# Patient Record
Sex: FEMALE | Race: WHITE | NOT HISPANIC OR LATINO | Employment: PART TIME | URBAN - METROPOLITAN AREA
[De-identification: names, ages, dates, MRNs, and addresses within clinical notes are randomized per-mention and may not be internally consistent; named-entity substitution may affect disease eponyms.]

---

## 2018-01-16 ENCOUNTER — HOSPITAL ENCOUNTER (EMERGENCY)
Facility: CLINIC | Age: 33
Discharge: HOME | End: 2018-01-16
Attending: EMERGENCY MEDICINE | Admitting: EMERGENCY MEDICINE
Payer: MEDICAID

## 2018-01-16 VITALS
TEMPERATURE: 100.2 F | OXYGEN SATURATION: 100 % | DIASTOLIC BLOOD PRESSURE: 59 MMHG | SYSTOLIC BLOOD PRESSURE: 122 MMHG | HEART RATE: 104 BPM | RESPIRATION RATE: 28 BRPM

## 2018-01-16 DIAGNOSIS — E86.0 DEHYDRATION: ICD-10-CM

## 2018-01-16 DIAGNOSIS — R11.15 NON-INTRACTABLE CYCLICAL VOMITING WITH NAUSEA: ICD-10-CM

## 2018-01-16 DIAGNOSIS — R11.2 NON-INTRACTABLE VOMITING WITH NAUSEA, UNSPECIFIED VOMITING TYPE: ICD-10-CM

## 2018-01-16 LAB
ALBUMIN SERPL-MCNC: 4.1 G/DL (ref 3.4–5)
ALP SERPL-CCNC: 59 U/L (ref 40–150)
ALT SERPL W P-5'-P-CCNC: 17 U/L (ref 0–50)
ANION GAP SERPL CALCULATED.3IONS-SCNC: 11 MMOL/L (ref 3–14)
AST SERPL W P-5'-P-CCNC: 15 U/L (ref 0–45)
BASOPHILS # BLD AUTO: 0 10E9/L (ref 0–0.2)
BASOPHILS NFR BLD AUTO: 0.4 %
BILIRUB SERPL-MCNC: 1.4 MG/DL (ref 0.2–1.3)
BUN SERPL-MCNC: 12 MG/DL (ref 7–30)
CALCIUM SERPL-MCNC: 8.7 MG/DL (ref 8.5–10.1)
CHLORIDE SERPL-SCNC: 107 MMOL/L (ref 94–109)
CO2 SERPL-SCNC: 20 MMOL/L (ref 20–32)
CREAT SERPL-MCNC: 0.71 MG/DL (ref 0.52–1.04)
DIFFERENTIAL METHOD BLD: ABNORMAL
EOSINOPHIL # BLD AUTO: 0 10E9/L (ref 0–0.7)
EOSINOPHIL NFR BLD AUTO: 0.2 %
ERYTHROCYTE [DISTWIDTH] IN BLOOD BY AUTOMATED COUNT: 13.5 % (ref 10–15)
GFR SERPL CREATININE-BSD FRML MDRD: >90 ML/MIN/1.7M2
GLUCOSE SERPL-MCNC: 110 MG/DL (ref 70–99)
HCT VFR BLD AUTO: 39.6 % (ref 35–47)
HGB BLD-MCNC: 13.6 G/DL (ref 11.7–15.7)
IMM GRANULOCYTES # BLD: 0 10E9/L (ref 0–0.4)
IMM GRANULOCYTES NFR BLD: 0.2 %
LYMPHOCYTES # BLD AUTO: 0.3 10E9/L (ref 0.8–5.3)
LYMPHOCYTES NFR BLD AUTO: 5.5 %
MCH RBC QN AUTO: 29.8 PG (ref 26.5–33)
MCHC RBC AUTO-ENTMCNC: 34.3 G/DL (ref 31.5–36.5)
MCV RBC AUTO: 87 FL (ref 78–100)
MONOCYTES # BLD AUTO: 0.8 10E9/L (ref 0–1.3)
MONOCYTES NFR BLD AUTO: 15.1 %
NEUTROPHILS # BLD AUTO: 4.1 10E9/L (ref 1.6–8.3)
NEUTROPHILS NFR BLD AUTO: 78.6 %
NRBC # BLD AUTO: 0 10*3/UL
NRBC BLD AUTO-RTO: 0 /100
PLATELET # BLD AUTO: 190 10E9/L (ref 150–450)
POTASSIUM SERPL-SCNC: 3.2 MMOL/L (ref 3.4–5.3)
PROT SERPL-MCNC: 8.4 G/DL (ref 6.8–8.8)
RBC # BLD AUTO: 4.57 10E12/L (ref 3.8–5.2)
SODIUM SERPL-SCNC: 138 MMOL/L (ref 133–144)
WBC # BLD AUTO: 5.2 10E9/L (ref 4–11)

## 2018-01-16 PROCEDURE — 96361 HYDRATE IV INFUSION ADD-ON: CPT

## 2018-01-16 PROCEDURE — 96375 TX/PRO/DX INJ NEW DRUG ADDON: CPT

## 2018-01-16 PROCEDURE — 80053 COMPREHEN METABOLIC PANEL: CPT | Performed by: EMERGENCY MEDICINE

## 2018-01-16 PROCEDURE — 96376 TX/PRO/DX INJ SAME DRUG ADON: CPT

## 2018-01-16 PROCEDURE — 99284 EMERGENCY DEPT VISIT MOD MDM: CPT | Mod: 25

## 2018-01-16 PROCEDURE — 96374 THER/PROPH/DIAG INJ IV PUSH: CPT

## 2018-01-16 PROCEDURE — 25000132 ZZH RX MED GY IP 250 OP 250 PS 637: Performed by: EMERGENCY MEDICINE

## 2018-01-16 PROCEDURE — 93005 ELECTROCARDIOGRAM TRACING: CPT

## 2018-01-16 PROCEDURE — 25000128 H RX IP 250 OP 636: Performed by: EMERGENCY MEDICINE

## 2018-01-16 PROCEDURE — 85025 COMPLETE CBC W/AUTO DIFF WBC: CPT | Performed by: EMERGENCY MEDICINE

## 2018-01-16 RX ORDER — ONDANSETRON 2 MG/ML
4 INJECTION INTRAMUSCULAR; INTRAVENOUS EVERY 30 MIN PRN
Status: COMPLETED | OUTPATIENT
Start: 2018-01-16 | End: 2018-01-16

## 2018-01-16 RX ORDER — PROMETHAZINE HYDROCHLORIDE 25 MG/ML
12.5 INJECTION, SOLUTION INTRAMUSCULAR; INTRAVENOUS ONCE
Status: COMPLETED | OUTPATIENT
Start: 2018-01-16 | End: 2018-01-16

## 2018-01-16 RX ORDER — PROMETHAZINE HYDROCHLORIDE 25 MG/1
25 TABLET ORAL EVERY 6 HOURS PRN
Qty: 20 TABLET | Refills: 0 | Status: ON HOLD | OUTPATIENT
Start: 2018-01-16 | End: 2018-01-21

## 2018-01-16 RX ORDER — POTASSIUM CHLORIDE 1500 MG/1
20 TABLET, EXTENDED RELEASE ORAL ONCE
Status: COMPLETED | OUTPATIENT
Start: 2018-01-16 | End: 2018-01-16

## 2018-01-16 RX ADMIN — PROMETHAZINE HYDROCHLORIDE 12.5 MG: 25 INJECTION INTRAMUSCULAR; INTRAVENOUS at 22:14

## 2018-01-16 RX ADMIN — SODIUM CHLORIDE, POTASSIUM CHLORIDE, SODIUM LACTATE AND CALCIUM CHLORIDE 1000 ML: 600; 310; 30; 20 INJECTION, SOLUTION INTRAVENOUS at 22:12

## 2018-01-16 RX ADMIN — ONDANSETRON 4 MG: 2 INJECTION INTRAMUSCULAR; INTRAVENOUS at 19:52

## 2018-01-16 RX ADMIN — ONDANSETRON 4 MG: 2 INJECTION INTRAMUSCULAR; INTRAVENOUS at 20:22

## 2018-01-16 RX ADMIN — POTASSIUM CHLORIDE 20 MEQ: 1500 TABLET, EXTENDED RELEASE ORAL at 23:11

## 2018-01-16 RX ADMIN — ONDANSETRON 4 MG: 2 INJECTION INTRAMUSCULAR; INTRAVENOUS at 21:35

## 2018-01-16 RX ADMIN — SODIUM CHLORIDE, POTASSIUM CHLORIDE, SODIUM LACTATE AND CALCIUM CHLORIDE 1000 ML: 600; 310; 30; 20 INJECTION, SOLUTION INTRAVENOUS at 19:50

## 2018-01-16 ASSESSMENT — ENCOUNTER SYMPTOMS
VOMITING: 1
DIARRHEA: 0
NAUSEA: 1
COUGH: 1
FEVER: 1
CHILLS: 1

## 2018-01-16 NOTE — ED AVS SNAPSHOT
[facility] Emergency Department    [address] [place] [address]    Phone:  [phone]    Fax:  [phone]                                       Ashlie Matthews   MRN: [id number]    Department:  [facility] Emergency Department   Date of Visit:  1/16/2018           Patient Information     Date Of Birth          1985        Your diagnoses for this visit were:     Non-intractable cyclical vomiting with nausea     Dehydration     Non-intractable vomiting with nausea, unspecified vomiting type        You were seen by Carlos A Galeas MD.      Follow-up Information     Follow up with Clinic, [facility] In 1 day.    Why:  As needed    Contact information:    [id number]  [facility] [id number] [phone]          Discharge Instructions         The Flu (Influenza)     The virus that causes the flu spreads through the air in droplets when someone who has the flu coughs, sneezes, laughs, or talks.   The flu (influenza) is an infection that affects your respiratory tract. This tract is made up of your mouth, nose, and lungs, and the passages between them. Unlike a cold, the flu can make you very ill. And it can lead to pneumonia, a serious lung infection. The flu can have serious complications and even cause death.  Who is at risk for the flu?  Anyone can get the flu. But you are more likely to become infected if you:    Have a weakened immune system    Work in a healthcare setting where you may be exposed to flu germs    Live or work with someone who has the flu    Haven t had an annual flu shot  How does the flu spread?  The flu is caused by a virus. The virus spreads through the air in droplets when someone who has the flu coughs, sneezes, laughs, or talks. You can become infected when you inhale these viruses directly. You can also become infected when you touch a surface on which the droplets have landed and then transfer the germs to your  eyes, nose, or mouth. Touching used tissues, or sharing utensils, drinking glasses, or a toothbrush from an infected person can expose you to flu viruses, too.  What are the symptoms of the flu?  Flu symptoms tend to come on quickly and may last a few days to a few weeks. They include:    Fever usually higher than 100.4 F  (38 C) and chills    Sore throat and headache    Dry cough    Runny nose    Tiredness and weakness    Muscle aches  Who is at risk for flu complications?  For some people, the flu can be very serious. The risk for complications is greater for:    Children younger than age 5    Adults ages 65 and older    People with a chronic illness such as diabetes or heart, kidney, or lung disease    People who live in a nursing home or long-term care facility   How is the flu treated?  The flu usually gets better after 7 days or so. In some cases, your healthcare provider may prescribe an antiviral medicine. This may help you get well a little sooner. For the medicine to help, you need to take it as soon as possible (ideally within 48 hours) after your symptoms start. If you develop pneumonia or other serious illness, you may need to stay in the hospital.  Easing flu symptoms    Drink lots of fluids such as water, juice, and warm soup. A good rule is to drink enough so that you urinate your normal amount.    Get plenty of rest.    Ask your healthcare provider what to take for fever and pain.    Call your provider if your fever is 100.4 F (38 C) or higher, or you become dizzy, lightheaded, or short of breath.  Taking steps to protect others    Wash your hands often, especially after coughing or sneezing. Or clean your hands with an alcohol-based hand  containing at least 60% alcohol.    Cough or sneeze into a tissue. Then throw the tissue away and wash your hands. If you don t have a tissue, cough and sneeze into your elbow.    Stay home until at least 24 hours after you no longer have a fever or chills.  Be sure the fever isn t being hidden by fever-reducing medicine.    Don t share food, utensils, drinking glasses, or a toothbrush with others.    Ask your healthcare provider if others in your household should get antiviral medicine to help them avoid infection.  How can the flu be prevented?    One of the best ways to avoid the flu is to get a flu vaccine each year. The virus that causes the flu changes from year to year. For that reason, healthcare providers recommend getting the flu vaccine each year, as soon as it's available in your area. The vaccine is given as a shot. Your healthcare provider can tell you which vaccine is right for you. A nasal spray is also available but is not recommended for the 1790-5826 flu season. The CDC says this is because the nasal spray did not seem to protect against the flu over the last several flu seasons. In the past, it was meant for people ages 2 to 49.    Wash your hands often. Frequent handwashing is a proven way to help prevent infection.    Carry an alcohol-based hand gel containing at least 60% alcohol. Use it when you can't use soap and water. Then wash your hands as soon as you can.    Avoid touching your eyes, nose, and mouth.    At home and work, clean phones, computer keyboards, and toys often with disinfectant wipes.    If possible, avoid close contact with others who have the flu or symptoms of the flu.  Handwashing tips  Handwashing is one of the best ways to prevent many common infections. If you are caring for or visiting someone with the flu, wash your hands each time you enter and leave the room. Follow these steps:    Use warm water and plenty of soap. Rub your hands together well.    Clean the whole hand, including under your nails, between your fingers, and up the wrists.    Wash for at least 15 seconds.    Rinse, letting the water run down your fingers, not up your wrists.    Dry your hands well. Use a paper towel to turn off the faucet and open the  door.  Using alcohol-based hand   Alcohol-based hand  are also a good choice. Use them when you can't use soap and water. Follow these steps:    Squeeze about a tablespoon of gel into the palm of one hand.    Rub your hands together briskly, cleaning the backs of your hands, the palms, between your fingers, and up the wrists.    Rub until the gel is gone and your hands are completely dry.  Preventing the flu in healthcare settings  The flu is a special concern for people in hospitals and long-term care facilities. To help prevent the spread of flu, many hospitals and nursing homes take these steps:    Healthcare providers wash their hands or use an alcohol-based hand  before and after treating each patient.    People with the flu have private rooms and bathrooms or share a room with someone with the same infection.    People who are at high risk for the flu but don't have it are encouraged to get the flu and pneumonia vaccines.    All healthcare workers are encouraged or required to get flu shots.   Date Last Reviewed: 12/1/2016 2000-2017 [employer]. [address] [place] [address]. All rights reserved. This information is not intended as a substitute for professional medical care. Always follow your healthcare professional's instructions.        Discharge Instructions  Vomiting    You have been seen today for vomiting. This is usually caused by a virus, but some bacteria, parasites, medicines or other medical conditions can cause similar symptoms. At this time your doctor does not find that your vomiting is a sign of anything dangerous or life-threatening. However, sometimes the signs of serious illness do not show up right away. If you have new or worse symptoms, you may need to be seen again in the emergency department or by your primary doctor. Remember that serious problems like appendicitis can start as vomiting.     Return to the Emergency Department  if:    You keep throwing up and you are not able to keep liquids down.     You feel you are getting dehydrated, such as being very thirsty, not urinating at least every 8-12 hours, or feeling faint or lightheaded.     You develop a new fever, or your fever continues for more than 2 days.     You have belly pain that seems worse than cramps, is in one spot, or is getting worse over time.     You have blood in your vomit or stools.     You feel very weak.    You are not starting to improve within 24 hours of your visit here.     What can I do to help myself?    The most important thing to do is to drink clear liquids. If you have been vomiting a lot, it is best to have only small, frequent sips of liquids. Drinking too much at once may cause more vomiting. If you are vomiting often, you must replace minerals, sodium and potassium lost with your illness. Pedialyte  and sports drinks can help you replace these minerals. You can also drink clear liquids such as water, weak tea, apple juice, and 7-Up . Avoid acid liquids (orange), caffeine (coffee) or alcohol. Do not drink milk until you no longer have diarrhea.     After liquids are staying down, you may start eating mild foods. Soda crackers, toast, plain noodles, gelatin, applesauce and bananas are good first choices. Avoid foods that have acid, are spicy, fatty or have a lot of fiber (such as meats, coarse grains, vegetables). You may start eating these foods again in about 3 days when you are better.     Sometimes treatment includes prescription medicine to prevent nausea and vomiting. If your doctor prescribes these for you, take them as directed.     Don t take ibuprofen, or other nonsteroidal anti-inflammatory medicines without checking with your healthcare provider.   If you were given a prescription for medicine here today, be sure to read all of the information (including the package insert) that comes with your prescription.  This will include important  information about the medicine, its side effects, and any warnings that you need to know about.  The pharmacist who fills the prescription can provide more information and answer questions you may have about the medicine.  If you have questions or concerns that the pharmacist cannot address, please call or return to the Emergency Department.       Opioid Medication Information    Pain medications are among the most commonly prescribed medicines, so we are including this information for all our patients. If you did not receive pain medication or get a prescription for pain medicine, you can ignore it.     You may have been given a prescription for an opioid (narcotic) pain medicine and/or have received a pain medicine while here in the Emergency Department. These medicines can make you drowsy or impaired. You must not drive, operate dangerous equipment, or engage in any other dangerous activities while taking these medications. If you drive while taking these medications, you could be arrested for DUI, or driving under the influence. Do not drink any alcohol while you are taking these medications.     Opioid pain medications can cause addiction. If you have a history of chemical dependency of any type, you are at a higher risk of becoming addicted to pain medications.  Only take these prescribed medications to treat your pain when all other options have been tried. Take it for as short a time and as few doses as possible. Store your pain pills in a secure place, as they are frequently stolen and provide a dangerous opportunity for children or visitors in your house to start abusing these powerful medications. We will not replace any lost or stolen medicine.  As soon as your pain is better, you should flush all your remaining medication.     Many prescription pain medications contain Tylenol  (acetaminophen), including Vicodin , Tylenol #3 , Norco , Lortab , and Percocet .  You should not take any extra pills of  Tylenol  if you are using these prescription medications or you can get very sick.  Do not ever take more than 3000 mg of acetaminophen in any 24 hour period.    All opioids tend to cause constipation. Drink plenty of water and eat foods that have a lot of fiber, such as fruits, vegetables, prune juice, apple juice and high fiber cereal.  Take a laxative if you don t move your bowels at least every other day. Miralax , Milk of Magnesia, Colace , or Senna  can be used to keep you regular.      Remember that you can always come back to the Emergency Department if you are not able to see your regular doctor in the amount of time listed above, if you get any new symptoms, or if there is anything that worries you.          24 Hour Appointment Hotline       To make an appointment at any [facility], call [phone] ([phone]). If you don't have a family doctor or clinic, we will help you find one. [facility] are conveniently located to serve the needs of you and your family.             Review of your medicines      START taking        Dose / Directions Last dose taken    promethazine 25 MG tablet   Commonly known as:  PHENERGAN   Dose:  25 mg   Quantity:  20 tablet        Take 1 tablet (25 mg) by mouth every 6 hours as needed for nausea   Refills:  0          Our records show that you are taking the medicines listed below. If these are incorrect, please call your family doctor or clinic.        Dose / Directions Last dose taken    * albuterol 108 (90 BASE) MCG/ACT Inhaler   Commonly known as:  PROAIR HFA/PROVENTIL HFA/VENTOLIN HFA   Dose:  2 puff   Quantity:  1 Inhaler        Inhale 2 puffs into the lungs every 6 hours as needed for shortness of breath / dyspnea or wheezing   Refills:  11        * albuterol (2.5 MG/3ML) 0.083% neb solution   Dose:  1 vial   Quantity:  30 vial        Take 1 vial (2.5 mg) by nebulization every 6 hours as needed for shortness of breath / dyspnea or wheezing   Refills:  3         fluticasone 50 MCG/ACT spray   Commonly known as:  FLONASE   Dose:  1-2 spray   Quantity:  17 g        Spray 1-2 sprays into both nostrils daily   Refills:  1        metoclopramide 5 MG tablet   Commonly known as:  REGLAN   Dose:  5-10 mg   Quantity:  20 tablet        Take 1-2 tablets (5-10 mg) by mouth every 6 hours as needed   Refills:  0        mometasone 50 MCG/ACT spray   Commonly known as:  NASONEX   Dose:  2 spray   Quantity:  3 Box        Spray 2 sprays into both nostrils daily   Refills:  3        prenatal multivitamin plus iron 27-0.8 MG Tabs per tablet   Dose:  1 tablet        Take 1 tablet by mouth daily   Refills:  0        ZYRTEC ALLERGY 10 MG tablet   Dose:  10 mg   Generic drug:  cetirizine        Take 10 mg by mouth every morning   Refills:  0        * Notice:  This list has 2 medication(s) that are the same as other medications prescribed for you. Read the directions carefully, and ask your doctor or other care provider to review them with you.            Prescriptions were sent or printed at these locations (1 Prescription)                   Other Prescriptions                Printed at Department/Unit printer (1 of 1)         promethazine (PHENERGAN) 25 MG tablet                Procedures and tests performed during your visit     CBC with platelets differential    Comprehensive metabolic panel    EKG 12-lead, tracing only    Peripheral IV catheter      Orders Needing Specimen Collection     None      Pending Results     Date and Time Order Name Status Description    1/16/2018 1929 EKG 12-lead, tracing only Preliminary             Pending Culture Results     No orders found from 1/14/2018 to 1/17/2018.            Pending Results Instructions     If you had any lab results that were not finalized at the time of your Discharge, you can call the ED Lab Result RN at [phone]. You will be contacted by this team for any positive Lab results or changes in treatment. The nurses are available 7  days a week from 10A to 6:30P.  You can leave a message 24 hours per day and they will return your call.        Test Results From Your Hospital Stay        1/16/2018  7:44 PM      Component Results     Component Value Ref Range & Units Status    WBC 5.2 4.0 - 11.0 10e9/L Final    RBC Count 4.57 3.8 - 5.2 10e12/L Final    Hemoglobin 13.6 11.7 - 15.7 g/dL Final    Hematocrit 39.6 35.0 - 47.0 % Final    MCV 87 78 - 100 fl Final    MCH 29.8 26.5 - 33.0 pg Final    MCHC 34.3 31.5 - 36.5 g/dL Final    RDW 13.5 10.0 - 15.0 % Final    Platelet Count 190 150 - 450 10e9/L Final    Diff Method Automated Method  Final    % Neutrophils 78.6 % Final    % Lymphocytes 5.5 % Final    % Monocytes 15.1 % Final    % Eosinophils 0.2 % Final    % Basophils 0.4 % Final    % Immature Granulocytes 0.2 % Final    Nucleated RBCs 0 0 /100 Final    Absolute Neutrophil 4.1 1.6 - 8.3 10e9/L Final    Absolute Lymphocytes 0.3 (L) 0.8 - 5.3 10e9/L Final    Absolute Monocytes 0.8 0.0 - 1.3 10e9/L Final    Absolute Eosinophils 0.0 0.0 - 0.7 10e9/L Final    Absolute Basophils 0.0 0.0 - 0.2 10e9/L Final    Abs Immature Granulocytes 0.0 0 - 0.4 10e9/L Final    Absolute Nucleated RBC 0.0  Final         1/16/2018  8:00 PM      Component Results     Component Value Ref Range & Units Status    Sodium 138 133 - 144 mmol/L Final    Potassium 3.2 (L) 3.4 - 5.3 mmol/L Final    Chloride 107 94 - 109 mmol/L Final    Carbon Dioxide 20 20 - 32 mmol/L Final    Anion Gap 11 3 - 14 mmol/L Final    Glucose 110 (H) 70 - 99 mg/dL Final    Urea Nitrogen 12 7 - 30 mg/dL Final    Creatinine 0.71 0.52 - 1.04 mg/dL Final    GFR Estimate >90 >60 mL/min/1.7m2 Final    Non  GFR Calc    GFR Estimate If Black >90 >60 mL/min/1.7m2 Final    African American GFR Calc    Calcium 8.7 8.5 - 10.1 mg/dL Final    Bilirubin Total 1.4 (H) 0.2 - 1.3 mg/dL Final    Albumin 4.1 3.4 - 5.0 g/dL Final    Protein Total 8.4 6.8 - 8.8 g/dL Final    Alkaline Phosphatase 59 40 - 150 U/L  Final    ALT 17 0 - 50 U/L Final    AST 15 0 - 45 U/L Final                Clinical Quality Measure: Blood Pressure Screening     Your blood pressure was checked while you were in the emergency department today. The last reading we obtained was  BP: 122/59 . Please read the guidelines below about what these numbers mean and what you should do about them.  If your systolic blood pressure (the top number) is less than 120 and your diastolic blood pressure (the bottom number) is less than 80, then your blood pressure is normal. There is nothing more that you need to do about it.  If your systolic blood pressure (the top number) is 120-139 or your diastolic blood pressure (the bottom number) is 80-89, your blood pressure may be higher than it should be. You should have your blood pressure rechecked within a year by a primary care provider.  If your systolic blood pressure (the top number) is 140 or greater or your diastolic blood pressure (the bottom number) is 90 or greater, you may have high blood pressure. High blood pressure is treatable, but if left untreated over time it can put you at risk for heart attack, stroke, or kidney failure. You should have your blood pressure rechecked by a primary care provider within the next 4 weeks.  If your provider in the emergency department today gave you specific instructions to follow-up with your doctor or provider even sooner than that, you should follow that instruction and not wait for up to 4 weeks for your follow-up visit.        Thank you for choosing [facility]       Thank you for choosing [facility] for your care. Our goal is always to provide you with excellent care. Hearing back from our patients is one way we can continue to improve our services. Please take a few minutes to complete the written survey that you may receive in the mail after you visit with us. Thank you!        [employer] Information     [employer] gives you secure access to your electronic health record. If  you see a primary care provider, you can also send messages to your care team and make appointments. If you have questions, please call your primary care clinic.  If you do not have a primary care provider, please call [phone] and they will assist you.        Care EveryWhere ID     This is your Care EveryWhere ID. This could be used by other organizations to access your [facility] medical records  [id number]        Equal Access to Services     ERICKA ALMAGUER : Hadii harpreet Aguilar, wabethany harris, vanda kaalmacheri ruggiero, mary reza. So [facility] [phone].    ATENCIÓN: Si habla eliseo, tiene a calvillo disposición servicios gratuitos de asistencia lingüística. Llame al [phone].    We comply with applicable federal civil rights laws and [place] laws. We do not discriminate on the basis of race, color, national origin, age, disability, sex, sexual orientation, or gender identity.            After Visit Summary       This is your record. Keep this with you and show to your community pharmacist(s) and doctor(s) at your next visit.

## 2018-01-16 NOTE — ED AVS SNAPSHOT
[facility] Emergency Department    [address]    [facility] [place] [address]    Phone:  [phone]    Fax:  [phone]                                       Ashlie Matthews   MRN: [id number]    Department:  [facility] Emergency Department   Date of Visit:  1/16/2018           After Visit Summary Signature Page     I have received my discharge instructions, and my questions have been answered. I have discussed any challenges I see with this plan with the nurse or doctor.    ..........................................................................................................................................  Patient/Patient Representative Signature      ..........................................................................................................................................  Patient Representative Print Name and Relationship to Patient    ..................................................               ................................................  Date                                            Time    ..........................................................................................................................................  Reviewed by Signature/Title    ...................................................              ..............................................  Date                                                            Time

## 2018-01-17 LAB — INTERPRETATION ECG - MUSE: NORMAL

## 2018-01-17 NOTE — DISCHARGE INSTRUCTIONS
The Flu (Influenza)     The virus that causes the flu spreads through the air in droplets when someone who has the flu coughs, sneezes, laughs, or talks.   The flu (influenza) is an infection that affects your respiratory tract. This tract is made up of your mouth, nose, and lungs, and the passages between them. Unlike a cold, the flu can make you very ill. And it can lead to pneumonia, a serious lung infection. The flu can have serious complications and even cause death.  Who is at risk for the flu?  Anyone can get the flu. But you are more likely to become infected if you:    Have a weakened immune system    Work in a healthcare setting where you may be exposed to flu germs    Live or work with someone who has the flu    Haven t had an annual flu shot  How does the flu spread?  The flu is caused by a virus. The virus spreads through the air in droplets when someone who has the flu coughs, sneezes, laughs, or talks. You can become infected when you inhale these viruses directly. You can also become infected when you touch a surface on which the droplets have landed and then transfer the germs to your eyes, nose, or mouth. Touching used tissues, or sharing utensils, drinking glasses, or a toothbrush from an infected person can expose you to flu viruses, too.  What are the symptoms of the flu?  Flu symptoms tend to come on quickly and may last a few days to a few weeks. They include:    Fever usually higher than 100.4 F  (38 C) and chills    Sore throat and headache    Dry cough    Runny nose    Tiredness and weakness    Muscle aches  Who is at risk for flu complications?  For some people, the flu can be very serious. The risk for complications is greater for:    Children younger than age 5    Adults ages 65 and older    People with a chronic illness such as diabetes or heart, kidney, or lung disease    People who live in a nursing home or long-term care facility   How is the flu treated?  The flu usually gets  better after 7 days or so. In some cases, your healthcare provider may prescribe an antiviral medicine. This may help you get well a little sooner. For the medicine to help, you need to take it as soon as possible (ideally within 48 hours) after your symptoms start. If you develop pneumonia or other serious illness, you may need to stay in the hospital.  Easing flu symptoms    Drink lots of fluids such as water, juice, and warm soup. A good rule is to drink enough so that you urinate your normal amount.    Get plenty of rest.    Ask your healthcare provider what to take for fever and pain.    Call your provider if your fever is 100.4 F (38 C) or higher, or you become dizzy, lightheaded, or short of breath.  Taking steps to protect others    Wash your hands often, especially after coughing or sneezing. Or clean your hands with an alcohol-based hand  containing at least 60% alcohol.    Cough or sneeze into a tissue. Then throw the tissue away and wash your hands. If you don t have a tissue, cough and sneeze into your elbow.    Stay home until at least 24 hours after you no longer have a fever or chills. Be sure the fever isn t being hidden by fever-reducing medicine.    Don t share food, utensils, drinking glasses, or a toothbrush with others.    Ask your healthcare provider if others in your household should get antiviral medicine to help them avoid infection.  How can the flu be prevented?    One of the best ways to avoid the flu is to get a flu vaccine each year. The virus that causes the flu changes from year to year. For that reason, healthcare providers recommend getting the flu vaccine each year, as soon as it's available in your area. The vaccine is given as a shot. Your healthcare provider can tell you which vaccine is right for you. A nasal spray is also available but is not recommended for the 1802-7084 flu season. The CDC says this is because the nasal spray did not seem to protect against the flu  over the last several flu seasons. In the past, it was meant for people ages 2 to 49.    Wash your hands often. Frequent handwashing is a proven way to help prevent infection.    Carry an alcohol-based hand gel containing at least 60% alcohol. Use it when you can't use soap and water. Then wash your hands as soon as you can.    Avoid touching your eyes, nose, and mouth.    At home and work, clean phones, computer keyboards, and toys often with disinfectant wipes.    If possible, avoid close contact with others who have the flu or symptoms of the flu.  Handwashing tips  Handwashing is one of the best ways to prevent many common infections. If you are caring for or visiting someone with the flu, wash your hands each time you enter and leave the room. Follow these steps:    Use warm water and plenty of soap. Rub your hands together well.    Clean the whole hand, including under your nails, between your fingers, and up the wrists.    Wash for at least 15 seconds.    Rinse, letting the water run down your fingers, not up your wrists.    Dry your hands well. Use a paper towel to turn off the faucet and open the door.  Using alcohol-based hand   Alcohol-based hand  are also a good choice. Use them when you can't use soap and water. Follow these steps:    Squeeze about a tablespoon of gel into the palm of one hand.    Rub your hands together briskly, cleaning the backs of your hands, the palms, between your fingers, and up the wrists.    Rub until the gel is gone and your hands are completely dry.  Preventing the flu in healthcare settings  The flu is a special concern for people in hospitals and long-term care facilities. To help prevent the spread of flu, many hospitals and nursing homes take these steps:    Healthcare providers wash their hands or use an alcohol-based hand  before and after treating each patient.    People with the flu have private rooms and bathrooms or share a room with someone  with the same infection.    People who are at high risk for the flu but don't have it are encouraged to get the flu and pneumonia vaccines.    All healthcare workers are encouraged or required to get flu shots.   Date Last Reviewed: 12/1/2016 2000-2017 [email or web address]. [address] [place] [id number]. All rights reserved. This information is not intended as a substitute for professional medical care. Always follow your healthcare professional's instructions.        Discharge Instructions  Vomiting    You have been seen today for vomiting. This is usually caused by a virus, but some bacteria, parasites, medicines or other medical conditions can cause similar symptoms. At this time your doctor does not find that your vomiting is a sign of anything dangerous or life-threatening. However, sometimes the signs of serious illness do not show up right away. If you have new or worse symptoms, you may need to be seen again in the emergency department or by your primary doctor. Remember that serious problems like appendicitis can start as vomiting.     Return to the Emergency Department if:    You keep throwing up and you are not able to keep liquids down.     You feel you are getting dehydrated, such as being very thirsty, not urinating at least every 8-12 hours, or feeling faint or lightheaded.     You develop a new fever, or your fever continues for more than 2 days.     You have belly pain that seems worse than cramps, is in one spot, or is getting worse over time.     You have blood in your vomit or stools.     You feel very weak.    You are not starting to improve within 24 hours of your visit here.     What can I do to help myself?    The most important thing to do is to drink clear liquids. If you have been vomiting a lot, it is best to have only small, frequent sips of liquids. Drinking too much at once may cause more vomiting. If you are vomiting often, you must replace minerals, sodium and  potassium lost with your illness. Pedialyte  and sports drinks can help you replace these minerals. You can also drink clear liquids such as water, weak tea, apple juice, and 7-Up . Avoid acid liquids (orange), caffeine (coffee) or alcohol. Do not drink milk until you no longer have diarrhea.     After liquids are staying down, you may start eating mild foods. Soda crackers, toast, plain noodles, gelatin, applesauce and bananas are good first choices. Avoid foods that have acid, are spicy, fatty or have a lot of fiber (such as meats, coarse grains, vegetables). You may start eating these foods again in about 3 days when you are better.     Sometimes treatment includes prescription medicine to prevent nausea and vomiting. If your doctor prescribes these for you, take them as directed.     Don t take ibuprofen, or other nonsteroidal anti-inflammatory medicines without checking with your healthcare provider.   If you were given a prescription for medicine here today, be sure to read all of the information (including the package insert) that comes with your prescription.  This will include important information about the medicine, its side effects, and any warnings that you need to know about.  The pharmacist who fills the prescription can provide more information and answer questions you may have about the medicine.  If you have questions or concerns that the pharmacist cannot address, please call or return to the Emergency Department.       Opioid Medication Information    Pain medications are among the most commonly prescribed medicines, so we are including this information for all our patients. If you did not receive pain medication or get a prescription for pain medicine, you can ignore it.     You may have been given a prescription for an opioid (narcotic) pain medicine and/or have received a pain medicine while here in the Emergency Department. These medicines can make you drowsy or impaired. You must not drive,  operate dangerous equipment, or engage in any other dangerous activities while taking these medications. If you drive while taking these medications, you could be arrested for DUI, or driving under the influence. Do not drink any alcohol while you are taking these medications.     Opioid pain medications can cause addiction. If you have a history of chemical dependency of any type, you are at a higher risk of becoming addicted to pain medications.  Only take these prescribed medications to treat your pain when all other options have been tried. Take it for as short a time and as few doses as possible. Store your pain pills in a secure place, as they are frequently stolen and provide a dangerous opportunity for children or visitors in your house to start abusing these powerful medications. We will not replace any lost or stolen medicine.  As soon as your pain is better, you should flush all your remaining medication.     Many prescription pain medications contain Tylenol  (acetaminophen), including Vicodin , Tylenol #3 , Norco , Lortab , and Percocet .  You should not take any extra pills of Tylenol  if you are using these prescription medications or you can get very sick.  Do not ever take more than 3000 mg of acetaminophen in any 24 hour period.    All opioids tend to cause constipation. Drink plenty of water and eat foods that have a lot of fiber, such as fruits, vegetables, prune juice, apple juice and high fiber cereal.  Take a laxative if you don t move your bowels at least every other day. Miralax , Milk of Magnesia, Colace , or Senna  can be used to keep you regular.      Remember that you can always come back to the Emergency Department if you are not able to see your regular doctor in the amount of time listed above, if you get any new symptoms, or if there is anything that worries you.

## 2018-01-17 NOTE — ED NOTES
Pt woke up last night feeling poorly. States she is unable to tolerate fluids, frequent vomiting. Hst of cyclic vomiting. Pt and  state she has had 20-30 episodes of syncope with this illness.

## 2018-01-17 NOTE — ED PROVIDER NOTES
History     Chief Complaint:  Flu Symptoms      HPI   Ashlie Matthews is a 32 year old female with a history of cyclic vomiting and asthma  who presents with influenza like symptoms. The patient states that last weekend her two children began to get sick and had positive influenza swabs. Last night the patient began experiencing fevers, chills, cough, body aches, nausea, and vomiting. Since onset of her symptoms she has not been able to keep down any fluids. Her  also reports that she has lost consciousness 20-30 times in the last 24 hours. Patient denies hematemesis, diarrhea, and all other complaints.     Allergies:  Moxifloxacin Hydrochloride  Cats  Codeine  Darvocet [Propoxyphene N-Apap]  Demerol  Dog Hair [Dogs]  Dust Mites  Pollen Extract  Vicodin [Acetaminophen]  Latex      Medications:    Reglan  Flonase  Nasonex  Zyrtec  Proair Inhaler  Albuterol Nebulizer Solution    Past Medical History:    Allergic Rhinitis  Anxiety  Asthma  Cyclic Vomiting Syndrome  Dysmenorrhea  Hypertension in Pregnancy  Pneumothorax  Allergic State  Hyperemesis /gravidarum  Atopic Dermatitis  Perforated Tympanic Membrane  Chronic Tonsillitis     Past Surgical History:    Arthroscopy Shoulder - Bilateral  Laparscopy  Laparotomy Mini, Tubal Ligation, Combined  Myringotomy, Insert Tube Bilateral, Combined  Septoplasty for Deviated Septum  Sinus Surgery  Tonsillectomy     Family History:    Hypertension  Pancreatitis  Lipids  Blood Disease    Social History:  Presents alone   Tobacco use: never  Alcohol use: No  [facility]: [facility]    Marital Status:        Review of Systems   Constitutional: Positive for chills and fever.   Respiratory: Positive for cough.    Gastrointestinal: Positive for nausea and vomiting. Negative for diarrhea.   Musculoskeletal:        Body aches   All other systems reviewed and are negative.    Physical Exam     Patient Vitals for the past 24 hrs:   BP Temp Pulse Heart Rate Resp  SpO2   01/16/18 2158 - - - - - 100 %   01/16/18 2133 - - - - - 100 %   01/16/18 2130 122/59 - - - - -   01/16/18 2100 117/48 - - - - -   01/16/18 2031 - - - - - 100 %   01/16/18 2030 103/52 - - - - -   01/16/18 2015 - - 104 - 28 98 %   01/16/18 2000 100/54 - - - - 100 %   01/16/18 1952 - - 112 - - 100 %   01/16/18 1931 121/63 100.2  F (37.9  C) 129 129 20 100 %      Physical Exam  Constitutional:  Appears well-developed and well-nourished. Alert. Conversant, but voice is breathy, and hyperventilating.  Appears anxious.  Denies shortness of breath.   initially at bedside and provides most of her history, but he had to leave to take care of the children, who are awaiting in the car.  HENT:   Head: Atraumatic.   Nose: Nose normal.  Mouth/Throat: Oral mucosa is clear but moderately dry-not desiccated or cracked. no trismus. Pharynx normal. Tonsils symmetric. No tonsillar enlargement, erythema, or exudate.  Eyes: Conjunctivae normal. EOM normal. Pupils equal, round, and reactive to light. No scleral icterus.   Neck: Normal range of motion. Neck supple. No tracheal deviation present.   No JVD  Cardiovascular: Tachycardic rate, regular rhythm. No gallop. No friction rub. No murmur heard. Symmetric radial artery pulses   Pulmonary/Chest: Effort normal. No stridor. No respiratory distress. No wheezes. No rales. No rhonchi . No tenderness.   Abdominal: Soft. Bowel sounds normal. No distension. No mass. No tenderness. No rebound. No guarding.   No CVA tenderness  Musculoskeletal:   RUE: Normal range of motion. No tenderness. No deformity  LUE: Normal range of motion. No tenderness. No deformity  RLE: Normal range of motion. No edema. No tenderness. No deformity  LLE: Normal range of motion. No edema. No tenderness. No deformity  Lymph: No cervical adenopathy.   Neurological: Alert and oriented to person, place, and time. Normal strength. CN II-VII intact. No sensory deficit. GCS eye subscore is 4. GCS verbal subscore is  5. GCS motor subscore is 6. Normal coordination   Skin: Skin is warm and dry. No rash noted. No pallor. Normal capillary refill.  Psychiatric:  Normal mood.  Reports history of cyclic vomiting syndrome.  Typically has repetitive, uncontrollable nausea and vomiting, this leads to fainting.  She is fainted several times a day as is typical for her CVS pattern.    Emergency Department Course   ECG:  ECG (19:51:14):  Rate 103 bpm. WY interval 146. QRS duration 94. QT/QTc 340/445. P-R-T axes 62 80 65. Sinus tachycardia. Otherwise normal ECG. Interpreted at 1955 by Carlos A Galeas MD.     Laboratory:  CBC: WNL (WBC 5.2, HGB 13.6, )    CMP: Glucose 110 (H), Potassium 3.2 (L), Bilirubin 1.4 (H) o/w WNL (Creatinine 0.71)     Interventions:  1950: Lactated Ringers Bolus 1,000 mL IV  1952: Zofran 4 mg IV  2022: Zofran 4 mg IV  2135: Zofran 4 mg IV  2212: Lactated Ringers Bolus 1L IV  2214: Phenergan 12.5 mg IV  2311: K-Dur CR Tablet 20 mEq PO    Emergency Department Course:  Past medical records, nursing notes, and vitals reviewed.  1935: I performed an exam of the patient and obtained history, as documented above.  IV inserted and blood drawn.   Above interventions provided.   I personally reviewed the laboratory results with the Patient and answered all related questions prior to discharge.  2336: I rechecked the patient. Findings and plan explained to the Patient. Patient discharged home with instructions regarding supportive care, medications, and reasons to return. The importance of close follow-up was reviewed.        Impression & Plan    Medical Decision Making:  Ashlie Matthews is a 32 year old female who presents for evaluation of cough, fever and myalgias.  She also has prominent vomiting with this illness.  2 of her children are positive for influenza A.  This is consistent with an influenza like illness.  Patient understands the sensitivity of influenza rapid test, and given known exposures with a  highly compatible clinical syndrome, will hold off on testing tonight because we would not believe a negative result..  They are at risk for pneumonia but no signs of this are detected on today's visit.  Discussed option for Tamiflu, given that she does have a history of asthma.  No evidence for asthma exacerbation at this time.  She would prefer to hold off on that due to risk of GI side effects and also says that 1 of her children was given Tamiflu and developed some confusion/neuropsychiatric side effects .    She is also developed vomiting with many episodes of nonbilious nonbloody vomiting today.  This could be a symptom of her influenza illness, but she also has a history of cyclic vomiting syndrome with similar presentations in the past.  No diarrhea today.  She was not responding to oral meds given at home.  She did not respond to Zofran given here in the ER but did develop much better after Phenergan.  She is now passed a p.o. challenge and feels ready to go home.  Labs are reassuring save for mild hypokalemia, which was supplemented orally here in the ER.  She also had syncope associated with her cyclic vomiting today.  This also is typical for her episodes of CVS.  We did check EKG to look for dysrhythmia or ischemia and it is normal.  Given the classic history with prior episodes of syncope like this I do not feel that she needs to be admitted for cardiac monitoring or further workup at this time.     Close followup of primary care physician is indicated and return to the ED for high fevers > 103 for more than 48 hours more, increasing productive cough, shortness of breath, or confusion.  There is no signs of serious bacterial infection such as bacteremia, meningitis, UTI/pyelonephritis, strep pharyngitis, etc.        Diagnosis:    ICD-10-CM    1. Non-intractable cyclical vomiting with nausea G43.A0    2. Dehydration E86.0    3. Non-intractable vomiting with nausea, unspecified vomiting type R11.2         Disposition:  Discharged to home with plan as outlined.     Discharge Medications:  Discharge Medication List as of 1/16/2018 11:25 PM      START taking these medications    Details   promethazine (PHENERGAN) 25 MG tablet Take 1 tablet (25 mg) by mouth every 6 hours as needed for nausea, Disp-20 tablet, R-0, Local Print               1/16/2018   [facility] EMERGENCY DEPARTMENT  I, Sandy Smith, max serving as a scribe at 7:35 PM on 1/16/2018 to document services personally performed by Carlos A Galeas MD based on my observations and the provider's statements to me.         Carlos A Galeas MD  01/17/18 0048

## 2018-01-20 ENCOUNTER — HOSPITAL ENCOUNTER (OUTPATIENT)
Facility: CLINIC | Age: 33
Setting detail: OBSERVATION
Discharge: HOME | End: 2018-01-21
Attending: EMERGENCY MEDICINE | Admitting: INTERNAL MEDICINE
Payer: MEDICAID

## 2018-01-20 DIAGNOSIS — I95.1 ORTHOSTATIC HYPOTENSION: ICD-10-CM

## 2018-01-20 DIAGNOSIS — R19.7 VOMITING AND DIARRHEA: ICD-10-CM

## 2018-01-20 DIAGNOSIS — R11.10 VOMITING AND DIARRHEA: ICD-10-CM

## 2018-01-20 PROBLEM — R55 SYNCOPE: Status: ACTIVE | Noted: 2018-01-20

## 2018-01-20 LAB
ALBUMIN SERPL-MCNC: 4 G/DL (ref 3.4–5)
ALP SERPL-CCNC: 50 U/L (ref 40–150)
ALT SERPL W P-5'-P-CCNC: 32 U/L (ref 0–50)
ANION GAP SERPL CALCULATED.3IONS-SCNC: 9 MMOL/L (ref 3–14)
AST SERPL W P-5'-P-CCNC: 46 U/L (ref 0–45)
BASOPHILS # BLD AUTO: 0 10E9/L (ref 0–0.2)
BASOPHILS NFR BLD AUTO: 0.3 %
BILIRUB SERPL-MCNC: 0.7 MG/DL (ref 0.2–1.3)
BUN SERPL-MCNC: 9 MG/DL (ref 7–30)
CALCIUM SERPL-MCNC: 8.7 MG/DL (ref 8.5–10.1)
CHLORIDE SERPL-SCNC: 106 MMOL/L (ref 94–109)
CO2 SERPL-SCNC: 24 MMOL/L (ref 20–32)
CREAT SERPL-MCNC: 0.62 MG/DL (ref 0.52–1.04)
DIFFERENTIAL METHOD BLD: ABNORMAL
EOSINOPHIL # BLD AUTO: 0 10E9/L (ref 0–0.7)
EOSINOPHIL NFR BLD AUTO: 0.8 %
ERYTHROCYTE [DISTWIDTH] IN BLOOD BY AUTOMATED COUNT: 13.7 % (ref 10–15)
GFR SERPL CREATININE-BSD FRML MDRD: >90 ML/MIN/1.7M2
GLUCOSE SERPL-MCNC: 96 MG/DL (ref 70–99)
HCG SERPL QL: NEGATIVE
HCT VFR BLD AUTO: 44.5 % (ref 35–47)
HGB BLD-MCNC: 15 G/DL (ref 11.7–15.7)
IMM GRANULOCYTES # BLD: 0 10E9/L (ref 0–0.4)
IMM GRANULOCYTES NFR BLD: 0.3 %
LYMPHOCYTES # BLD AUTO: 0.6 10E9/L (ref 0.8–5.3)
LYMPHOCYTES NFR BLD AUTO: 14.1 %
MCH RBC QN AUTO: 29.8 PG (ref 26.5–33)
MCHC RBC AUTO-ENTMCNC: 33.7 G/DL (ref 31.5–36.5)
MCV RBC AUTO: 88 FL (ref 78–100)
MONOCYTES # BLD AUTO: 0.2 10E9/L (ref 0–1.3)
MONOCYTES NFR BLD AUTO: 5.1 %
NEUTROPHILS # BLD AUTO: 3.2 10E9/L (ref 1.6–8.3)
NEUTROPHILS NFR BLD AUTO: 79.4 %
NRBC # BLD AUTO: 0 10*3/UL
NRBC BLD AUTO-RTO: 0 /100
PLATELET # BLD AUTO: 141 10E9/L (ref 150–450)
POTASSIUM SERPL-SCNC: 3.6 MMOL/L (ref 3.4–5.3)
PROT SERPL-MCNC: 8.4 G/DL (ref 6.8–8.8)
RBC # BLD AUTO: 5.04 10E12/L (ref 3.8–5.2)
SODIUM SERPL-SCNC: 139 MMOL/L (ref 133–144)
TSH SERPL DL<=0.005 MIU/L-ACNC: 1.66 MU/L (ref 0.4–4)
WBC # BLD AUTO: 4 10E9/L (ref 4–11)

## 2018-01-20 PROCEDURE — 25000128 H RX IP 250 OP 636: Performed by: PHYSICIAN ASSISTANT

## 2018-01-20 PROCEDURE — 96375 TX/PRO/DX INJ NEW DRUG ADDON: CPT

## 2018-01-20 PROCEDURE — 96361 HYDRATE IV INFUSION ADD-ON: CPT

## 2018-01-20 PROCEDURE — 99220 ZZC INITIAL OBSERVATION CARE,LEVL III: CPT | Performed by: PHYSICIAN ASSISTANT

## 2018-01-20 PROCEDURE — 93005 ELECTROCARDIOGRAM TRACING: CPT

## 2018-01-20 PROCEDURE — 84703 CHORIONIC GONADOTROPIN ASSAY: CPT | Performed by: EMERGENCY MEDICINE

## 2018-01-20 PROCEDURE — 96374 THER/PROPH/DIAG INJ IV PUSH: CPT

## 2018-01-20 PROCEDURE — 96376 TX/PRO/DX INJ SAME DRUG ADON: CPT

## 2018-01-20 PROCEDURE — 25000128 H RX IP 250 OP 636: Performed by: EMERGENCY MEDICINE

## 2018-01-20 PROCEDURE — 25000132 ZZH RX MED GY IP 250 OP 250 PS 637: Performed by: PHYSICIAN ASSISTANT

## 2018-01-20 PROCEDURE — 80053 COMPREHEN METABOLIC PANEL: CPT | Performed by: EMERGENCY MEDICINE

## 2018-01-20 PROCEDURE — 84443 ASSAY THYROID STIM HORMONE: CPT | Performed by: EMERGENCY MEDICINE

## 2018-01-20 PROCEDURE — G0378 HOSPITAL OBSERVATION PER HR: HCPCS

## 2018-01-20 PROCEDURE — 99285 EMERGENCY DEPT VISIT HI MDM: CPT | Mod: 25

## 2018-01-20 PROCEDURE — 85025 COMPLETE CBC W/AUTO DIFF WBC: CPT | Performed by: EMERGENCY MEDICINE

## 2018-01-20 RX ORDER — SODIUM CHLORIDE 9 MG/ML
INJECTION, SOLUTION INTRAVENOUS CONTINUOUS
Status: DISCONTINUED | OUTPATIENT
Start: 2018-01-20 | End: 2018-01-20

## 2018-01-20 RX ORDER — BENZONATATE 100 MG/1
100 CAPSULE ORAL 3 TIMES DAILY PRN
Status: DISCONTINUED | OUTPATIENT
Start: 2018-01-20 | End: 2018-01-21 | Stop reason: HOSPADM

## 2018-01-20 RX ORDER — ACETAMINOPHEN 650 MG/1
650 SUPPOSITORY RECTAL EVERY 4 HOURS PRN
Status: DISCONTINUED | OUTPATIENT
Start: 2018-01-20 | End: 2018-01-21 | Stop reason: HOSPADM

## 2018-01-20 RX ORDER — SODIUM CHLORIDE 9 MG/ML
INJECTION, SOLUTION INTRAVENOUS CONTINUOUS
Status: DISCONTINUED | OUTPATIENT
Start: 2018-01-20 | End: 2018-01-21 | Stop reason: HOSPADM

## 2018-01-20 RX ORDER — ONDANSETRON 4 MG/1
4 TABLET, ORALLY DISINTEGRATING ORAL EVERY 8 HOURS PRN
Status: ON HOLD | COMMUNITY
End: 2018-05-03

## 2018-01-20 RX ORDER — LORAZEPAM 2 MG/ML
0.5 INJECTION INTRAMUSCULAR ONCE
Status: COMPLETED | OUTPATIENT
Start: 2018-01-20 | End: 2018-01-20

## 2018-01-20 RX ORDER — ONDANSETRON 2 MG/ML
4 INJECTION INTRAMUSCULAR; INTRAVENOUS EVERY 6 HOURS PRN
Status: DISCONTINUED | OUTPATIENT
Start: 2018-01-20 | End: 2018-01-21 | Stop reason: HOSPADM

## 2018-01-20 RX ORDER — LORAZEPAM 0.5 MG/1
0.5 TABLET ORAL EVERY 4 HOURS PRN
Status: DISCONTINUED | OUTPATIENT
Start: 2018-01-20 | End: 2018-01-21 | Stop reason: HOSPADM

## 2018-01-20 RX ORDER — ACETAMINOPHEN 325 MG/1
650 TABLET ORAL EVERY 4 HOURS PRN
Status: DISCONTINUED | OUTPATIENT
Start: 2018-01-20 | End: 2018-01-21 | Stop reason: HOSPADM

## 2018-01-20 RX ORDER — ONDANSETRON 4 MG/1
4 TABLET, ORALLY DISINTEGRATING ORAL EVERY 6 HOURS PRN
Status: DISCONTINUED | OUTPATIENT
Start: 2018-01-20 | End: 2018-01-21 | Stop reason: HOSPADM

## 2018-01-20 RX ORDER — LIDOCAINE 40 MG/G
CREAM TOPICAL
Status: DISCONTINUED | OUTPATIENT
Start: 2018-01-20 | End: 2018-01-21

## 2018-01-20 RX ORDER — FLUTICASONE PROPIONATE 50 MCG
1-2 SPRAY, SUSPENSION (ML) NASAL DAILY PRN
Status: ON HOLD | COMMUNITY
End: 2018-05-07

## 2018-01-20 RX ORDER — GUAIFENESIN/DEXTROMETHORPHAN 100-10MG/5
5 SYRUP ORAL EVERY 4 HOURS PRN
Status: DISCONTINUED | OUTPATIENT
Start: 2018-01-20 | End: 2018-01-21 | Stop reason: HOSPADM

## 2018-01-20 RX ORDER — PROCHLORPERAZINE MALEATE 10 MG
10 TABLET ORAL EVERY 6 HOURS PRN
Status: DISCONTINUED | OUTPATIENT
Start: 2018-01-20 | End: 2018-01-20

## 2018-01-20 RX ORDER — MECLIZINE HYDROCHLORIDE 25 MG/1
25 TABLET ORAL 3 TIMES DAILY PRN
Status: DISCONTINUED | OUTPATIENT
Start: 2018-01-20 | End: 2018-01-21 | Stop reason: HOSPADM

## 2018-01-20 RX ORDER — PROCHLORPERAZINE 25 MG
25 SUPPOSITORY, RECTAL RECTAL EVERY 12 HOURS PRN
Status: DISCONTINUED | OUTPATIENT
Start: 2018-01-20 | End: 2018-01-20

## 2018-01-20 RX ORDER — IBUPROFEN 600 MG/1
600 TABLET, FILM COATED ORAL EVERY 6 HOURS PRN
Status: DISCONTINUED | OUTPATIENT
Start: 2018-01-20 | End: 2018-01-21 | Stop reason: HOSPADM

## 2018-01-20 RX ORDER — NALOXONE HYDROCHLORIDE 0.4 MG/ML
.1-.4 INJECTION, SOLUTION INTRAMUSCULAR; INTRAVENOUS; SUBCUTANEOUS
Status: DISCONTINUED | OUTPATIENT
Start: 2018-01-20 | End: 2018-01-21 | Stop reason: HOSPADM

## 2018-01-20 RX ORDER — ALBUTEROL SULFATE 0.83 MG/ML
1 SOLUTION RESPIRATORY (INHALATION) EVERY 6 HOURS PRN
Status: DISCONTINUED | OUTPATIENT
Start: 2018-01-20 | End: 2018-01-21 | Stop reason: HOSPADM

## 2018-01-20 RX ORDER — PROMETHAZINE HYDROCHLORIDE 25 MG/1
25 TABLET ORAL EVERY 6 HOURS PRN
Status: DISCONTINUED | OUTPATIENT
Start: 2018-01-20 | End: 2018-01-21 | Stop reason: HOSPADM

## 2018-01-20 RX ORDER — ONDANSETRON 2 MG/ML
4 INJECTION INTRAMUSCULAR; INTRAVENOUS EVERY 30 MIN PRN
Status: DISCONTINUED | OUTPATIENT
Start: 2018-01-20 | End: 2018-01-20

## 2018-01-20 RX ADMIN — SODIUM CHLORIDE: 9 INJECTION, SOLUTION INTRAVENOUS at 16:26

## 2018-01-20 RX ADMIN — SODIUM CHLORIDE: 9 INJECTION, SOLUTION INTRAVENOUS at 14:09

## 2018-01-20 RX ADMIN — SODIUM CHLORIDE 1000 ML: 9 INJECTION, SOLUTION INTRAVENOUS at 11:40

## 2018-01-20 RX ADMIN — ONDANSETRON 4 MG: 2 INJECTION INTRAMUSCULAR; INTRAVENOUS at 11:08

## 2018-01-20 RX ADMIN — SODIUM CHLORIDE 1000 ML: 9 INJECTION, SOLUTION INTRAVENOUS at 11:07

## 2018-01-20 RX ADMIN — ACETAMINOPHEN 650 MG: 325 TABLET, FILM COATED ORAL at 16:27

## 2018-01-20 RX ADMIN — ONDANSETRON 4 MG: 2 INJECTION INTRAMUSCULAR; INTRAVENOUS at 17:50

## 2018-01-20 RX ADMIN — PROCHLORPERAZINE EDISYLATE 10 MG: 5 INJECTION INTRAMUSCULAR; INTRAVENOUS at 16:42

## 2018-01-20 RX ADMIN — LORAZEPAM 0.5 MG: 0.5 TABLET ORAL at 18:23

## 2018-01-20 RX ADMIN — LORAZEPAM 0.05 MG: 2 INJECTION INTRAMUSCULAR; INTRAVENOUS at 11:08

## 2018-01-20 ASSESSMENT — ENCOUNTER SYMPTOMS
NAUSEA: 1
DIZZINESS: 1
DIARRHEA: 1
VOMITING: 1
LIGHT-HEADEDNESS: 1
CHILLS: 1
FEVER: 1
APPETITE CHANGE: 1

## 2018-01-20 NOTE — H&P
History and Physical     Ashlie Matthews MRN# [id number]   YOB: 1985 Age: 32 year old      Date of Admission:  1/20/2018    Primary care provider: Billie Brar Yeaddiss          Assessment and Plan:   Ashlie Matthews is a 32 year old female with a PMH significant for stress induced cyclical vomiting, anxiety, asthma and pneumothorax related to coughing who presents with vomiting, diarrhea and syncope.    Patient was discussed with Dr. Crowlye, who was provider in ED. Chart review of ED work up was performed and is as follows: Vitals show temp of 98.4, pulse 89, and pressure 119/81 with spontaneous respirations and no hypoxia. Labs remarkable for Creatinine 0.62, normal electrolytes, LFTs show slight elevation in AST at 46, negative pregnancy test,  WBC 4, Hgb 15.  ECG NSR. Patient declined CXR due to lack of insurance. Chart review of Care Everywhere, previous visits and admissions were also reviewed.    1. Gastroenteritis with nausea, vomiting and diarrhea   Developed after being clinically diagnosed with Influenza (not started on Tamiflu). Has vomited at least 12x in the last 24 hours and has had at least 20 bowel movements in the last 24 hours. Denies stomach pain and no fever. WBC normal. Bowel sounds are actually hypoactive. Hx of stress induced cyclic vomiting syndrome.  -Send stool for C diff (no recent risk factors) and enteric pathogens  -Fluid support  -Anti nausea meds ordered  -No significant stomach pain to necessitate further bowel imaging.    2. Syncope presumed to be related to # 1  Since #1 started she has passed out multiple times with preceding lightheadedness. No chest pain or palpitations. No hx of cardiac issues or active bleeding. She has been significantly orthostatic in ED despite 2 litres of fluid which is most likely cause.  -Fluids as above  -Monitor on tele to ensure no heart block  -No indication for echocardiogram at this point    3. Presumed influenza  Her  children and sister were recently diagnosed and earlier in the week she had cough, shortness of breath, fever and body aches so was clinically diagnosed but not started on tamilfu due to hx of cyclic vomiting. Shortness of breath and cough improved. No elevation of WBC or fever to suggest pneumonia. Lungs sound clear. Patient is hesitant for CXR due to lack of insurance. I think at this point it is OK to hold off.  -Supportive care with tylenol/ibuprofen  -Tessalon pearls  -Low threshhold for further imaging if she becomes febrile/hypoxic        Social: No concerns  Code: Discussed with patient and they have chosen Full code  VTE prophylaxis: Ambulation  Disposition: Observation                    Chief Complaint:   Diarrhea, vomiting and syncope         History of Present Illness:   Ashlie Matthews is a 32 year old female who presents with profuse diarrhea, vomiting and multiple episodes of syncope. She reports her family all has influenza and she was diagnosed with it earlier in the week (not actually tested but given symptoms of fever, body aches and cough was symptomatically diagnosed) but was not started on Tamiflu due to hx of stress induced cyclical vomiting. Over the last 24 hours she has developed profuse vomiting and diarrhea without being able to keep anything down. Since starting this she has had multiple episodes of syncope with significant lightheadedness when standing. She states when she has cyclic vomiting episodes she does pass out. She denies chest pain, palpitations, and abdominal pain. She has no active bleeding. She feels her respiratory symptoms have improved with her last fever about 48 hours ago. She denies smoking, alcohol use and illegal drug use including marijuana.              Past Medical History:     Past Medical History:   Diagnosis Date     Allergic rhinitis      Anxiety      Asthma      Cyclic vomiting syndrome      Dysmenorrhea      Hypertension in pregnancy      Pneumothorax  2004    from bad coughing               Past Surgical History:     Past Surgical History:   Procedure Laterality Date     ARTHROSCOPY SHOULDER Bilateral 2008,2009     Laparoscopy to look for endometriosis  8/2004     LAPAROTOMY MINI, TUBAL LIGATION (POST PARTUM), COMBINED Bilateral 5/11/2016    Procedure: COMBINED LAPAROTOMY MINI, TUBAL LIGATION (POST PARTUM);  Surgeon: Nannette Shane DO;  Location: [facility] OR     MYRINGOTOMY, INSERT TUBE BILATERAL, COMBINED  as a child     Septoplasty for deviated septum  7/2005     SINUS SURGERY  2007     TONSILLECTOMY  2006               Social History:     Social History     Social History     Marital status:      Spouse name: Pa     Number of children: 1     Years of education: N/A     Occupational History     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 Yes     Social History Narrative               Family History:     Family History   Problem Relation Age of Onset     DIABETES Maternal Grandfather      insulin     Hypertension Mother      GASTROINTESTINAL DISEASE Mother      Pancreatitis-flare ups often     Hypertension Maternal Grandfather      Lipids Mother      high     Obesity Maternal Grandmother      Obesity Maternal Grandfather      Respiratory Maternal Grandfather      asthma     Family History Negative Father      Blood Disease Mother      HEART DISEASE Father      Allergies Son      Allergic to peanuts, different foods, Amoxicillin              Allergies:      Allergies   Allergen Reactions     Moxifloxacin Hydrochloride Nausea and Vomiting     Avelox-vomiting     Cats Itching     Codeine GI Disturbance     Darvocet [Propoxyphene N-Apap] Hives     Demerol Hives     Dog Hair [Dogs] Unknown     Dust Mites  "Itching and Swelling     Pollen Extract Itching     Vicodin [Acetaminophen] GI Disturbance     Latex Itching, Swelling and Rash     \"sensitivity\"               Medications:     Prior to Admission medications    Medication Sig Last Dose Taking? Auth Provider   ondansetron (ZOFRAN-ODT) 4 MG ODT tab Take 4 mg by mouth every 8 hours as needed for nausea 1/20/2018 at am Yes Unknown, Entered By History   promethazine (PHENERGAN) 25 MG tablet Take 1 tablet (25 mg) by mouth every 6 hours as needed for nausea 1/20/2018 at am Yes Carlos A Galeas MD   Prenatal Vit-Fe Fumarate-FA (PRENATAL MULTIVITAMIN  PLUS IRON) 27-0.8 MG TABS Take 1 tablet by mouth daily Past Month at  Yes Reported, Patient   cetirizine (ZYRTEC ALLERGY) 10 MG tablet Take 10 mg by mouth every morning 1/20/2018 at am Yes Unknown, Entered By History   fluticasone (FLONASE) 50 MCG/ACT spray Spray 1-2 sprays into both nostrils daily as needed for rhinitis or allergies More than a month at   Unknown, Entered By History   albuterol (PROAIR HFA, PROVENTIL HFA, VENTOLIN HFA) 108 (90 BASE) MCG/ACT inhaler Inhale 2 puffs into the lungs every 6 hours as needed for shortness of breath / dyspnea or wheezing More than a month at   Balgobin, Christopher Lennox Paul, MD   albuterol (2.5 MG/3ML) 0.083% nebulizer solution Take 1 vial (2.5 mg) by nebulization every 6 hours as needed for shortness of breath / dyspnea or wheezing More than a month at   Balgobin, Christopher Lennox Paul, MD              Review of Systems:   A Comprehensive greater than 10 system review of systems was carried out.  Pertinent positives and negatives are noted above.  Otherwise negative for contributory information.            Physical Exam:   Blood pressure 119/81, pulse 89, temperature 98.4  F (36.9  C), temperature source Oral, resp. rate 28, weight 56.7 kg (125 lb), SpO2 100 %, currently breastfeeding.  Exam:  GENERAL:  Comfortable.  PSYCH: pleasant, oriented, No acute " distress.  HEENT:  PERRLA. Normal conjunctiva, normal hearing, nasal mucosa and Oropharynx are normal.  NECK:  Supple, no neck vein distention, adenopathy or bruits, normal thyroid.  HEART:  Normal S1, S2 with no murmur, no pericardial rub, gallops or S3 or S4.  LUNGS:  Clear to auscultation, normal Respiratory effort. No wheezing, rales or ronchi.  ABDOMEN:  Soft, no hepatosplenomegaly, normal bowel sounds. Non-tender, non distended.   EXTREMITIES:  No pedal edema, +2 pulses bilateral and equal.  SKIN:  Dry to touch, No rash, wound or ulcerations.  NEUROLOGIC:  CN 2-12 grossly intact, sensation is intact with no focal deficits.               Data:       Recent Labs  Lab 01/20/18  1047 01/16/18 1934   WBC 4.0 5.2   HGB 15.0 13.6   HCT 44.5 39.6   MCV 88 87   * 190       Recent Labs  Lab 01/20/18 1047 01/16/18 1934    138   POTASSIUM 3.6 3.2*   CHLORIDE 106 107   CO2 24 20   ANIONGAP 9 11   GLC 96 110*   BUN 9 12   CR 0.62 0.71   GFRESTIMATED >90 >90   GFRESTBLACK >90 >90   MURIEL 8.7 8.7   PROTTOTAL 8.4 8.4   ALBUMIN 4.0 4.1   BILITOTAL 0.7 1.4*   ALKPHOS 50 59   AST 46* 15   ALT 32 17         No results found for this or any previous visit (from the past 24 hour(s)).      Kathy Salgado PA-C

## 2018-01-20 NOTE — ED NOTES
"Helped ambulate pt. To commode with assistance from RN. Pt was weak and needed assistance standing. Pt stated before ambulation, \"My leg is numb.\" MD notified.   "

## 2018-01-20 NOTE — ED NOTES
Patient brought in by  for evaluation of syncopal episodes, nausea, vomiting, and diarrhea. Pt reports that 2 sons had tested positive for influenza.

## 2018-01-20 NOTE — IP AVS SNAPSHOT
[facility] Observation Department    [address]    [facility] [address]    Phone:  [phone]                                       After Visit Summary   1/20/2018    Ashlie Matthews    MRN: [id number]           After Visit Summary Signature Page     I have received my discharge instructions, and my questions have been answered. I have discussed any challenges I see with this plan with the nurse or doctor.    ..........................................................................................................................................  Patient/Patient Representative Signature      ..........................................................................................................................................  Patient Representative Print Name and Relationship to Patient    ..................................................               ................................................  Date                                            Time    ..........................................................................................................................................  Reviewed by Signature/Title    ...................................................              ..............................................  Date                                                            Time

## 2018-01-20 NOTE — PHARMACY-ADMISSION MEDICATION HISTORY
Admission medication history interview status for this patient is complete. See [facility] admission navigator for allergy information, prior to admission medications and immunization status.     Medication history interview source(s):Patient  Medication history resources (including written lists, pill bottles, clinic record):None  Primary [facility]     Changes made to PTA medication list:  Added: Zofran  Deleted:  Reglan, Nasonex  Changed: none     Actions taken by pharmacist (provider contacted, etc):None     Additional medication history information:None    Medication reconciliation/reorder completed by provider prior to medication history? No    Do you take OTC medications (eg tylenol, ibuprofen, fish oil, eye/ear drops, etc)? N(Y/N)    For patients on insulin therapy: N (Y/N)  Lantus/levemir/NPH/Mix 70/30 dose:   (Y/N) (see Med list for doses)   Sliding scale Novolog Y/N  If Yes, do you have a baseline novolog pre-meal dose:  units with meals  Patients eat three meals a day:   Y/N    How many episodes of hypoglycemia do you have per week: _______  How many missed doses do you have per week: ______  How many times do you check your blood glucose per day: _______   Any Barriers to therapy - Be specific :  cost of medications, comfortable with giving injections (if applicable), comfortable and confident with current diabetes regimen: Y/N ______________      Prior to Admission medications    Medication Sig Last Dose Taking? Auth Provider   ondansetron (ZOFRAN-ODT) 4 MG ODT tab Take 4 mg by mouth every 8 hours as needed for nausea 1/20/2018 at am Yes Unknown, Entered By History   promethazine (PHENERGAN) 25 MG tablet Take 1 tablet (25 mg) by mouth every 6 hours as needed for nausea 1/20/2018 at am Yes Carlos A Galeas MD   Prenatal Vit-Fe Fumarate-FA (PRENATAL MULTIVITAMIN  PLUS IRON) 27-0.8 MG TABS Take 1 tablet by mouth daily Past Month at na Yes Reported, Patient   cetirizine (ZYRTEC ALLERGY) 10  MG tablet Take 10 mg by mouth every morning 1/20/2018 at am Yes Unknown, Entered By History   fluticasone (FLONASE) 50 MCG/ACT spray Spray 1-2 sprays into both nostrils daily as needed for rhinitis or allergies More than a month at   Unknown, Entered By History   albuterol (PROAIR HFA, PROVENTIL HFA, VENTOLIN HFA) 108 (90 BASE) MCG/ACT inhaler Inhale 2 puffs into the lungs every 6 hours as needed for shortness of breath / dyspnea or wheezing More than a month at   Tian, Christopher Lennox Paul, MD   albuterol (2.5 MG/3ML) 0.083% nebulizer solution Take 1 vial (2.5 mg) by nebulization every 6 hours as needed for shortness of breath / dyspnea or wheezing More than a month at   Balgobin, Christopher Lennox Paul, MD

## 2018-01-20 NOTE — ED PROVIDER NOTES
History     Chief Complaint:  Flu Symptoms    HPI   Ashlie Matthews is a 32 year old female who presents to the emergency department today for evaluation of flu symptoms. The patient reports that she developed fever and chills on Monday morning 01/15/2017 and began vomiting on Tuesday. She notes that two of her children have tested positive for influenza recently. She was evaluated in the emergency department on 01/16/2018 for the same symptoms. She had an ECG, blood work (with results as per below), and received 2 L LR and 12 mg of Zofran as well as phenergan and K-dur. She was discharged home with phenergan. The patient reports that she has been unable to eat much throughout the week. She states that she developed diarrhea, vomiting, and syncopal episodes today. She reports that prior to syncopizing she has had lightheadedness, dizziness, and chills. She estimates that she has had twenty episodes of vomiting today and fifteen of diarrhea. She notes that she has cyclic vomiting syndrome. She reports that she tried to take promethazine and Zofran this morning but vomited it up immediately.    Laboratory - 01/16/2017  CBC: WNL (WBC 5.2, HGB 13.6, )    CMP: Glucose 110 (H), Potassium 3.2 (L), Bilirubin 1.4 (H) o/w WNL (Creatinine 0.71)     Allergies:  Moxifloxacin Hydrochloride  Cats  Codeine  Darvocet [Propoxyphene N-Apap]  Demerol  Dog Hair [Dogs]  Dust Mites  Pollen Extract  Vicodin [Acetaminophen]  Latex     Medications:    Zyrtec  Phenergan  Metoclopramide  Flonase  Nasonex  Albuterol inhaler  Albuterol nebulizer    Past Medical History:    Allergic rhinitis  Anxiety  Asthma  Cyclic vomiting syndrome  Dysmenorrhea  Hypertension in pregnancy  Pneumothorax    Past Surgical History:    Bilateral shoulder arthroscopy  Laparoscopy to look for endometriosis  Bilateral laparotomy mini, tubal ligation combined  Myringotomy, insert tube bilateral, combined  Septoplasty for deviated septum  Sinus  surgery  Tonsillectomy    Family History:    Maternal Grandfather: Diabetes, hypertension, obesity, respiratory  Mother: Hypertension, gastrointestinal disease, lipids, blood disease  Father: Heart disease  Son: Allergies  Maternal Grandmother: Obesity    Social History:  The patient was accompanied to the ED by her .  Smoking Status: Never Smoker  Smokeless Tobacco: Never Used  Alcohol Use: Negative  Marital Status:       Review of Systems   Constitutional: Positive for appetite change (decreased), chills and fever.   Gastrointestinal: Positive for diarrhea, nausea and vomiting.   Neurological: Positive for dizziness, syncope and light-headedness.   All other systems reviewed and are negative.    Physical Exam     Patient Vitals for the past 24 hrs:   BP Temp Temp src Pulse Heart Rate Resp SpO2 Weight   01/20/18 1415 119/81 - - - 65 - - -   01/20/18 1400 122/71 - - - 74 - - -   01/20/18 1345 118/75 - - - 65 - - -   01/20/18 1330 127/75 - - - 73 - - -   01/20/18 1315 116/71 - - - 70 - - -   01/20/18 1300 102/67 - - - 78 28 - -   01/20/18 1215 102/57 - - - 62 25 - -   01/20/18 1200 99/53 - - - 81 - 100 % -   01/20/18 1145 114/68 - - - 74 - 100 % -   01/20/18 1113 135/62 - - - 96 - - -   01/20/18 1100 - - - - - - 100 % -   01/20/18 1053 114/72 - - - 90 - 99 % -   01/20/18 1052 112/68 - - - 73 - 100 % -   01/20/18 1045 122/69 - - - - - 99 % -   01/20/18 1043 122/69 98.4  F (36.9  C) Oral 89 - 20 99 % 56.7 kg (125 lb)     Physical Exam   Constitutional: She is oriented to person, place, and time. She appears well-developed.   Anxious vomiting   HENT:   Head: Normocephalic and atraumatic.   Right Ear: External ear normal.   Mouth/Throat: Oropharynx is clear and moist.   Eyes: Conjunctivae and EOM are normal. Pupils are equal, round, and reactive to light.   Neck: Normal range of motion. Neck supple. No JVD present.   Cardiovascular: Normal rate, regular rhythm and normal heart sounds.    Pulmonary/Chest:  Effort normal and breath sounds normal.   Abdominal: Soft. Bowel sounds are normal. She exhibits no distension. There is no tenderness. There is no rebound.   Musculoskeletal: Normal range of motion.   Lymphadenopathy:     She has no cervical adenopathy.   Neurological: She is alert and oriented to person, place, and time. She displays normal reflexes. No cranial nerve deficit. She exhibits normal muscle tone. Coordination normal.   Skin: Skin is warm and dry.   Psychiatric: She has a normal mood and affect. Her behavior is normal. Judgment normal.   Vitals reviewed.        Emergency Department Course     ECG:  ECG taken at 1049, ECG read at 1126  Normal sinus rhythm  Normal ECG  Rate 83 bpm. RI interval 150 ms. QRS duration 82 ms. QT/QTc 384/451 ms. P-R-T axes 51 79 56.    Laboratory:  Laboratory findings were communicated with the patient who voiced understanding of the findings.    CBC: WBC 4.0, HGB 15.0,  (L)  CMP: AST 46 (H), o/w WNL (Creatinine 0.62)  HCG qualitative: Negative    Interventions:  1107 NS 1000 mL IV  1108 Zofran 4 mg IV  1108 Ativan 0.5 mg IV  1140 NS 1000 mL IV  1409  mL/hr    Emergency Department Course:  Nursing notes and vitals reviewed.  I performed an exam of the patient as documented above.   IV was inserted and blood was drawn for laboratory testing, results above.  I discussed the treatment plan with the patient. They expressed understanding of this plan and consented to admission.  1438 I discussed the patient with Armida Salgado PA-C, of Dr. Watson's hospitalist service, who will admit the patient to a monitored bed for further evaluation and treatment.  I personally reviewed the ECG and laboratory results with the patient and answered all related questions prior to admission.    Impression & Plan      Medical Decision Making:  Patient presents with 3 episodes of passing out at home.  Patient is a history of vomiting she has uses the term cyclical vomiting.   Patient has had a recent positive influenza but not on Tamiflu.  Patient is well-appearing IV fluids were given lab tests are unremarkable after 2 full liters of IV fluid patient persists to be orthostatic and persists to be symptomatic and therefore will recommend admission for continued IV fluids clinical suspicions are for dehydration and vomiting and diarrhea induced orthostatic hypotension.  Care was discussed with Armida Salgado we will admit on observation for ongoing IV fluid.    Diagnosis:    ICD-10-CM    1. Orthostatic hypotension I95.1    2. Vomiting and diarrhea R11.10     R19.7      Disposition:  The patient is admitted into the care of Armida Salgado PA-C, of Dr. Watson's service.    Scribe Disclosure:  I, Jose David Cast, am serving as a scribe at 10:54 AM on 1/20/2018 to document services personally performed by Ivan Crowley MD based on my observations and the provider's statements to me.  [facility] EMERGENCY DEPARTMENT       Ivan Crowley MD  01/21/18 0874

## 2018-01-20 NOTE — IP AVS SNAPSHOT
MRN:[id number]                      After Visit Summary   1/20/2018    Ashlie Matthews    MRN: [id number]           Thank you!     Thank you for choosing [facility] for your care. Our goal is always to provide you with excellent care. Hearing back from our patients is one way we can continue to improve our services. Please take a few minutes to complete the written survey that you may receive in the mail after you visit. If you would like to speak to someone directly about your visit please contact Patient Relations at [phone]. Thank you!          Patient Information     Date Of Birth          1985        About your hospital stay     You were admitted on:  January 20, 2018 You last received care in the:  [facility] Observation Department    You were discharged on:  January 21, 2018        Reason for your hospital stay       You were admitted for concerns of passing out episode in the setting of recent illness with nausea and vomiting. You were found to be very dehydrated and improved after IV fluids. There is no cardiac origin of your passing out episode. Normal heart rhythm and normal ECHO.  Cont to push fluids, light bland meal as tolerated.  Discontinue Promethazine and instead use zofran.                  Who to Call     For medical emergencies, please call 911.  For non-urgent questions about your medical care, please call your primary care provider or clinic, [phone]          Attending Provider     Provider Specialty    Ivan Crowley MD Emergency Medicine    Nakia Watson DO Internal Medicine       Primary Care Provider Office Phone # Fax #    [facility] [phone]      After Care Instructions     Activity       Your activity upon discharge: activity as tolerated            Diet       Follow this diet upon discharge: Orders Placed This Encounter      Advance Diet as Tolerated: Regular Diet Adult  as tolerated                  Follow-up Appointments     Follow-up and recommended labs and tests        Follow up with primary care provider, [facility], within 7 days for hospital follow- up.                             Pending Results     No orders found for last 3 day(s).            Statement of Approval     Ordered          01/21/18 1219  I have reviewed and agree with all the recommendations and orders detailed in this document.  EFFECTIVE NOW     Approved and electronically signed by:  Sophia Cannon PA-C             Admission Information     Date & Time Provider Department Dept. Phone    1/20/2018 Nakia Watson,  [facility] Observation Department [phone]      Your Vitals Were     Blood Pressure Pulse Temperature Respirations Weight Pulse Oximetry    115/68 (BP Location: Left arm) 87 97.9  F (36.6  C) (Oral) 18 56.7 kg (125 lb) 97%    BMI (Body Mass Index)                   23.24 kg/m2           MyChart Information     [employer] gives you secure access to your electronic health record. If you see a primary care provider, you can also send messages to your care team and make appointments. If you have questions, please call your primary care clinic.  If you do not have a primary care provider, please call [phone] and they will assist you.        Care EveryWhere ID     This is your Care EveryWhere ID. This could be used by other organizations to access your [place] medical records  [id number]        Equal Access to Services     ERICKA ALMAGUER : Kurt Aguilar, waaxda luqadaha, qaybta kaalmacheri ruggiero, mary reza. So [facility] [phone].    ATENCIÓN: Si habla español, tiene a calvillo disposición servicios gratuitos de asistencia lingüística. Llame al [phone].    We comply with applicable federal civil rights laws and [place] laws. We do not discriminate on the basis of race, color, national origin, age,  disability, sex, sexual orientation, or gender identity.               Review of your medicines      START taking        Dose / Directions    ondansetron 4 MG tablet   Commonly known as:  ZOFRAN   Used for:  Vomiting and diarrhea        Dose:  4 mg   Take 1 tablet (4 mg) by mouth every 6 hours as needed for nausea   Quantity:  20 tablet   Refills:  0         CONTINUE these medicines which have NOT CHANGED        Dose / Directions    * albuterol 108 (90 BASE) MCG/ACT Inhaler   Commonly known as:  PROAIR HFA/PROVENTIL HFA/VENTOLIN HFA   Used for:  Mild intermittent asthma        Dose:  2 puff   Inhale 2 puffs into the lungs every 6 hours as needed for shortness of breath / dyspnea or wheezing   Quantity:  1 Inhaler   Refills:  11       * albuterol (2.5 MG/3ML) 0.083% neb solution   Used for:  Mild intermittent asthma        Dose:  1 vial   Take 1 vial (2.5 mg) by nebulization every 6 hours as needed for shortness of breath / dyspnea or wheezing   Quantity:  30 vial   Refills:  3       fluticasone 50 MCG/ACT spray   Commonly known as:  FLONASE        Dose:  1-2 spray   Spray 1-2 sprays into both nostrils daily as needed for rhinitis or allergies   Refills:  0       ondansetron 4 MG ODT tab   Commonly known as:  ZOFRAN-ODT        Dose:  4 mg   Take 4 mg by mouth every 8 hours as needed for nausea   Refills:  0       prenatal multivitamin plus iron 27-0.8 MG Tabs per tablet        Dose:  1 tablet   Take 1 tablet by mouth daily   Refills:  0       ZYRTEC ALLERGY 10 MG tablet   Generic drug:  cetirizine        Dose:  10 mg   Take 10 mg by mouth every morning   Refills:  0       * Notice:  This list has 2 medication(s) that are the same as other medications prescribed for you. Read the directions carefully, and ask your doctor or other care provider to review them with you.      STOP taking     promethazine 25 MG tablet   Commonly known as:  PHENERGAN                Where to get your medicines      Some of these will need a  paper prescription and others can be bought over the counter. Ask your nurse if you have questions.     Bring a paper prescription for each of these medications     ondansetron 4 MG tablet                Protect others around you: Learn how to safely use, store and throw away your medicines at www.disposemymeds.org.             Medication List: This is a list of all your medications and when to take them. Check marks below indicate your daily home schedule. Keep this list as a reference.      Medications           Morning Afternoon Evening Bedtime As Needed    * albuterol 108 (90 BASE) MCG/ACT Inhaler   Commonly known as:  PROAIR HFA/PROVENTIL HFA/VENTOLIN HFA   Inhale 2 puffs into the lungs every 6 hours as needed for shortness of breath / dyspnea or wheezing                                * albuterol (2.5 MG/3ML) 0.083% neb solution   Take 1 vial (2.5 mg) by nebulization every 6 hours as needed for shortness of breath / dyspnea or wheezing                                fluticasone 50 MCG/ACT spray   Commonly known as:  FLONASE   Spray 1-2 sprays into both nostrils daily as needed for rhinitis or allergies                                ondansetron 4 MG ODT tab   Commonly known as:  ZOFRAN-ODT   Take 4 mg by mouth every 8 hours as needed for nausea                                ondansetron 4 MG tablet   Commonly known as:  ZOFRAN   Take 1 tablet (4 mg) by mouth every 6 hours as needed for nausea                                prenatal multivitamin plus iron 27-0.8 MG Tabs per tablet   Take 1 tablet by mouth daily                                ZYRTEC ALLERGY 10 MG tablet   Take 10 mg by mouth every morning   Generic drug:  cetirizine                                * Notice:  This list has 2 medication(s) that are the same as other medications prescribed for you. Read the directions carefully, and ask your doctor or other care provider to review them with you.

## 2018-01-21 ENCOUNTER — APPOINTMENT (OUTPATIENT)
Dept: CARDIOLOGY | Facility: CLINIC | Age: 33
End: 2018-01-21
Attending: PHYSICIAN ASSISTANT
Payer: MEDICAID

## 2018-01-21 VITALS
HEART RATE: 87 BPM | SYSTOLIC BLOOD PRESSURE: 106 MMHG | TEMPERATURE: 98.8 F | RESPIRATION RATE: 20 BRPM | WEIGHT: 125 LBS | DIASTOLIC BLOOD PRESSURE: 55 MMHG | OXYGEN SATURATION: 95 % | BODY MASS INDEX: 23.24 KG/M2

## 2018-01-21 PROBLEM — R55 SYNCOPE: Status: RESOLVED | Noted: 2018-01-20 | Resolved: 2018-01-21

## 2018-01-21 LAB — INTERPRETATION ECG - MUSE: NORMAL

## 2018-01-21 PROCEDURE — 93306 TTE W/DOPPLER COMPLETE: CPT

## 2018-01-21 PROCEDURE — 25000128 H RX IP 250 OP 636: Performed by: PHYSICIAN ASSISTANT

## 2018-01-21 PROCEDURE — G0378 HOSPITAL OBSERVATION PER HR: HCPCS

## 2018-01-21 PROCEDURE — 99217 ZZC OBSERVATION CARE DISCHARGE: CPT | Performed by: PHYSICIAN ASSISTANT

## 2018-01-21 PROCEDURE — 93306 TTE W/DOPPLER COMPLETE: CPT | Mod: 26 | Performed by: INTERNAL MEDICINE

## 2018-01-21 PROCEDURE — 96361 HYDRATE IV INFUSION ADD-ON: CPT

## 2018-01-21 RX ORDER — ONDANSETRON 4 MG/1
4 TABLET, FILM COATED ORAL EVERY 6 HOURS PRN
Qty: 20 TABLET | Refills: 0 | Status: SHIPPED | OUTPATIENT
Start: 2018-01-21 | End: 2018-06-25

## 2018-01-21 RX ADMIN — SODIUM CHLORIDE 1000 ML: 9 INJECTION, SOLUTION INTRAVENOUS at 10:34

## 2018-01-21 RX ADMIN — SODIUM CHLORIDE: 9 INJECTION, SOLUTION INTRAVENOUS at 02:38

## 2018-01-21 NOTE — PLAN OF CARE
Problem: Patient Care Overview  Goal: Plan of Care/Patient Progress Review  Outcome: No Change  PRIMARY DIAGNOSIS: Syncope, Gastroenteritis   OUTPATIENT/OBSERVATION GOALS TO BE MET BEFORE DISCHARGE:  1. Orthostatic performed: No    2. Diagnostic testing complete & at baseline neurologic testing: No    3. Cleared by consultants (if involved): No    4. Interpretation of cardiac rhythm per telemetry tech: SR/SB    5. Tolerating adequate PO diet and medications: Yes- clears     6. Return to near baseline physical activity or neurologic status: No    Discharge Planner Nurse   Safe discharge environment identified: Yes  Barriers to discharge: Yes A x2 up to commode       Entered by: Mare Syed 01/21/2018 5:41 AM       Pt A & O x4, vitally stable on RA. Pt very tired, continues to be assist x2 up to commode and reports feeling weak/dizzy. Tolerating clear liquids. Denies any nausea/vomiting. Has not had any BM's, waiting for stool sample. Plan for IVF, echo, and SW consults in the morning. Will continue to monitor.

## 2018-01-21 NOTE — PROGRESS NOTES
Alerted by nursing that patient passed out again despite receiving 2.5 litres of fluid. She feels vertiginous when she is standing as well. Nursing was concerned so I went to reevaluate. Unfortunately, tele had not been placed yet to assess for arrhythmia.     When I entered room she was anxious and tearful. She expressed feeling overwhelmed by what was happening and that she had no insurance. She denies unilateral weakness, numbness and has no vertigo symptoms if lying down but feels like room spins when standing.     Exam reveals equal strength and sensation bilaterally of limbs. CN II - CN XII intact. No significant nystagmus on exam.     I find it unusual that she is still syncopizing despite 2.5 litres of fluid and having feelings of room spinning. I doubt a neurological process is occurring given her normal neuro exam. Is this a vagal reflux related to her cyclic vomiting or does she have POTS or autonomic dysfunction?    Nursing will now place tele and I will order an echocardiogram. If she becomes febrile will order CXR, UA, lactic acid and blood cultures to complete sepsis work up.

## 2018-01-21 NOTE — DISCHARGE SUMMARY
[facility] Outpatient / Observation Unit  Discharge Summary        Ashlie Matthews MRN# [id number]   YOB: 1985 Age: 32 year old     Date of Admission:  1/20/2018  Date of Discharge:  1/21/2018  Admitting Physician:  Nakia Watson, DO  Discharge Physician: Sophia Cannon PA-C  Discharging Service: Hospitalist      Primary Provider: [facility], [facility]  Primary Care Physician Phone Number: [phone]         Primary Discharge Diagnoses:    Ashlie Matthews was admitted on 1/20/2018 for concerns of nausea, vomiting as well as syncopal episode due to hypovolemia.     1. Acute Gastroenteritis: suspect viral causes. Recent exposure to sick family members with influenza A. Now resolved.      2. Syncope 2/2 hypovolemia. Negative cardiac work up.         Secondary Discharge Diagnoses:     Past Medical History:   Diagnosis Date     Allergic rhinitis      Anxiety      Asthma      Cyclic vomiting syndrome      Dysmenorrhea      Hypertension in pregnancy      Pneumothorax 2004    from bad coughing            Code Status:      Full Code        Brief Hospital Summary:        Reason for your hospital stay       You were admitted for concerns of passing out episode in the setting of   recent illness with nausea and vomiting. You were found to be very   dehydrated and improved after IV fluids. There is no cardiac origin of   your passing out episode. Normal heart rhythm and normal ECHO.  Cont to   push fluids, light bland meal as tolerated.  Discontinue Promethazine and instead use zofran.          Please refer to initial admission history and physical for further details.   Ashlie Matthews is a 32 year old female who presents with profuse diarrhea, vomiting and multiple episodes of syncope. She reports her family all has influenza and she was diagnosed with it earlier in the week (not actually tested but given symptoms of fever, body aches and cough was symptomatically diagnosed) but was not  started on Tamiflu due to hx of stress induced cyclical vomiting. Over the last 24 hours she has developed profuse vomiting and diarrhea without being able to keep anything down. Since starting this she has had multiple episodes of syncope with significant lightheadedness when standing. She states when she has cyclic vomiting episodes she does pass out. She denies chest pain, palpitations, and abdominal pain. She has no active bleeding. She feels her respiratory symptoms have improved with her last fever about 48 hours ago. She denies smoking, alcohol use and illegal drug use including marijuana. Work up in ED did not show any evidence of bowel obstruction. Pt was registered to the Observation Unit for continued supportive therapy for acute gastroenteritis.    Pt was resuscitated with IV fluids and continued on supportive measures including anti-emetics. She was still fairly symptomatic and orthostatic with 2.5L NS, she continued IV over vnith and another bolus in the am with resolution of her orthostatic hypotension. She feels better and is able to go home. Tele and ECHO were unremarkable. Labs were reviewed and significant results addressed.  On the day of discharge, symptoms were resolving and pt was able to tolerate PO intake. Vitals were stable, without evidence of orthostasis. Medications were reviewed and adjustments made as necessary. Pt is instructed to follow up as below.            Significant Lab During Hospitalization:        Recent Labs  Lab 01/20/18  1047 01/16/18  1934   WBC 4.0 5.2   HGB 15.0 13.6   HCT 44.5 39.6   MCV 88 87   * 190       Recent Labs  Lab 01/20/18  1047 01/16/18  1934    138   POTASSIUM 3.6 3.2*   CHLORIDE 106 107   CO2 24 20   ANIONGAP 9 11   GLC 96 110*   BUN 9 12   CR 0.62 0.71   GFRESTIMATED >90 >90   GFRESTBLACK >90 >90   MURIEL 8.7 8.7                Significant Imaging During Hospitalization:      Results for orders placed or performed during the hospital encounter  of 05/02/16   US Fetal Biophys Prof w/o Non Stress Test    Narrative    US OB FETAL BIOPHYSICAL PROFILE WITHOUT NON STRESS SINGLE  5/2/2016  7:40 PM    HISTORY: Decreased fetal movement.    COMPARISON: None.    FINDINGS:     Fetal breathing movements: 0 out of 2.  Gross body movement:    2 out of 2.  Fetal tone:         2 out of 2.  Amniotic fluid volume:     2 out of 2.    Presentation: Cephalic.   Fetal heart rate: 170 bpm. Regular rhythm.   Placenta: Posterior.  JOSE: 11 cm.      Impression    IMPRESSION:   1. Single live intrauterine pregnancy in cephalic presentation.  2. Biophysical profile score of 6 out of 8. The fetus received a score  of 0 out of 2 for breathing movements.    MIGUELINA TAVERA MD              Pending Results:        Unresulted Labs Ordered in the Past 30 Days of this Admission     No orders found for last 61 day(s).              Consultations This Hospital Stay:      No consultations were requested during this admission         Discharge Instructions and Follow-Up:      Follow up with primary care provider, [facility],   within 7 days for hospital follow- up.            Discharge Disposition:      Discharged to home         Discharge Medications:        Current Discharge Medication List      START taking these medications    Details   ondansetron (ZOFRAN) 4 MG tablet Take 1 tablet (4 mg) by mouth every 6 hours as needed for nausea  Qty: 20 tablet, Refills: 0    Associated Diagnoses: Vomiting and diarrhea         CONTINUE these medications which have NOT CHANGED    Details   ondansetron (ZOFRAN-ODT) 4 MG ODT tab Take 4 mg by mouth every 8 hours as needed for nausea      Prenatal Vit-Fe Fumarate-FA (PRENATAL MULTIVITAMIN  PLUS IRON) 27-0.8 MG TABS Take 1 tablet by mouth daily      cetirizine (ZYRTEC ALLERGY) 10 MG tablet Take 10 mg by mouth every morning      fluticasone (FLONASE) 50 MCG/ACT spray Spray 1-2 sprays into both nostrils daily as needed for rhinitis or allergies     "  albuterol (PROAIR HFA, PROVENTIL HFA, VENTOLIN HFA) 108 (90 BASE) MCG/ACT inhaler Inhale 2 puffs into the lungs every 6 hours as needed for shortness of breath / dyspnea or wheezing  Qty: 1 Inhaler, Refills: 11    Associated Diagnoses: Mild intermittent asthma      albuterol (2.5 MG/3ML) 0.083% nebulizer solution Take 1 vial (2.5 mg) by nebulization every 6 hours as needed for shortness of breath / dyspnea or wheezing  Qty: 30 vial, Refills: 3    Associated Diagnoses: Mild intermittent asthma         STOP taking these medications       promethazine (PHENERGAN) 25 MG tablet Comments:   Reason for Stopping:                   Allergies:         Allergies   Allergen Reactions     Moxifloxacin Hydrochloride Nausea and Vomiting     Avelox-vomiting     Cats Itching     Codeine GI Disturbance     Darvocet [Propoxyphene N-Apap] Hives     Demerol Hives     Dog Hair [Dogs] Unknown     Dust Mites Itching and Swelling     Pollen Extract Itching     Vicodin [Acetaminophen] GI Disturbance     Latex Itching, Swelling and Rash     \"sensitivity\"           Condition and Physical on Discharge:      Discharge condition: Stable   Vitals: Blood pressure 115/68, pulse 87, temperature 97.9  F (36.6  C), temperature source Oral, resp. rate 18, weight 56.7 kg (125 lb), SpO2 97 %, currently breastfeeding.  125 lbs 0 oz      GENERAL:  Comfortable.  PSYCH: pleasant, oriented, No acute distress.  HEENT:  PERRLA. Normal conjunctiva, normal hearing, nasal mucosa and Oropharynx are normal.  NECK:  Supple, no neck vein distention, adenopathy or bruits, normal thyroid.  HEART:  Normal S1, S2 with no murmur, no pericardial rub, gallops or S3 or S4.  LUNGS:  Clear to auscultation, normal Respiratory effort. No wheezing, rales or ronchi.  ABDOMEN:  Soft, no hepatosplenomegaly, normal bowel sounds. Non-tender, non distended.   EXTREMITIES:  No pedal edema, +2 pulses bilateral and equal.  SKIN:  Dry to touch, No rash, wound or ulcerations.  NEUROLOGIC:  " CN 2-12 intact, BL 5/5 symmetric upper and lower extremity strength, sensation is intact with no focal deficits.

## 2018-01-21 NOTE — PLAN OF CARE
Problem: Patient Care Overview  Goal: Plan of Care/Patient Progress Review  Outcome: Adequate for Discharge Date Met: 01/21/18  OBSERVATION patient END time: 1340    Patient's After Visit Summary was reviewed with patient and significant other.   Patient verbalized understanding of After Visit Summary, recommended follow up and was given an opportunity to ask questions.   Discharge medications sent home with patient/family: YES, hard copy rx for zofran   Discharged with significant other.

## 2018-01-21 NOTE — PLAN OF CARE
Problem: Patient Care Overview  Goal: Plan of Care/Patient Progress Review  Outcome: No Change  PRIMARY DIAGNOSIS: Syncope, Gastroenteritis   OUTPATIENT/OBSERVATION GOALS TO BE MET BEFORE DISCHARGE:  1. Orthostatic performed: No    2. Diagnostic testing complete & at baseline neurologic testing: No    3. Cleared by consultants (if involved): No    4. Interpretation of cardiac rhythm per telemetry tech: SR/SB    5. Tolerating adequate PO diet and medications: Yes- clears     6. Return to near baseline physical activity or neurologic status: No    Discharge Planner Nurse   Safe discharge environment identified: Yes  Barriers to discharge: Yes A x2 up to commode       Entered by: Mare Syed 01/21/2018 3:18 AM       Pt A & O x4, vitally stable on RA. Pt very tired, continues to be assist x2 up to commode and reports feeling weak/dizzy. Tolerating clear liquids. Denies any nausea/vomiting. Has not had any BM's, waiting for stool sample. Plan for IVF, echo, and SW consults in the morning. Will continue to monitor.

## 2018-01-21 NOTE — PLAN OF CARE
Problem: Patient Care Overview  Goal: Plan of Care/Patient Progress Review  PRIMARY DIAGNOSIS: GASTROENTERITIS, SYNCOPE    OUTPATIENT/OBSERVATION GOALS TO BE MET BEFORE DISCHARGE  1. Orthostatic performed: Yes        Lying Orthostatic BP: 117/59        Sitting Orthostatic BP: 119/68        Standing Orthostatic BP: 109/62    2. Tolerating PO fluid and/or antibiotics (if applicable): n/a    3. Nausea/Vomiting/Diarrhea symptoms improved: Yes    4. Pain status: Pain free.    5. Return to near baseline physical activity: Yes    Pt A&Ox4, VSS except orthostatic. Pt denies pain or nausea at this time. Pt states she had gotten up independently and denied dizziness or light-headedness. Pt denies any BMs since last evening. IVF infusing. Echo done this AM, results pending. Will continue to monitor.    Discharge Planner Nurse   Safe discharge environment identified: Yes  Barriers to discharge: Yes       Entered by: Mica Bright 01/21/2018 8:45 AM     Please review provider order for any additional goals.   Nurse to notify provider when observation goals have been met and patient is ready for discharge.

## 2018-01-21 NOTE — PLAN OF CARE
Problem: Patient Care Overview  Goal: Plan of Care/Patient Progress Review  Outcome: No Change  PRIMARY DIAGNOSIS: SYNCOPE/TIA  OUTPATIENT/OBSERVATION GOALS TO BE MET BEFORE DISCHARGE:  1. Orthostatic performed: No    2. Diagnostic testing complete & at baseline neurologic testing: No    3. Cleared by consultants (if involved): No    4. Interpretation of cardiac rhythm per telemetry tech: SR/SB    5. Tolerating adequate PO diet and medications: No    6. Return to near baseline physical activity or neurologic status: No    Discharge Planner Nurse   Safe discharge environment identified: Yes  Barriers to discharge: Yes, still A-2 to commode.  Weak and dizzy       Entered by: Elizabet Lehman 01/20/2018 10:17 PM      Patient alert and oriented x4.  VSS.  Up w/ A-2 to commode.  Tolerating small amounts of clear liquids.  Voiding well.  No BM's this shift and stool sample still needed.  Zofran given x1.  Anxious and crying regarding having no health insurance and worried about this hospital bill.  0.5mg Ativan given.  Did have one episode of Syncope that lasted 10-15 seconds while sitting on the edge of the bed.  MD notified.  Echo done and results pending.  IV infusing at 100/HR.  Will continue to monitor.

## 2018-01-21 NOTE — PLAN OF CARE
Problem: Patient Care Overview  Goal: Plan of Care/Patient Progress Review  Outcome: No Change  PRIMARY DIAGNOSIS: SYNCOPE/TIA  OUTPATIENT/OBSERVATION GOALS TO BE MET BEFORE DISCHARGE:  1. Orthostatic performed: N/A    2. Diagnostic testing complete & at baseline neurologic testing: N/A    3. Cleared by consultants (if involved): No    4. Interpretation of cardiac rhythm per telemetry tech: Sinus Rhythm     5. Tolerating adequate PO diet and medications: No, very nauseous     6. Return to near baseline physical activity or neurologic status: No, independent at baseline and A-2 now.    Discharge Planner Nurse   Safe discharge environment identified: Yes  Barriers to discharge: Yes, patient up with A-2 to commode.  Very weak and dizzy       Entered by: Elizabet Lehman 01/20/2018 7:10 PM     Please review provider order for any additional goals.   Nurse to notify provider when observation goals have been met and patient is ready for discharge.

## 2018-01-22 ENCOUNTER — TELEPHONE (OUTPATIENT)
Dept: FAMILY MEDICINE | Facility: CLINIC | Age: 33
End: 2018-01-22

## 2018-05-03 ENCOUNTER — HOSPITAL ENCOUNTER (EMERGENCY)
Facility: CLINIC | Age: 33
Discharge: PSYCHIATRIC OR SUBSTANCE USE FACILITY | End: 2018-05-03
Attending: EMERGENCY MEDICINE | Admitting: EMERGENCY MEDICINE
Payer: MEDICAID

## 2018-05-03 ENCOUNTER — HOSPITAL ENCOUNTER (INPATIENT)
Facility: CLINIC | Age: 33
LOS: 4 days | Discharge: HOME | End: 2018-05-07
Attending: PSYCHIATRY & NEUROLOGY | Admitting: PSYCHIATRY & NEUROLOGY
Payer: MEDICAID

## 2018-05-03 VITALS
HEART RATE: 93 BPM | RESPIRATION RATE: 16 BRPM | DIASTOLIC BLOOD PRESSURE: 66 MMHG | BODY MASS INDEX: 23.42 KG/M2 | SYSTOLIC BLOOD PRESSURE: 121 MMHG | TEMPERATURE: 98 F | OXYGEN SATURATION: 94 % | WEIGHT: 126 LBS

## 2018-05-03 DIAGNOSIS — R11.10 VOMITING AND DIARRHEA: ICD-10-CM

## 2018-05-03 DIAGNOSIS — R19.7 VOMITING AND DIARRHEA: ICD-10-CM

## 2018-05-03 DIAGNOSIS — F32.A DEPRESSION WITH SUICIDAL IDEATION: Primary | ICD-10-CM

## 2018-05-03 DIAGNOSIS — R45.851 DEPRESSION WITH SUICIDAL IDEATION: Primary | ICD-10-CM

## 2018-05-03 DIAGNOSIS — R45.851 SUICIDAL IDEATION: ICD-10-CM

## 2018-05-03 DIAGNOSIS — F41.9 ANXIETY: ICD-10-CM

## 2018-05-03 DIAGNOSIS — F32.A DEPRESSION, UNSPECIFIED DEPRESSION TYPE: ICD-10-CM

## 2018-05-03 LAB
AMPHETAMINES UR QL SCN: NEGATIVE
ANION GAP SERPL CALCULATED.3IONS-SCNC: 8 MMOL/L (ref 3–14)
BARBITURATES UR QL: NEGATIVE
BENZODIAZ UR QL: NEGATIVE
BUN SERPL-MCNC: 7 MG/DL (ref 7–30)
CALCIUM SERPL-MCNC: 8.8 MG/DL (ref 8.5–10.1)
CANNABINOIDS UR QL SCN: NEGATIVE
CHLORIDE SERPL-SCNC: 111 MMOL/L (ref 94–109)
CO2 SERPL-SCNC: 23 MMOL/L (ref 20–32)
COCAINE UR QL: NEGATIVE
CREAT SERPL-MCNC: 0.58 MG/DL (ref 0.52–1.04)
ERYTHROCYTE [DISTWIDTH] IN BLOOD BY AUTOMATED COUNT: 13.6 % (ref 10–15)
GFR SERPL CREATININE-BSD FRML MDRD: >90 ML/MIN/1.7M2
GLUCOSE BLDC GLUCOMTR-MCNC: 105 MG/DL (ref 70–99)
GLUCOSE SERPL-MCNC: 101 MG/DL (ref 70–99)
HCG UR QL: NEGATIVE
HCT VFR BLD AUTO: 39.7 % (ref 35–47)
HGB BLD-MCNC: 13.7 G/DL (ref 11.7–15.7)
INTERPRETATION ECG - MUSE: NORMAL
MCH RBC QN AUTO: 29.8 PG (ref 26.5–33)
MCHC RBC AUTO-ENTMCNC: 34.5 G/DL (ref 31.5–36.5)
MCV RBC AUTO: 86 FL (ref 78–100)
OPIATES UR QL SCN: NEGATIVE
PCP UR QL SCN: NEGATIVE
PLATELET # BLD AUTO: 180 10E9/L (ref 150–450)
POTASSIUM SERPL-SCNC: 3.6 MMOL/L (ref 3.4–5.3)
RBC # BLD AUTO: 4.6 10E12/L (ref 3.8–5.2)
SODIUM SERPL-SCNC: 142 MMOL/L (ref 133–144)
TSH SERPL DL<=0.005 MIU/L-ACNC: 2.24 MU/L (ref 0.4–4)
WBC # BLD AUTO: 7.9 10E9/L (ref 4–11)

## 2018-05-03 PROCEDURE — 80307 DRUG TEST PRSMV CHEM ANLYZR: CPT | Performed by: EMERGENCY MEDICINE

## 2018-05-03 PROCEDURE — 36415 COLL VENOUS BLD VENIPUNCTURE: CPT | Performed by: PSYCHIATRY & NEUROLOGY

## 2018-05-03 PROCEDURE — 84443 ASSAY THYROID STIM HORMONE: CPT | Performed by: PSYCHIATRY & NEUROLOGY

## 2018-05-03 PROCEDURE — 00000146 ZZHCL STATISTIC GLUCOSE BY METER IP

## 2018-05-03 PROCEDURE — 93005 ELECTROCARDIOGRAM TRACING: CPT

## 2018-05-03 PROCEDURE — 99285 EMERGENCY DEPT VISIT HI MDM: CPT | Mod: 25

## 2018-05-03 PROCEDURE — 81025 URINE PREGNANCY TEST: CPT | Performed by: EMERGENCY MEDICINE

## 2018-05-03 PROCEDURE — 25000132 ZZH RX MED GY IP 250 OP 250 PS 637: Performed by: PSYCHIATRY & NEUROLOGY

## 2018-05-03 PROCEDURE — 25000132 ZZH RX MED GY IP 250 OP 250 PS 637: Performed by: EMERGENCY MEDICINE

## 2018-05-03 PROCEDURE — 85027 COMPLETE CBC AUTOMATED: CPT | Performed by: PSYCHIATRY & NEUROLOGY

## 2018-05-03 PROCEDURE — 90791 PSYCH DIAGNOSTIC EVALUATION: CPT

## 2018-05-03 PROCEDURE — 80048 BASIC METABOLIC PNL TOTAL CA: CPT | Performed by: PSYCHIATRY & NEUROLOGY

## 2018-05-03 PROCEDURE — 25000125 ZZHC RX 250: Performed by: PSYCHIATRY & NEUROLOGY

## 2018-05-03 PROCEDURE — 12400006 ZZH R&B MH INTERMEDIATE

## 2018-05-03 RX ORDER — HYDROXYZINE HYDROCHLORIDE 25 MG/1
25 TABLET, FILM COATED ORAL EVERY 4 HOURS PRN
Status: DISCONTINUED | OUTPATIENT
Start: 2018-05-03 | End: 2018-05-07 | Stop reason: HOSPADM

## 2018-05-03 RX ORDER — VILAZODONE HYDROCHLORIDE 20 MG/1
20 TABLET ORAL DAILY
Status: DISCONTINUED | OUTPATIENT
Start: 2018-05-04 | End: 2018-05-07 | Stop reason: HOSPADM

## 2018-05-03 RX ORDER — ALBUTEROL SULFATE 90 UG/1
2 AEROSOL, METERED RESPIRATORY (INHALATION) EVERY 6 HOURS PRN
Status: DISCONTINUED | OUTPATIENT
Start: 2018-05-03 | End: 2018-05-07 | Stop reason: HOSPADM

## 2018-05-03 RX ORDER — ALBUTEROL SULFATE 0.83 MG/ML
1 SOLUTION RESPIRATORY (INHALATION) EVERY 6 HOURS PRN
Status: DISCONTINUED | OUTPATIENT
Start: 2018-05-03 | End: 2018-05-07 | Stop reason: HOSPADM

## 2018-05-03 RX ORDER — CETIRIZINE HYDROCHLORIDE 10 MG/1
10 TABLET ORAL EVERY MORNING
Status: DISCONTINUED | OUTPATIENT
Start: 2018-05-04 | End: 2018-05-07 | Stop reason: HOSPADM

## 2018-05-03 RX ORDER — FLUTICASONE PROPIONATE 50 MCG
1-2 SPRAY, SUSPENSION (ML) NASAL DAILY PRN
Status: DISCONTINUED | OUTPATIENT
Start: 2018-05-03 | End: 2018-05-07 | Stop reason: HOSPADM

## 2018-05-03 RX ORDER — QUETIAPINE FUMARATE 25 MG/1
25 TABLET, FILM COATED ORAL
Status: DISCONTINUED | OUTPATIENT
Start: 2018-05-03 | End: 2018-05-07 | Stop reason: HOSPADM

## 2018-05-03 RX ORDER — LORAZEPAM 1 MG/1
1 TABLET ORAL ONCE
Status: COMPLETED | OUTPATIENT
Start: 2018-05-03 | End: 2018-05-03

## 2018-05-03 RX ORDER — ONDANSETRON 4 MG/1
4 TABLET, FILM COATED ORAL EVERY 6 HOURS PRN
Status: DISCONTINUED | OUTPATIENT
Start: 2018-05-03 | End: 2018-05-07 | Stop reason: HOSPADM

## 2018-05-03 RX ORDER — PRENATAL VIT/IRON FUM/FOLIC AC 27MG-0.8MG
1 TABLET ORAL DAILY
Status: DISCONTINUED | OUTPATIENT
Start: 2018-05-04 | End: 2018-05-07 | Stop reason: HOSPADM

## 2018-05-03 RX ADMIN — QUETIAPINE FUMARATE 25 MG: 25 TABLET ORAL at 18:12

## 2018-05-03 RX ADMIN — BISMUTH SUBSALICYLATE 30 ML: 262 LIQUID ORAL at 16:34

## 2018-05-03 RX ADMIN — LORAZEPAM 1 MG: 1 TABLET ORAL at 12:03

## 2018-05-03 RX ADMIN — ONDANSETRON 4 MG: 4 TABLET, FILM COATED ORAL at 19:37

## 2018-05-03 ASSESSMENT — ENCOUNTER SYMPTOMS
DYSPHORIC MOOD: 1
NERVOUS/ANXIOUS: 1
SLEEP DISTURBANCE: 1
APPETITE CHANGE: 1
HALLUCINATIONS: 0

## 2018-05-03 NOTE — PLAN OF CARE
Problem: Depressive Symptoms  Goal: Depressive Symptoms  Signs and symptoms of listed problems will be absent or manageable.  33 year old female received from the [facility] ER due to Depression with suicidal ideation. Reports struggling with post partum depression since the birth of her daughter 2 years ago ( this is her 3rd child) Depression has increased in intensity the past couple of weeks becoming acute with suicidal thoughts the past 2 days. This AM reportedly had a knife to her wrist but woke  up for help instead of acting on thoughts. No previous MH treatment. Recently prescribed Viibryd without relief. Contracts for safety here in the hospital. Very tearful and anxious in presentation.    Welcome packet reviewed with patient. Information reviewed includes getting emergency help, preventing infections, understanding your care, using medication safely, reducing falls, preventing pressure ulcers, smoking cessation, powerful choices and Patients Bill of Rights. Pt. given tour of the unit and instruction on use of facility including emergency call light. Program schedule reviewed with patient. Questions regarding the unit addressed. Pt. Search completed and belongings inventoried.    Nursing assessment complete including patient and medication profiles. Risk assessments completed addressing suicide,fall,skin,nutrition and safety issues. Care plan initiated. Assessments reviewed with physician and admit orders received.

## 2018-05-03 NOTE — IP AVS SNAPSHOT
MRN:[id number]                      After Visit Summary   5/3/2018    Ashlie Matthews    MRN: [id number]           Thank you!     Thank you for choosing [facility] for your care. Our goal is always to provide you with excellent care.        Patient Information     Date Of Birth          1985        Designated Caregiver       Most Recent Value    Caregiver    Will someone help with your care after discharge? yes    Name of designated caregiver     Phone number of caregiver see flowsheet    Caregiver address see flowsheet      About your hospital stay     You were admitted on:  May 3, 2018 You last received care in the:  [facility]    You were discharged on:  May 7, 2018       Who to Call     For medical emergencies, please call 911.  For non-urgent questions about your medical care, please call your primary care provider or clinic, [phone]          Attending Provider     Provider Specialty    Ghislaine Rankin MD Psychiatry       Primary Care Provider Office Phone # Fax #    [facility] [phone]      Further instructions from your care team       Behavioral Discharge Planning and Instructions      Main Diagnosis: Adjustment disorder with mixed anxiety and depressed mood; Rule out bipolar II disorder (given her history of postpartum depression and now agitated depression); Generalized anxiety disorder; Cyclic vomiting syndrome triggered by migraines.    Psychiatry Follow-up:     [facility]  [address]  [phone]  Fax [phone]  Appointment with Dr. Nathan Wilson MD on Monday, May 14 at 4:00 PM                         With Uyen Giles, Therapist, Thursday, May 17 at 3:00 PM    Resources:   Crisis Intervention: 553.320.7954 or 173-177-0588 (TTY: [phone]).  Call anytime for help.  National [place] on Mental Illness (www.[place].maryanne.org): 852.178.7510 or  "147.840.9596.  Alcoholics Anonymous (www.alcoholics-anonymous.org): Check your phone book for your local chapter.  Suicide Awareness Voices of Education (SAVE) (www.save.org): 755-647-TCDE (5402)  National Suicide Prevention Line (www.mentalhealthmn.org): 748-791-PYKX (5496)  Mental Health Consumer/Survivor Network of [place] (www.mhcsn.net): 256.545.6094 or 871-254-7045  Mental Health Association of [place] (www.mentalhealth.org): 230.241.1956 or 983-317-9552    General Medication Instructions:   See your medication sheet(s) for instructions.   Take all medicines as directed.  Make no changes unless your doctor suggests them.   Go to all your doctor visits.  Be sure to have all your required lab tests. This way, your medicines can be refilled on time.  Do not use any drugs not prescribed by your doctor.  Avoid alcohol.      Pending Results     No orders found from 5/1/2018 to 5/4/2018.            Statement of Approval     Ordered          05/07/18 0742  I have reviewed and agree with all the recommendations and orders detailed in this document.  EFFECTIVE NOW     Approved and electronically signed by:  Pedro Aldridge MD             Admission Information     Date & Time Provider Department Dept. Phone    5/3/2018 Ghislaine Rankin MD [facility] [phone]      Your Vitals Were     Blood Pressure Pulse Temperature Respirations Height Weight    115/71 81 98.3  F (36.8  C) (Oral) 15 1.549 m (5' 1\") 56.4 kg (124 lb 6.4 oz)    Pulse Oximetry BMI (Body Mass Index)                96% 23.51 kg/m2          MyChart Information     [facility] [employer] [facility] gives you secure access to your electronic health record. If you see a primary care provider, you can also send messages to your care team and make appointments. If you have questions, please call your primary care clinic.  If you do not have a primary care provider, please call [phone] and they will assist you.        Care EveryWhere ID     This is your " Care EveryWhere ID. This could be used by other organizations to access your [facility] medical records  [id number]        Equal Access to Services     ERICKA ALMAGUER : Hadii aad ku hadlindamarah Aguilar, wajimida phuongguillermoha, qaclaribelta kalilyda bahman, mary miriin hayaalinette hindsoscar orellana laaaronlinette mirlande Mantlila [facility] [phone].    ATENCIÓN: Si habla español, tiene a calvillo disposición servicios gratuitos de asistencia lingüística. Llame al [phone].    We comply with applicable federal civil rights laws and [place] laws. We do not discriminate on the basis of race, color, national origin, age, disability, sex, sexual orientation, or gender identity.               Review of your medicines      START taking        Dose / Directions    OLANZapine 5 MG tablet   Commonly known as:  zyPREXA        Dose:  5 mg   Take 1 tablet (5 mg) by mouth At Bedtime   Quantity:  30 tablet   Refills:  0       QUEtiapine 25 MG tablet   Commonly known as:  SEROquel        Dose:  25 mg   Take 1 tablet (25 mg) by mouth nightly as needed (anxiety)   Quantity:  60 tablet   Refills:  0         CONTINUE these medicines which may have CHANGED, or have new prescriptions. If we are uncertain of the size of tablets/capsules you have at home, strength may be listed as something that might have changed.        Dose / Directions    * ondansetron 4 MG tablet   Commonly known as:  ZOFRAN   This may have changed:  Another medication with the same name was added. Make sure you understand how and when to take each.   Used for:  Vomiting and diarrhea        Dose:  4 mg   Take 1 tablet (4 mg) by mouth every 6 hours as needed for nausea   Quantity:  20 tablet   Refills:  0       * ondansetron 4 MG tablet   Commonly known as:  ZOFRAN   This may have changed:  You were already taking a medication with the same name, and this prescription was added. Make sure you understand how and when to take each.   Used for:  Vomiting and diarrhea        Dose:  4 mg   Take 1 tablet (4 mg) by  mouth every 6 hours as needed for nausea   Quantity:  30 tablet   Refills:  0       * Notice:  This list has 2 medication(s) that are the same as other medications prescribed for you. Read the directions carefully, and ask your doctor or other care provider to review them with you.      CONTINUE these medicines which have NOT CHANGED        Dose / Directions    * albuterol 108 (90 Base) MCG/ACT Inhaler   Commonly known as:  PROAIR HFA/PROVENTIL HFA/VENTOLIN HFA   Used for:  Mild intermittent asthma        Dose:  2 puff   Inhale 2 puffs into the lungs every 6 hours as needed for shortness of breath / dyspnea or wheezing   Quantity:  1 Inhaler   Refills:  11       * albuterol (2.5 MG/3ML) 0.083% neb solution   Used for:  Mild intermittent asthma        Dose:  1 vial   Take 1 vial (2.5 mg) by nebulization every 6 hours as needed for shortness of breath / dyspnea or wheezing   Quantity:  30 vial   Refills:  3       VIIBRYD PO        Dose:  20 mg   Take 20 mg by mouth daily   Refills:  0       ZYRTEC ALLERGY 10 MG tablet   Generic drug:  cetirizine        Dose:  10 mg   Take 10 mg by mouth every morning   Refills:  0       * Notice:  This list has 2 medication(s) that are the same as other medications prescribed for you. Read the directions carefully, and ask your doctor or other care provider to review them with you.      STOP taking     fluticasone 50 MCG/ACT spray   Commonly known as:  FLONASE           prenatal multivitamin plus iron 27-0.8 MG Tabs per tablet                Where to get your medicines      These medications were sent to [place] [id number] [facility] [address] [facility]  [address] [facility] [address]     Phone:  [phone]     OLANZapine 5 MG tablet    ondansetron 4 MG tablet    QUEtiapine 25 MG tablet                Protect others around you: Learn how to safely use, store and throw away your medicines at www.disposemymeds.org.             Medication List: This is a list of  all your medications and when to take them. Check marks below indicate your daily home schedule. Keep this list as a reference.      Medications           Morning Afternoon Evening Bedtime As Needed    * albuterol 108 (90 Base) MCG/ACT Inhaler   Commonly known as:  PROAIR HFA/PROVENTIL HFA/VENTOLIN HFA   Inhale 2 puffs into the lungs every 6 hours as needed for shortness of breath / dyspnea or wheezing                                   * albuterol (2.5 MG/3ML) 0.083% neb solution   Take 1 vial (2.5 mg) by nebulization every 6 hours as needed for shortness of breath / dyspnea or wheezing                                   OLANZapine 5 MG tablet   Commonly known as:  zyPREXA   Take 1 tablet (5 mg) by mouth At Bedtime                                   * ondansetron 4 MG tablet   Commonly known as:  ZOFRAN   Take 1 tablet (4 mg) by mouth every 6 hours as needed for nausea   Last time this was given:  4 mg on 5/6/2018  6:33 AM                                   * ondansetron 4 MG tablet   Commonly known as:  ZOFRAN   Take 1 tablet (4 mg) by mouth every 6 hours as needed for nausea   Last time this was given:  4 mg on 5/6/2018  6:33 AM                                   QUEtiapine 25 MG tablet   Commonly known as:  SEROquel   Take 1 tablet (25 mg) by mouth nightly as needed (anxiety)   Last time this was given:  25 mg on 5/5/2018  7:57 PM                                   VIIBRYD PO   Take 20 mg by mouth daily   Last time this was given:  20 mg on 5/7/2018  8:08 AM                                   ZYRTEC ALLERGY 10 MG tablet   Take 10 mg by mouth every morning   Last time this was given:  10 mg on 5/7/2018  8:08 AM   Generic drug:  cetirizine                                   * Notice:  This list has 4 medication(s) that are the same as other medications prescribed for you. Read the directions carefully, and ask your doctor or other care provider to review them with you.

## 2018-05-03 NOTE — ED NOTES
Monitor brought back in room due to patient near syncopal events while up to restroom. Pt states she has hx of this whenever she gets anxious. VSS, blood sugar checked, 105. MD aware, pt brought apple juice and harinder crackers.

## 2018-05-03 NOTE — PROGRESS NOTES
05/03/18 1847   Patient Belongings   Did you bring any home meds/supplements to the hospital?  No   Patient Belongings none   Disposition of Belongings Other (see comment)   Belongings Search No belongings   Clothing Search Yes   Second Staff Evelyne    General Info Comment Pt came in with no Belongings      A               Admission:  I am responsible for any personal items that are not sent to the safe or pharmacy.  [facility] is not responsible for loss, theft or damage of any property in my possession.    Signature:  _________________________________ Date: _______  Time: _____                                              Staff Signature:  ____________________________ Date: ________  Time: _____      2nd Staff person, if patient is unable/unwilling to sign:    Signature: ________________________________ Date: ________  Time: _____     Discharge:  [facility] has returned all of my personal belongings:    Signature: _________________________________ Date: ________  Time: _____                                          Staff Signature:  ____________________________ Date: ________  Time: _____

## 2018-05-03 NOTE — ED NOTES
Patient was assisted by significant other to the restroom and collapsed in the doorway of her room.  RN and sitter witnessed event and caught patient before she fell to the floor.  Assisted back to bed.  Patient crying and resting safely.

## 2018-05-03 NOTE — ED PROVIDER NOTES
"  History     Chief Complaint:  Suicidal    The history is provided by the patient.      Ashlie Matthews is a 33 year old female who presents to the emergency department today for evaluation of suicidal ideation. Over the past 2 years, the patient has been experiencing increased depression and anxiety which she reports is due to postpartum depression (child is 2 years old). Two weeks ago, she woke up and had a \"breakdown\" with thoughts of hurting herself. She reports she was seen at [facility] and was started on Vibriid 10 days ago and has since been taking this medication as prescribed. However, she has no significant change in symptoms. She reports decreased sleep and has continued to have suicidal ideation. She endorses anorexia and thoughts of helplessness and hopelessness. She states that she has a history of overactive bladder and has been experiencing flare-ups of these symptoms as well, including spasming of her bladder, worsening her anxiety. She denies hallucinations or thoughts of harming her child or others. She has never been hospitalized for depression in the past. She notes a long-standing history of anxiety though notes depression started only after childbirth.    Allergies:  Moxifloxacin Hydrochloride  Cats  Codeine  Darvocet [Propoxyphene N-Apap]  Demerol  Dog Hair [Dogs]  Dust Mites  Pollen Extract  Vicodin [Acetaminophen]  Latex    Medications:    cetirizine (ZYRTEC ALLERGY) 10 MG tablet  ondansetron (ZOFRAN-ODT) 4 MG ODT tab  Vilazodone HCl (VIIBRYD PO) - New prescription, see HPI   albuterol (2.5 MG/3ML) 0.083% nebulizer solution  albuterol (PROAIR HFA, PROVENTIL HFA, VENTOLIN HFA) 108 (90 BASE) MCG/ACT inhaler  fluticasone (FLONASE) 50 MCG/ACT spray  Prenatal Vit-Fe Fumarate-FA (PRENATAL MULTIVITAMIN  PLUS IRON) 27-0.8 MG TABS    Past Medical History:    Overactive bladder  Cyclic vomiting syndrome  Allergic rhinitis   Anxiety   Asthma   Dysmenorrhea   Hypertension in " pregnancy   Pneumothorax     Past Surgical History:    Arthroscopy shoulder  Laparoscopy to look for endometriosis  Laparotomy mini, tubal ligation (post partum), combined  Myringotomy, insert tube bilateral, combined  Septoplasty for deviated septum  Sinus surgery   Tonsillectomy    Family History:    HEART DISEASE   Father   Blood Disease    Mother  Hypertension    Mother   GASTROINTESTINAL DISEASE Mother  Lipids     Mother    DIABETES    Maternal Grandfather   Hypertension    Maternal Grandfather  Obesity    Maternal Grandfather   Asthma    Maternal Grandfather   Obesity    Maternal Grandmother   Allergies    Son    Social History:  The patient was accompanied to the ED by .  Smoking Status: Never Smoker  Smokeless Tobacco: Never Used  Alcohol Use: Negative   Marital Status:       Review of Systems   Constitutional: Positive for appetite change.   Psychiatric/Behavioral: Positive for dysphoric mood, sleep disturbance and suicidal ideas. Negative for hallucinations. The patient is nervous/anxious.    All other systems reviewed and are negative.    Physical Exam     Patient Vitals for the past 24 hrs:   BP Temp Temp src Pulse Heart Rate Resp SpO2 Weight   05/03/18 1306 121/66 - - 93 93 16 94 % -   05/03/18 1235 116/69 - - 103 103 16 94 % -   05/03/18 1200 - - - 110 110 16 96 % -   05/03/18 1111 142/87 98  F (36.7  C) Temporal 130 - 20 95 % 57.2 kg (126 lb)      Physical Exam  General: WD/WN; tearful young  woman; cooperative  Head:  Atraumatic  Eyes:  Conjunctivae, lids, and sclerae are normal  ENT:    Normal nose; MMM  Neck:  Supple; normal ROM  Resp:  No respiratory distress  GI:  Nondistended    MS:  Normal ROM  Skin:  Warm; non-diaphoretic; no pallor  Neuro:  Awake; A&Ox3  Psych:  Dysphoric and anxious mood and affect; normal speech; suicidal thought content; no homicidal though content; not responding to internal stimuli  Vitals reviewed.    Emergency Department Course  "    EKG  Indication: Near syncope  Time: 1235  Rate 101 bpm. RI interval 168. QRS duration 94. QT/QTc 350/453.   Sinus tachycardia  Incomplete right bundle branch block  Septal infarct, age undetermined  Abnormal ECG   No acute ST changes.  No significant change as compared to prior, dated 1/20/18.    Laboratory:  Laboratory findings were communicated with the patient who voiced understanding of the findings.    Glucose by meter (Collected 1241) 105 (H)  Drug abuse screen 77 urine: negative   HCG qualitative urine: Negative     Interventions:  1203 Ativan 1 mg PO     Emergency Department Course:    Nursing notes and vitals reviewed.    1150 I performed an exam of the patient as documented above.     The patient provided a urine sample here in the emergency department. This was sent for laboratory testing, findings above.     1230 I was notified by nursing staff that the patient had a near-syncopal episode, but states she has had this in the past when she is very anxious.    1413 I discussed the patient's case with DEC.      1420 I discussed the patient's case with central intake.     1445 I discussed the plan for admission with the patient and she agrees. She confirms that she normally feels lightheaded when she is anxious. I personally reviewed the lab results with the patient and her . I discussed the treatment plan with the patient and . They expressed understanding of this plan and consented to admission.    Patient pending transfer to [facility] signed out to Dr. Atkinson.    Impression & Plan      Medical Decision Making:  Ashlie is a 33 year old woman who presents on her own volition for suicidal ideation.  Patient states she has had \"postpartum depression\" for the last 2 years since her delivery.  She states prior to this she had anxiety as well, though her anxiety is much more significant with her current depression.  She was seen in an outside facility for these symptoms approximately 10 " "days ago, and was discharged on vilazodone.  She has not had significant improvement and notes difficulty sleeping, worsening anxiety, and, \"scary\" thoughts of hurting herself.  She has never had a psychiatric hospitalization.  Exam is unremarkable, though she appears anxious and is tearful.  Although she did take a small dose of Ativan at home, she was given a 1 mg PO dose of Ativan here for her anxiety with some improvement during her stay.  Urine pregnancy is negative and U tox is also negative.  Patient had a near syncopal episode while walking to the bathroom.  EKG was performed which is unremarkable without acute ST changes or arrhythmias, and glucose is normal.  Patient states this is quite common for her to get lightheaded or have near syncope when she is very anxious, and further workup or treatment of this is not indicated at this time.  These symptoms are likely to improve with treatment of her underlying anxiety and depression. The patient was placed on a health officer hold and one-to-one sitter was at bedside for patient's safety throughout her stay.  She was evaluated by LEXIS Garza , who agrees with the plan for an inpatient psychiatric admission, given patient has failed a trial of outpatient treatment over the last 10 days and has thoughts and reported plan for suicide.  Further, she states she has not been eating or sleeping and is seemingly not caring well for herself.  I updated patient and her  on plan for inpatient psychiatric admission and they verbalized understanding.  Patient is amenable to this plan.  She has been accepted at [facility] and at the end of my shift she was endorsed to Dr. Atkinson pending transfer.  However, throughout her stay in the emergency department while under my care, patient required no further interventions including no chemical or physical restraint.    Diagnosis:    ICD-10-CM   1. Suicidal ideation R45.851   2. Depression, unspecified depression type " F32.9   3. Anxiety F41.9     Disposition:   Patient transferred to [facility]    Scribe Disclosure:  I, Sharron Lewis, am serving as a scribe at 11:37 AM on 5/3/2018 to document services personally performed by Shelly Napoles MD, based on my observations and the provider's statements to me.      [facility] EMERGENCY DEPARTMENT       Shelly Napoles MD  05/03/18 4875

## 2018-05-03 NOTE — IP AVS SNAPSHOT
[place] [address] [address]    Phone:  [phone]                                       After Visit Summary   5/3/2018    Ashlie Matthews    MRN: [id number]           After Visit Summary Signature Page     I have received my discharge instructions, and my questions have been answered. I have discussed any challenges I see with this plan with the nurse or doctor.    ..........................................................................................................................................  Patient/Patient Representative Signature      ..........................................................................................................................................  Patient Representative Print Name and Relationship to Patient    ..................................................               ................................................  Date                                            Time    ..........................................................................................................................................  Reviewed by Signature/Title    ...................................................              ..............................................  Date                                                            Time

## 2018-05-03 NOTE — ED NOTES
Pt placed on HEAVEN, pt changed into behavioral scrubs, belongings gone through, pt verbalized understanding of hold, cooperative at this time, pt 1:1 with ERT currently. Pt's  also in room.

## 2018-05-03 NOTE — ED NOTES
Pt sleeping, easily arousable to voice, vital signs remain stable, monitoring equipment removed from room.

## 2018-05-03 NOTE — ED NOTES
Patient was assisted by writer and significant other to the restroom, was alert and responsive. Returning to patient room with writer and significant other collapsed in hallway, did not hit the ground.  Writer, significant other, and  caught patient before she fell to the floor.  Assisted back to bed.  Patient crying and resting safely.  Was instructed not to get out of bed.

## 2018-05-03 NOTE — ED TRIAGE NOTES
Patient states she has had increased depression and anxiety over the last two weeks. Patient states she was started on a new depression medication 10 days ago. Patienet states she has difficulty sleeping and has thoughts of harming herself and a plan. Patient states she has not acted on her plan yet. ABC intact alert and no distress.

## 2018-05-04 PROCEDURE — 99222 1ST HOSP IP/OBS MODERATE 55: CPT | Performed by: NURSE PRACTITIONER

## 2018-05-04 PROCEDURE — 25000132 ZZH RX MED GY IP 250 OP 250 PS 637: Performed by: PSYCHIATRY & NEUROLOGY

## 2018-05-04 PROCEDURE — 25000125 ZZHC RX 250: Performed by: PSYCHIATRY & NEUROLOGY

## 2018-05-04 PROCEDURE — 12400006 ZZH R&B MH INTERMEDIATE

## 2018-05-04 PROCEDURE — 99207 ZZC CDG-MDM COMPONENT: MEETS LOW - DOWN CODED: CPT | Performed by: NURSE PRACTITIONER

## 2018-05-04 RX ORDER — MIRTAZAPINE 7.5 MG/1
7.5 TABLET, FILM COATED ORAL AT BEDTIME
Status: DISCONTINUED | OUTPATIENT
Start: 2018-05-04 | End: 2018-05-04

## 2018-05-04 RX ORDER — METOCLOPRAMIDE 10 MG/1
10 TABLET ORAL 4 TIMES DAILY PRN
Status: DISCONTINUED | OUTPATIENT
Start: 2018-05-04 | End: 2018-05-07 | Stop reason: HOSPADM

## 2018-05-04 RX ORDER — QUETIAPINE FUMARATE 50 MG/1
50 TABLET, FILM COATED ORAL AT BEDTIME
Status: DISCONTINUED | OUTPATIENT
Start: 2018-05-04 | End: 2018-05-04

## 2018-05-04 RX ORDER — PROCHLORPERAZINE MALEATE 5 MG
5-10 TABLET ORAL EVERY 6 HOURS PRN
Status: DISCONTINUED | OUTPATIENT
Start: 2018-05-04 | End: 2018-05-07 | Stop reason: HOSPADM

## 2018-05-04 RX ORDER — IBUPROFEN 600 MG/1
600 TABLET, FILM COATED ORAL EVERY 6 HOURS PRN
Status: DISCONTINUED | OUTPATIENT
Start: 2018-05-04 | End: 2018-05-07 | Stop reason: HOSPADM

## 2018-05-04 RX ORDER — QUETIAPINE FUMARATE 50 MG/1
50 TABLET, EXTENDED RELEASE ORAL AT BEDTIME
Status: DISCONTINUED | OUTPATIENT
Start: 2018-05-04 | End: 2018-05-04

## 2018-05-04 RX ORDER — ACETAMINOPHEN 325 MG/1
975 TABLET ORAL EVERY 6 HOURS PRN
Status: DISCONTINUED | OUTPATIENT
Start: 2018-05-04 | End: 2018-05-07 | Stop reason: HOSPADM

## 2018-05-04 RX ORDER — OLANZAPINE 5 MG/1
5 TABLET, ORALLY DISINTEGRATING ORAL AT BEDTIME
Status: DISCONTINUED | OUTPATIENT
Start: 2018-05-04 | End: 2018-05-07

## 2018-05-04 RX ADMIN — ACETAMINOPHEN 975 MG: 325 TABLET ORAL at 18:00

## 2018-05-04 RX ADMIN — HYDROXYZINE HYDROCHLORIDE 25 MG: 25 TABLET ORAL at 18:39

## 2018-05-04 RX ADMIN — ONDANSETRON 4 MG: 4 TABLET, FILM COATED ORAL at 19:30

## 2018-05-04 RX ADMIN — PRENATAL VIT W/ FE FUMARATE-FA TAB 27-0.8 MG 1 TABLET: 27-0.8 TAB at 08:26

## 2018-05-04 RX ADMIN — CETIRIZINE HYDROCHLORIDE 10 MG: 10 TABLET, FILM COATED ORAL at 08:26

## 2018-05-04 RX ADMIN — VILAZODONE HYDROCHLORIDE 20 MG: 20 TABLET ORAL at 08:26

## 2018-05-04 RX ADMIN — OLANZAPINE 5 MG: 5 TABLET, ORALLY DISINTEGRATING ORAL at 20:23

## 2018-05-04 RX ADMIN — ONDANSETRON 4 MG: 4 TABLET, FILM COATED ORAL at 14:20

## 2018-05-04 ASSESSMENT — ACTIVITIES OF DAILY LIVING (ADL)
HYGIENE/GROOMING: INDEPENDENT
ORAL_HYGIENE: INDEPENDENT
DRESS: STREET CLOTHES;INDEPENDENT

## 2018-05-04 NOTE — PLAN OF CARE
"Problem: Anxiety (Adult)  Goal: Identify Related Risk Factors and Signs and Symptoms  Related risk factors and signs and symptoms are identified upon initiation of Human Response Clinical Practice Guideline (CPG).   This morning patient was calm but stated\" do you think I can leave today? I am more anxious staying here.\"  She denied suicidal thoughts.  Isolative to her room during the day.  Encouraged her to fill out her safety plan and she did do that.  This afternoon she was told that she was not leaving.  Spoke to her in her room.  The entire time she was crying and begging to to leave.  \"I can't stay here all weekend. I feel like I am inconveniencing everyone.  My  has to be off and his boss is not happy with him.  I need to see my children.  I have never been away from them for this long.  I need to take care of my autistic son.  I will only get worse staying here. Can I stay for one night and then leave?\" Crying during the entire conversation.  Begging writer to call the doctor to ask him to discharge her. She did not care if it was AMA.  Instructed her that he wanted to start her on something to sleep.  She stated that she did not like the way the seroquel made her feel.  She refused to go to group when invited multiple times.  Called Dr. Rankin and he changed the med from seroquel to zyprexa.  Stated that if the patient continues to demand to leave that she may need to be place on a hold.  Patient continues to ruminate about having to stay here. Unwilling to go to groups.      "

## 2018-05-04 NOTE — H&P
"Admitted:     05/03/2018      IDENTIFYING DATA AND REASON FOR REFERRAL:  Ashlie Matthews is a 33-year-old woman who has been  for 13 years and has 3 children, ages 8, 5 and almost 2.  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  She denies previous psychiatric hospitalization, but recently started seeing Dr. Wilson at [facility] on account of increased anxiety and depression.  She presented to [facility] on account of inability to sleep and suicidal ideations on account of which she was transferred to [facility] for hospitalization.  Information was gathered through direct patient contact as well as collateral information provided by the patient's  with the patient's consent.      CHIEF COMPLAINT:  \"I had a pretty difficult pregnancy.\"      HISTORY OF PRESENT ILLNESS:  Ashlie Matthews reports that 2 years ago she was pregnant with their third child and had hyperemesis gravidarum.  She reports that she was so sick that they had put a PICC line in for her and she required multiple visits to the hospital and a couple of times her obstetrician recommended termination of the pregnancy.  She reports that she eventually had the baby safely, but immediately postpartum she noticed that she was so attached to the baby that she would not let anybody else carry the baby.  She states that she became very territorial even after she got back home and did not want anybody holding her.  She reports that this was very difficult for her mother-in-law as she is typically a demanding and touchy person.  She states that she had to avoid attending functions with her in-laws, even though she knew they understood her situation.  She states she may have been this way because it took over a year for them to get pregnant with their third child whilst it was easy for them to have the first two.  She states she subsequently got better with attending family functions, but " she remained very attached to her daughter and continued to breastfeed up until just 2 weeks ago.  Most recently, she reports that she had a breakdown 2 weeks ago resulting in her presentation to [facility] ED.  She states she was evaluated, but they did not feel she needed hospitalization at that point on account of which she was placed on Viibryd.        Per information available on Care Everywhere, she was seen in the Emergency Department on 04/18/2018 on account of anxiety and depression.  She indicated that she had had sudden onset of suicidal ideation which frightened her on account of which she presented to the hospital.  She reportedly had had poor oral intake and had also had increased anxiety on account of planning their daughter's birthday party.  She reportedly had become increasingly tearful and disengaged.  She called a crisis line on 04/18/2018 trying to get outpatient resources, but she had to wait a long time and she was advised to come to the Emergency Department.  She was ultimately discharged from the ED on medication to follow up outpatient.        The patient reports she has been on Viibryd for about 11 days.  She claims she has an overactive bladder and has been up multiple times during the night a couple of days ago and was unable to sleep.  She states she was up until about 2:30 a.m. at which point she felt exhausted and woke her  up.  She reportedly was very frantic and expressed hopelessness.  Collateral information provided by her  suggested that she was looking for a knife to hurt herself on account of which they presented at [facility] and subsequently transferred to [facility] for admission.        The patient reports that she feels better this morning having had some sleep.  She denies suicidal or homicidal ideations.  She admits to dysphoria and anxiety in the context of multiple plans for her child's birthday party which they have  now canceled.  She does not endorse history suggestive of geovany, but she does endorse irritability and ruminative worry.  The patient's  is concerned that if she is unable to sleep after she gets home that she may be a danger to herself again and so he defers to the undersigned to decide if she should stay in the hospital.  The patient denies use of alcohol or illicit chemicals.  She does admit to history of cyclical vomiting syndrome.      PAST PSYCHIATRIC HISTORY:  No prior psychiatric hospitalizations, but the patient had tried Paxil 11 years ago but did not like how it felt and did not stay on it.      CHEMICAL USE HISTORY:  None reported.      PAST MEDICAL HISTORY:  The patient reports being in good physical health apart from her cyclic vomiting syndrome triggered by migraines.  She does have mild intermittent asthma and overactive bladder.      PAST SURGICAL HISTORY:  Tubal ligation, septoplasty, tonsillectomy, sinus surgery, shoulder surgery x2.      MEDICATIONS PRIOR TO ADMISSION:   1.  Zyrtec 10 mg p.o. q.a.m.   2.  Zofran 4 mg q.6 hours p.r.n.   3.  Prenatal multivitamin plus iron 1 p.o. daily.     4.  Viibryd 20 mg p.o. daily.   5.  Albuterol 0.083% nebulizer solution 1 vial every 6 hours as needed for shortness of breath.   6.  Proventil 2 puffs q.6 hours as needed for shortness of breath.   7.  Flonase 50 mcg/act 1-2 puffs into both nostrils daily as needed for rhinitis.      ALLERGIES:  MOXIFLOXACIN, HYDROCHLORIDE, CATS, CODEINE, DARVOCET, DEMEROL, DOG HAIR, DUST MITES, POLLEN EXTRACT, VICODIN.      FAMILY PSYCHIATRIC HISTORY:  None reported.      SOCIAL HISTORY:  The patient reports she grew up in [place], [place].  She has an older and a younger sister.  She denies history of physical, emotional or sexual abuse.  She graduated high school.  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  She denies  or criminal history.      REVIEW OF SYSTEMS:  I refer the  reader to the 10-point review of systems as documented by RYAN Bernstein, CNP, on 05/04/2018 at 9:58 a.m.      VITAL SIGNS:  Blood pressure 115/78, pulse 80, respirations 16, temperature 99.1, weight 124 pounds.      MENTAL STATUS EXAMINATION:  This is a middle-aged woman who appears her stated age of 33.  She is dressed in hospital scrubs and has her hair in a ponytail.  She makes fair eye contact and speaks softly, but clearly and coherently.  Her mood is anxious, and her affect is tense.  Her thought process is logical, relevant and goal directed.  She does not endorse the presence of auditory or visual hallucinations and there are no delusions elicited.  She did express passive self-harm thoughts prior to admission, but currently denies any self-harm thoughts or plans.  She is alert and oriented to time, place and person.  Her gait and station are within normal limits.  Her attention and concentration are marginal.  Her recall of recent events is fair.  Risk assessment at this time is considered moderate.  She displays fair insight and limited judgment.      DIAGNOSTIC IMPRESSION:  Ashlie Matthews is a 33-year-old woman with history of anxiety and postpartum depression who presents to the hospital on account of inability to sleep on account of ruminative worry about a birthday party for her child that she had been planning.  She became overwhelmed and endorsed self-harm thoughts in that context.      ADMITTING DIAGNOSES:   1.  Adjustment disorder with mixed anxiety and depressed mood.   2.  Rule out bipolar II disorder (given her history of postpartum depression and now agitated depression).   3.  Generalized anxiety disorder.   4.  Cyclic vomiting syndrome triggered by migraines.      PLAN:  The patient will be maintained on the step-down unit.  She will be encouraged to participate in individual milieu and group therapy.  Collateral information has been gathered from her .  She will be maintained  on Viibryd at 20 mg daily and Zyprexa 5 mg at bedtime.  The initial thought was to place her on Remeron, but given the possibility that she may have hypomania we will put her on Zyprexa instead.  Estimated length of stay 3-5 days.         PEDRO CELIS MD             D: 2018   T: 2018   MT: BIENVENIDO      Name:     CHANDLER GONGORA   MRN:      [id number]        Account:      [id number]   :      1985        Admitted:     2018                   Document: [id number]

## 2018-05-04 NOTE — H&P
Admitted:     05/03/2018      DATE OF SERVICE:  05/04/2018      PRIMARY CARE PROVIDER: Billie Brar.      PSYCHIATRIST:   Dr. Wilson.      CHIEF COMPLAINT:  Psychiatric history and physical.      HISTORY OF PRESENT ILLNESS:  Ms. Ashlie Matthews is a 33-year-old female with past medical history significant for postpartum depression diagnosed approximately 2 years ago, overactive bladder, generalized anxiety disorder, cyclic vomiting syndrome triggered by migraines and mild intermittent asthma who presented on 05/03/2018, with worsening depression, anxiety and suicidal ideation with plan.  The patient reports worsening depression, anxiety and development of suicidal ideation with plan over the past 2 weeks.  The patient has thoughts of driving her car into something.  The patient reports recent insomnia and anorexia with an 8 pound weight loss, feelings of hopelessness and worsening depression and anxiety.  The patient reports her  took off work for approximately 2 weeks as she is currently a stay-at-home mom with recent onset of worsening depression and anxiety.  The patient presented to a community psychiatrist, referred by [facility] and she presented approximately 10 days ago with worsening suicidal ideation, depression and anxiety who started the patient on Viibryd 10 days ago.  The patient reports the first 7 days she felt that her depression was improving; however, day 7, she was instructed to increase the dose and at bedtime the patient reports decrease of symptoms.  The patient is voluntarily admitted to inpatient Psychiatry.      Presently the patient is seen on the inpatient psychiatry unit.  The patient is sitting up in bed, requesting to go home.  The patient is insistent on discharge to home, which was deferred to Psychiatry.  The patient reports no current or recent chest pain, shortness of breath, nausea, vomiting, diarrhea, constipation, fevers or chills.  As mentioned above, the  patient reports an 8-pound weight loss in the past couple of weeks due to decreased appetite.      PAST MEDICAL HISTORY:   1.  Postpartum depression.   2.  Overactive bladder.   3.  Generalized anxiety disorder.   4.  Cyclic vomiting syndrome triggered by migraines.   5.  Mild intermittent asthma.       PAST SURGICAL HISTORY:   1.  Tubal ligation.   2.  Septoplasty.   3.  Tonsillectomy.   4.  Sinus surgery.   5.  Shoulder surgery x2.      SOCIAL HISTORY:    Tobacco, never.  Alcohol, none.  Drugs, none.  Lives in a house with her  and 3 children.      FAMILY HISTORY:   1.  Father, heart disease.   2.  Mother hypertension, hyperlipidemia, blood disease.   3.  Internal grandfather, diabetes, hypertension, and asthma.      ALLERGIES:  MOXIFLOXACIN, CODEINE, DARVOCET, DEMEROL, VICODIN, AND LATEX.      MEDICATIONS:    Prior to Admission medications    Medication Sig Last Dose Taking? Auth Provider   cetirizine (ZYRTEC ALLERGY) 10 MG tablet Take 10 mg by mouth every morning 5/3/2018 at Unknown time Yes Unknown, Entered By History   ondansetron (ZOFRAN) 4 MG tablet Take 1 tablet (4 mg) by mouth every 6 hours as needed for nausea 5/3/2018 at Unknown time Yes Sophia Cannon PA-C   Prenatal Vit-Fe Fumarate-FA (PRENATAL MULTIVITAMIN  PLUS IRON) 27-0.8 MG TABS Take 1 tablet by mouth daily 5/2/2018 at Unknown time Yes Reported, Patient   Vilazodone HCl (VIIBRYD PO) Take 20 mg by mouth daily  5/3/2018 at Unknown time Yes Reported, Patient   albuterol (2.5 MG/3ML) 0.083% nebulizer solution Take 1 vial (2.5 mg) by nebulization every 6 hours as needed for shortness of breath / dyspnea or wheezing More than a month at Unknown time  Balgobin, Christopher Lennox Paul, MD   albuterol (PROAIR HFA, PROVENTIL HFA, VENTOLIN HFA) 108 (90 BASE) MCG/ACT inhaler Inhale 2 puffs into the lungs every 6 hours as needed for shortness of breath / dyspnea or wheezing More than a month at Unknown time  Balgobin, Christopher Lennox Paul, MD    fluticasone (FLONASE) 50 MCG/ACT spray Spray 1-2 sprays into both nostrils daily as needed for rhinitis or allergies More than a month at Unknown time  Unknown, Entered By History     REVIEW OF SYSTEMS:  A 10-point review of systems was completed.  All pertinent positives are noted in the HPI. All other systems negative.      PHYSICAL EXAMINATION:   VITAL SIGNS:  Reveal temperature 97.5, blood pressure 118/85, heart rate 109, respiratory rate 16, O2 sats 96% on room air.   CONSTITUTIONAL:  The patient sitting in her bed. Awake, alert and cooperative, in no apparent distress and appears stated age.   HEENT:  Pupils equal, round and reactive to light.  Extraocular muscles are intact.  Sclerae are clear.  Normocephalic, atraumatic.  Oropharynx has moist mucous membranes.   NECK:  Supple without adenopathy, normal range of motion, no nuchal rigidity noted.   LUNGS:  No increased work of breathing.  Clear to auscultation bilaterally posteriorly.  No crackles or wheezing noted.   CARDIOVASCULAR:  Normal apical pulse, regular rate and rhythm.  Normal S1 and S2.  No murmur, rub or gallop noted.   ABDOMEN:  Normal bowel sounds, soft, nondistended, nontender.  No masses are palpated.  No guarding or rebound tenderness noted.   EXTREMITIES:  Upper extremities, radial pulses palpable bilaterally.   LOWER EXTREMITIES:  No edema.   NEUROLOGIC:  Awake, alert, oriented.  Cranial nerves II-XII are grossly intact.  Sensory is intact as well.   SKIN:  Warm and dry.  No lesions or rashes noted.  No erythema.   PSYCHIATRIC:  Mentation appears normal and affect normal.      LABORATORY DATA:  Reviewed in Epic.       ASSESSMENT AND PLAN:     Ms. Ashlie Matthews is a 33-year-old female with past medical history significant for postpartum depression, overactive bladder, generalized anxiety disorder, cyclic vomiting syndrome,  and mild intermittent asthma who presents today with 05/03/2018, with worsening depression, anxiety and suicidal  ideation with plan.  The patient has been admitted to inpatient psychiatry for further evaluation and management.     Suicidal ideation with plan.   Postpartum depression.   Generalized anxiety disorder.   -Above diagnoses being managed per primary service psychiatry.   -The patient was started on Viibryd approximately 10 days prior to admission.  Will defer to primary.     Mild intermittent asthma.   -Continue pain prior to admission Albuterol inhaler p.r.n.    Overactive bladder.  -Patient not on any medications for above diagnosis.  Will continue to monitor.    Cyclic vomiting syndrome.  -Patient reports above triggered by migraines and utilizes p.r.n. Zofran, has been ordered.  Will continue to monitor.    Pain Assessment.  Current Pain Score 5/3/2018   Patient currently in pain? -   Pain score (0-10) 0   Pain location -   Pain descriptors -   Ashlie nye pain level was assessed and she currently denies pain.      Deep vein thrombosis prophylaxis.   -Low VTE risk, encourage out of bed and ambulation daily while awake.      DISPOSITION:  Per primary service.      CODE STATUS:  Full code.      This patient was discussed with Dr. Carlos A Hauser of the hospitalist service who agrees with the plans as  outlined above.         CARLOS A HAUSER MD       As dictated by HECTOR ABDI NP            D: 2018   T: 2018   MT: BIENVENIDO      Name:     ASHLIE GONGORA   MRN:      [id number]        Account:      [id number]   :      1985        Admitted:     2018                   Document: [id number]

## 2018-05-04 NOTE — PROVIDER NOTIFICATION
Provider notified Pt has cyclical vomiting. Can she have another anti emetic such as Reglan order for her?

## 2018-05-04 NOTE — PLAN OF CARE
"Problem: Depressive Symptoms  Goal: Depressive Symptoms  Signs and symptoms of listed problems will be absent or manageable.   Outcome: No Change  Pt was offered PRN Seroquel at 1812, for anxiety.  She was tearful and continued to stay in her bed.  Pt reported that her intake has home has been poor, ate only a few bites of bread this evening.  Staff encouraged oral intake.  When staff reentered she was still tearful and reported feeling \"sleepy but buzzing from the med\" as well as nausea she requested PRN Zofran at 1937, with was helpful.  She is currently sleeping.        "

## 2018-05-05 PROCEDURE — 25000125 ZZHC RX 250: Performed by: PSYCHIATRY & NEUROLOGY

## 2018-05-05 PROCEDURE — 25000132 ZZH RX MED GY IP 250 OP 250 PS 637: Performed by: PHYSICIAN ASSISTANT

## 2018-05-05 PROCEDURE — 25000132 ZZH RX MED GY IP 250 OP 250 PS 637: Performed by: PSYCHIATRY & NEUROLOGY

## 2018-05-05 PROCEDURE — 12400006 ZZH R&B MH INTERMEDIATE

## 2018-05-05 RX ADMIN — QUETIAPINE FUMARATE 25 MG: 25 TABLET ORAL at 19:57

## 2018-05-05 RX ADMIN — PRENATAL VIT W/ FE FUMARATE-FA TAB 27-0.8 MG 1 TABLET: 27-0.8 TAB at 07:50

## 2018-05-05 RX ADMIN — VILAZODONE HYDROCHLORIDE 20 MG: 20 TABLET ORAL at 07:50

## 2018-05-05 RX ADMIN — IBUPROFEN 600 MG: 600 TABLET ORAL at 16:36

## 2018-05-05 RX ADMIN — CETIRIZINE HYDROCHLORIDE 10 MG: 10 TABLET, FILM COATED ORAL at 07:50

## 2018-05-05 RX ADMIN — OLANZAPINE 5 MG: 5 TABLET, ORALLY DISINTEGRATING ORAL at 19:57

## 2018-05-05 RX ADMIN — IBUPROFEN 600 MG: 600 TABLET ORAL at 10:03

## 2018-05-05 RX ADMIN — ONDANSETRON 4 MG: 4 TABLET, FILM COATED ORAL at 19:57

## 2018-05-05 RX ADMIN — ONDANSETRON 4 MG: 4 TABLET, FILM COATED ORAL at 05:19

## 2018-05-05 RX ADMIN — ACETAMINOPHEN 975 MG: 325 TABLET ORAL at 14:14

## 2018-05-05 ASSESSMENT — ACTIVITIES OF DAILY LIVING (ADL)
DRESS: STREET CLOTHES
LAUNDRY: WITH SUPERVISION
ORAL_HYGIENE: INDEPENDENT
HYGIENE/GROOMING: INDEPENDENT
GROOMING: INDEPENDENT
ORAL_HYGIENE: INDEPENDENT
DRESS: STREET CLOTHES;INDEPENDENT

## 2018-05-05 NOTE — PLAN OF CARE
"Problem: Depressive Symptoms  Goal: Depressive Symptoms  Signs and symptoms of listed problems will be absent or manageable.   Outcome: No Change  Patient presents with sad affect, anxious and depressed mood. Tearful at times. Spent first part of shift in the [facility] [place]. Minimally social but pleasant with peers and staff. Does not brighten much on approach. Reluctant to try Seroquel again; felt \"tired but jittery\" after taking it yesterday. Was open to trying a half dose or zyprexa. Early in shift stated she was having a \"difficult day\" but was looking forward to visit from .  visited around 1715.   Requested tylenol for back pain, was ordered and given at 1800. At 1830 patient requested Zofran for nausea. Reported she had her \"tubes tied\" recently and ever since, each month when she gets her period she has an \"episode\" during which she throws up a lot and occasionally faints. Patient's period started this morning, made her anxious about a possible episode. At this time, it was too early for another dose of Zofran (previously taken at approx. 1400) so patient was given atarax and essential oils to help with anxiety. Requested ice packs for back pain. No relief from earlier does of Tylenol. After this, returned to [facility] to visit with .  At approx. 1900, patient was observed running to her room and throwing up. Hospitalist was paged and Reglan was ordered as alternative nausea aid. Zofran given at 1930. Continued to report no relief from 1800 dose of tylenol. Hospitalist paged and ibuprofen ordered as alternative. Patient reports she usually alternates doses of ibuprofen and tylenol at home for optimum relief.  At 2020 patient was at med window taking HS dose of zyprexa when she lowered herself to the floor, aided by . See previous note for details. Patient became tearful and complained of pain from cramping. Was aided back to her room. Patient spent rest of shift bed resting " before going to sleep.

## 2018-05-05 NOTE — PROGRESS NOTES
[facility]    Hospitalist Crosscover Note:    Paged by RN Genaro regarding pt's known cyclic vomiting. Zofran reportedly not working for pt today. She reported Reglan typically works for her. PRN Reglan and Compazine added for pt to utilize for N/V. Hospitalist service as signed off. Please notify if there are further medical issues we can assist with.     Irina Monsalve PA-C  Hospitalist Physician Assistant  Pager: [phone]

## 2018-05-05 NOTE — PROGRESS NOTES
[facility]    Hospitalist cross cover note:    Page by SHANNEN Lam regarding patient with abdominal pain.  Per patient during her period she has intermittent lower abdominal pain along with cyclic vomiting.  Per patient report current symptoms are as per usual.  Tylenol reportedly not relieving symptoms and patient asking for ibuprofen.  As needed ibuprofen 600 mg every 6 hours ordered.  Please contact hospitalist service if further medical issues arise.    Irina Monsalve PA-C  Hospitalist Physician Assistant  Pager: [phone]

## 2018-05-05 NOTE — PROGRESS NOTES
Pt c/o nausea and cramping pain.  She was at the med window and received HS Zyprexa.  After, pt lowered herself to the ground and c/o pain.  She feels this is brought on by her period.  Pt was sitting on the floor with her  by her. VSS /80 HR98.  Pt requested PRN IBuProfen for pain. A/O X 4.  Did not hit her head or injure self.

## 2018-05-05 NOTE — PLAN OF CARE
Problem: Anxiety (Adult)  Goal: Identify Related Risk Factors and Signs and Symptoms  Related risk factors and signs and symptoms are identified upon initiation of Human Response Clinical Practice Guideline (CPG).   Patient has tense affect and wants to be discharged. MD is not comfortable allowing pt to go home.Pt is crying and states she feels like she is a burden to her  and has been costing him too much money being in the hospital despite the fact that they recently obtained insurance. Pt states that she has been anxious most of her life and when she had her last baby it was a difficult  Pregnancy and she had post partum depression and has been trying to cope for 2 years because of lack of insurance. Pt states that her sleep has been poor but last night she slept well. This writer encouraged pt to stay since this has been a long term issue to make sure the medications is working before she leaves and also that her  would want to make sure she is better. Pt was agreeable to this.

## 2018-05-06 PROCEDURE — 12400006 ZZH R&B MH INTERMEDIATE

## 2018-05-06 PROCEDURE — 25000125 ZZHC RX 250: Performed by: PSYCHIATRY & NEUROLOGY

## 2018-05-06 PROCEDURE — 25000132 ZZH RX MED GY IP 250 OP 250 PS 637: Performed by: PSYCHIATRY & NEUROLOGY

## 2018-05-06 RX ADMIN — CETIRIZINE HYDROCHLORIDE 10 MG: 10 TABLET, FILM COATED ORAL at 07:39

## 2018-05-06 RX ADMIN — OLANZAPINE 5 MG: 5 TABLET, ORALLY DISINTEGRATING ORAL at 21:12

## 2018-05-06 RX ADMIN — VILAZODONE HYDROCHLORIDE 20 MG: 20 TABLET ORAL at 07:39

## 2018-05-06 RX ADMIN — PRENATAL VIT W/ FE FUMARATE-FA TAB 27-0.8 MG 1 TABLET: 27-0.8 TAB at 07:39

## 2018-05-06 RX ADMIN — ONDANSETRON 4 MG: 4 TABLET, FILM COATED ORAL at 06:33

## 2018-05-06 ASSESSMENT — ACTIVITIES OF DAILY LIVING (ADL)
GROOMING: INDEPENDENT
HYGIENE/GROOMING: INDEPENDENT
ORAL_HYGIENE: INDEPENDENT
DRESS: STREET CLOTHES
DRESS: STREET CLOTHES;INDEPENDENT
LAUNDRY: WITH SUPERVISION
ORAL_HYGIENE: INDEPENDENT

## 2018-05-06 NOTE — PLAN OF CARE
Problem: Depressive Symptoms  Goal: Depressive Symptoms  Signs and symptoms of listed problems will be absent or manageable.   Outcome: Improving  Patient presents with full-range affect, anxious mood. Frequent foot tapping and other nervous movements. Brightens on approach. Spent most of shift watching movies in the lounge. States she's feeling better and less anxious today, thinks it's because she slept well last night on the zyprexa. Prefers the zyprexa to the seroquel she took previously.  visited. Patient complained of nausea and pain at 2000, was given zofran and hot pack. Attended no groups. Med-compliant.

## 2018-05-06 NOTE — PLAN OF CARE
Problem: Suicide Risk (Adult)  Goal: Identify Related Risk Factors and Signs and Symptoms  Related risk factors and signs and symptoms are identified upon initiation of Human Response Clinical Practice Guideline (CPG).   Outcome: Improving  Patient denies SI and was observed in lounge socializing and laughing with peers. Pt disappointed that she is not going home today but is more accepting and is less labile and tense today. She states the Zyprexa helps her to sleep and likes this medication

## 2018-05-07 VITALS
DIASTOLIC BLOOD PRESSURE: 71 MMHG | HEIGHT: 61 IN | SYSTOLIC BLOOD PRESSURE: 115 MMHG | BODY MASS INDEX: 23.49 KG/M2 | WEIGHT: 124.4 LBS | HEART RATE: 81 BPM | OXYGEN SATURATION: 96 % | RESPIRATION RATE: 15 BRPM | TEMPERATURE: 98.3 F

## 2018-05-07 PROCEDURE — 25000132 ZZH RX MED GY IP 250 OP 250 PS 637: Performed by: PSYCHIATRY & NEUROLOGY

## 2018-05-07 RX ORDER — OLANZAPINE 5 MG/1
5 TABLET ORAL AT BEDTIME
Qty: 30 TABLET | Refills: 0 | Status: SHIPPED | OUTPATIENT
Start: 2018-05-07 | End: 2018-12-17

## 2018-05-07 RX ORDER — OLANZAPINE 5 MG/1
5 TABLET ORAL AT BEDTIME
Status: DISCONTINUED | OUTPATIENT
Start: 2018-05-07 | End: 2018-05-07 | Stop reason: HOSPADM

## 2018-05-07 RX ORDER — QUETIAPINE FUMARATE 25 MG/1
25 TABLET, FILM COATED ORAL
Qty: 60 TABLET | Refills: 0 | Status: SHIPPED | OUTPATIENT
Start: 2018-05-07 | End: 2018-12-17

## 2018-05-07 RX ORDER — ONDANSETRON 4 MG/1
4 TABLET, FILM COATED ORAL EVERY 6 HOURS PRN
Qty: 30 TABLET | Refills: 0 | Status: SHIPPED | OUTPATIENT
Start: 2018-05-07 | End: 2018-11-07

## 2018-05-07 RX ADMIN — CETIRIZINE HYDROCHLORIDE 10 MG: 10 TABLET, FILM COATED ORAL at 08:08

## 2018-05-07 RX ADMIN — VILAZODONE HYDROCHLORIDE 20 MG: 20 TABLET ORAL at 08:08

## 2018-05-07 RX ADMIN — PRENATAL VIT W/ FE FUMARATE-FA TAB 27-0.8 MG 1 TABLET: 27-0.8 TAB at 08:08

## 2018-05-07 NOTE — PROGRESS NOTES
Patient denies suicidal ideation. Reviewed discharge instructions with patient. She has a copy of discharge instructions and verbalizes understanding of them. Patient getting medications at her own pharmacy. Discharged to home at 1035 with her sister.

## 2018-05-07 NOTE — PLAN OF CARE
"Problem: Anxiety (Adult)  Goal: Identify Related Risk Factors and Signs and Symptoms  Related risk factors and signs and symptoms are identified upon initiation of [facility] Clinical Practice Guideline (CPG).   Outcome: Improving  Patient presents with full-range affect, somewhat anxious mood. Brighter than on previous shifts, but still appears anxious at times. Spent most of shift watching movies and socializing in the lounge. Pleasant and cooperative with peers and staff. Made use of essential oils during shift to aid anxiety. Said she had an \"okay day,\" and is \"anxious to get home.\" Misses her kids and wants to get back to them. Hoping to go home tomorrow. Feeling a lot better now that she's sleeping better with the zyprexa. Would like to continue taking the zyprexa at home.  is able to pick patient up early in the day. Attended wrap-up group and participated appropriately. Med-compliant.      "

## 2018-05-07 NOTE — DISCHARGE INSTRUCTIONS
Behavioral Discharge Planning and Instructions      Main Diagnosis: Adjustment disorder with mixed anxiety and depressed mood; Rule out bipolar II disorder (given her history of postpartum depression and now agitated depression); Generalized anxiety disorder; Cyclic vomiting syndrome triggered by migraines.    Psychiatry Follow-up:     [facility]  [address] [place] [address]  [phone]  Fax [phone]  Appointment with Dr. Nathan Wilson MD on Monday, May 14 at 4:00 PM                         With Uyen Giles, Therapist, Thursday, May 17 at 3:00 PM    Resources:   Crisis Intervention: [phone] or [phone] (TTY: [phone]).  Call anytime for help.  [place] on Mental Illness (www.[place].maryanne.org): 583.442.1029 or 884-589-9176.  Alcoholics Anonymous (www.alcoholics-anonymous.org): Check your phone book for your local chapter.  Suicide Awareness Voices of Education (SAVE) (www.save.org): 686-589-FEIZ (9872)  National Suicide Prevention Line (www.mentalhealthmn.org): 731-459-DUEG (6418)  Mental Health Consumer/Survivor Network of [place] (www.mhcsn.net): 770.171.6488 or 609-609-3707  Mental Health Association of [place] (www.mentalhealth.org): 238.531.2967 or 298-053-1085    General Medication Instructions:   See your medication sheet(s) for instructions.   Take all medicines as directed.  Make no changes unless your doctor suggests them.   Go to all your doctor visits.  Be sure to have all your required lab tests. This way, your medicines can be refilled on time.  Do not use any drugs not prescribed by your doctor.  Avoid alcohol.

## 2018-05-08 ENCOUNTER — TELEPHONE (OUTPATIENT)
Dept: FAMILY MEDICINE | Facility: CLINIC | Age: 33
End: 2018-05-08

## 2018-05-08 NOTE — TELEPHONE ENCOUNTER
05/07/2018 vomiting and diarrhea, depression with suicidal ideation  No future appt  Socorro Savage

## 2018-05-08 NOTE — TELEPHONE ENCOUNTER
ED / Discharge Outreach Protocol    Patient Contact    Attempt # 1    Was call answered?  No.  Left message on voicemail with information to call me back.    Ashlie Junior RN

## 2018-05-09 NOTE — TELEPHONE ENCOUNTER
"Hospital/TCU/ED for chronic condition Discharge Protocol    \"Hi, my name is Ashlie Junior, a registered nurse, and I am calling from [facility].  I am calling to follow up and see how things are going for you after your recent emergency visit/hospital/TCU stay.\"    Tell me how you are doing now that you are home?\" Pt is doing well\"   She is getting back into routine       Discharge Instructions    \"Let's review your discharge instructions.  What is/are the follow-up recommendations?  Pt. Response: f/u as needed has mental health planned     \"Has an appointment with your primary care provider been scheduled?\"   No (schedule appointment)   Pt states she is considering est care at the [facility] - she will call to schedule.     \"When you see the provider, I would recommend that you bring your medications with you.\"    Medications    \"Tell me what changed about your medicines when you discharged?\"    Changes to chronic meds?    0-1    \"What questions do you have about your medications?\"    None     New diagnoses of heart failure, COPD, diabetes, or MI?    No              Medication reconciliation completed? Yes  Was MTM referral placed (*Make sure to put transitions as reason for referral)?   No    Call Summary    \"What questions or concerns do you have about your recent visit and your follow-up care?\"     none    \"If you have questions or things don't continue to improve, we encourage you contact us through the main clinic number (give number).  Even if the clinic is not open, triage nurses are available 24/7 to help you.     We would like you to know that our clinic has extended hours (provide information).  We also have urgent care (provide details on closest location and hours/contact info)\"      \"Thank you for your time and take care!\"       Ashlie Junior RN      "

## 2018-06-01 ENCOUNTER — OFFICE VISIT (OUTPATIENT)
Dept: URGENT CARE | Facility: URGENT CARE | Age: 33
End: 2018-06-01
Payer: COMMERCIAL

## 2018-06-01 VITALS
SYSTOLIC BLOOD PRESSURE: 130 MMHG | TEMPERATURE: 102.1 F | DIASTOLIC BLOOD PRESSURE: 72 MMHG | HEART RATE: 110 BPM | OXYGEN SATURATION: 98 %

## 2018-06-01 DIAGNOSIS — R07.0 THROAT PAIN: Primary | ICD-10-CM

## 2018-06-01 LAB
DEPRECATED S PYO AG THROAT QL EIA: ABNORMAL
SPECIMEN SOURCE: ABNORMAL

## 2018-06-01 PROCEDURE — 99213 OFFICE O/P EST LOW 20 MIN: CPT | Performed by: FAMILY MEDICINE

## 2018-06-01 PROCEDURE — 87880 STREP A ASSAY W/OPTIC: CPT | Performed by: FAMILY MEDICINE

## 2018-06-01 RX ORDER — AMOXICILLIN 875 MG
875 TABLET ORAL 2 TIMES DAILY
Qty: 20 TABLET | Refills: 0 | Status: SHIPPED | OUTPATIENT
Start: 2018-06-01 | End: 2018-06-25

## 2018-06-01 NOTE — MR AVS SNAPSHOT
After Visit Summary   6/1/2018    Ashlie Matthews    MRN: [id number]           Patient Information     Date Of Birth          1985        Visit Information        Provider Department      6/1/2018 7:40 PM Jose Manuel Alejo MD [facility]        Today's Diagnoses     Throat pain    -  1       Follow-ups after your visit        Your next 10 appointments already scheduled     Jun 11, 2018  4:10 PM CDT   Katya Short with Danielle Mims PA-C   [facility]    [address] [address] [phone]              Who to contact     If you have questions or need follow up information about today's clinic visit or your schedule please contact [facility] directly at [phone].  Normal or non-critical lab and imaging results will be communicated to you by MyChart, letter or phone within 4 business days after the clinic has received the results. If you do not hear from us within 7 days, please contact the clinic through MyChart or phone. If you have a critical or abnormal lab result, we will notify you by phone as soon as possible.  Submit refill requests through [employer] or call your pharmacy and they will forward the refill request to us. Please allow 3 business days for your refill to be completed.          Additional Information About Your Visit        MyChart Information     [employer] gives you secure access to your electronic health record. If you see a primary care provider, you can also send messages to your care team and make appointments. If you have questions, please call your primary care clinic.  If you do not have a primary care provider, please call [phone] and they will assist you.        Care EveryWhere ID     This is your Care EveryWhere ID. This could be used by other organizations to access your [facility] medical records  [id number]        Your Vitals Were     Pulse  Temperature Pulse Oximetry             110 102.1  F (38.9  C) (Oral) 98%          Blood Pressure from Last 3 Encounters:   06/01/18 130/72   05/07/18 115/71   05/03/18 121/66    Weight from Last 3 Encounters:   05/03/18 124 lb 6.4 oz (56.4 kg)   05/03/18 126 lb (57.2 kg)   01/20/18 125 lb (56.7 kg)              We Performed the Following     Rapid strep screen          Today's Medication Changes          These changes are accurate as of 6/1/18  8:19 PM.  If you have any questions, ask your nurse or doctor.               Start taking these medicines.        Dose/Directions    amoxicillin 875 MG tablet   Commonly known as:  AMOXIL   Used for:  Throat pain        Dose:  875 mg   Take 1 tablet (875 mg) by mouth 2 times daily   Quantity:  20 tablet   Refills:  0            Where to get your medicines      These medications were sent to [place] [address] [place] [id number] [address]    Hours:  Tech issues with their phone system Phone:  [phone]     amoxicillin 875 MG tablet                Primary Care Provider Office Phone # Fax #    [facility] [phone] [address] [address]  Pending sale to [facility] [phone]        Equal Access to Services     ERICKA ALMAGUER AH: Hadii aad ku hadasho Soomaali, waaxda luqadaha, qaybta kaalmada adeegyada, waxay idiin haycuongn margot reza. So [facility] [phone].    ATENCIÓN: Si habla español, tiene a calvillo disposición servicios gratuitos de asistencia lingüística. Llame al [phone].    We comply with applicable federal civil rights laws and [place] laws. We do not discriminate on the basis of race, color, national origin, age, disability, sex, sexual orientation, or gender identity.            Thank you!     Thank you for choosing [facility]  for your care. Our goal is always to provide you with excellent care. Hearing back from our patients is one way we can continue to improve our  services. Please take a few minutes to complete the written survey that you may receive in the mail after your visit with us. Thank you!             Your Updated Medication List - Protect others around you: Learn how to safely use, store and throw away your medicines at www.disposemymeds.org.          This list is accurate as of 6/1/18  8:19 PM.  Always use your most recent med list.                   Brand Name Dispense Instructions for use Diagnosis    * albuterol 108 (90 Base) MCG/ACT Inhaler    PROAIR HFA/PROVENTIL HFA/VENTOLIN HFA    1 Inhaler    Inhale 2 puffs into the lungs every 6 hours as needed for shortness of breath / dyspnea or wheezing    Mild intermittent asthma       * albuterol (2.5 MG/3ML) 0.083% neb solution     30 vial    Take 1 vial (2.5 mg) by nebulization every 6 hours as needed for shortness of breath / dyspnea or wheezing    Mild intermittent asthma       amoxicillin 875 MG tablet    AMOXIL    20 tablet    Take 1 tablet (875 mg) by mouth 2 times daily    Throat pain       OLANZapine 5 MG tablet    zyPREXA    30 tablet    Take 1 tablet (5 mg) by mouth At Bedtime    Depression with suicidal ideation       * ondansetron 4 MG tablet    ZOFRAN    20 tablet    Take 1 tablet (4 mg) by mouth every 6 hours as needed for nausea    Vomiting and diarrhea       * ondansetron 4 MG tablet    ZOFRAN    30 tablet    Take 1 tablet (4 mg) by mouth every 6 hours as needed for nausea    Vomiting and diarrhea       QUEtiapine 25 MG tablet    SEROquel    60 tablet    Take 1 tablet (25 mg) by mouth nightly as needed (anxiety)    Depression with suicidal ideation       VIIBRYD PO      Take 20 mg by mouth daily        ZYRTEC ALLERGY 10 MG tablet   Generic drug:  cetirizine      Take 10 mg by mouth every morning        * Notice:  This list has 4 medication(s) that are the same as other medications prescribed for you. Read the directions carefully, and ask your doctor or other care provider to review them with you.

## 2018-06-02 NOTE — PROGRESS NOTES
SUBJECTIVE: 33 year old female with sore throat, myalgias, swollen glands, headache and fever for several days. No history of rheumatic fever. Other symptoms: nasal congestion and nasal blockage.    OBJECTIVE:   Vitals as noted above.  Appears moderate distress.  Ears: normal  Oropharynx: mild erythema  Neck: supple and moderate nontender anterior cervical nodes  Lungs: chest clear to IPPA and clear to IPPA  Rapid Strep test is positive    ASSESSMENT: Streptococcal pharyngitis    PLAN: Per orders. Gargle, use acetaminophen or other OTC analgesic, and take Rx fully as prescribed. Call if other family members develop similar symptoms. See prn.

## 2018-06-02 NOTE — PROGRESS NOTES
SUBJECTIVE:   Ashlie Matthews is a 33 year old female who presents to clinic today for the following health issues:      Pt had been having colds for almost a week and fever started yesterday.  Sx- myalgia, sore throat, chills, HA.    SUBJECTIVE:   Ashlie Matthews is a 33 year old female presenting with a chief complaint of   Chief Complaint   Patient presents with     Urgent Care     Fever     Fever of 102.5       She is { New/Established:493930} patient of [facility].    { Conditions:682933}    Review of Systems    Past Medical History:   Diagnosis Date     Allergic rhinitis      Anxiety      Asthma      Cyclic vomiting syndrome      Dysmenorrhea      Hypertension in pregnancy      Pneumothorax 2004    from bad coughing     Family History   Problem Relation Age of Onset     DIABETES Maternal Grandfather      insulin     Hypertension Mother      GASTROINTESTINAL DISEASE Mother      Pancreatitis-flare ups often     Hypertension Maternal Grandfather      Lipids Mother      high     Obesity Maternal Grandmother      Obesity Maternal Grandfather      Respiratory Maternal Grandfather      asthma     Family History Negative Father      Blood Disease Mother      HEART DISEASE Father      Allergies Son      Allergic to peanuts, different foods, Amoxicillin     Current Outpatient Prescriptions   Medication Sig Dispense Refill     albuterol (2.5 MG/3ML) 0.083% nebulizer solution Take 1 vial (2.5 mg) by nebulization every 6 hours as needed for shortness of breath / dyspnea or wheezing 30 vial 3     cetirizine (ZYRTEC ALLERGY) 10 MG tablet Take 10 mg by mouth every morning       OLANZapine (ZYPREXA) 5 MG tablet Take 1 tablet (5 mg) by mouth At Bedtime 30 tablet 0     ondansetron (ZOFRAN) 4 MG tablet Take 1 tablet (4 mg) by mouth every 6 hours as needed for nausea 20 tablet 0     QUEtiapine (SEROQUEL) 25 MG tablet Take 1 tablet (25 mg) by mouth nightly as needed (anxiety) 60 tablet 0     Vilazodone HCl (VIIBRYD PO) Take  "20 mg by mouth daily        albuterol (PROAIR HFA, PROVENTIL HFA, VENTOLIN HFA) 108 (90 BASE) MCG/ACT inhaler Inhale 2 puffs into the lungs every 6 hours as needed for shortness of breath / dyspnea or wheezing 1 Inhaler 11     ondansetron (ZOFRAN) 4 MG tablet Take 1 tablet (4 mg) by mouth every 6 hours as needed for nausea 30 tablet 0     Social History   Substance Use Topics     Smoking status: Never Smoker     Smokeless tobacco: Never Used     Alcohol use No       OBJECTIVE  /72 (BP Location: Right arm, Patient Position: Chair, Cuff Size: Adult Regular)  Pulse 110  Temp 102.1  F (38.9  C) (Oral)  SpO2 98%    Physical Exam    Labs:  No results found for this or any previous visit (from the past 24 hour(s)).    {XRay was/was not done (Optional):612733}    ASSESSMENT:      ICD-10-CM    1. Throat pain R07.0 Rapid strep screen        Medical Decision Making:    Differential Diagnosis:  { Differential Choices:804568}    Serious Comorbid Conditions:  { Serious Comorbid Conditions:070573}    PLAN:    { Plan Choices:885275}    Followup:    { Followup:194043::\"If not improving or if condition worsens, follow up with your Primary Care Provider\"}    There are no Patient Instructions on file for this visit.        Ashlie's urine culture was {POS/NE}. She should {CONTINUE/DISCONTINUE:124870::\"Continue Services\"} antibiotics. Parent's names are: Data Unavailable (mother) and Data Unavailable (father).  Please notify of plan. [phone] (home) [phone] (work)  {RECHECK UA:700003}  Nidia Gary  {additional problems for provider to add:131034}    Problem list and histories reviewed & adjusted, as indicated.  Additional history: {NONE - AS DOCUMENTED:451448::\"as documented\"}    {HIST REVIEW/ LINKS 2:201198}    Reviewed and updated as needed this visit by clinical staff       Reviewed and updated as needed this visit by Provider         {PROVIDER CHARTING PREFERENCE:163095}    "

## 2018-06-11 ENCOUNTER — OFFICE VISIT (OUTPATIENT)
Dept: FAMILY MEDICINE | Facility: CLINIC | Age: 33
End: 2018-06-11
Payer: COMMERCIAL

## 2018-06-11 VITALS
BODY MASS INDEX: 23.43 KG/M2 | RESPIRATION RATE: 16 BRPM | WEIGHT: 124 LBS | HEART RATE: 88 BPM | TEMPERATURE: 98.9 F | SYSTOLIC BLOOD PRESSURE: 122 MMHG | DIASTOLIC BLOOD PRESSURE: 80 MMHG

## 2018-06-11 DIAGNOSIS — N39.46 MIXED STRESS AND URGE URINARY INCONTINENCE: Primary | ICD-10-CM

## 2018-06-11 DIAGNOSIS — G43.831 INTRACTABLE MENSTRUAL MIGRAINE WITH STATUS MIGRAINOSUS: ICD-10-CM

## 2018-06-11 DIAGNOSIS — F32.1 MODERATE SINGLE CURRENT EPISODE OF MAJOR DEPRESSIVE DISORDER (H): ICD-10-CM

## 2018-06-11 PROCEDURE — 99214 OFFICE O/P EST MOD 30 MIN: CPT | Performed by: PHYSICIAN ASSISTANT

## 2018-06-11 RX ORDER — SOLIFENACIN SUCCINATE 10 MG/1
10 TABLET, FILM COATED ORAL DAILY
Qty: 30 TABLET | Refills: 1 | Status: SHIPPED | OUTPATIENT
Start: 2018-06-11 | End: 2018-09-11

## 2018-06-11 NOTE — MR AVS SNAPSHOT
After Visit Summary   6/11/2018    Ashlie Matthews    MRN: [id number]           Patient Information     Date Of Birth          1985        Visit Information        Provider Department      6/11/2018 4:10 PM Danielle Mims PA-C [facility] [place]        Today's Diagnoses     Intractable menstrual migraine with status migrainosus    -  1    Mixed stress and urge urinary incontinence        Moderate single current episode of major depressive disorder (H)           Follow-ups after your visit        Additional Services     MENTAL HEALTH REFERRAL  - Adult; Outpatient Treatment; Individual/Couples/Family/Group Therapy/Health Psychology; [facility] [facility] [phone]; We will contact you to schedule the appointment or please call with any questions       All scheduling is subject to the client's specific insurance plan & benefits, provider/location availability, and provider clinical specialities.  Please arrive 15 minutes early for your first appointment and bring your completed paperwork.    Please be aware that coverage of these services is subject to the terms and limitations of your health insurance plan.  Call member services at your health plan with any benefit or coverage questions.                      OB/GYN REFERRAL       Your provider [place] you [place]  [facility] [place] [facility] [phone]  [email or web address]    Please be aware that coverage of these services is subject to the terms and limitations of your health insurance plan.  Call member services at your health plan with any benefit or coverage questions.      Please bring the following with you to your appointment:    (1) Any X-Rays, CTs or MRIs which have been performed.  Contact the facility where they were done to arrange for  prior to your scheduled appointment.   (2) List of current medications   (3) This referral request   (4) Any  documents/labs given to you for this referral            UROLOGY ADULT REFERRAL       Your provider has referred you to: [facility]: [facility] [phone]   [email or web address]    Please be aware that coverage of these services is subject to the terms and limitations of your health insurance plan.  Call member services at your health plan with any benefit or coverage questions.      Please bring the following with you to your appointment:    (1) Any X-Rays, CTs or MRIs which have been performed.  Contact the facility where they were done to arrange for  prior to your scheduled appointment.    (2) List of current medications  (3) This referral request   (4) Any documents/labs given to you for this referral                  Follow-up notes from your care team     Return in about 3 months (around 9/11/2018) for urinary and mood recheck.      Who to contact     If you have questions or need follow up information about today's clinic visit or your schedule please contact [facility] directly at [phone].  Normal or non-critical lab and imaging results will be communicated to you by [employer], letter or phone within 4 business days after the clinic has received the results. If you do not hear from us within 7 days, please contact the clinic through [employer] or phone. If you have a critical or abnormal lab result, we will notify you by phone as soon as possible.  Submit refill requests through [employer] or call your pharmacy and they will forward the refill request to us. Please allow 3 business days for your refill to be completed.          Additional Information About Your Visit        [employer] Information     [employer] gives you secure access to your electronic health record. If you see a primary care provider, you can also send messages to your care team and make appointments. If you have questions, please call your primary care clinic.  If you do not  have a primary care provider, please call [phone] and they will assist you.        Care EveryWhere ID     This is your Care EveryWhere ID. This could be used by other organizations to access your [facility] medical records  [id number]        Your Vitals Were     Pulse Temperature Respirations BMI (Body Mass Index)          88 98.9  F (37.2  C) (Tympanic) 16 23.43 kg/m2         Blood Pressure from Last 3 Encounters:   06/11/18 122/80   06/01/18 130/72   05/07/18 115/71    Weight from Last 3 Encounters:   06/11/18 124 lb (56.2 kg)   05/03/18 124 lb 6.4 oz (56.4 kg)   05/03/18 126 lb (57.2 kg)              We Performed the Following     MENTAL HEALTH REFERRAL  - Adult; Outpatient Treatment; Individual/Couples/Family/Group Therapy/Health Psychology; [facility]: [facility] [phone]; We will contact you to schedule the appointment or please call with any questions     OB/GYN REFERRAL     UROLOGY ADULT REFERRAL          Today's Medication Changes          These changes are accurate as of 6/11/18  4:46 PM.  If you have any questions, ask your nurse or doctor.               Start taking these medicines.        Dose/Directions    solifenacin 10 MG tablet   Commonly known as:  VESICARE   Used for:  Mixed stress and urge urinary incontinence   Started by:  Danielle Mims PA-C        Dose:  10 mg   Take 1 tablet (10 mg) by mouth daily   Quantity:  30 tablet   Refills:  1            Where to get your medicines      These medications were sent to [place] [id number] [place] [facility] - [place], [place] - [address]  [facility]  [address]  [facility], [facility] [address]     Phone:  [phone]     solifenacin 10 MG tablet                Primary Care Provider Office Phone # Fax #    [facility] [phone]       [address]  [facility] [address]        Equal Access to Services     ERICKA ALMAGUER AH: Hadii aad ku hadasho Soomaali, waaxda luqadaha, qaybta kaalmada adejaylan, mary dawn  lizetoscar romelelias la'aan ah. So [facility] [phone].    ATENCIÓN: Si juliola eliseo, tiene a calvillo disposición servicios gratuitos de asistencia lingüística. Kane al [phone].    We comply with applicable federal civil rights laws and [place] laws. We do not discriminate on the basis of race, color, national origin, age, disability, sex, sexual orientation, or gender identity.            Thank you!     Thank you for choosing [facility]  for your care. Our goal is always to provide you with excellent care. Hearing back from our patients is one way we can continue to improve our services. Please take a few minutes to complete the written survey that you may receive in the mail after your visit with us. Thank you!             Your Updated Medication List - Protect others around you: Learn how to safely use, store and throw away your medicines at www.disposemymeds.org.          This list is accurate as of 6/11/18  4:46 PM.  Always use your most recent med list.                   Brand Name Dispense Instructions for use Diagnosis    * albuterol 108 (90 Base) MCG/ACT Inhaler    PROAIR HFA/PROVENTIL HFA/VENTOLIN HFA    1 Inhaler    Inhale 2 puffs into the lungs every 6 hours as needed for shortness of breath / dyspnea or wheezing    Mild intermittent asthma       * albuterol (2.5 MG/3ML) 0.083% neb solution     30 vial    Take 1 vial (2.5 mg) by nebulization every 6 hours as needed for shortness of breath / dyspnea or wheezing    Mild intermittent asthma       amoxicillin 875 MG tablet    AMOXIL    20 tablet    Take 1 tablet (875 mg) by mouth 2 times daily    Throat pain       OLANZapine 5 MG tablet    zyPREXA    30 tablet    Take 1 tablet (5 mg) by mouth At Bedtime    Depression with suicidal ideation       * ondansetron 4 MG tablet    ZOFRAN    20 tablet    Take 1 tablet (4 mg) by mouth every 6 hours as needed for nausea    Vomiting and diarrhea       * ondansetron 4 MG tablet    ZOFRAN    30 tablet    Take 1  tablet (4 mg) by mouth every 6 hours as needed for nausea    Vomiting and diarrhea       QUEtiapine 25 MG tablet    SEROquel    60 tablet    Take 1 tablet (25 mg) by mouth nightly as needed (anxiety)    Depression with suicidal ideation       solifenacin 10 MG tablet    VESICARE    30 tablet    Take 1 tablet (10 mg) by mouth daily    Mixed stress and urge urinary incontinence       VIIBRYD PO      Take 20 mg by mouth daily        ZYRTEC ALLERGY 10 MG tablet   Generic drug:  cetirizine      Take 10 mg by mouth every morning        * Notice:  This list has 4 medication(s) that are the same as other medications prescribed for you. Read the directions carefully, and ask your doctor or other care provider to review them with you.

## 2018-06-11 NOTE — PROGRESS NOTES
SUBJECTIVE:   Ashlie Matthews is a 33 year old female who presents to clinic today for the following health issues:      URINARY Problems         Duration: ongoing, has had issues with overactive bladder in the past, stopped taking vesicare while pregnant, would like to restart med.    Description  frequency and urgency    Intensity:  moderate    Accompanying signs and symptoms:  Fever/chills: no   Flank pain no   Nausea and vomiting: no   Vaginal symptoms: none  Abdominal/Pelvic Pain: no     History  History of frequent UTI's: no   History of kidney stones: no   Sexually Active: YES  Possibility of pregnancy: No    Precipitating or alleviating factors: None    Therapies tried and outcome: vesicare   Outcome: helped symptoms       Patient is here today requesting a medication to help her with incontinence  She states she has urgency and frequency  She was seeing Dr. Weaver before  She was on Vesicare previously, would like to restart it    Also patient is requesting referral to OB  She has a history of cyclic vomiting syndrome with her menstrual cycle  She has had a tubal ligation, but is wondering if she would be eligible for hysterectomy  Notes that she also has migraines with her menses    Lastly, patient notes she was recently hospitalized for her mood  She woke up one night feeling like she wanted to harm herself  She was admitted to [facility] for 4 days  She notes that she has had postpartum depression for the last 2 years  She had a very difficult pregnancy and delivery with her youngest child  She ultimately was started on medication and feels more like her old self  Has an appointment with psychiatrist on June 18th    Problem list and histories reviewed & adjusted, as indicated.  Additional history: as documented    Patient Active Problem List   Diagnosis     Allergic state     Chronic maxillary sinusitis     Chronic tonsillitis     Mild intermittent asthma     CARDIOVASCULAR SCREENING; LDL GOAL LESS  THAN 160     Perforated tympanic membrane     AD (atopic dermatitis)     Diet controlled gestational diabetes mellitus in third trimester     Depression with suicidal ideation     Past Surgical History:   Procedure Laterality Date     ARTHROSCOPY SHOULDER Bilateral 2008,2009     Laparoscopy to look for endometriosis  8/2004     LAPAROTOMY MINI, TUBAL LIGATION (POST PARTUM), COMBINED Bilateral 5/11/2016    Procedure: COMBINED LAPAROTOMY MINI, TUBAL LIGATION (POST PARTUM);  Surgeon: Nannette Shane DO;  Location: [facility] OR     MYRINGOTOMY, INSERT TUBE BILATERAL, COMBINED  as a child     Septoplasty for deviated septum  7/2005     SINUS SURGERY  2007     TONSILLECTOMY  2006       Social History   Substance Use Topics     Smoking status: Never Smoker     Smokeless tobacco: Never Used     Alcohol use No     Family History   Problem Relation Age of Onset     DIABETES Maternal Grandfather      insulin     Hypertension Maternal Grandfather      Obesity Maternal Grandfather      Respiratory Maternal Grandfather      asthma     Obesity Maternal Grandmother      Hypertension Mother      GASTROINTESTINAL DISEASE Mother      Pancreatitis-flare ups often     Lipids Mother      high     Blood Disease Mother      Family History Negative Father      HEART DISEASE Father      Allergies Son      Allergic to peanuts, different foods, Amoxicillin         Current Outpatient Prescriptions   Medication Sig Dispense Refill     albuterol (2.5 MG/3ML) 0.083% nebulizer solution Take 1 vial (2.5 mg) by nebulization every 6 hours as needed for shortness of breath / dyspnea or wheezing 30 vial 3     albuterol (PROAIR HFA, PROVENTIL HFA, VENTOLIN HFA) 108 (90 BASE) MCG/ACT inhaler Inhale 2 puffs into the lungs every 6 hours as needed for shortness of breath / dyspnea or wheezing 1 Inhaler 11     amoxicillin (AMOXIL) 875 MG tablet Take 1 tablet (875 mg) by mouth 2 times daily 20 tablet 0     cetirizine (ZYRTEC ALLERGY) 10 MG tablet Take 10 mg by  "mouth every morning       OLANZapine (ZYPREXA) 5 MG tablet Take 1 tablet (5 mg) by mouth At Bedtime 30 tablet 0     ondansetron (ZOFRAN) 4 MG tablet Take 1 tablet (4 mg) by mouth every 6 hours as needed for nausea 30 tablet 0     ondansetron (ZOFRAN) 4 MG tablet Take 1 tablet (4 mg) by mouth every 6 hours as needed for nausea 20 tablet 0     QUEtiapine (SEROQUEL) 25 MG tablet Take 1 tablet (25 mg) by mouth nightly as needed (anxiety) 60 tablet 0     solifenacin (VESICARE) 10 MG tablet Take 1 tablet (10 mg) by mouth daily 30 tablet 1     Vilazodone HCl (VIIBRYD PO) Take 20 mg by mouth daily        Allergies   Allergen Reactions     Moxifloxacin Hydrochloride Nausea and Vomiting     Avelox-vomiting     Cats Itching     Codeine GI Disturbance     Darvocet [Propoxyphene N-Apap] Hives     Demerol Hives     Dog Hair [Dogs] Unknown     Dust Mites Itching and Swelling     Pollen Extract Itching     Vicodin [Acetaminophen] GI Disturbance     Latex Itching, Swelling and Rash     \"sensitivity\"       Reviewed and updated as needed this visit by clinical staff  Tobacco  Allergies  Meds  Med Hx  Surg Hx  Fam Hx  Soc Hx      Reviewed and updated as needed this visit by Provider         ROS:  Constitutional, HEENT, cardiovascular, pulmonary, gi and gu systems are negative, except as otherwise noted.    OBJECTIVE:     /80 (BP Location: Right arm, Patient Position: Chair, Cuff Size: Adult Regular)  Pulse 88  Temp 98.9  F (37.2  C) (Tympanic)  Resp 16  Wt 124 lb (56.2 kg)  BMI 23.43 kg/m2  Body mass index is 23.43 kg/(m^2).  GENERAL: healthy, alert and no distress  NECK: no adenopathy, no asymmetry, masses, or scars and thyroid normal to palpation  RESP: lungs clear to auscultation - no rales, rhonchi or wheezes  CV: regular rate and rhythm, normal S1 S2, no S3 or S4, no murmur, click or rub, no peripheral edema and peripheral pulses strong  MS: no gross musculoskeletal defects noted, no edema    Diagnostic Test " Results:  none     ASSESSMENT/PLAN:             1. Mixed stress and urge urinary incontinence  Chronic issue, will restart vesicare.  Referral placed back to Urology.  - solifenacin (VESICARE) 10 MG tablet; Take 1 tablet (10 mg) by mouth daily  Dispense: 30 tablet; Refill: 1  - UROLOGY ADULT REFERRAL    2. Intractable menstrual migraine with status migrainosus  Chronic issue, will refer to OB/GYN to discuss possibility of possible hysterectomy.  - OB/GYN REFERRAL    3. Moderate single current episode of major depressive disorder (H)  Chronic issue, will refer to counseling as well as have her follow up with psychiatry.  - MENTAL HEALTH REFERRAL  - Adult; Outpatient Treatment; Individual/Couples/Family/Group Therapy/Health Psychology; [facility]: [facility] [phone]; We will contact you to schedule the appointment or please call with any questions    Risks, benefits and alternatives were discussed with patient. Agreeable to the plan of care.      Danielle Mims PA-C  [facility]

## 2018-06-11 NOTE — LETTER
My Asthma Action Plan  Name: Ashlie Matthews   YOB: 1985  Date: 6/11/2018   My doctor: Danielle Mims PA-C   My clinic: [facility]        My Control Medicine: { :920829}  My Rescue Medicine: { :218312}  {AAP include Oral Steroid:626992} My Asthma Severity: { :754084}  Avoid your asthma triggers: { :273405}               GREEN ZONE   Good Control    I feel good    No cough or wheeze    Can work, sleep and play without asthma symptoms       Take your asthma control medicine every day.     1. If exercise triggers your asthma, take your rescue medication    15 minutes before exercise or sports, and    During exercise if you have asthma symptoms  2. Spacer to use with inhaler: If you have a spacer, make sure to use it with your inhaler             YELLOW ZONE Getting Worse  I have ANY of these:    I do not feel good    Cough or wheeze    Chest feels tight    Wake up at night   1. Keep taking your Green Zone medications  2. Start taking your rescue medicine:    every 20 minutes for up to 1 hour. Then every 4 hours for 24-48 hours.  3. If you stay in the Yellow Zone for more than 12-24 hours, contact your doctor.  4. If you do not return to the Green Zone in 12-24 hours or you get worse, start taking your oral steroid medicine if prescribed by your provider.           RED ZONE Medical Alert - Get Help  I have ANY of these:    I feel awful    Medicine is not helping    Breathing getting harder    Trouble walking or talking    Nose opens wide to breathe       1. Take your rescue medicine NOW  2. If your provider has prescribed an oral steroid medicine, start taking it NOW  3. Call your doctor NOW  4. If you are still in the Red Zone after 20 minutes and you have not reached your doctor:    Take your rescue medicine again and    Call 911 or go to the emergency room right away    See your regular doctor within 2 weeks of an Emergency Room or Urgent Care visit for follow-up treatment.           Annual Reminders:  Meet with Asthma Educator,  Flu Shot in the Fall, consider Pneumonia Vaccination for patients with asthma (aged 19 and older).    Pharmacy:    [facility] [place], [place] - [address]  [facility] [place], [place] - [address]  [facility] [place], [place] - [address]  [facility] [place] [place] [facility] [address] [facility]                      Asthma Triggers  How To Control Things That Make Your Asthma Worse    Triggers are things that make your asthma worse.  Look at the list below to help you find your triggers and what you can do about them.  You can help prevent asthma flare-ups by staying away from your triggers.      Trigger                                                          What you can do   Cigarette Smoke  Tobacco smoke can make asthma worse. Do not allow smoking in your home, car or around you.  Be sure no one smokes at a child s day care or school.  If you smoke, ask your health care provider for ways to help you quit.  Ask family members to quit too.  Ask your health care provider for a referral to Quit Plan to help you quit smoking, or call 8-092-127-PLAN.     Colds, Flu, Bronchitis  These are common triggers of asthma. Wash your hands often.  Don t touch your eyes, nose or mouth.  Get a flu shot every year.     Dust Mites  These are tiny bugs that live in cloth or carpet. They are too small to see. Wash sheets and blankets in hot water every week.   Encase pillows and mattress in dust mite proof covers.  Avoid having carpet if you can. If you have carpet, vacuum weekly.   Use a dust mask and HEPA vacuum.   Pollen and Outdoor Mold  Some people are allergic to trees, grass, or weed pollen, or molds. Try to keep your windows closed.  Limit time out doors when pollen count is high.   Ask you health care provider about taking medicine during allergy season.     Animal Dander  Some people are allergic to  skin flakes, urine or saliva from pets with fur or feathers. Keep pets with fur or feathers out of your home.    If you can t keep the pet outdoors, then keep the pet out of your bedroom.  Keep the bedroom door closed.  Keep pets off cloth furniture and away from stuffed toys.     Mice, Rats, and Cockroaches  Some people are allergic to the waste from these pests.   Cover food and garbage.  Clean up spills and food crumbs.  Store grease in the refrigerator.   Keep food out of the bedroom.   Indoor Mold  This can be a trigger if your home has high moisture. Fix leaking faucets, pipes, or other sources of water.   Clean moldy surfaces.  Dehumidify basement if it is damp and smelly.   Smoke, Strong Odors, and Sprays  These can reduce air quality. Stay away from strong odors and sprays, such as perfume, powder, hair spray, paints, smoke incense, paint, cleaning products, candles and new carpet.   Exercise or Sports  Some people with asthma have this trigger. Be active!  Ask your doctor about taking medicine before sports or exercise to prevent symptoms.    Warm up for 5-10 minutes before and after sports or exercise.     Other Triggers of Asthma  Cold air:  Cover your nose and mouth with a scarf.  Sometimes laughing or crying can be a trigger.  Some medicines and food can trigger asthma.

## 2018-06-12 ASSESSMENT — ASTHMA QUESTIONNAIRES: ACT_TOTALSCORE: 25

## 2018-06-13 ENCOUNTER — TRANSFERRED RECORDS (OUTPATIENT)
Dept: HEALTH INFORMATION MANAGEMENT | Facility: CLINIC | Age: 33
End: 2018-06-13

## 2018-06-13 LAB
HPV ABSTRACT: NORMAL
PAP SMEAR - HIM PATIENT REPORTED: NEGATIVE

## 2018-06-15 ENCOUNTER — TRANSFERRED RECORDS (OUTPATIENT)
Dept: HEALTH INFORMATION MANAGEMENT | Facility: CLINIC | Age: 33
End: 2018-06-15

## 2018-06-15 LAB — PAP SMEAR - HIM PATIENT REPORTED: NEGATIVE

## 2018-06-18 ENCOUNTER — OFFICE VISIT (OUTPATIENT)
Dept: URGENT CARE | Facility: URGENT CARE | Age: 33
End: 2018-06-18
Payer: COMMERCIAL

## 2018-06-18 VITALS
OXYGEN SATURATION: 99 % | TEMPERATURE: 101.6 F | DIASTOLIC BLOOD PRESSURE: 70 MMHG | HEART RATE: 114 BPM | SYSTOLIC BLOOD PRESSURE: 130 MMHG

## 2018-06-18 DIAGNOSIS — J02.0 ACUTE STREPTOCOCCAL PHARYNGITIS: Primary | ICD-10-CM

## 2018-06-18 DIAGNOSIS — R07.0 THROAT PAIN: ICD-10-CM

## 2018-06-18 LAB
DEPRECATED S PYO AG THROAT QL EIA: ABNORMAL
SPECIMEN SOURCE: ABNORMAL

## 2018-06-18 PROCEDURE — 87880 STREP A ASSAY W/OPTIC: CPT | Performed by: PHYSICIAN ASSISTANT

## 2018-06-18 PROCEDURE — 99213 OFFICE O/P EST LOW 20 MIN: CPT | Performed by: PHYSICIAN ASSISTANT

## 2018-06-18 NOTE — MR AVS SNAPSHOT
After Visit Summary   6/18/2018    Ashlie Matthews    MRN: [id number]           Patient Information     Date Of Birth          1985        Visit Information        Provider Department      6/18/2018 7:40 PM Kiera Domingo PA-C [facility]        Today's Diagnoses     Acute streptococcal pharyngitis    -  1    Throat pain           Follow-ups after your visit        Who to contact     If you have questions or need follow up information about today's clinic visit or your schedule please contact [facility] directly at [phone].  Normal or non-critical lab and imaging results will be communicated to you by [employer], letter or phone within 4 business days after the clinic has received the results. If you do not hear from us within 7 days, please contact the clinic through [employer] or phone. If you have a critical or abnormal lab result, we will notify you by phone as soon as possible.  Submit refill requests through [employer] or call your pharmacy and they will forward the refill request to us. Please allow 3 business days for your refill to be completed.          Additional Information About Your Visit        MyChart Information     [employer] gives you secure access to your electronic health record. If you see a primary care provider, you can also send messages to your care team and make appointments. If you have questions, please call your primary care clinic.  If you do not have a primary care provider, please call [phone] and they will assist you.        Care EveryWhere ID     This is your Care EveryWhere ID. This could be used by other organizations to access your [facility] medical records  [id number]        Your Vitals Were     Pulse Temperature Pulse Oximetry             114 101.6  F (38.7  C) (Tympanic) 99%          Blood Pressure from Last 3 Encounters:   06/18/18 130/70   06/11/18 122/80   06/01/18 130/72    Weight from Last 3 Encounters:    06/11/18 124 lb (56.2 kg)   05/03/18 124 lb 6.4 oz (56.4 kg)   05/03/18 126 lb (57.2 kg)              We Performed the Following     Rapid strep screen          Today's Medication Changes          These changes are accurate as of 6/18/18 11:59 PM.  If you have any questions, ask your nurse or doctor.               Start taking these medicines.        Dose/Directions    amoxicillin-clavulanate 875-125 MG per tablet   Commonly known as:  AUGMENTIN   Used for:  Acute streptococcal pharyngitis   Started by:  Kiera Domingo PA-C        Dose:  1 tablet   Take 1 tablet by mouth 2 times daily for 14 days   Quantity:  28 tablet   Refills:  0            Where to get your medicines      These medications were sent to [facility] [id number] [address] [address]    Hours:  Tech issues with their phone system Phone:  [phone]     amoxicillin-clavulanate 875-125 MG per tablet                Primary Care Provider Office Phone # Fax #    [facility] [phone] [address] [facility] [address]        Equal Access to Services     ERICKA ALMAGUER AH: Hadii aad ku hadasho Soomaali, waaxda luqadaha, qaybta kaalmada adeegyada, waxay idiin hayaan adeoscar del rosario ah. So [facility] [phone].    ATENCIÓN: Si habla español, tiene a calvillo disposición servicios gratuitos de asistencia lingüística. Kane al [phone].    We comply with applicable federal civil rights laws and [place] laws. We do not discriminate on the basis of race, color, national origin, age, disability, sex, sexual orientation, or gender identity.            Thank you!     Thank you for choosing [facility]  for your care. Our goal is always to provide you with excellent care. Hearing back from our patients is one way we can continue to improve our services. Please take a few minutes to complete the written survey that you may receive in the mail  after your visit with us. Thank you!             Your Updated Medication List - Protect others around you: Learn how to safely use, store and throw away your medicines at www.disposemymeds.org.          This list is accurate as of 6/18/18 11:59 PM.  Always use your most recent med list.                   Brand Name Dispense Instructions for use Diagnosis    * albuterol 108 (90 Base) MCG/ACT Inhaler    PROAIR HFA/PROVENTIL HFA/VENTOLIN HFA    1 Inhaler    Inhale 2 puffs into the lungs every 6 hours as needed for shortness of breath / dyspnea or wheezing    Mild intermittent asthma       * albuterol (2.5 MG/3ML) 0.083% neb solution     30 vial    Take 1 vial (2.5 mg) by nebulization every 6 hours as needed for shortness of breath / dyspnea or wheezing    Mild intermittent asthma       amoxicillin 875 MG tablet    AMOXIL    20 tablet    Take 1 tablet (875 mg) by mouth 2 times daily    Throat pain       amoxicillin-clavulanate 875-125 MG per tablet    AUGMENTIN    28 tablet    Take 1 tablet by mouth 2 times daily for 14 days    Acute streptococcal pharyngitis       OLANZapine 5 MG tablet    zyPREXA    30 tablet    Take 1 tablet (5 mg) by mouth At Bedtime    Depression with suicidal ideation       * ondansetron 4 MG tablet    ZOFRAN    20 tablet    Take 1 tablet (4 mg) by mouth every 6 hours as needed for nausea    Vomiting and diarrhea       * ondansetron 4 MG tablet    ZOFRAN    30 tablet    Take 1 tablet (4 mg) by mouth every 6 hours as needed for nausea    Vomiting and diarrhea       QUEtiapine 25 MG tablet    SEROquel    60 tablet    Take 1 tablet (25 mg) by mouth nightly as needed (anxiety)    Depression with suicidal ideation       solifenacin 10 MG tablet    VESICARE    30 tablet    Take 1 tablet (10 mg) by mouth daily    Mixed stress and urge urinary incontinence       VIIBRYD PO      Take 20 mg by mouth daily        ZYRTEC ALLERGY 10 MG tablet   Generic drug:  cetirizine      Take 10 mg by mouth every morning         * Notice:  This list has 4 medication(s) that are the same as other medications prescribed for you. Read the directions carefully, and ask your doctor or other care provider to review them with you.

## 2018-06-18 NOTE — DISCHARGE SUMMARY
[facility] Psychiatric Discharge Summary      DATE OF ADMISSION: 5/3/2018     DATE OF DISCHARGE: 5/7/2018    PRIMARY CARE PHYSICIAN: Billie Brar    IDENTIFICATION: Ashlie Matthews is a 33-year-old woman who has been  for 13 years and has 3 children, ages 8, 5 and almost 2.  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  She denies previous psychiatric hospitalization, but recently started seeing Dr. Wilson at [facility] on account of increased anxiety and depression.  She presented to [facility] on account of inability to sleep and suicidal ideations on account of which she was transferred to [facility] for hospitalization. For history, see dictation by Dr. Rankin on 5/4/18. For physical summary, see dictation by RYAN Bernstein CNP on 5/4/18.     HOSPITAL COURSE: Ashlie Matthews reports that 2 years ago she was pregnant with their third child and had hyperemesis gravidarum.  She reports that she was so sick that they had put a PICC line in for her and she required multiple visits to the hospital and a couple of times her obstetrician recommended termination of the pregnancy.  She reports that she eventually had the baby safely, but immediately postpartum she noticed that she was so attached to the baby that she would not let anybody else carry the baby.  She states that she became very territorial even after she got back home and did not want anybody holding her.  She reports that this was very difficult for her mother-in-law as she is typically a demanding and touchy person.  She states that she had to avoid attending functions with her in-laws, even though she knew they understood her situation.  She states she may have been this way because it took over a year for them to get pregnant with their third child whilst it was easy for them to have the first two.  She states she subsequently got better with attending  family functions, but she remained very attached to her daughter and continued to breastfeed up until just 2 weeks ago.  Most recently, she reports that she had a breakdown 2 weeks ago resulting in her presentation to [facility] ED.  She states she was evaluated, but they did not feel she needed hospitalization at that point on account of which she was placed on Viibryd. The patient reports she has been on Viibryd for about 11 days.  She claims she has an overactive bladder and has been up multiple times during the night a couple of days ago and was unable to sleep.  She states she was up until about 2:30 a.m. at which point she felt exhausted and woke her  up.  She reportedly was very frantic and expressed hopelessness.    Pt was maintained on the step down unit and encouraged to participate in the milieu. She was placed on Viibryd 20mg qam and Zyprexa 5mg qhs. It was suggested that she be placed on Remeron, but ultimately put on Zyprexa on account of possible hypomania. On 5/7/18, pt was started on Zyprexa and she was able to obtain better sleep through the night, not waking up to urinate. She reported that Viibryd allowed her to have more energy during the day and was less tired. Her  believes that she is much improved since admission. She was then ready for discharge. Denies suicidal or homicidal ideation. 30 day supply of medication provided at time of discharge. She will follow up with her therapist and psychiatrist at [facility] in [place].    DISCHARGE MENTAL STATUS EXAMINATION:    Appearance Sitting in chair, dressed in clothes neatly dresed. Appears stated age.   Attitude Cooperative   Orientation Oriented to person, place, time   Eye Contact Poor   Speech Regular rate, rhythm, volume and tone   Language Normal   Psychomotor Behavior Normal   Mood Much brighter   Affect Much less agitated   Thought Process Goal-Oriented, Intact   Associations Intact   Thought Content Patient is currently  "negative for suicidal ideation, negative for plan or intent, able to contract no self harm and identify barriers to suicide.  Negative for obsessions, compulsions or psychosis.     Fund of Knowledge intact   Insight Much improved   Judgement Much improved   Attention Span & Concentration Dramatically improved   Recent & Remote Memory intact   Gait Normal   Muscle Tone Intact         LABORATORY DATA:    Refer to hospitalist admission dictation.  No results found for this or any previous visit (from the past 24 hour(s)).     /71  Pulse 81  Temp 98.3  F (36.8  C) (Oral)  Resp 15  Ht 1.549 m (5' 1\")  Wt 56.4 kg (124 lb 6.4 oz)  SpO2 96%  BMI 23.51 kg/m2     DISCHARGE MEDICATIONS:      Review of your medicines      START taking       Dose / Directions    OLANZapine 5 MG tablet   Commonly known as:  zyPREXA   Used for:  Depression with suicidal ideation        Dose:  5 mg   Take 1 tablet (5 mg) by mouth At Bedtime   Quantity:  30 tablet   Refills:  0       QUEtiapine 25 MG tablet   Commonly known as:  SEROquel   Used for:  Depression with suicidal ideation        Dose:  25 mg   Take 1 tablet (25 mg) by mouth nightly as needed (anxiety)   Quantity:  60 tablet   Refills:  0         CONTINUE these medicines which may have CHANGED, or have new prescriptions. If we are uncertain of the size of tablets/capsules you have at home, strength may be listed as something that might have changed.       Dose / Directions    * ondansetron 4 MG tablet   Commonly known as:  ZOFRAN   This may have changed:  Another medication with the same name was added. Make sure you understand how and when to take each.   Used for:  Vomiting and diarrhea        Dose:  4 mg   Take 1 tablet (4 mg) by mouth every 6 hours as needed for nausea   Quantity:  20 tablet   Refills:  0       * ondansetron 4 MG tablet   Commonly known as:  ZOFRAN   This may have changed:  You were already taking a medication with the same name, and this prescription was " added. Make sure you understand how and when to take each.   Used for:  Vomiting and diarrhea        Dose:  4 mg   Take 1 tablet (4 mg) by mouth every 6 hours as needed for nausea   Quantity:  30 tablet   Refills:  0       * Notice:  This list has 2 medication(s) that are the same as other medications prescribed for you. Read the directions carefully, and ask your doctor or other care provider to review them with you.      CONTINUE these medicines which have NOT CHANGED       Dose / Directions    * albuterol 108 (90 Base) MCG/ACT Inhaler   Commonly known as:  PROAIR HFA/PROVENTIL HFA/VENTOLIN HFA   Used for:  Mild intermittent asthma        Dose:  2 puff   Inhale 2 puffs into the lungs every 6 hours as needed for shortness of breath / dyspnea or wheezing   Quantity:  1 Inhaler   Refills:  11       * albuterol (2.5 MG/3ML) 0.083% neb solution   Used for:  Mild intermittent asthma        Dose:  1 vial   Take 1 vial (2.5 mg) by nebulization every 6 hours as needed for shortness of breath / dyspnea or wheezing   Quantity:  30 vial   Refills:  3       VIIBRYD PO        Dose:  20 mg   Take 20 mg by mouth daily   Refills:  0       ZYRTEC ALLERGY 10 MG tablet   Generic drug:  cetirizine        Dose:  10 mg   Take 10 mg by mouth every morning   Refills:  0       * Notice:  This list has 2 medication(s) that are the same as other medications prescribed for you. Read the directions carefully, and ask your doctor or other care provider to review them with you.      STOP taking          fluticasone 50 MCG/ACT spray   Commonly known as:  FLONASE           prenatal multivitamin plus iron 27-0.8 MG Tabs per tablet                Where to get your medicines      These medications were sent to [facility] [address] [facility] - [place], [place] - [address]  [facility]  [address] , [facility] [address]     Phone:  [phone]      OLANZapine 5 MG tablet     ondansetron 4 MG tablet     QUEtiapine 25 MG tablet             DISCHARGE  DIAGNOSES:    1.  Adjustment disorder with mixed anxiety and depressed mood.   2.  Rule out bipolar II disorder (given her history of postpartum depression and now agitated depression).   3.  Generalized anxiety disorder.   4.  Cyclic vomiting syndrome triggered by migraines.     DISCHARGE PLAN:     1. Explained side effects, benefits, and complications of medications to the patient, Pt gave verbal consent.  2. Medication changes: none  3. Discussed treatment plan with patient and team.  4. Projected length of stay:  Discharge today    DISCHARGE FOLLOW-UP:    Psychiatry Follow-up:      [facility]  [address] [place] [address]  [phone]  Fax [phone]  Appointment with Dr. Nathan Wilson MD on Monday, May 14 at 4:00 PM                         With Uyen Giles, Therapist, Thursday, May 17 at 3:00 PM    Attestation:   Patient has been seen and evaluated by me, Pedro Aldridge MD.    Patient ID:    Name: Ashlie Matthews MRN: [id number]  Admission: 5/3/2018   YOB: 1985

## 2018-06-19 ASSESSMENT — ENCOUNTER SYMPTOMS
NAUSEA: 0
RHINORRHEA: 0
FEVER: 1
COUGH: 0
CHILLS: 0
SORE THROAT: 1
VOMITING: 0
DIARRHEA: 0
HEADACHES: 1
SHORTNESS OF BREATH: 0

## 2018-06-19 NOTE — PROGRESS NOTES
SUBJECTIVE:   Ashlie Matthews is a 33 year old female presenting with a chief complaint of   Chief Complaint   Patient presents with     Urgent Care     Pharyngitis     Possible strep started this morning- myagial, chills, sore throat, nasal congestion, fever of 102.3       She is an established patient of [facility].    URI Adult  Onset of symptoms was this morning  Course of illness is worsening.    Severity moderate  Current and Associated symptoms: fever, sore throat, headache and body aches.  Denies: n/v/d  Treatment measures tried include Tylenol/Ibuprofen.  Predisposing factors include recent illness : finished amoxicillin for strep a week ago.        Review of Systems   Constitutional: Positive for fever. Negative for chills.   HENT: Positive for congestion and sore throat. Negative for ear pain and rhinorrhea.    Respiratory: Negative for cough and shortness of breath.    Gastrointestinal: Negative for diarrhea, nausea and vomiting.   Neurological: Positive for headaches.       Past Medical History:   Diagnosis Date     Allergic rhinitis      Anxiety      Asthma      Cyclic vomiting syndrome      Dysmenorrhea      Hypertension in pregnancy      Pneumothorax 2004    from bad coughing     Family History   Problem Relation Age of Onset     Diabetes Maternal Grandfather      insulin     Hypertension Maternal Grandfather      Obesity Maternal Grandfather      Respiratory Maternal Grandfather      asthma     Obesity Maternal Grandmother      Hypertension Mother      GASTROINTESTINAL DISEASE Mother      Pancreatitis-flare ups often     Lipids Mother      high     Blood Disease Mother      Family History Negative Father      HEART DISEASE Father      Allergies Son      Allergic to peanuts, different foods, Amoxicillin     Current Outpatient Prescriptions   Medication Sig Dispense Refill     albuterol (2.5 MG/3ML) 0.083% nebulizer solution Take 1 vial (2.5 mg) by nebulization every 6 hours as needed for shortness of  breath / dyspnea or wheezing 30 vial 3     albuterol (PROAIR HFA, PROVENTIL HFA, VENTOLIN HFA) 108 (90 BASE) MCG/ACT inhaler Inhale 2 puffs into the lungs every 6 hours as needed for shortness of breath / dyspnea or wheezing 1 Inhaler 11     amoxicillin-clavulanate (AUGMENTIN) 875-125 MG per tablet Take 1 tablet by mouth 2 times daily for 14 days 28 tablet 0     cetirizine (ZYRTEC ALLERGY) 10 MG tablet Take 10 mg by mouth every morning       OLANZapine (ZYPREXA) 5 MG tablet Take 1 tablet (5 mg) by mouth At Bedtime 30 tablet 0     ondansetron (ZOFRAN) 4 MG tablet Take 1 tablet (4 mg) by mouth every 6 hours as needed for nausea 30 tablet 0     ondansetron (ZOFRAN) 4 MG tablet Take 1 tablet (4 mg) by mouth every 6 hours as needed for nausea 20 tablet 0     QUEtiapine (SEROQUEL) 25 MG tablet Take 1 tablet (25 mg) by mouth nightly as needed (anxiety) 60 tablet 0     solifenacin (VESICARE) 10 MG tablet Take 1 tablet (10 mg) by mouth daily 30 tablet 1     Vilazodone HCl (VIIBRYD PO) Take 20 mg by mouth daily        amoxicillin (AMOXIL) 875 MG tablet Take 1 tablet (875 mg) by mouth 2 times daily (Patient not taking: Reported on 6/18/2018) 20 tablet 0     Social History   Substance Use Topics     Smoking status: Never Smoker     Smokeless tobacco: Never Used     Alcohol use No       OBJECTIVE  /70 (BP Location: Right arm, Patient Position: Chair, Cuff Size: Adult Regular)  Pulse 114  Temp 101.6  F (38.7  C) (Tympanic)  SpO2 99%    Physical Exam   Constitutional: She appears well-developed and well-nourished. No distress.   HENT:   Head: Normocephalic and atraumatic.   Right Ear: External ear normal.   Left Ear: External ear normal.   Mouth/Throat: No oropharyngeal exudate.   Oropharynx looks inflamed, tonsils are surgically absent   Eyes: Conjunctivae and EOM are normal.   Neck: Normal range of motion. Neck supple.   Cardiovascular: Regular rhythm and normal heart sounds.    Pulmonary/Chest: Effort normal and breath  sounds normal. No respiratory distress.   Lymphadenopathy:     She has no cervical adenopathy.   Neurological: She is alert.   Skin: Skin is warm and dry. No rash noted.       Labs:  Results for orders placed or performed in visit on 06/18/18 (from the past 24 hour(s))   Rapid strep screen   Result Value Ref Range    Specimen Description Throat     Rapid Strep A Screen (A)      POSITIVE: Group A Streptococcal antigen detected by immunoassay.           ASSESSMENT:      ICD-10-CM    1. Acute streptococcal pharyngitis J02.0 amoxicillin-clavulanate (AUGMENTIN) 875-125 MG per tablet   2. Throat pain R07.0 Rapid strep screen        Medical Decision Making:    Differential Diagnosis:  URI Adult/Peds:  Strep pharyngitis, Viral pharyngitis, Viral syndrome and Viral upper respiratory illness    Serious Comorbid Conditions:  Adult:  None    PLAN:  Acute streptococcal pharyngitis: patient finished amoxicillin for strep a week ago. Augmentin Rx tonight. Advised to get a new toothbrush after 24 hrs on the Abx. Good hand washing is advised. Tylenol or motrin as needed for fever or pain. Follow up if any worsening symptoms. Patient agrees with the plan.    Followup:    If not improving or if condition worsens, follow up with your Primary Care Provider

## 2018-06-25 ENCOUNTER — TELEPHONE (OUTPATIENT)
Dept: FAMILY MEDICINE | Facility: CLINIC | Age: 33
End: 2018-06-25

## 2018-06-25 ENCOUNTER — OFFICE VISIT (OUTPATIENT)
Dept: FAMILY MEDICINE | Facility: CLINIC | Age: 33
End: 2018-06-25
Payer: COMMERCIAL

## 2018-06-25 VITALS
HEART RATE: 102 BPM | BODY MASS INDEX: 24.92 KG/M2 | RESPIRATION RATE: 18 BRPM | OXYGEN SATURATION: 97 % | DIASTOLIC BLOOD PRESSURE: 68 MMHG | WEIGHT: 135.4 LBS | SYSTOLIC BLOOD PRESSURE: 116 MMHG | HEIGHT: 62 IN | TEMPERATURE: 98.5 F

## 2018-06-25 DIAGNOSIS — J02.9 VIRAL PHARYNGITIS: Primary | ICD-10-CM

## 2018-06-25 PROCEDURE — 99213 OFFICE O/P EST LOW 20 MIN: CPT | Performed by: PHYSICIAN ASSISTANT

## 2018-06-25 RX ORDER — DIPHENHYDRAMINE HYDROCHLORIDE AND LIDOCAINE HYDROCHLORIDE AND ALUMINUM HYDROXIDE AND MAGNESIUM HYDRO
5-10 KIT EVERY 6 HOURS PRN
Qty: 237 ML | Refills: 1 | Status: SHIPPED | OUTPATIENT
Start: 2018-06-25 | End: 2018-07-11

## 2018-06-25 ASSESSMENT — PATIENT HEALTH QUESTIONNAIRE - PHQ9: 5. POOR APPETITE OR OVEREATING: MORE THAN HALF THE DAYS

## 2018-06-25 ASSESSMENT — ANXIETY QUESTIONNAIRES
2. NOT BEING ABLE TO STOP OR CONTROL WORRYING: MORE THAN HALF THE DAYS
1. FEELING NERVOUS, ANXIOUS, OR ON EDGE: MORE THAN HALF THE DAYS
5. BEING SO RESTLESS THAT IT IS HARD TO SIT STILL: MORE THAN HALF THE DAYS
3. WORRYING TOO MUCH ABOUT DIFFERENT THINGS: MORE THAN HALF THE DAYS
GAD7 TOTAL SCORE: 12
6. BECOMING EASILY ANNOYED OR IRRITABLE: SEVERAL DAYS
7. FEELING AFRAID AS IF SOMETHING AWFUL MIGHT HAPPEN: SEVERAL DAYS
IF YOU CHECKED OFF ANY PROBLEMS ON THIS QUESTIONNAIRE, HOW DIFFICULT HAVE THESE PROBLEMS MADE IT FOR YOU TO DO YOUR WORK, TAKE CARE OF THINGS AT HOME, OR GET ALONG WITH OTHER PEOPLE: SOMEWHAT DIFFICULT

## 2018-06-25 NOTE — MR AVS SNAPSHOT
After Visit Summary   6/25/2018    Ashlie Matthews    MRN: [id number]           Patient Information     Date Of Birth          1985        Visit Information        Provider Department      6/25/2018 9:30 AM Danielle Mims PA-C [facility]        Today's Diagnoses     Viral pharyngitis    -  1       Follow-ups after your visit        Follow-up notes from your care team     Return for If symptoms worsen or fail to improve.      Your next 10 appointments already scheduled     Jul 11, 2018  4:10 PM CDT   Katya Torres with Danielle Mims PA-C   [facility] ([facility])    [address] [address] [facility] [address] [facility] [address] [phone]              Who to contact     If you have questions or need follow up information about today's clinic visit or your schedule please contact [facility] directly at [phone].  Normal or non-critical lab and imaging results will be communicated to you by [facility], letter or phone within 4 business days after the clinic has received the results. If you do not hear from us within 7 days, please contact the clinic through [facility] or phone. If you have a critical or abnormal lab result, we will notify you by phone as soon as possible.  Submit refill requests through [employer] or call your pharmacy and they will forward the refill request to us. Please allow 3 business days for your refill to be completed.          Additional Information About Your Visit        MyChart Information     [employer] gives you secure access to your electronic health record. If you see a primary care provider, you can also send messages to your care team and make appointments. If you have questions, please call your primary care clinic.  If you do not have a primary care provider, please call [phone] and they will assist you.        Care EveryWhere ID     This is your Care EveryWhere ID. This could be used by other  "organizations to access your [place] medical records  [id number]        Your Vitals Were     Pulse Temperature Respirations Height Last Period Pulse Oximetry    102 98.5  F (36.9  C) (Oral) 18 5' 1.75\" (1.568 m) 05/29/2018 (Approximate) 97%    Breastfeeding? BMI (Body Mass Index)                No 24.97 kg/m2           Blood Pressure from Last 3 Encounters:   06/25/18 116/68   06/18/18 130/70   06/11/18 122/80    Weight from Last 3 Encounters:   06/25/18 135 lb 6.4 oz (61.4 kg)   06/11/18 124 lb (56.2 kg)   05/03/18 124 lb 6.4 oz (56.4 kg)              Today, you had the following     No orders found for display         Today's Medication Changes          These changes are accurate as of 6/25/18  9:43 AM.  If you have any questions, ask your nurse or doctor.               Start taking these medicines.        Dose/Directions    magic mouthwash suspension (diphenhydrAMINE, lidocaine, aluminum-magnesium & simethicone) Susp compounding kit   Used for:  Viral pharyngitis   Started by:  Danielle Mims PA-C        Dose:  5-10 mL   Swish and swallow 5-10 mLs in mouth every 6 hours as needed for mouth sores   Quantity:  237 mL   Refills:  1            Where to get your medicines      These medications were sent to [facility] [address] [place] [facility] [address] [address]  [address] [facility] [address]     Phone:  [phone]     magic mouthwash suspension (diphenhydrAMINE, lidocaine, aluminum-magnesium & simethicone) Susp compounding kit                Primary Care Provider Office Phone # Fax #    [facility] [phone] [address]  [facility] [address]        Equal Access to Services     [facility] ROSINA : Kurt mackey Solisbeth, waaxda luqadaha, qaybta kaalmada adeegyada, mary reza. So [facility] [phone].    ATENCIÓN: Si habla español, tiene a calvillo disposición servicios gratuitos de asistencia lingüística. Llame al " [phone].    We comply with applicable federal civil rights laws and [place] laws. We do not discriminate on the basis of race, color, national origin, age, disability, sex, sexual orientation, or gender identity.            Thank you!     Thank you for choosing [facility]  for your [facility]. Our goal is always to provide you with excellent care. Hearing back from our patients is one way we can continue to improve our services. Please take a few minutes to complete the written survey that you may receive in the mail after your visit with us. Thank you!             Your Updated Medication List - Protect others around you: Learn how to safely use, store and throw away your medicines at www.disposemymeds.org.          This list is accurate as of 6/25/18  9:43 AM.  Always use your most recent med list.                   Brand Name Dispense Instructions for use Diagnosis    * albuterol 108 (90 Base) MCG/ACT Inhaler    PROAIR HFA/PROVENTIL HFA/VENTOLIN HFA    1 Inhaler    Inhale 2 puffs into the lungs every 6 hours as needed for shortness of breath / dyspnea or wheezing    Mild intermittent asthma       * albuterol (2.5 MG/3ML) 0.083% neb solution     30 vial    Take 1 vial (2.5 mg) by nebulization every 6 hours as needed for shortness of breath / dyspnea or wheezing    Mild intermittent asthma       amoxicillin-clavulanate 875-125 MG per tablet    AUGMENTIN    28 tablet    Take 1 tablet by mouth 2 times daily for 14 days    Acute streptococcal pharyngitis       magic mouthwash suspension (diphenhydrAMINE, lidocaine, aluminum-magnesium & simethicone) Susp compounding kit     237 mL    Swish and swallow 5-10 mLs in mouth every 6 hours as needed for mouth sores    Viral pharyngitis       OLANZapine 5 MG tablet    zyPREXA    30 tablet    Take 1 tablet (5 mg) by mouth At Bedtime    Depression with suicidal ideation       ondansetron 4 MG tablet    ZOFRAN    30 tablet    Take 1 tablet (4 mg) by mouth every 6  hours as needed for nausea    Vomiting and diarrhea       QUEtiapine 25 MG tablet    SEROquel    60 tablet    Take 1 tablet (25 mg) by mouth nightly as needed (anxiety)    Depression with suicidal ideation       solifenacin 10 MG tablet    VESICARE    30 tablet    Take 1 tablet (10 mg) by mouth daily    Mixed stress and urge urinary incontinence       VIIBRYD PO      Take 20 mg by mouth daily        ZYRTEC ALLERGY 10 MG tablet   Generic drug:  cetirizine      Take 10 mg by mouth every morning        * Notice:  This list has 2 medication(s) that are the same as other medications prescribed for you. Read the directions carefully, and ask your doctor or other care provider to review them with you.

## 2018-06-25 NOTE — PROGRESS NOTES
SUBJECTIVE:   Ashlie Matthews is a 33 year old female who presents to clinic today for the following health issues:      ED/UC Followup:    Facility:  [facility]  Date of visit: 6/18/18  Reason for visit: strep  Current Status: throat on left side is still very sore, difficulty talking, left side ear is painful  Was given Augmentin, after one week still isn't helping     Patient is here today complaining of left side throat pain  Ongoing since last week when seen at UC  She states she was diagnosed with strep prior to that, given Amoxicillin and symptoms resolved  Then had fever, bodyaches and went back to UC- told + strep though no sore throat  Taking Augmentin x 7 days, no improvement  Now left ear pain and throat pain  No cough, + drainage, no stuffy nose    Problem list and histories reviewed & adjusted, as indicated.  Additional history: as documented    Patient Active Problem List   Diagnosis     Allergic state     Chronic maxillary sinusitis     Chronic tonsillitis     Mild intermittent asthma     CARDIOVASCULAR SCREENING; LDL GOAL LESS THAN 160     Perforated tympanic membrane     AD (atopic dermatitis)     Diet controlled gestational diabetes mellitus in third trimester     Depression with suicidal ideation     Past Surgical History:   Procedure Laterality Date     ARTHROSCOPY SHOULDER Bilateral 2008,2009     Laparoscopy to look for endometriosis  8/2004     LAPAROTOMY MINI, TUBAL LIGATION (POST PARTUM), COMBINED Bilateral 5/11/2016    Procedure: COMBINED LAPAROTOMY MINI, TUBAL LIGATION (POST PARTUM);  Surgeon: Nannette Shane DO;  Location: [facility] OR     MYRINGOTOMY, INSERT TUBE BILATERAL, COMBINED  as a child     Septoplasty for deviated septum  7/2005     SINUS SURGERY  2007     TONSILLECTOMY  2006       Social History   Substance Use Topics     Smoking status: Never Smoker     Smokeless tobacco: Never Used     Alcohol use No     Family History   Problem Relation Age of Onset     Diabetes  Maternal Grandfather      insulin     Hypertension Maternal Grandfather      Obesity Maternal Grandfather      Respiratory Maternal Grandfather      asthma     Obesity Maternal Grandmother      Hypertension Mother      GASTROINTESTINAL DISEASE Mother      Pancreatitis-flare ups often     Lipids Mother      high     Blood Disease Mother      Family History Negative Father      HEART DISEASE Father      Allergies Son      Allergic to peanuts, different foods, Amoxicillin         Current Outpatient Prescriptions   Medication Sig Dispense Refill     albuterol (2.5 MG/3ML) 0.083% nebulizer solution Take 1 vial (2.5 mg) by nebulization every 6 hours as needed for shortness of breath / dyspnea or wheezing 30 vial 3     albuterol (PROAIR HFA, PROVENTIL HFA, VENTOLIN HFA) 108 (90 BASE) MCG/ACT inhaler Inhale 2 puffs into the lungs every 6 hours as needed for shortness of breath / dyspnea or wheezing 1 Inhaler 11     amoxicillin-clavulanate (AUGMENTIN) 875-125 MG per tablet Take 1 tablet by mouth 2 times daily for 14 days 28 tablet 0     cetirizine (ZYRTEC ALLERGY) 10 MG tablet Take 10 mg by mouth every morning       magic mouthwash (FIRST-MOUTHWASH BLM) suspension Swish and swallow 5-10 mLs in mouth every 6 hours as needed for mouth sores 237 mL 1     OLANZapine (ZYPREXA) 5 MG tablet Take 1 tablet (5 mg) by mouth At Bedtime 30 tablet 0     ondansetron (ZOFRAN) 4 MG tablet Take 1 tablet (4 mg) by mouth every 6 hours as needed for nausea 30 tablet 0     QUEtiapine (SEROQUEL) 25 MG tablet Take 1 tablet (25 mg) by mouth nightly as needed (anxiety) 60 tablet 0     solifenacin (VESICARE) 10 MG tablet Take 1 tablet (10 mg) by mouth daily 30 tablet 1     Vilazodone HCl (VIIBRYD PO) Take 20 mg by mouth daily        Allergies   Allergen Reactions     Moxifloxacin Hydrochloride Nausea and Vomiting     Avelox-vomiting     Cats Itching     Codeine GI Disturbance     Darvocet [Propoxyphene N-Apap] Hives     PN: LW Reaction: Rash,  "Generalized     Demerol Hives     Dog Hair [Dogs] Unknown     Dust Mites Itching and Swelling     Meperidine      PN: LW Reaction: Rash, Generalized     Oxycodone-Acetaminophen      PN: LW Reaction: SHORTNESS OF BREATH     Pollen Extract Itching     Vicodin [Hydrocodone-Acetaminophen] GI Disturbance     PN: LW Reaction: Rash, Generalized     Latex Itching, Swelling and Rash     \"sensitivity\"       Reviewed and updated as needed this visit by clinical staff  Tobacco  Allergies  Meds  Med Hx  Surg Hx  Fam Hx  Soc Hx      Reviewed and updated as needed this visit by Provider         ROS:  Constitutional, HEENT, cardiovascular, pulmonary, gi and gu systems are negative, except as otherwise noted.    OBJECTIVE:     /68 (BP Location: Right arm, Patient Position: Chair, Cuff Size: Adult Regular)  Pulse 102  Temp 98.5  F (36.9  C) (Oral)  Resp 18  Ht 5' 1.75\" (1.568 m)  Wt 135 lb 6.4 oz (61.4 kg)  LMP 05/29/2018 (Approximate)  SpO2 97%  Breastfeeding? No  BMI 24.97 kg/m2  Body mass index is 24.97 kg/(m^2).  GENERAL: healthy, alert and no distress  EYES: Eyes grossly normal to inspection, PERRL and conjunctivae and sclerae normal  HENT: ear canals and TM's normal, nose and mouth without ulcers or lesions  NECK: no adenopathy, no asymmetry, masses, or scars and thyroid normal to palpation  RESP: lungs clear to auscultation - no rales, rhonchi or wheezes  CV: regular rate and rhythm, normal S1 S2, no S3 or S4, no murmur, click or rub, no peripheral edema and peripheral pulses strong  MS: no gross musculoskeletal defects noted, no edema    Diagnostic Test Results:  none     ASSESSMENT/PLAN:             1. Viral pharyngitis  New problem, discussed with patient that illness is likely viral in etiology. Recommend rest, fluids and over the counter medications.  Advised follow up if symptoms worsen or do not alleviate or improve.    - magic mouthwash (FIRST-MOUTHWASH BLM) suspension; Swish and swallow 5-10 mLs " in mouth every 6 hours as needed for mouth sores  Dispense: 237 mL; Refill: 1    Risks, benefits and alternatives were discussed with patient. Agreeable to the plan of care.      Danielle Mims PA-C  [facility]

## 2018-06-25 NOTE — LETTER
My Asthma Action Plan  Name: Ashlie Matthews   YOB: 1985  Date: 6/25/2018   My doctor: Danielle Mims PA-C   My clinic: [facility]        My Control Medicine: { :705092}  My Rescue Medicine: { :959536}  {AAP include Oral Steroid:458947} My Asthma Severity: { :758239}  Avoid your asthma triggers: { :093064}        {Is patient a child or adult?:200749}       GREEN ZONE   Good Control    I feel good    No cough or wheeze    Can work, sleep and play without asthma symptoms       Take your asthma control medicine every day.     1. If exercise triggers your asthma, take your rescue medication    15 minutes before exercise or sports, and    During exercise if you have asthma symptoms  2. Spacer to use with inhaler: If you have a spacer, make sure to use it with your inhaler             YELLOW ZONE Getting Worse  I have ANY of these:    I do not feel good    Cough or wheeze    Chest feels tight    Wake up at night   1. Keep taking your Green Zone medications  2. Start taking your rescue medicine:    every 20 minutes for up to 1 hour. Then every 4 hours for 24-48 hours.  3. If you stay in the Yellow Zone for more than 12-24 hours, contact your doctor.  4. If you do not return to the Green Zone in 12-24 hours or you get worse, start taking your oral steroid medicine if prescribed by your provider.           RED ZONE Medical Alert - Get Help  I have ANY of these:    I feel awful    Medicine is not helping    Breathing getting harder    Trouble walking or talking    Nose opens wide to breathe       1. Take your rescue medicine NOW  2. If your provider has prescribed an oral steroid medicine, start taking it NOW  3. Call your doctor NOW  4. If you are still in the Red Zone after 20 minutes and you have not reached your doctor:    Take your rescue medicine again and    Call 911 or go to the emergency room right away    See your regular doctor within 2 weeks of an Emergency Room or  Urgent Care visit for follow-up treatment.          Annual Reminders:  Meet with Asthma Educator,  Flu Shot in the Fall, consider Pneumonia Vaccination for patients with asthma (aged 19 and older).    Pharmacy:    [facility] [address] [place], [place] - [address]  [facility] [place], [place] - [address] [address] [place] [facility] [address]  [address]  [facility] [address] [place] [facility] [address] [facility]                      Asthma Triggers  How To Control Things That Make Your Asthma Worse    Triggers are things that make your asthma worse.  Look at the list below to help you find your triggers and what you can do about them.  You can help prevent asthma flare-ups by staying away from your triggers.      Trigger                                                          What you can do   Cigarette Smoke  Tobacco smoke can make asthma worse. Do not allow smoking in your home, car or around you.  Be sure no one smokes at a child s day care or school.  If you smoke, ask your health care provider for ways to help you quit.  Ask family members to quit too.  Ask your health care provider for a referral to Quit Plan to help you quit smoking, or call 7-661-318-PLAN.     Colds, Flu, Bronchitis  These are common triggers of asthma. Wash your hands often.  Don t touch your eyes, nose or mouth.  Get a flu shot every year.     Dust Mites  These are tiny bugs that live in cloth or carpet. They are too small to see. Wash sheets and blankets in hot water every week.   Encase pillows and mattress in dust mite proof covers.  Avoid having carpet if you can. If you have carpet, vacuum weekly.   Use a dust mask and HEPA vacuum.   Pollen and Outdoor Mold  Some people are allergic to trees, grass, or weed pollen, or molds. Try to keep your windows closed.  Limit time out doors when pollen count is high.   Ask you health care provider about taking medicine during allergy season.      Animal Dander  Some people are allergic to skin flakes, urine or saliva from pets with fur or feathers. Keep pets with fur or feathers out of your home.    If you can t keep the pet outdoors, then keep the pet out of your bedroom.  Keep the bedroom door closed.  Keep pets off cloth furniture and away from stuffed toys.     Mice, Rats, and Cockroaches  Some people are allergic to the waste from these pests.   Cover food and garbage.  Clean up spills and food crumbs.  Store grease in the refrigerator.   Keep food out of the bedroom.   Indoor Mold  This can be a trigger if your home has high moisture. Fix leaking faucets, pipes, or other sources of water.   Clean moldy surfaces.  Dehumidify basement if it is damp and smelly.   Smoke, Strong Odors, and Sprays  These can reduce air quality. Stay away from strong odors and sprays, such as perfume, powder, hair spray, paints, smoke incense, paint, cleaning products, candles and new carpet.   Exercise or Sports  Some people with asthma have this trigger. Be active!  Ask your doctor about taking medicine before sports or exercise to prevent symptoms.    Warm up for 5-10 minutes before and after sports or exercise.     Other Triggers of Asthma  Cold air:  Cover your nose and mouth with a scarf.  Sometimes laughing or crying can be a trigger.  Some medicines and food can trigger asthma.

## 2018-06-25 NOTE — TELEPHONE ENCOUNTER
Patient calling because she had strep diagnosed on 6/18/18 in UC, also had it on 6/1/18. Most recently given Augmentin in UC. Patient states she still has a sore throat, temperature constantly 99.2 since Thursday when she was 101.0. Now her left ear and left side of throat feels worse. Has been taking abx as prescribed, in the middle of the course. Wondering if she can be seen in clinic. Body aches have gone but everything else is worse.     Appt scheduled for today.    Mari BUSH, Triage RN

## 2018-06-25 NOTE — LETTER
My Depression Action Plan  Name: Ashlie Matthews   Date of Birth 1985  Date: 6/25/2018    My doctor: Farzana Brar   My clinic: [facility] [place]  [address] [facility] [address] [phone]          GREEN    ZONE   Good Control    What it looks like:     Things are going generally well. You have normal up s and down s. You may even feel depressed from time to time, but bad moods usually last less than a day.   What you need to do:  1. Continue to care for yourself (see self care plan)  2. Check your depression survival kit and update it as needed  3. Follow your physician s recommendations including any medication.  4. Do not stop taking medication unless you consult with your physician first.           YELLOW         ZONE Getting Worse    What it looks like:     Depression is starting to interfere with your life.     It may be hard to get out of bed; you may be starting to isolate yourself from others.    Symptoms of depression are starting to last most all day and this has happened for several days.     You may have suicidal thoughts but they are not constant.   What you need to do:     1. Call your care team, your response to treatment will improve if you keep your care team informed of your progress. Yellow periods are signs an adjustment may need to be made.     2. Continue your self-care, even if you have to fake it!    3. Talk to someone in your support network    4. Open up your depression survival kit           RED    ZONE Medical Alert - Get Help    What it looks like:     Depression is seriously interfering with your life.     You may experience these or other symptoms: You can t get out of bed most days, can t work or engage in other necessary activities, you have trouble taking care of basic hygiene, or basic responsibilities, thoughts of suicide or death that will not go away, self-injurious behavior.     What you need to do:  1. Call your  care team and request a same-day appointment. If they are not available (weekends or after hours) call your local crisis line, emergency room or 911.            Depression Self Care Plan / Survival Kit    Self-Care for Depression  Here s the deal. Your body and mind are really not as separate as most people think.  What you do and think affects how you feel and how you feel influences what you do and think. This means if you do things that people who feel good do, it will help you feel better.  Sometimes this is all it takes.  There is also a place for medication and therapy depending on how severe your depression is, so be sure to consult with your medical provider and/ or Behavioral Health Consultant if your symptoms are worsening or not improving.     In order to better manage my stress, I will:    Exercise  Get some form of exercise, every day. This will help reduce pain and release endorphins, the  feel good  chemicals in your brain. This is almost as good as taking antidepressants!  This is not the same as joining a gym and then never going! (they count on that by the way ) It can be as simple as just going for a walk or doing some gardening, anything that will get you moving.      Hygiene   Maintain good hygiene (Get out of bed in the morning, Make your bed, Brush your teeth, Take a shower, and Get dressed like you were going to work, even if you are unemployed).  If your clothes don't fit try to get ones that do.    Diet  I will strive to eat foods that are good for me, drink plenty of water, and avoid excessive sugar, caffeine, alcohol, and other mood-altering substances.  Some foods that are helpful in depression are: complex carbohydrates, B vitamins, flaxseed, fish or fish oil, fresh fruits and vegetables.    Psychotherapy  I agree to participate in Individual Therapy (if recommended).    Medication  If prescribed medications, I agree to take them.  Missing doses can result in serious side effects.  I  understand that drinking alcohol, or other illicit drug use, may cause potential side effects.  I will not stop my medication abruptly without first discussing it with my provider.    Staying Connected With Others  I will stay in touch with my friends, family members, and my primary care provider/team.    Use your imagination  Be creative.  We all have a creative side; it doesn t matter if it s oil painting, sand castles, or mud pies! This will also kick up the endorphins.    Witness Beauty  (AKA stop and smell the roses) Take a look outside, even in mid-winter. Notice colors, textures. Watch the squirrels and birds.     Service to others  Be of service to others.  There is always someone else in need.  By helping others we can  get out of ourselves  and remember the really important things.  This also provides opportunities for practicing all the other parts of the program.    Humor  Laugh and be silly!  Adjust your TV habits for less news and crime-drama and more comedy.    Control your stress  Try breathing deep, massage therapy, biofeedback, and meditation. Find time to relax each day.     My support system    Clinic Contact:  Phone number:    Contact 1:  Phone number:    Contact 2:  Phone number:    Synagogue/:  Phone number:    Therapist:  Phone number:    Local crisis center:    Phone number:    Other community support:  Phone number:

## 2018-06-26 ASSESSMENT — PATIENT HEALTH QUESTIONNAIRE - PHQ9: SUM OF ALL RESPONSES TO PHQ QUESTIONS 1-9: 13

## 2018-06-26 ASSESSMENT — ANXIETY QUESTIONNAIRES: GAD7 TOTAL SCORE: 12

## 2018-07-11 ENCOUNTER — OFFICE VISIT (OUTPATIENT)
Dept: FAMILY MEDICINE | Facility: CLINIC | Age: 33
End: 2018-07-11
Payer: COMMERCIAL

## 2018-07-11 ENCOUNTER — NURSE TRIAGE (OUTPATIENT)
Dept: NURSING | Facility: CLINIC | Age: 33
End: 2018-07-11

## 2018-07-11 ENCOUNTER — TELEPHONE (OUTPATIENT)
Dept: FAMILY MEDICINE | Facility: CLINIC | Age: 33
End: 2018-07-11

## 2018-07-11 VITALS
RESPIRATION RATE: 18 BRPM | HEIGHT: 61 IN | OXYGEN SATURATION: 94 % | TEMPERATURE: 98.4 F | HEART RATE: 114 BPM | BODY MASS INDEX: 25.83 KG/M2 | SYSTOLIC BLOOD PRESSURE: 114 MMHG | DIASTOLIC BLOOD PRESSURE: 66 MMHG | WEIGHT: 136.8 LBS

## 2018-07-11 DIAGNOSIS — D72.829 LEUKOCYTOSIS, UNSPECIFIED TYPE: ICD-10-CM

## 2018-07-11 DIAGNOSIS — G43.A0 CYCLICAL VOMITING WITH NAUSEA, INTRACTABILITY OF VOMITING NOT SPECIFIED: ICD-10-CM

## 2018-07-11 DIAGNOSIS — R45.851 DEPRESSION WITH SUICIDAL IDEATION: ICD-10-CM

## 2018-07-11 DIAGNOSIS — G43.831 INTRACTABLE MENSTRUAL MIGRAINE WITH STATUS MIGRAINOSUS: ICD-10-CM

## 2018-07-11 DIAGNOSIS — F32.A DEPRESSION WITH SUICIDAL IDEATION: ICD-10-CM

## 2018-07-11 DIAGNOSIS — Z01.818 PREOP GENERAL PHYSICAL EXAM: Primary | ICD-10-CM

## 2018-07-11 LAB
ERYTHROCYTE [DISTWIDTH] IN BLOOD BY AUTOMATED COUNT: 14.1 % (ref 10–15)
HCT VFR BLD AUTO: 37.7 % (ref 35–47)
HGB BLD-MCNC: 12.3 G/DL (ref 11.7–15.7)
HGB BLD-MCNC: NORMAL G/DL (ref 11.7–15.7)
MCH RBC QN AUTO: 29.2 PG (ref 26.5–33)
MCHC RBC AUTO-ENTMCNC: 32.6 G/DL (ref 31.5–36.5)
MCV RBC AUTO: 90 FL (ref 78–100)
PLATELET # BLD AUTO: 234 10E9/L (ref 150–450)
RBC # BLD AUTO: 4.21 10E12/L (ref 3.8–5.2)
WBC # BLD AUTO: 12.6 10E9/L (ref 4–11)

## 2018-07-11 PROCEDURE — 99215 OFFICE O/P EST HI 40 MIN: CPT | Performed by: PHYSICIAN ASSISTANT

## 2018-07-11 PROCEDURE — 36415 COLL VENOUS BLD VENIPUNCTURE: CPT | Performed by: PHYSICIAN ASSISTANT

## 2018-07-11 PROCEDURE — 85027 COMPLETE CBC AUTOMATED: CPT | Performed by: PHYSICIAN ASSISTANT

## 2018-07-11 RX ORDER — CITALOPRAM HYDROBROMIDE 20 MG/1
TABLET ORAL
Qty: 30 TABLET | Refills: 0 | Status: SHIPPED | OUTPATIENT
Start: 2018-07-11 | End: 2018-08-06

## 2018-07-11 NOTE — PROGRESS NOTES
[facility]  [address] [facility] [id number] [phone]  Dept: [phone]    PRE-OP EVALUATION:  Today's date: 2018    Ashlie Matthews (: 1985) presents for pre-operative evaluation assessment as requested by Dr. Garcia.  She requires evaluation and anesthesia risk assessment prior to undergoing surgery/procedure for treatment of: hysterectomy (everything except ovaries).    Fax number for surgical facility: [phone]  Primary Physician: Danielle Mims  Type of Anesthesia Anticipated: General    Patient has a Health Care Directive or Living Will:  YES    Preop Questions 2018   Who is doing your surgery?    What are you having done? hysterectomy   Date of Surgery/Procedure:    Facility or Hospital where procedure/surgery will be performed: [facility]   1.  Do you have a history of Heart attack, stroke, stent, coronary bypass surgery, or other heart surgery? No   2.  Do you ever have any pain or discomfort in your chest? No   3.  Do you have a history of  Heart Failure? No   4.   Are you troubled by shortness of breath when:  walking on a level surface, or up a slight hill, or at night? No   5.  Do you currently have a cold, bronchitis or other respiratory infection? No   6.  Do you have a cough, shortness of breath, or wheezing? No   7.  Do you sometimes get pains in the calves of your legs when you walk? No   8. Do you or anyone in your family have previous history of blood clots? No   9.  Do you or does anyone in your family have a serious bleeding problem such as prolonged bleeding following surgeries or cuts? No   10. Have you ever had problems with anemia or been told to take iron pills? No   11. Have you had any abnormal blood loss such as black, tarry or bloody stools, or abnormal vaginal bleeding? No   12. Have you ever had a blood transfusion? No   13. Have you or any of your relatives ever had problems with  anesthesia? No   14. Do you have sleep apnea, excessive snoring or daytime drowsiness? No   15. Do you have any prosthetic heart valves? No   16. Do you have prosthetic joints? No   17. Is there any chance that you may be pregnant? No         HPI:     HPI related to upcoming procedure: patient followed up with OB/GYN to see about hysterectomy as she has heavy bleeding which leads to dizziness, migraines and then subsequent vomiting. Discussed options with OB who said OCP would not likely help, therefore hysterectomy will be performed.      See problem list for active medical problems.  Problems all longstanding and stable, except as noted/documented.  See ROS for pertinent symptoms related to these conditions.                                                                                                                                                          .    MEDICAL HISTORY:     Patient Active Problem List    Diagnosis Date Noted     Depression with suicidal ideation 05/03/2018     Priority: Medium     Diet controlled gestational diabetes mellitus in third trimester 03/05/2016     Priority: Medium     AD (atopic dermatitis) 10/16/2013     Priority: Medium     Perforated tympanic membrane 01/26/2011     Priority: Medium     left       CARDIOVASCULAR SCREENING; LDL GOAL LESS THAN 160 10/31/2010     Priority: Medium     Mild intermittent asthma 04/11/2006     Priority: Medium     Chronic tonsillitis 02/21/2006     Priority: Medium     Chronic maxillary sinusitis 03/28/2005     Priority: Medium     Allergic state 02/26/2004     Priority: Medium     Problem list name updated by automated process. Provider to review        Past Medical History:   Diagnosis Date     Allergic rhinitis      Anxiety      Asthma      Cyclic vomiting syndrome      Dysmenorrhea      Hypertension in pregnancy      Pneumothorax 2004    from bad coughing     Past Surgical History:   Procedure Laterality Date     ARTHROSCOPY SHOULDER  Bilateral 2008,2009     Laparoscopy to look for endometriosis  8/2004     LAPAROTOMY MINI, TUBAL LIGATION (POST PARTUM), COMBINED Bilateral 5/11/2016    Procedure: COMBINED LAPAROTOMY MINI, TUBAL LIGATION (POST PARTUM);  Surgeon: Nannette Shane DO;  Location: [facility] OR     MYRINGOTOMY, INSERT TUBE BILATERAL, COMBINED  as a child     Septoplasty for deviated septum  7/2005     SINUS SURGERY  2007     TONSILLECTOMY  2006     Current Outpatient Prescriptions   Medication Sig Dispense Refill     albuterol (2.5 MG/3ML) 0.083% nebulizer solution Take 1 vial (2.5 mg) by nebulization every 6 hours as needed for shortness of breath / dyspnea or wheezing 30 vial 3     albuterol (PROAIR HFA, PROVENTIL HFA, VENTOLIN HFA) 108 (90 BASE) MCG/ACT inhaler Inhale 2 puffs into the lungs every 6 hours as needed for shortness of breath / dyspnea or wheezing 1 Inhaler 11     cetirizine (ZYRTEC ALLERGY) 10 MG tablet Take 10 mg by mouth every morning       citalopram (CELEXA) 20 MG tablet Take 1/2 tablet (10 mg) for 1-2 weeks, then increase to 1 tablet orally daily 30 tablet 0     OLANZapine (ZYPREXA) 5 MG tablet Take 1 tablet (5 mg) by mouth At Bedtime 30 tablet 0     ondansetron (ZOFRAN) 4 MG tablet Take 1 tablet (4 mg) by mouth every 6 hours as needed for nausea 30 tablet 0     QUEtiapine (SEROQUEL) 25 MG tablet Take 1 tablet (25 mg) by mouth nightly as needed (anxiety) 60 tablet 0     solifenacin (VESICARE) 10 MG tablet Take 1 tablet (10 mg) by mouth daily 30 tablet 1     Vilazodone HCl (VIIBRYD PO) Take 20 mg by mouth daily        OTC products: Ibuprofen use 2 days ago, no ASA no Steroids    Allergies   Allergen Reactions     Moxifloxacin Hydrochloride Nausea and Vomiting     Avelox-vomiting     Cats Itching     Codeine GI Disturbance     Darvocet [Propoxyphene N-Apap] Hives     PN: LW Reaction: Rash, Generalized     Demerol Hives     Dog Hair [Dogs] Unknown     Dust Mites Itching and Swelling     Meperidine      PN: LW Reaction:  "Rash, Generalized     Oxycodone-Acetaminophen      PN: LW Reaction: SHORTNESS OF BREATH     Pollen Extract Itching     Vicodin [Hydrocodone-Acetaminophen] GI Disturbance     PN: LW Reaction: Rash, Generalized     Latex Itching, Swelling and Rash     \"sensitivity\"      Latex Allergy: + Sensitivity    Social History   Substance Use Topics     Smoking status: Never Smoker     Smokeless tobacco: Never Used     Alcohol use No     History   Drug Use No       REVIEW OF SYSTEMS:   CONSTITUTIONAL: NEGATIVE for fever, chills, change in weight  INTEGUMENTARY/SKIN: NEGATIVE for worrisome rashes, moles or lesions  EYES: NEGATIVE for vision changes or irritation  ENT/MOUTH: NEGATIVE for ear, mouth and throat problems  RESP: NEGATIVE for significant cough or SOB  BREAST: NEGATIVE for masses, tenderness or discharge  CV: NEGATIVE for chest pain, palpitations or peripheral edema  GI: NEGATIVE for nausea, abdominal pain, heartburn, or change in bowel habits  MUSCULOSKELETAL: NEGATIVE for significant arthralgias or myalgia  NEURO: NEGATIVE for weakness, dizziness or paresthesias  ENDOCRINE: NEGATIVE for temperature intolerance, skin/hair changes  HEME: NEGATIVE for bleeding problems  PSYCHIATRIC: + anxiety has increased 2/2 upcoming procedure. She also wants to stop the Viibryd, dislikes s/e such as weight gain and increased fatigue.    EXAM:   /66 (BP Location: Right arm, Patient Position: Chair, Cuff Size: Adult Regular)  Pulse 114  Temp 98.4  F (36.9  C) (Oral)  Resp 18  Ht 5' 1.25\" (1.556 m)  Wt 136 lb 12.8 oz (62.1 kg)  LMP 06/27/2018 (Exact Date)  SpO2 94%  Breastfeeding? No  BMI 25.64 kg/m2    GENERAL APPEARANCE: healthy, alert and no distress     EYES: EOMI, PERRL     HENT: ear canals and TM's normal and nose and mouth without ulcers or lesions     NECK: no adenopathy, no asymmetry, masses, or scars and thyroid normal to palpation     RESP: lungs clear to auscultation - no rales, rhonchi or wheezes     CV: " regular rates and rhythm, normal S1 S2, no S3 or S4 and no murmur, click or rub     ABDOMEN:  soft, nontender, no HSM or masses and bowel sounds normal     MS: extremities normal- no gross deformities noted, no evidence of inflammation in joints, FROM in all extremities.     SKIN: no suspicious lesions or rashes     NEURO: Normal strength and tone, sensory exam grossly normal, mentation intact and speech normal     PSYCH: mentation appears normal. and affect normal/bright     LYMPHATICS: No cervical adenopathy    DIAGNOSTICS:   Hemoglobin (indicated for history of anemia or procedure with significant blood loss such as tonsillectomy, major intraperitoneal surgery, vascular surgery, major spine surgery, total joint replacement)  Results for orders placed or performed in visit on 07/11/18   Hemoglobin   Result Value Ref Range    Hemoglobin 12.3 11.7 - 15.7 g/dL       Recent Labs   Lab Test  05/03/18   1815  01/20/18   1047   HGB  13.7  15.0   PLT  180  141*   NA  142  139   POTASSIUM  3.6  3.6   CR  0.58  0.62        IMPRESSION:   Reason for surgery/procedure: partial hysterectomy  Diagnosis/reason for consult:  intractable migraine, cyclical vomiting syndrome    The proposed surgical procedure is considered INTERMEDIATE risk.    REVISED CARDIAC RISK INDEX  The patient has the following serious cardiovascular risks for perioperative complications such as (MI, PE, VFib and 3  AV Block):  No serious cardiac risks  INTERPRETATION: 0 risks: Class I (very low risk - 0.4% complication rate)    The patient has the following additional risks for perioperative complications:  No identified additional risks      ICD-10-CM    1. Preop general physical exam Z01.818 Hemoglobin   2. Intractable menstrual migraine with status migrainosus G43.831 Hemoglobin   3. Cyclical vomiting with nausea, intractability of vomiting not specified G43.A0 Hemoglobin   4. Depression with suicidal ideation F32.9 citalopram (CELEXA) 20 MG  tablet    Patient dislikes the Viibryd due to increased fatigue, weight gain. Therefore after surgery, will take 10mg Viibryd daily for 7 days and start on Celexa. Recheck in 1 month. Risks, benefits and alternatives were discussed with patient. Agreeable to the plan of care.      R45.268        RECOMMENDATIONS:     --Consult hospital rounder / IM to assist post-op medical management  --Patient is to take Seroquel in am with small sip of water, hold all remaining medications.  --NPO recommendations discussed, patient is aware of where to be and when to be for surgery.  --Patient advised no ASA, NSAIDs or Steroids from now until surgery.    * Leukocytosis resolved upon recheck*    APPROVAL GIVEN to proceed with proposed procedure, without further diagnostic evaluation       Signed Electronically by: Danielle Mims PA-C    Copy of this evaluation report is provided to requesting physician.    [facility] Preop Guidelines    Revised Cardiac Risk Index

## 2018-07-11 NOTE — MR AVS SNAPSHOT
After Visit Summary   7/11/2018    Ashlie Matthews    MRN: [id number]           Patient Information     Date Of Birth          1985        Visit Information        Provider Department      7/11/2018 12:50 PM Danielle Mims PA-C [facility]        Today's Diagnoses     Preop general physical exam    -  1    Intractable menstrual migraine with status migrainosus        Cyclical vomiting with nausea, intractability of vomiting not specified        Depression with suicidal ideation          Care Instructions      Before Your Surgery      Call your surgeon if there is any change in your health. This includes signs of a cold or flu (such as a sore throat, runny nose, cough, rash or fever).    Do not smoke, drink alcohol or take over the counter medicine (unless your surgeon or primary care doctor tells you to) for the 24 hours before and after surgery.    If you take prescribed drugs: Follow your doctor s orders about which medicines to take and which to stop until after surgery.    Eating and drinking prior to surgery: follow the instructions from your surgeon    Take a shower or bath the night before surgery. Use the soap your surgeon gave you to gently clean your skin. If you do not have soap from your surgeon, use your regular soap. Do not shave or scrub the surgery site.  Wear clean pajamas and have clean sheets on your bed.           Follow-ups after your visit        Follow-up notes from your care team     Return in about 5 weeks (around 8/15/2018) for Mood Recheck.      Your next 10 appointments already scheduled     Aug 13, 2018  7:50 AM CDT   SHORT with Danielle Mims PA-C   [facility] ([facility]    [address] [address] [phone]              Who to contact     If you have questions or need follow up information about today's clinic visit or your schedule please contact [facility]  "[facility] directly at [phone].  Normal or non-critical lab and imaging results will be communicated to you by MyChart, letter or phone within 4 business days after the clinic has received the results. If you do not hear from us within 7 days, please contact the clinic through [facility] or phone. If you have a critical or abnormal lab result, we will notify you by phone as soon as possible.  Submit refill requests through [facility] or call your pharmacy and they will forward the refill request to us. Please allow 3 business days for your refill to be completed.          Additional Information About Your Visit        [facility] Information     [facility] gives you secure access to your electronic health record. If you see a primary care provider, you can also send messages to your care team and make appointments. If you have questions, please call your primary care clinic.  If you do not have a primary care provider, please call [phone] and they will assist you.        Care EveryWhere ID     This is your Care EveryWhere ID. This could be used by other organizations to access your [facility] medical records  [id number]        Your Vitals Were     Pulse Temperature Respirations Height Last Period Pulse Oximetry    114 98.4  F (36.9  C) (Oral) 18 5' 1.25\" (1.556 m) 06/27/2018 (Exact Date) 94%    Breastfeeding? BMI (Body Mass Index)                No 25.64 kg/m2           Blood Pressure from Last 3 Encounters:   07/11/18 114/66   06/25/18 116/68   06/18/18 130/70    Weight from Last 3 Encounters:   07/11/18 136 lb 12.8 oz (62.1 kg)   06/25/18 135 lb 6.4 oz (61.4 kg)   06/11/18 124 lb (56.2 kg)              Today, you had the following     No orders found for display         Today's Medication Changes          These changes are accurate as of 7/11/18  1:20 PM.  If you have any questions, ask your nurse or doctor.               Start taking these medicines.        Dose/Directions    citalopram 20 MG tablet   Commonly known " as:  celeXA   Used for:  Depression with suicidal ideation   Started by:  Danielle Mims PA-C        Take 1/2 tablet (10 mg) for 1-2 weeks, then increase to 1 tablet orally daily   Quantity:  30 tablet   Refills:  0            Where to get your medicines      These medications were sent to [facility] [address] [address] [facility] - [place], [place] - [address]  [facility]  [address] [address] [address]     Phone:  [phone]     citalopram 20 MG tablet                Primary Care Provider Office Phone # Fax #    Danielle Mims PA-C [phone]       [address] [facility] [address]        Equal Access to Services     [facility]ISE : Hadii harpreet saldaña hadasho Soomaali, waaxda luqadaha, qaybta kaalmada adeegyada, mary del rosario . So [facility] [phone].    ATENCIÓN: Si habla español, tiene a calvillo disposición servicios gratuitos de asistencia lingüística. Llame al [phone].    We comply with applicable federal civil rights laws and [place] laws. We do not discriminate on the basis of race, color, national origin, age, disability, sex, sexual orientation, or gender identity.            Thank you!     Thank you for choosing [facility]  for your care. Our goal is always to provide you with excellent care. Hearing back from our patients is one way we can continue to improve our services. Please take a few minutes to complete the written survey that you may receive in the mail after your visit with us. Thank you!             Your Updated Medication List - Protect others around you: Learn how to safely use, store and throw away your medicines at www.disposemymeds.org.          This list is accurate as of 7/11/18  1:20 PM.  Always use your most recent med list.                   Brand Name Dispense Instructions for use Diagnosis    * albuterol 108 (90 Base) MCG/ACT Inhaler    PROAIR HFA/PROVENTIL HFA/VENTOLIN HFA    1 Inhaler    Inhale 2 puffs into the  lungs every 6 hours as needed for shortness of breath / dyspnea or wheezing    Mild intermittent asthma       * albuterol (2.5 MG/3ML) 0.083% neb solution     30 vial    Take 1 vial (2.5 mg) by nebulization every 6 hours as needed for shortness of breath / dyspnea or wheezing    Mild intermittent asthma       citalopram 20 MG tablet    celeXA    30 tablet    Take 1/2 tablet (10 mg) for 1-2 weeks, then increase to 1 tablet orally daily    Depression with suicidal ideation       OLANZapine 5 MG tablet    zyPREXA    30 tablet    Take 1 tablet (5 mg) by mouth At Bedtime    Depression with suicidal ideation       ondansetron 4 MG tablet    ZOFRAN    30 tablet    Take 1 tablet (4 mg) by mouth every 6 hours as needed for nausea    Vomiting and diarrhea       QUEtiapine 25 MG tablet    SEROquel    60 tablet    Take 1 tablet (25 mg) by mouth nightly as needed (anxiety)    Depression with suicidal ideation       solifenacin 10 MG tablet    VESICARE    30 tablet    Take 1 tablet (10 mg) by mouth daily    Mixed stress and urge urinary incontinence       VIIBRYD PO      Take 20 mg by mouth daily        ZYRTEC ALLERGY 10 MG tablet   Generic drug:  cetirizine      Take 10 mg by mouth every morning        * Notice:  This list has 2 medication(s) that are the same as other medications prescribed for you. Read the directions carefully, and ask your doctor or other care provider to review them with you.

## 2018-07-11 NOTE — TELEPHONE ENCOUNTER
Please call patient: received note that WBC count was high per lab. Please have her come in Friday am for lab only visit to recheck this.    Danielle Mims PA-C

## 2018-07-11 NOTE — LETTER
My Asthma Action Plan  Name: Ashlie Matthews   YOB: 1985  Date: 7/11/2018   My doctor: Danielle Mims PA-C   My clinic: [facility]        My Control Medicine: { :471520}  My Rescue Medicine: { :579624}  {AAP include Oral Steroid:317227} My Asthma Severity: { :408078}  Avoid your asthma triggers: { :168637}        {Is patient a child or adult?:380030}       GREEN ZONE   Good Control    I feel good    No cough or wheeze    Can work, sleep and play without asthma symptoms       Take your asthma control medicine every day.     1. If exercise triggers your asthma, take your rescue medication    15 minutes before exercise or sports, and    During exercise if you have asthma symptoms  2. Spacer to use with inhaler: If you have a spacer, make sure to use it with your inhaler             YELLOW ZONE Getting Worse  I have ANY of these:    I do not feel good    Cough or wheeze    Chest feels tight    Wake up at night   1. Keep taking your Green Zone medications  2. Start taking your rescue medicine:    every 20 minutes for up to 1 hour. Then every 4 hours for 24-48 hours.  3. If you stay in the Yellow Zone for more than 12-24 hours, contact your doctor.  4. If you do not return to the Green Zone in 12-24 hours or you get worse, start taking your oral steroid medicine if prescribed by your provider.           RED ZONE Medical Alert - Get Help  I have ANY of these:    I feel awful    Medicine is not helping    Breathing getting harder    Trouble walking or talking    Nose opens wide to breathe       1. Take your rescue medicine NOW  2. If your provider has prescribed an oral steroid medicine, start taking it NOW  3. Call your doctor NOW  4. If you are still in the Red Zone after 20 minutes and you have not reached your doctor:    Take your rescue medicine again and    Call 911 or go to the emergency room right away    See your regular doctor within 2 weeks of an Emergency Room or  Urgent Care visit for follow-up treatment.          Annual Reminders:  Meet with Asthma Educator,  Flu Shot in the Fall, consider Pneumonia Vaccination for patients with asthma (aged 19 and older).    Pharmacy:    [facility] [address] [place], [place] - [address]  [facility] [place], [place] - [address] [address] [place] [facility] [address]  [address]  [facility] [address] [place] [facility] [address] [facility]                      Asthma Triggers  How To Control Things That Make Your Asthma Worse    Triggers are things that make your asthma worse.  Look at the list below to help you find your triggers and what you can do about them.  You can help prevent asthma flare-ups by staying away from your triggers.      Trigger                                                          What you can do   Cigarette Smoke  Tobacco smoke can make asthma worse. Do not allow smoking in your home, car or around you.  Be sure no one smokes at a child s day care or school.  If you smoke, ask your health care provider for ways to help you quit.  Ask family members to quit too.  Ask your health care provider for a referral to Quit Plan to help you quit smoking, or call 0-932-205-PLAN.     Colds, Flu, Bronchitis  These are common triggers of asthma. Wash your hands often.  Don t touch your eyes, nose or mouth.  Get a flu shot every year.     Dust Mites  These are tiny bugs that live in cloth or carpet. They are too small to see. Wash sheets and blankets in hot water every week.   Encase pillows and mattress in dust mite proof covers.  Avoid having carpet if you can. If you have carpet, vacuum weekly.   Use a dust mask and HEPA vacuum.   Pollen and Outdoor Mold  Some people are allergic to trees, grass, or weed pollen, or molds. Try to keep your windows closed.  Limit time out doors when pollen count is high.   Ask you health care provider about taking medicine during allergy season.      Animal Dander  Some people are allergic to skin flakes, urine or saliva from pets with fur or feathers. Keep pets with fur or feathers out of your home.    If you can t keep the pet outdoors, then keep the pet out of your bedroom.  Keep the bedroom door closed.  Keep pets off cloth furniture and away from stuffed toys.     Mice, Rats, and Cockroaches  Some people are allergic to the waste from these pests.   Cover food and garbage.  Clean up spills and food crumbs.  Store grease in the refrigerator.   Keep food out of the bedroom.   Indoor Mold  This can be a trigger if your home has high moisture. Fix leaking faucets, pipes, or other sources of water.   Clean moldy surfaces.  Dehumidify basement if it is damp and smelly.   Smoke, Strong Odors, and Sprays  These can reduce air quality. Stay away from strong odors and sprays, such as perfume, powder, hair spray, paints, smoke incense, paint, cleaning products, candles and new carpet.   Exercise or Sports  Some people with asthma have this trigger. Be active!  Ask your doctor about taking medicine before sports or exercise to prevent symptoms.    Warm up for 5-10 minutes before and after sports or exercise.     Other Triggers of Asthma  Cold air:  Cover your nose and mouth with a scarf.  Sometimes laughing or crying can be a trigger.  Some medicines and food can trigger asthma.

## 2018-07-11 NOTE — TELEPHONE ENCOUNTER
Caller is inquiring mona veloz her WBC was  Today at her preop ; has been told it is elevated and she needs ait repeated  To get preop clearance   Review of EMR reveals;  WBC 12.6 (H) 4.0 - 11.0 10e9/L Final 07/11/2018  1:26 PM RM       Advised to  Have test  Repeated an discuss with PCP   Understands and will comply    Elle Guthrie RN  FNA        Additional Information    [1] Follow-up call to recent contact AND [2] information only call, no triage required    Protocols used: [facility]

## 2018-07-13 DIAGNOSIS — D72.829 LEUKOCYTOSIS, UNSPECIFIED TYPE: ICD-10-CM

## 2018-07-13 LAB
ERYTHROCYTE [DISTWIDTH] IN BLOOD BY AUTOMATED COUNT: 14.1 % (ref 10–15)
HCT VFR BLD AUTO: 38.5 % (ref 35–47)
HGB BLD-MCNC: 12.6 G/DL (ref 11.7–15.7)
MCH RBC QN AUTO: 29.4 PG (ref 26.5–33)
MCHC RBC AUTO-ENTMCNC: 32.7 G/DL (ref 31.5–36.5)
MCV RBC AUTO: 90 FL (ref 78–100)
PLATELET # BLD AUTO: 214 10E9/L (ref 150–450)
RBC # BLD AUTO: 4.28 10E12/L (ref 3.8–5.2)
WBC # BLD AUTO: 6.9 10E9/L (ref 4–11)

## 2018-07-13 PROCEDURE — 85027 COMPLETE CBC AUTOMATED: CPT | Performed by: PHYSICIAN ASSISTANT

## 2018-07-13 PROCEDURE — 36415 COLL VENOUS BLD VENIPUNCTURE: CPT | Performed by: PHYSICIAN ASSISTANT

## 2018-07-16 ENCOUNTER — HOSPITAL PATHOLOGY (OUTPATIENT)
Dept: OTHER | Facility: CLINIC | Age: 33
End: 2018-07-16

## 2018-07-17 LAB — COPATH REPORT: NORMAL

## 2018-08-05 ENCOUNTER — OFFICE VISIT (OUTPATIENT)
Dept: URGENT CARE | Facility: URGENT CARE | Age: 33
End: 2018-08-05
Payer: COMMERCIAL

## 2018-08-05 VITALS
SYSTOLIC BLOOD PRESSURE: 118 MMHG | WEIGHT: 136 LBS | OXYGEN SATURATION: 100 % | HEART RATE: 138 BPM | DIASTOLIC BLOOD PRESSURE: 78 MMHG | BODY MASS INDEX: 25.49 KG/M2 | TEMPERATURE: 102 F

## 2018-08-05 DIAGNOSIS — R07.0 THROAT PAIN: Primary | ICD-10-CM

## 2018-08-05 LAB
DEPRECATED S PYO AG THROAT QL EIA: ABNORMAL
SPECIMEN SOURCE: ABNORMAL

## 2018-08-05 PROCEDURE — 87880 STREP A ASSAY W/OPTIC: CPT | Performed by: FAMILY MEDICINE

## 2018-08-05 PROCEDURE — 99214 OFFICE O/P EST MOD 30 MIN: CPT | Performed by: FAMILY MEDICINE

## 2018-08-05 RX ORDER — AMOXICILLIN 875 MG
875 TABLET ORAL 2 TIMES DAILY
Qty: 20 TABLET | Refills: 0 | Status: SHIPPED | OUTPATIENT
Start: 2018-08-05 | End: 2018-09-11

## 2018-08-05 ASSESSMENT — ENCOUNTER SYMPTOMS
SORE THROAT: 1
ABDOMINAL PAIN: 1

## 2018-08-05 NOTE — PROGRESS NOTES
SUBJECTIVE:   Ashlie Matthews is a 33 year old female presenting with a chief complaint of   Chief Complaint   Patient presents with     Urgent Care     Pharyngitis     sore throat started yesterday and now bodyaches and fevers, 3 weeks post op      She has had complaints of sore throat upper respiratory type of symptoms over the last several days.. She thinks she has had some chills has had fever and today is actually over 102.    Some abdominal pain and she is 3 weeks post hysterectomy.  No bleeding no significant pain and she thinks more from the tensing of her abdominal muscles because of the chills that she has had.    Some low back pain but this is from a injury over 2 years ago.   No shortness of breath    She is an established patient of [facility].        Review of Systems   HENT: Positive for sore throat.    Gastrointestinal: Positive for abdominal pain.   All other systems reviewed and are negative.      Past Medical History:   Diagnosis Date     Allergic rhinitis      Anxiety      Asthma      Cyclic vomiting syndrome      Dysmenorrhea      Hypertension in pregnancy      Pneumothorax 2004    from bad coughing     Family History   Problem Relation Age of Onset     Diabetes Maternal Grandfather      insulin     Hypertension Maternal Grandfather      Obesity Maternal Grandfather      Respiratory Maternal Grandfather      asthma     Obesity Maternal Grandmother      Hypertension Mother      GASTROINTESTINAL DISEASE Mother      Pancreatitis-flare ups often     Lipids Mother      high     Blood Disease Mother      Family History Negative Father      HEART DISEASE Father      Allergies Son      Allergic to peanuts, different foods, Amoxicillin     Current Outpatient Prescriptions   Medication Sig Dispense Refill     albuterol (2.5 MG/3ML) 0.083% nebulizer solution Take 1 vial (2.5 mg) by nebulization every 6 hours as needed for shortness of breath / dyspnea or wheezing 30 vial 3     albuterol (PROAIR HFA,  PROVENTIL HFA, VENTOLIN HFA) 108 (90 BASE) MCG/ACT inhaler Inhale 2 puffs into the lungs every 6 hours as needed for shortness of breath / dyspnea or wheezing 1 Inhaler 11     cetirizine (ZYRTEC ALLERGY) 10 MG tablet Take 10 mg by mouth every morning       citalopram (CELEXA) 20 MG tablet Take 1/2 tablet (10 mg) for 1-2 weeks, then increase to 1 tablet orally daily 30 tablet 0     OLANZapine (ZYPREXA) 5 MG tablet Take 1 tablet (5 mg) by mouth At Bedtime 30 tablet 0     ondansetron (ZOFRAN) 4 MG tablet Take 1 tablet (4 mg) by mouth every 6 hours as needed for nausea 30 tablet 0     QUEtiapine (SEROQUEL) 25 MG tablet Take 1 tablet (25 mg) by mouth nightly as needed (anxiety) 60 tablet 0     solifenacin (VESICARE) 10 MG tablet Take 1 tablet (10 mg) by mouth daily 30 tablet 1     Vilazodone HCl (VIIBRYD PO) Take 20 mg by mouth daily        Social History   Substance Use Topics     Smoking status: Never Smoker     Smokeless tobacco: Never Used     Alcohol use No       OBJECTIVE  /78  Pulse 138  Temp 102  F (38.9  C) (Oral)  Wt 136 lb (61.7 kg)  SpO2 100%  BMI 25.49 kg/m2    Physical Exam   Constitutional: She appears well-developed.   HENT:   Head: Normocephalic.   Right Ear: External ear normal.   Left Ear: External ear normal.   Nose: Nose normal.   Mouth/Throat: Oropharynx is clear and moist.   Eyes: Pupils are equal, round, and reactive to light.   Neck: Normal range of motion.   Cardiovascular: Normal rate.    Pulmonary/Chest: Effort normal.   Abdominal: Soft.   Musculoskeletal: Normal range of motion.   Neurological: She is alert.   Skin: Skin is warm.   Nursing note and vitals reviewed.      Labs:    Results for orders placed or performed in visit on 08/05/18   Strep, Rapid Screen   Result Value Ref Range    Specimen Description Throat     Rapid Strep A Screen (A)      POSITIVE: Group A Streptococcal antigen detected by immunoassay.     Current Outpatient Prescriptions   Medication Sig Dispense Refill      amoxicillin (AMOXIL) 875 MG tablet Take 1 tablet (875 mg) by mouth 2 times daily 20 tablet 0     albuterol (2.5 MG/3ML) 0.083% nebulizer solution Take 1 vial (2.5 mg) by nebulization every 6 hours as needed for shortness of breath / dyspnea or wheezing 30 vial 3     albuterol (PROAIR HFA, PROVENTIL HFA, VENTOLIN HFA) 108 (90 BASE) MCG/ACT inhaler Inhale 2 puffs into the lungs every 6 hours as needed for shortness of breath / dyspnea or wheezing 1 Inhaler 11     cetirizine (ZYRTEC ALLERGY) 10 MG tablet Take 10 mg by mouth every morning       citalopram (CELEXA) 20 MG tablet Take 1/2 tablet (10 mg) for 1-2 weeks, then increase to 1 tablet orally daily 30 tablet 0     OLANZapine (ZYPREXA) 5 MG tablet Take 1 tablet (5 mg) by mouth At Bedtime 30 tablet 0     ondansetron (ZOFRAN) 4 MG tablet Take 1 tablet (4 mg) by mouth every 6 hours as needed for nausea 30 tablet 0     QUEtiapine (SEROQUEL) 25 MG tablet Take 1 tablet (25 mg) by mouth nightly as needed (anxiety) 60 tablet 0     solifenacin (VESICARE) 10 MG tablet Take 1 tablet (10 mg) by mouth daily 30 tablet 1     Vilazodone HCl (VIIBRYD PO) Take 20 mg by mouth daily            X-Ray was not done.    ASSESSMENT:      ICD-10-CM    1. Throat pain R07.0 Strep, Rapid Screen    2. Fever  3. Chills  4.  Status post hysterectomy 3 weeks ago; slight abdominal pain    Patient did look very ill and we were concerned that this could be symptoms that might be associated with her postoperative course and her hysterectomy however she does have a positive strep test and I suspect that all of her symptoms are probably from the strep.    This includes her abdominal pain which is probably from some chills that she had from her strep.    However this is a level 4 visit 25 minutes was spent in exam and counseling.  Greater than 50% of the time in counseling in regards to the numerous strep infections are running through the family as well as she has had 3 strep infections in the last  several months.    I suspect that her immune system is down because of her recent surgery and she has been unable to find out the strep    Medical Decision Making:    Differential Diagnosis:  Muscle wall tenderness, pharyngitis,  upper respiratory infection, ileus, bowel obstruction    Serious Comorbid Conditions:  Adult:  None    PLAN:        Followup:    1.  2 weeks if not improving    There are no Patient Instructions on file for this visit.

## 2018-08-05 NOTE — NURSING NOTE
The following medication was given:     MEDICATION:  Acetaminophen 325mg  ROUTE: PO  SITE: Medication was given orally   DOSE: 650 mg  LOT #: [id number]  : [employer]  EXPIRATION DATE: 10/19  NDC#: [address]-58   Was there drug waste? No  Multi-dose vial: Yes    Nidia Gary CMA  August 5, 2018

## 2018-08-05 NOTE — MR AVS SNAPSHOT
After Visit Summary   8/5/2018    Ashlie Matthews    MRN: [id number]           Patient Information     Date Of Birth          1985        Visit Information        Provider Department      8/5/2018 8:35 AM Jose Manuel Alejo MD [facility]        Today's Diagnoses     Throat pain    -  1       Follow-ups after your visit        Your next 10 appointments already scheduled     Aug 13, 2018  7:50 AM CDT   SHORT with Danielle Mims PA-C   [facility]    [address] [address] [phone]              Who to contact     If you have questions or need follow up information about today's clinic visit or your schedule please contact [facility] directly at [phone].  Normal or non-critical lab and imaging results will be communicated to you by MyChart, letter or phone within 4 business days after the clinic has received the results. If you do not hear from us within 7 days, please contact the clinic through MyChart or phone. If you have a critical or abnormal lab result, we will notify you by phone as soon as possible.  Submit refill requests through [employer] or call your pharmacy and they will forward the refill request to us. Please allow 3 business days for your refill to be completed.          Additional Information About Your Visit        MyChart Information     [employer] gives you secure access to your electronic health record. If you see a primary care provider, you can also send messages to your care team and make appointments. If you have questions, please call your primary care clinic.  If you do not have a primary care provider, please call [phone] and they will assist you.        Care EveryWhere ID     This is your Care EveryWhere ID. This could be used by other organizations to access your [place] medical records  [id number]        Your Vitals Were     Pulse  Temperature Pulse Oximetry BMI (Body Mass Index)          138 102  F (38.9  C) (Oral) 100% 25.49 kg/m2         Blood Pressure from Last 3 Encounters:   08/05/18 118/78   07/11/18 114/66   06/25/18 116/68    Weight from Last 3 Encounters:   08/05/18 136 lb (61.7 kg)   07/11/18 136 lb 12.8 oz (62.1 kg)   06/25/18 135 lb 6.4 oz (61.4 kg)              We Performed the Following     Strep, Rapid Screen          Today's Medication Changes          These changes are accurate as of 8/5/18  9:11 AM.  If you have any questions, ask your nurse or doctor.               Start taking these medicines.        Dose/Directions    amoxicillin 875 MG tablet   Commonly known as:  AMOXIL   Used for:  Throat pain   Started by:  Jose Manuel Alejo MD        Dose:  875 mg   Take 1 tablet (875 mg) by mouth 2 times daily   Quantity:  20 tablet   Refills:  0            Where to get your medicines      These medications were sent to [place] [address] [facility] [employer] [address]  [address] [facility] [address]     Phone:  [phone]     amoxicillin 875 MG tablet                Primary Care Provider Office Phone # Fax #    Danielle Mims PA-C [phone]       [address]  [facility] [address]        Equal Access to Services     RHONAD ALMAGUER AH: Hadii harpreet ku hadasho Soomaali, waaxda luqadaha, qaybta kaalmada adeegyada, mary reza. So [facility] [phone].    ATENCIÓN: Si habla español, tiene a calvillo disposición servicios gratuitos de asistencia lingüística. Kane al [phone].    We comply with applicable federal civil rights laws and [place] laws. We do not discriminate on the basis of race, color, national origin, age, disability, sex, sexual orientation, or gender identity.            Thank you!     Thank you for choosing [facility]  for your care. Our goal is always to provide you with excellent care. Hearing back from our patients is one  way we can continue to improve our services. Please take a few minutes to complete the written survey that you may receive in the mail after your visit with us. Thank you!             Your Updated Medication List - Protect others around you: Learn how to safely use, store and throw away your medicines at www.disposemymeds.org.          This list is accurate as of 8/5/18  9:11 AM.  Always use your most recent med list.                   Brand Name Dispense Instructions for use Diagnosis    * albuterol 108 (90 Base) MCG/ACT Inhaler    PROAIR HFA/PROVENTIL HFA/VENTOLIN HFA    1 Inhaler    Inhale 2 puffs into the lungs every 6 hours as needed for shortness of breath / dyspnea or wheezing    Mild intermittent asthma       * albuterol (2.5 MG/3ML) 0.083% neb solution     30 vial    Take 1 vial (2.5 mg) by nebulization every 6 hours as needed for shortness of breath / dyspnea or wheezing    Mild intermittent asthma       amoxicillin 875 MG tablet    AMOXIL    20 tablet    Take 1 tablet (875 mg) by mouth 2 times daily    Throat pain       citalopram 20 MG tablet    celeXA    30 tablet    Take 1/2 tablet (10 mg) for 1-2 weeks, then increase to 1 tablet orally daily    Depression with suicidal ideation       OLANZapine 5 MG tablet    zyPREXA    30 tablet    Take 1 tablet (5 mg) by mouth At Bedtime    Depression with suicidal ideation       ondansetron 4 MG tablet    ZOFRAN    30 tablet    Take 1 tablet (4 mg) by mouth every 6 hours as needed for nausea    Vomiting and diarrhea       QUEtiapine 25 MG tablet    SEROquel    60 tablet    Take 1 tablet (25 mg) by mouth nightly as needed (anxiety)    Depression with suicidal ideation       solifenacin 10 MG tablet    VESICARE    30 tablet    Take 1 tablet (10 mg) by mouth daily    Mixed stress and urge urinary incontinence       VIIBRYD PO      Take 20 mg by mouth daily        ZYRTEC ALLERGY 10 MG tablet   Generic drug:  cetirizine      Take 10 mg by mouth every morning        *  Notice:  This list has 2 medication(s) that are the same as other medications prescribed for you. Read the directions carefully, and ask your doctor or other care provider to review them with you.

## 2018-08-06 DIAGNOSIS — R45.851 DEPRESSION WITH SUICIDAL IDEATION: ICD-10-CM

## 2018-08-06 DIAGNOSIS — F32.A DEPRESSION WITH SUICIDAL IDEATION: ICD-10-CM

## 2018-08-06 NOTE — TELEPHONE ENCOUNTER
"Requested Prescriptions   Pending Prescriptions Disp Refills     citalopram (CELEXA) 20 MG tablet [Pharmacy Med Name: CITALOPRAM HBR 20 MG TABLET]  Last Written Prescription Date:  7/11/18  Last Fill Quantity: 30,  # refills: 0   Last office visit: 7/11/2018 with prescribing provider:  Danielle Mims PA-C    Future Office Visit:   Next 5 appointments (look out 90 days)     Aug 13, 2018  7:50 AM CDT   SHORT with Danielle Mims PA-C   [facility]    [address] [address] [phone]                  30 tablet 0     Sig: TAKE 1/2 TABLET BY MOUTH FOR 1-2 WEEKS, THEN INCREASE TO 1 TABLET DAILY    SSRIs Protocol Failed    8/6/2018  1:19 PM       Failed - PHQ-9 score less than 5 in past 6 months    Please review last PHQ-9 score.   PHQ-9 SCORE 2/15/2013 6/28/2016 6/25/2018   Total Score 0 - -   Total Score MyChart - - -   Total Score - 0 13     SLIM-7 SCORE 6/28/2016 6/25/2018   Total Score 0 12                Passed - Patient is age 18 or older       Passed - No active pregnancy on record       Passed - No positive pregnancy test in last 12 months       Passed - Recent (6 mo) or future (30 days) visit within the authorizing provider's specialty    Patient had office visit in the last 6 months or has a visit in the next 30 days with authorizing provider or within the authorizing provider's specialty.  See \"Patient Info\" tab in inbasket, or \"Choose Columns\" in Meds & Orders section of the refill encounter.              "

## 2018-08-08 NOTE — TELEPHONE ENCOUNTER
Routing refill request to provider for review/approval because:  Drug interaction warning    Elda Roblero RN BSN  [facility]  [phone]

## 2018-08-13 ENCOUNTER — RADIANT APPOINTMENT (OUTPATIENT)
Dept: GENERAL RADIOLOGY | Facility: CLINIC | Age: 33
End: 2018-08-13
Attending: PHYSICIAN ASSISTANT
Payer: COMMERCIAL

## 2018-08-13 ENCOUNTER — OFFICE VISIT (OUTPATIENT)
Dept: FAMILY MEDICINE | Facility: CLINIC | Age: 33
End: 2018-08-13
Payer: COMMERCIAL

## 2018-08-13 VITALS
BODY MASS INDEX: 25.77 KG/M2 | RESPIRATION RATE: 15 BRPM | SYSTOLIC BLOOD PRESSURE: 108 MMHG | HEART RATE: 79 BPM | WEIGHT: 137.5 LBS | DIASTOLIC BLOOD PRESSURE: 62 MMHG | OXYGEN SATURATION: 97 % | TEMPERATURE: 98.4 F

## 2018-08-13 DIAGNOSIS — R45.851 DEPRESSION WITH SUICIDAL IDEATION: Primary | ICD-10-CM

## 2018-08-13 DIAGNOSIS — M54.50 CHRONIC LEFT-SIDED LOW BACK PAIN WITHOUT SCIATICA: ICD-10-CM

## 2018-08-13 DIAGNOSIS — G89.29 CHRONIC LEFT-SIDED LOW BACK PAIN WITHOUT SCIATICA: ICD-10-CM

## 2018-08-13 DIAGNOSIS — F32.A DEPRESSION WITH SUICIDAL IDEATION: Primary | ICD-10-CM

## 2018-08-13 PROCEDURE — 99213 OFFICE O/P EST LOW 20 MIN: CPT | Performed by: PHYSICIAN ASSISTANT

## 2018-08-13 PROCEDURE — 72100 X-RAY EXAM L-S SPINE 2/3 VWS: CPT | Mod: FY

## 2018-08-13 RX ORDER — CITALOPRAM HYDROBROMIDE 20 MG/1
20 TABLET ORAL DAILY
Qty: 90 TABLET | Refills: 3 | Status: SHIPPED | OUTPATIENT
Start: 2018-08-13 | End: 2018-11-07

## 2018-08-13 RX ORDER — CITALOPRAM HYDROBROMIDE 20 MG/1
TABLET ORAL
Qty: 30 TABLET | Refills: 0 | Status: SHIPPED | OUTPATIENT
Start: 2018-08-13 | End: 2018-08-13

## 2018-08-13 ASSESSMENT — ANXIETY QUESTIONNAIRES
5. BEING SO RESTLESS THAT IT IS HARD TO SIT STILL: SEVERAL DAYS
3. WORRYING TOO MUCH ABOUT DIFFERENT THINGS: SEVERAL DAYS
2. NOT BEING ABLE TO STOP OR CONTROL WORRYING: SEVERAL DAYS
IF YOU CHECKED OFF ANY PROBLEMS ON THIS QUESTIONNAIRE, HOW DIFFICULT HAVE THESE PROBLEMS MADE IT FOR YOU TO DO YOUR WORK, TAKE CARE OF THINGS AT HOME, OR GET ALONG WITH OTHER PEOPLE: SOMEWHAT DIFFICULT
6. BECOMING EASILY ANNOYED OR IRRITABLE: SEVERAL DAYS
GAD7 TOTAL SCORE: 7
7. FEELING AFRAID AS IF SOMETHING AWFUL MIGHT HAPPEN: SEVERAL DAYS
1. FEELING NERVOUS, ANXIOUS, OR ON EDGE: SEVERAL DAYS

## 2018-08-13 ASSESSMENT — PATIENT HEALTH QUESTIONNAIRE - PHQ9: 5. POOR APPETITE OR OVEREATING: SEVERAL DAYS

## 2018-08-13 NOTE — PROGRESS NOTES
SUBJECTIVE:   Ashlie Matthews is a 33 year old female who presents to clinic today for the following health issues:      Depression Followup    Status since last visit (6/11/18): Stable   Still feels anxious but seems to be dealing with it better     See PHQ-9 for current symptoms.  Other associated symptoms: None    Complicating factors:   Significant life event:  No   Current substance abuse:  None  Anxiety or Panic symptoms:  No  PHQ-9 6/28/2016 6/25/2018   Total Score 0 13   Q9: Suicide Ideation Not at all Several days     PHQ-9  English  PHQ-9   Any Language  Suicide Assessment Five-step Evaluation and Treatment (SAFE-T)      Amount of exercise or physical activity: 6-7 days/week for an average of 30-45 minutes None since having her hysterectomy until cleared by her doctor then will resume    Problems taking medications regularly: No    Medication side effects: none    Diet: regular (no restrictions)    Patient is here today to follow up on her depressed mood  She feels like with the Celexa she is doing really well  She still has occasional thoughts of self harm but has no desire to act on anything  She feels a bit anxious at times but senses she will always feels somewhat anxious  She denies SI/HI today, she is eating and sleeping well  Is getting remainder of medication from psychiatry    Patient is also here complaining of left low back pain  Ongoing for 2 years stemming from a fall down the stairs 2 years ago  No       Problem list and histories reviewed & adjusted, as indicated.  Additional history: as documented    Patient Active Problem List   Diagnosis     Allergic state     Chronic maxillary sinusitis     Chronic tonsillitis     Mild intermittent asthma     CARDIOVASCULAR SCREENING; LDL GOAL LESS THAN 160     Perforated tympanic membrane     AD (atopic dermatitis)     Diet controlled gestational diabetes mellitus in third trimester     Depression with suicidal ideation     Past Surgical History:    Procedure Laterality Date     ARTHROSCOPY SHOULDER Bilateral 2008,2009     Laparoscopy to look for endometriosis  8/2004     LAPAROTOMY MINI, TUBAL LIGATION (POST PARTUM), COMBINED Bilateral 5/11/2016    Procedure: COMBINED LAPAROTOMY MINI, TUBAL LIGATION (POST PARTUM);  Surgeon: Nannette Shane DO;  Location: [facility] OR     MYRINGOTOMY, INSERT TUBE BILATERAL, COMBINED  as a child     Septoplasty for deviated septum  7/2005     SINUS SURGERY  2007     TONSILLECTOMY  2006       Social History   Substance Use Topics     Smoking status: Never Smoker     Smokeless tobacco: Never Used     Alcohol use No     Family History   Problem Relation Age of Onset     Diabetes Maternal Grandfather      insulin     Hypertension Maternal Grandfather      Obesity Maternal Grandfather      Respiratory Maternal Grandfather      asthma     Obesity Maternal Grandmother      Hypertension Mother      GASTROINTESTINAL DISEASE Mother      Pancreatitis-flare ups often     Lipids Mother      high     Blood Disease Mother      Family History Negative Father      HEART DISEASE Father      Allergies Son      Allergic to peanuts, different foods, Amoxicillin         Current Outpatient Prescriptions   Medication Sig Dispense Refill     albuterol (2.5 MG/3ML) 0.083% nebulizer solution Take 1 vial (2.5 mg) by nebulization every 6 hours as needed for shortness of breath / dyspnea or wheezing 30 vial 3     albuterol (PROAIR HFA, PROVENTIL HFA, VENTOLIN HFA) 108 (90 BASE) MCG/ACT inhaler Inhale 2 puffs into the lungs every 6 hours as needed for shortness of breath / dyspnea or wheezing 1 Inhaler 11     amoxicillin (AMOXIL) 875 MG tablet Take 1 tablet (875 mg) by mouth 2 times daily 20 tablet 0     cetirizine (ZYRTEC ALLERGY) 10 MG tablet Take 10 mg by mouth every morning       citalopram (CELEXA) 20 MG tablet Take 1 tablet (20 mg) by mouth daily 90 tablet 3     OLANZapine (ZYPREXA) 5 MG tablet Take 1 tablet (5 mg) by mouth At Bedtime 30 tablet 0      "ondansetron (ZOFRAN) 4 MG tablet Take 1 tablet (4 mg) by mouth every 6 hours as needed for nausea 30 tablet 0     QUEtiapine (SEROQUEL) 25 MG tablet Take 1 tablet (25 mg) by mouth nightly as needed (anxiety) 60 tablet 0     solifenacin (VESICARE) 10 MG tablet Take 1 tablet (10 mg) by mouth daily 30 tablet 1     [DISCONTINUED] citalopram (CELEXA) 20 MG tablet TAKE 1/2 TABLET BY MOUTH FOR 1-2 WEEKS, THEN INCREASE TO 1 TABLET DAILY 30 tablet 0     Allergies   Allergen Reactions     Moxifloxacin Hydrochloride Nausea and Vomiting     Avelox-vomiting     Cats Itching     Codeine GI Disturbance     Darvocet [Propoxyphene N-Apap] Hives     PN: LW Reaction: Rash, Generalized     Demerol Hives     Dog Hair [Dogs] Unknown     Dust Mites Itching and Swelling     Meperidine      PN: LW Reaction: Rash, Generalized     Oxycodone-Acetaminophen      PN: LW Reaction: SHORTNESS OF BREATH     Pollen Extract Itching     Vicodin [Hydrocodone-Acetaminophen] GI Disturbance     PN: LW Reaction: Rash, Generalized     Latex Itching, Swelling and Rash     \"sensitivity\"       Reviewed and updated as needed this visit by clinical staff  Tobacco  Allergies  Meds  Med Hx  Surg Hx  Fam Hx  Soc Hx      Reviewed and updated as needed this visit by Provider         ROS:  Constitutional, HEENT, cardiovascular, pulmonary, gi and gu systems are negative, except as otherwise noted.    OBJECTIVE:     /62  Pulse 79  Temp 98.4  F (36.9  C) (Oral)  Resp 15  Wt 137 lb 8 oz (62.4 kg)  LMP 06/27/2018 (Exact Date)  SpO2 97%  BMI 25.77 kg/m2  Body mass index is 25.77 kg/(m^2).  GENERAL: healthy, alert and no distress  NECK: no adenopathy, no asymmetry, masses, or scars and thyroid normal to palpation  RESP: lungs clear to auscultation - no rales, rhonchi or wheezes  CV: regular rate and rhythm, normal S1 S2, no S3 or S4, no murmur, click or rub, no peripheral edema and peripheral pulses strong  MS: no gross musculoskeletal defects noted, no " edema  Comprehensive back pain exam:  No tenderness, Range of motion not limited by pain, Lower extremity strength functional and equal on both sides and Straight leg raise negative bilaterally    Diagnostic Test Results:  none     ASSESSMENT/PLAN:             1. Depression with suicidal ideation  Chronic issue, stable.  PHQ9/GAD7 updated today.   Refills sent for 1 year. Will plan to refill other psych meds with one more visit from Psychiatry, plan to see psych on interim basis in future.  - citalopram (CELEXA) 20 MG tablet; Take 1 tablet (20 mg) by mouth daily  Dispense: 90 tablet; Refill: 3    2. Chronic left-sided low back pain without sciatica  Chronic issue, xrays reviewed as negative.  Recommend RICE and PT.  May consider Ortho if symptoms worsen or do not improve.  - XR Lumbar Spine 2/3 Views; Future    Risks, benefits and alternatives were discussed with patient. Agreeable to the plan of care.      Danielle Mims PA-C  [facility]

## 2018-08-13 NOTE — LETTER
My Asthma Action Plan  Name: Ashlie Matthews   YOB: 1985  Date: 8/13/2018   My doctor: Danielle Mims PA-C   My clinic: [facility]        My Control Medicine: { :338164}  My Rescue Medicine: { :548837}  {AAP include Oral Steroid:292223} My Asthma Severity: { :222155}  Avoid your asthma triggers: { :815349}        {Is patient a child or adult?:001044}       GREEN ZONE   Good Control    I feel good    No cough or wheeze    Can work, sleep and play without asthma symptoms       Take your asthma control medicine every day.     1. If exercise triggers your asthma, take your rescue medication    15 minutes before exercise or sports, and    During exercise if you have asthma symptoms  2. Spacer to use with inhaler: If you have a spacer, make sure to use it with your inhaler             YELLOW ZONE Getting Worse  I have ANY of these:    I do not feel good    Cough or wheeze    Chest feels tight    Wake up at night   1. Keep taking your Green Zone medications  2. Start taking your rescue medicine:    every 20 minutes for up to 1 hour. Then every 4 hours for 24-48 hours.  3. If you stay in the Yellow Zone for more than 12-24 hours, contact your doctor.  4. If you do not return to the Green Zone in 12-24 hours or you get worse, start taking your oral steroid medicine if prescribed by your provider.           RED ZONE Medical Alert - Get Help  I have ANY of these:    I feel awful    Medicine is not helping    Breathing getting harder    Trouble walking or talking    Nose opens wide to breathe       1. Take your rescue medicine NOW  2. If your provider has prescribed an oral steroid medicine, start taking it NOW  3. Call your doctor NOW  4. If you are still in the Red Zone after 20 minutes and you have not reached your doctor:    Take your rescue medicine again and    Call 911 or go to the emergency room right away    See your regular doctor within 2 weeks of an Emergency Room or  Urgent Care visit for follow-up treatment.          Annual Reminders:  Meet with Asthma Educator,  Flu Shot in the Fall, consider Pneumonia Vaccination for patients with asthma (aged 19 and older).    Pharmacy:    [facility] [place], [place] - [address]  [facility] [place], [place] - [address] [facility] [address] [place] [facility] [address]  [facility] [address] [place] [facility] [address] [facility]                      Asthma Triggers  How To Control Things That Make Your Asthma Worse    Triggers are things that make your asthma worse.  Look at the list below to help you find your triggers and what you can do about them.  You can help prevent asthma flare-ups by staying away from your triggers.      Trigger                                                          What you can do   Cigarette Smoke  Tobacco smoke can make asthma worse. Do not allow smoking in your home, car or around you.  Be sure no one smokes at a child s day care or school.  If you smoke, ask your health care provider for ways to help you quit.  Ask family members to quit too.  Ask your health care provider for a referral to Quit Plan to help you quit smoking, or call 8-208-482-PLAN.     Colds, Flu, Bronchitis  These are common triggers of asthma. Wash your hands often.  Don t touch your eyes, nose or mouth.  Get a flu shot every year.     Dust Mites  These are tiny bugs that live in cloth or carpet. They are too small to see. Wash sheets and blankets in hot water every week.   Encase pillows and mattress in dust mite proof covers.  Avoid having carpet if you can. If you have carpet, vacuum weekly.   Use a dust mask and HEPA vacuum.   Pollen and Outdoor Mold  Some people are allergic to trees, grass, or weed pollen, or molds. Try to keep your windows closed.  Limit time out doors when pollen count is high.   Ask you health care provider about taking medicine during allergy season.      Animal Dander  Some people are allergic to skin flakes, urine or saliva from pets with fur or feathers. Keep pets with fur or feathers out of your home.    If you can t keep the pet outdoors, then keep the pet out of your bedroom.  Keep the bedroom door closed.  Keep pets off cloth furniture and away from stuffed toys.     Mice, Rats, and Cockroaches  Some people are allergic to the waste from these pests.   Cover food and garbage.  Clean up spills and food crumbs.  Store grease in the refrigerator.   Keep food out of the bedroom.   Indoor Mold  This can be a trigger if your home has high moisture. Fix leaking faucets, pipes, or other sources of water.   Clean moldy surfaces.  Dehumidify basement if it is damp and smelly.   Smoke, Strong Odors, and Sprays  These can reduce air quality. Stay away from strong odors and sprays, such as perfume, powder, hair spray, paints, smoke incense, paint, cleaning products, candles and new carpet.   Exercise or Sports  Some people with asthma have this trigger. Be active!  Ask your doctor about taking medicine before sports or exercise to prevent symptoms.    Warm up for 5-10 minutes before and after sports or exercise.     Other Triggers of Asthma  Cold air:  Cover your nose and mouth with a scarf.  Sometimes laughing or crying can be a trigger.  Some medicines and food can trigger asthma.

## 2018-08-13 NOTE — MR AVS SNAPSHOT
After Visit Summary   8/13/2018    Ashlie Mtathews    MRN: [id number]           Patient Information     Date Of Birth          1985        Visit Information        Provider Department      8/13/2018 7:50 AM Danielle Mims PA-C [facility]        Today's Diagnoses     Depression with suicidal ideation    -  1    Chronic left-sided low back pain without sciatica           Follow-ups after your visit        Follow-up notes from your care team     Return in about 4 weeks (around 9/10/2018) for If symptoms worsen or fail to improve.      Who to contact     If you have questions or need follow up information about today's clinic visit or your schedule please contact [facility] directly at [phone].  Normal or non-critical lab and imaging results will be communicated to you by LIFEmeehart, letter or phone within 4 business days after the clinic has received the results. If you do not hear from us within 7 days, please contact the clinic through LIFEmeehart or phone. If you have a critical or abnormal lab result, we will notify you by phone as soon as possible.  Submit refill requests through Softfront or call your pharmacy and they will forward the refill request to us. Please allow 3 business days for your refill to be completed.          Additional Information About Your Visit        MyChart Information     Softfront gives you secure access to your electronic health record. If you see a primary care provider, you can also send messages to your care team and make appointments. If you have questions, please call your primary care clinic.  If you do not have a primary care provider, please call [phone] and they will assist you.        Care EveryWhere ID     This is your Care EveryWhere ID. This could be used by other organizations to access your [facility] medical records  [id number]        Your Vitals Were     Pulse Temperature Respirations Last Period Pulse  Oximetry BMI (Body Mass Index)    79 98.4  F (36.9  C) (Oral) 15 06/27/2018 (Exact Date) 97% 25.77 kg/m2       Blood Pressure from Last 3 Encounters:   08/13/18 108/62   08/05/18 118/78   07/11/18 114/66    Weight from Last 3 Encounters:   08/13/18 137 lb 8 oz (62.4 kg)   08/05/18 136 lb (61.7 kg)   07/11/18 136 lb 12.8 oz (62.1 kg)                 Today's Medication Changes          These changes are accurate as of 8/13/18  8:23 AM.  If you have any questions, ask your nurse or doctor.               These medicines have changed or have updated prescriptions.        Dose/Directions    citalopram 20 MG tablet   Commonly known as:  celeXA   This may have changed:  See the new instructions.   Used for:  Depression with suicidal ideation   Changed by:  Danielle Mims PA-C        Dose:  20 mg   Take 1 tablet (20 mg) by mouth daily   Quantity:  90 tablet   Refills:  3            Where to get your medicines      These medications were sent to [place] [address] [place] [facility] [address] [facility]  [address] [facility] [address]     Phone:  [phone]     citalopram 20 MG tablet                Primary Care Provider Office Phone # Fax #    Danielle Mims PA-C [phone] [address] [facility] [address]        Equal Access to Services     [facility] AH: Hadii harpreet orellanao Sonataliyaali, waaxda luqadaha, qaybta kaalmada adeegyada, mary dawn adeoscar reza. So [facility] [phone].    ATENCIÓN: Si habla español, tiene a calvillo disposición servicios gratuitos de asistencia lingüística. Llame al [phone].    We comply with applicable federal civil rights laws and [place] laws. We do not discriminate on the basis of race, color, national origin, age, disability, sex, sexual orientation, or gender identity.            Thank you!     Thank you for choosing [facility]  for your care. Our goal is always to provide you with excellent care.  Hearing back from our patients is one way we can continue to improve our services. Please take a few minutes to complete the written survey that you may receive in the mail after your visit with us. Thank you!             Your Updated Medication List - Protect others around you: Learn how to safely use, store and throw away your medicines at www.disposemymeds.org.          This list is accurate as of 8/13/18  8:23 AM.  Always use your most recent med list.                   Brand Name Dispense Instructions for use Diagnosis    * albuterol 108 (90 Base) MCG/ACT inhaler    PROAIR HFA/PROVENTIL HFA/VENTOLIN HFA    1 Inhaler    Inhale 2 puffs into the lungs every 6 hours as needed for shortness of breath / dyspnea or wheezing    Mild intermittent asthma       * albuterol (2.5 MG/3ML) 0.083% neb solution     30 vial    Take 1 vial (2.5 mg) by nebulization every 6 hours as needed for shortness of breath / dyspnea or wheezing    Mild intermittent asthma       amoxicillin 875 MG tablet    AMOXIL    20 tablet    Take 1 tablet (875 mg) by mouth 2 times daily    Throat pain       citalopram 20 MG tablet    celeXA    90 tablet    Take 1 tablet (20 mg) by mouth daily    Depression with suicidal ideation       OLANZapine 5 MG tablet    zyPREXA    30 tablet    Take 1 tablet (5 mg) by mouth At Bedtime    Depression with suicidal ideation       ondansetron 4 MG tablet    ZOFRAN    30 tablet    Take 1 tablet (4 mg) by mouth every 6 hours as needed for nausea    Vomiting and diarrhea       QUEtiapine 25 MG tablet    SEROquel    60 tablet    Take 1 tablet (25 mg) by mouth nightly as needed (anxiety)    Depression with suicidal ideation       solifenacin 10 MG tablet    VESICARE    30 tablet    Take 1 tablet (10 mg) by mouth daily    Mixed stress and urge urinary incontinence       ZYRTEC ALLERGY 10 MG tablet   Generic drug:  cetirizine      Take 10 mg by mouth every morning        * Notice:  This list has 2 medication(s) that are the  same as other medications prescribed for you. Read the directions carefully, and ask your doctor or other care provider to review them with you.

## 2018-08-14 ASSESSMENT — ANXIETY QUESTIONNAIRES: GAD7 TOTAL SCORE: 7

## 2018-08-14 ASSESSMENT — PATIENT HEALTH QUESTIONNAIRE - PHQ9: SUM OF ALL RESPONSES TO PHQ QUESTIONS 1-9: 8

## 2018-09-10 DIAGNOSIS — N32.81 OAB (OVERACTIVE BLADDER): Primary | ICD-10-CM

## 2018-09-11 ENCOUNTER — OFFICE VISIT (OUTPATIENT)
Dept: FAMILY MEDICINE | Facility: CLINIC | Age: 33
End: 2018-09-11
Payer: COMMERCIAL

## 2018-09-11 VITALS
SYSTOLIC BLOOD PRESSURE: 100 MMHG | RESPIRATION RATE: 16 BRPM | BODY MASS INDEX: 27.74 KG/M2 | WEIGHT: 148 LBS | HEART RATE: 88 BPM | DIASTOLIC BLOOD PRESSURE: 66 MMHG | TEMPERATURE: 98.7 F

## 2018-09-11 DIAGNOSIS — B02.9 HERPES ZOSTER WITHOUT COMPLICATION: Primary | ICD-10-CM

## 2018-09-11 PROCEDURE — 99213 OFFICE O/P EST LOW 20 MIN: CPT | Performed by: PHYSICIAN ASSISTANT

## 2018-09-11 RX ORDER — VALACYCLOVIR HYDROCHLORIDE 1 G/1
1000 TABLET, FILM COATED ORAL 3 TIMES DAILY
Qty: 21 TABLET | Refills: 0 | Status: SHIPPED | OUTPATIENT
Start: 2018-09-11 | End: 2018-11-07

## 2018-09-11 RX ORDER — ASPIRIN 81 MG
TABLET,CHEWABLE ORAL 2 TIMES DAILY PRN
Qty: 30 G | Refills: 1 | Status: SHIPPED | OUTPATIENT
Start: 2018-09-11 | End: 2018-10-12

## 2018-09-11 RX ORDER — PREDNISONE 20 MG/1
20 TABLET ORAL DAILY
Qty: 5 TABLET | Refills: 0 | Status: SHIPPED | OUTPATIENT
Start: 2018-09-11 | End: 2018-10-12

## 2018-09-11 NOTE — MR AVS SNAPSHOT
After Visit Summary   9/11/2018    Ashlie Matthews    MRN: [id number]           Patient Information     Date Of Birth          1985        Visit Information        Provider Department      9/11/2018 11:00 AM Jose David Galeas PA-C [facility]        Today's Diagnoses     Herpes zoster without complication    -  1      Care Instructions      Shingles  Shingles is a viral infection caused by the same virus as chicken pox. Anyone who has had chicken pox may get shingles later in life. The virus stays in the body, but remains dormant (asleep). Shingles often occurs in older persons or persons with lowered immunity. But it can affect anyone at any age.  Shingles starts as a tingling patch of skin on one side of the body. Small, painful blisters may then appear. The rash does not spread to the rest of the body.  Exposure to shingles cannot cause shingles. However, it can cause chicken pox in anyone who has not had chicken pox or has not been vaccinated. The contagious period ends when all blisters have crusted over (generally about 2 weeks after the illness begins).  After the blisters heal, the affected skin may be sensitive or painful for months (neuralgia). This often gradually goes away.  A shingles vaccine is available. This can help prevent shingles or make it less painful. It is generally recommended for adults over the age of 60 who have had chicken pox in the past, but who have never had shingles. Adults over 60 who have had neither chicken pox nor shingles can prevent both diseases with the chicken pox vaccine. Ask your healthcare provider about these vaccines.  Home care    Medicines may be prescribed to help relieve pain. Take these medicines as directed. Ask your healthcare provider or pharmacist before using over-the-counter medicines for helping treat pain and itching.    In certain cases, antiviral medicines may be prescribed to reduce pain, shorten the illness,  and prevent neuralgia. Take these medicines as directed.    Compresses made from a solution of cool water mixed with cornstarch or baking soda may help relieve pain and itching.     Gently wash skin daily with soap and water to help prevent infection.  Be certain to rinse off all of the soap, which can be irritating.    Trim fingernails and try not to scratch. Scratching the sores may leave scars.    Stay home from work or school until all blisters have formed a crust and you are no longer contagious.  Follow-up care  Follow up with your healthcare provider or as directed by our staff.  When to seek medical advice    Fever of 100.4 F (38 C) or higher, or as directed by your healthcare provider    Affected skin is on the face or neck and any of the following occur:  ? Headache  ? Eye pain  ? Changes in vision  ? Sores near the eye  ? Weakness of facial muscles    Pain, redness, or swelling of a joint    Signs of skin infection: colored drainage from the sores, warmth, increasing redness, or increasing pain  Date Last Reviewed: 9/25/2015 2000-2017 [employer]. [address] [place] [address]. All rights reserved. This information is not intended as a substitute for professional medical care. Always follow your healthcare professional's instructions.                Follow-ups after your visit        Follow-up notes from your care team     Return in about 1 week (around 9/18/2018) for if symptoms are not improving.      Your next 10 appointments already scheduled     Oct 12, 2018 10:00 AM CDT   New Patient Visit with Анна Dowell PA-C   [facility] ([facility])    [address]  [address]  [facility] [address] [phone]              Future tests that were ordered for you today     Open Future Orders        Priority Expected Expires Ordered    UA without Microscopic [[id number]] Routine  9/10/2019 9/10/2018     MEASURE POST-VOID RESIDUAL URINE/BLADDER CAPACITY, US NON-IMAGING (39282) Routine  9/10/2019 9/10/2018            Who to contact     If you have questions or need follow up information about today's clinic visit or your schedule please contact [facility] directly at [phone].  Normal or non-critical lab and imaging results will be communicated to you by MyChart, letter or phone within 4 business days after the clinic has received the results. If you do not hear from us within 7 days, please contact the clinic through Gigyahart or phone. If you have a critical or abnormal lab result, we will notify you by phone as soon as possible.  Submit refill requests through DotNetNuke or call your pharmacy and they will forward the refill request to us. Please allow 3 business days for your refill to be completed.          Additional Information About Your Visit        MyChart Information     DotNetNuke gives you secure access to your electronic health record. If you see a primary care provider, you can also send messages to your care team and make appointments. If you have questions, please call your primary care clinic.  If you do not have a primary care provider, please call [phone] and they will assist you.        Care EveryWhere ID     This is your Care EveryWhere ID. This could be used by other organizations to access your [facility] medical records  [id number]        Your Vitals Were     Pulse Temperature Respirations Last Period BMI (Body Mass Index)       88 98.7  F (37.1  C) (Oral) 16 06/27/2018 (Exact Date) 27.74 kg/m2        Blood Pressure from Last 3 Encounters:   09/11/18 100/66   08/13/18 108/62   08/05/18 118/78    Weight from Last 3 Encounters:   09/11/18 148 lb (67.1 kg)   08/13/18 137 lb 8 oz (62.4 kg)   08/05/18 136 lb (61.7 kg)              Today, you had the following     No orders found for display         Today's Medication Changes          These changes are accurate as of 9/11/18 11:25  AM.  If you have any questions, ask your nurse or doctor.               Start taking these medicines.        Dose/Directions    Capsaicin 0.1 % cream   Commonly known as:  CAPSAICIN HP   Used for:  Herpes zoster without complication   Started by:  Jose David Galeas PA-C        Apply topically 2 times daily as needed   Quantity:  30 g   Refills:  1       predniSONE 20 MG tablet   Commonly known as:  DELTASONE   Used for:  Herpes zoster without complication   Started by:  Jose David Galeas PA-C        Dose:  20 mg   Take 1 tablet (20 mg) by mouth daily   Quantity:  5 tablet   Refills:  0       valACYclovir 1000 mg tablet   Commonly known as:  VALTREX   Used for:  Herpes zoster without complication   Started by:  Jose David Galeas PA-C        Dose:  1000 mg   Take 1 tablet (1,000 mg) by mouth 3 times daily   Quantity:  21 tablet   Refills:  0            Where to get your medicines      These medications were sent to [place] [address] [place] [id number] [address]    Hours:  Tech issues with their phone system Phone:  [phone]     Capsaicin 0.1 % cream    predniSONE 20 MG tablet    valACYclovir 1000 mg tablet                Primary Care Provider Office Phone # Fax #    Danielle Mims PA-C [phone]       [address] [address]  [facility] [address]        Equal Access to Services     ERICKA ALMAGUER AH: Hadii harpreet ku hadasho Soomaali, waaxda luqadaha, qaybta kaalmada adeegyada, mary evangelista haycuongn margot reza. So [facility] [phone].    ATENCIÓN: Si habla español, tiene a calvillo disposición servicios gratuitos de asistencia lingüística. Llame al [phone].    We comply with applicable federal civil rights laws and [place] laws. We do not discriminate on the basis of race, color, national origin, age, disability, sex, sexual orientation, or gender identity.            Thank you!     Thank you for choosing [facility]  for your care. Our goal is always to provide you with excellent care. Hearing back from our patients is one way we can continue to improve our services. Please take a few minutes to complete the written survey that you may receive in the mail after your visit with us. Thank you!             Your Updated Medication List - Protect others around you: Learn how to safely use, store and throw away your medicines at www.disposemymeds.org.          This list is accurate as of 9/11/18 11:25 AM.  Always use your most recent med list.                   Brand Name Dispense Instructions for use Diagnosis    * albuterol 108 (90 Base) MCG/ACT inhaler    PROAIR HFA/PROVENTIL HFA/VENTOLIN HFA    1 Inhaler    Inhale 2 puffs into the lungs every 6 hours as needed for shortness of breath / dyspnea or wheezing    Mild intermittent asthma       * albuterol (2.5 MG/3ML) 0.083% neb solution     30 vial    Take 1 vial (2.5 mg) by nebulization every 6 hours as needed for shortness of breath / dyspnea or wheezing    Mild intermittent asthma       Capsaicin 0.1 % cream    CAPSAICIN HP    30 g    Apply topically 2 times daily as needed    Herpes zoster without complication       citalopram 20 MG tablet    celeXA    90 tablet    Take 1 tablet (20 mg) by mouth daily    Depression with suicidal ideation       OLANZapine 5 MG tablet    zyPREXA    30 tablet    Take 1 tablet (5 mg) by mouth At Bedtime    Depression with suicidal ideation       ondansetron 4 MG tablet    ZOFRAN    30 tablet    Take 1 tablet (4 mg) by mouth every 6 hours as needed for nausea    Vomiting and diarrhea       predniSONE 20 MG tablet    DELTASONE    5 tablet    Take 1 tablet (20 mg) by mouth daily    Herpes zoster without complication       QUEtiapine 25 MG tablet    SEROquel    60 tablet    Take 1 tablet (25 mg) by mouth nightly as needed (anxiety)    Depression with suicidal ideation       valACYclovir 1000 mg tablet    VALTREX    21 tablet    Take 1 tablet  (1,000 mg) by mouth 3 times daily    Herpes zoster without complication       ZYRTEC ALLERGY 10 MG tablet   Generic drug:  cetirizine      Take 10 mg by mouth every morning        * Notice:  This list has 2 medication(s) that are the same as other medications prescribed for you. Read the directions carefully, and ask your doctor or other care provider to review them with you.

## 2018-09-11 NOTE — PROGRESS NOTES
SUBJECTIVE:   Ashlie Matthews is a 33 year old female who presents to clinic today for the following health issues:      Rash  Onset: last week    Description:   Location: right thigh  Character: round, raised, burning, red  Itching (Pruritis): YES    Progression of Symptoms:  worsening and constant    Accompanying Signs & Symptoms:  Fever: no   Body aches or joint pain: no   Sore throat symptoms: YES  Recent cold symptoms: no     History:   Previous similar rash: YES- shingles 5 years ago    Precipitating factors:   Exposure to similar rash: no   New exposures: None   Recent travel: no     Alleviating factors:  nothing    Therapies Tried and outcome: ice, ibuprofen, advil    Rash showed up 3 days ago, itching and burning for closer to a week.  Has had shingles before in the past and this feels similar.  Tried using ibuprofen without any relief and some ice packs.  Had a hysterectomy 8 weeks ago and also got strep throat during her recovery.      Problem list and histories reviewed & adjusted, as indicated.  Additional history: as documented      Reviewed and updated as needed this visit by clinical staff  Tobacco  Allergies  Meds  Med Hx  Surg Hx  Fam Hx  Soc Hx      Reviewed and updated as needed this visit by Provider         ROS:  Constitutional, HEENT, cardiovascular, pulmonary, gi and gu systems are negative, except as otherwise noted.    OBJECTIVE:     /66 (BP Location: Right arm, Patient Position: Sitting, Cuff Size: Adult Regular)  Pulse 88  Temp 98.7  F (37.1  C) (Oral)  Resp 16  Wt 148 lb (67.1 kg)  LMP 06/27/2018 (Exact Date)  BMI 27.74 kg/m2  Body mass index is 27.74 kg/(m^2).  GENERAL: healthy, alert and no distress  MS: no gross musculoskeletal defects noted, no edema  SKIN: zosteriform eruption - right upper lateral thigh  PSYCH: mentation appears normal, affect normal/bright    Diagnostic Test Results:  none     ASSESSMENT/PLAN:   1. Herpes zoster without complication    -  valACYclovir (VALTREX) 1000 mg tablet; Take 1 tablet (1,000 mg) by mouth 3 times daily  Dispense: 21 tablet; Refill: 0  - predniSONE (DELTASONE) 20 MG tablet; Take 1 tablet (20 mg) by mouth daily  Dispense: 5 tablet; Refill: 0  - Capsaicin (CAPSAICIN HP) 0.1 % cream; Apply topically 2 times daily as needed  Dispense: 30 g; Refill: 1      Rtc if sxs change, worsen or fail to resolve with above tx.      Patient Instructions     Shingles  Shingles is a viral infection caused by the same virus as chicken pox. Anyone who has had chicken pox may get shingles later in life. The virus stays in the body, but remains dormant (asleep). Shingles often occurs in older persons or persons with lowered immunity. But it can affect anyone at any age.  Shingles starts as a tingling patch of skin on one side of the body. Small, painful blisters may then appear. The rash does not spread to the rest of the body.  Exposure to shingles cannot cause shingles. However, it can cause chicken pox in anyone who has not had chicken pox or has not been vaccinated. The contagious period ends when all blisters have crusted over (generally about 2 weeks after the illness begins).  After the blisters heal, the affected skin may be sensitive or painful for months (neuralgia). This often gradually goes away.  A shingles vaccine is available. This can help prevent shingles or make it less painful. It is generally recommended for adults over the age of 60 who have had chicken pox in the past, but who have never had shingles. Adults over 60 who have had neither chicken pox nor shingles can prevent both diseases with the chicken pox vaccine. Ask your healthcare provider about these vaccines.  Home care    Medicines may be prescribed to help relieve pain. Take these medicines as directed. Ask your healthcare provider or pharmacist before using over-the-counter medicines for helping treat pain and itching.    In certain cases, antiviral medicines may be  prescribed to reduce pain, shorten the illness, and prevent neuralgia. Take these medicines as directed.    Compresses made from a solution of cool water mixed with cornstarch or baking soda may help relieve pain and itching.     Gently wash skin daily with soap and water to help prevent infection.  Be certain to rinse off all of the soap, which can be irritating.    Trim fingernails and try not to scratch. Scratching the sores may leave scars.    Stay home from work or school until all blisters have formed a crust and you are no longer contagious.  Follow-up care  Follow up with your healthcare provider or as directed by our staff.  When to seek medical advice    Fever of 100.4 F (38 C) or higher, or as directed by your healthcare provider    Affected skin is on the face or neck and any of the following occur:  ? Headache  ? Eye pain  ? Changes in vision  ? Sores near the eye  ? Weakness of facial muscles    Pain, redness, or swelling of a joint    Signs of skin infection: colored drainage from the sores, warmth, increasing redness, or increasing pain  Date Last Reviewed: 9/25/2015 2000-2017 [employer]. [address] [place] [id number]. All rights reserved. This information is not intended as a substitute for professional medical care. Always follow your healthcare professional's instructions.            Jose David Galeas PA-C  [facility]

## 2018-09-11 NOTE — PATIENT INSTRUCTIONS
Shingles  Shingles is a viral infection caused by the same virus as chicken pox. Anyone who has had chicken pox may get shingles later in life. The virus stays in the body, but remains dormant (asleep). Shingles often occurs in older persons or persons with lowered immunity. But it can affect anyone at any age.  Shingles starts as a tingling patch of skin on one side of the body. Small, painful blisters may then appear. The rash does not spread to the rest of the body.  Exposure to shingles cannot cause shingles. However, it can cause chicken pox in anyone who has not had chicken pox or has not been vaccinated. The contagious period ends when all blisters have crusted over (generally about 2 weeks after the illness begins).  After the blisters heal, the affected skin may be sensitive or painful for months (neuralgia). This often gradually goes away.  A shingles vaccine is available. This can help prevent shingles or make it less painful. It is generally recommended for adults over the age of 60 who have had chicken pox in the past, but who have never had shingles. Adults over 60 who have had neither chicken pox nor shingles can prevent both diseases with the chicken pox vaccine. Ask your healthcare provider about these vaccines.  Home care    Medicines may be prescribed to help relieve pain. Take these medicines as directed. Ask your healthcare provider or pharmacist before using over-the-counter medicines for helping treat pain and itching.    In certain cases, antiviral medicines may be prescribed to reduce pain, shorten the illness, and prevent neuralgia. Take these medicines as directed.    Compresses made from a solution of cool water mixed with cornstarch or baking soda may help relieve pain and itching.     Gently wash skin daily with soap and water to help prevent infection.  Be certain to rinse off all of the soap, which can be irritating.    Trim fingernails and try not to scratch. Scratching the sores  may leave scars.    Stay home from work or school until all blisters have formed a crust and you are no longer contagious.  Follow-up care  Follow up with your healthcare provider or as directed by our staff.  When to seek medical advice    Fever of 100.4 F (38 C) or higher, or as directed by your healthcare provider    Affected skin is on the face or neck and any of the following occur:  ? Headache  ? Eye pain  ? Changes in vision  ? Sores near the eye  ? Weakness of facial muscles    Pain, redness, or swelling of a joint    Signs of skin infection: colored drainage from the sores, warmth, increasing redness, or increasing pain  Date Last Reviewed: 9/25/2015 2000-2017 [employer]. [address] [place] [address], [place] [address]. All rights reserved. This information is not intended as a substitute for professional medical care. Always follow your healthcare professional's instructions.

## 2018-09-14 ENCOUNTER — TRANSFERRED RECORDS (OUTPATIENT)
Dept: HEALTH INFORMATION MANAGEMENT | Facility: CLINIC | Age: 33
End: 2018-09-14

## 2018-09-28 ENCOUNTER — TRANSFERRED RECORDS (OUTPATIENT)
Dept: HEALTH INFORMATION MANAGEMENT | Facility: CLINIC | Age: 33
End: 2018-09-28

## 2018-10-12 ENCOUNTER — OFFICE VISIT (OUTPATIENT)
Dept: UROLOGY | Facility: CLINIC | Age: 33
End: 2018-10-12
Payer: COMMERCIAL

## 2018-10-12 VITALS
DIASTOLIC BLOOD PRESSURE: 66 MMHG | BODY MASS INDEX: 25.76 KG/M2 | HEIGHT: 62 IN | WEIGHT: 140 LBS | HEART RATE: 66 BPM | SYSTOLIC BLOOD PRESSURE: 124 MMHG

## 2018-10-12 DIAGNOSIS — N32.81 OAB (OVERACTIVE BLADDER): Primary | ICD-10-CM

## 2018-10-12 LAB
ALBUMIN UR-MCNC: 30 MG/DL
APPEARANCE UR: CLEAR
BILIRUB UR QL STRIP: NEGATIVE
COLOR UR AUTO: YELLOW
GLUCOSE UR STRIP-MCNC: NEGATIVE MG/DL
HGB UR QL STRIP: NEGATIVE
KETONES UR STRIP-MCNC: 15 MG/DL
LEUKOCYTE ESTERASE UR QL STRIP: NEGATIVE
NITRATE UR QL: NEGATIVE
PH UR STRIP: 6 PH (ref 5–7)
PR INTERVAL - MUSE: 0
SOURCE: ABNORMAL
SP GR UR STRIP: 1.02 (ref 1–1.03)
UROBILINOGEN UR STRIP-ACNC: 0.2 EU/DL (ref 0.2–1)

## 2018-10-12 PROCEDURE — 99244 OFF/OP CNSLTJ NEW/EST MOD 40: CPT | Mod: 25 | Performed by: PHYSICIAN ASSISTANT

## 2018-10-12 PROCEDURE — 81003 URINALYSIS AUTO W/O SCOPE: CPT | Mod: QW | Performed by: PHYSICIAN ASSISTANT

## 2018-10-12 PROCEDURE — 51798 US URINE CAPACITY MEASURE: CPT | Performed by: PHYSICIAN ASSISTANT

## 2018-10-12 RX ORDER — TOLTERODINE 4 MG/1
4 CAPSULE, EXTENDED RELEASE ORAL DAILY
Qty: 90 CAPSULE | Refills: 1 | Status: SHIPPED | OUTPATIENT
Start: 2018-10-12 | End: 2019-02-27

## 2018-10-12 ASSESSMENT — PAIN SCALES - GENERAL: PAINLEVEL: NO PAIN (0)

## 2018-10-12 NOTE — LETTER
"10/12/2018       RE: Ashlie Matthews  [address]  [facility] [id number]     Dear Colleague,    Thank you for referring your patient, Ashlie Matthews, to the [facility] [place] [facility]. Please see a copy of my visit note below.      October 12, 2018      CC:  Urinary urgency    HPI:  Ashlie Matthews is a 33 year old female who presents in consultation from Danielle Mims PA-C for evaluation of the above. Has been ongoing for years, but worse since having three treatments of \"Core Intima\" laser therapy. Hx of stress incontinence and the laser treatment has kept her dry. She now describes bladder spasms and urge to urinate frequently. Drinks mainly water.  Bowels regular.  Has tried Vesicare in the past without improvement. Has benefited from pelvic floor PT in the past.  Nocturia x 3-4.     No gross hematuria, dysuria, flank pain, fevers or chills.     Past Medical History:   Diagnosis Date     Allergic rhinitis      Anxiety      Asthma      Cyclic vomiting syndrome      Depressive disorder      Diet controlled gestational diabetes mellitus in third trimester      Dysmenorrhea      Hypertension in pregnancy      Pneumothorax 2004    from bad coughing       Past Surgical History:   Procedure Laterality Date     ARTHROSCOPY SHOULDER Bilateral 2008,2009     GYN SURGERY  7/16/18     Laparoscopy to look for endometriosis  8/2004     LAPAROTOMY MINI, TUBAL LIGATION (POST PARTUM), COMBINED Bilateral 5/11/2016    Procedure: COMBINED LAPAROTOMY MINI, TUBAL LIGATION (POST PARTUM);  Surgeon: Nannette Shane DO;  Location: [facility] OR     MYRINGOTOMY, INSERT TUBE BILATERAL, COMBINED  as a child     ORTHOPEDIC SURGERY  2008/2009    Shoulder     Septoplasty for deviated septum  7/2005     SINUS SURGERY  2007     TONSILLECTOMY  2006       Social History     Social History     Marital status:      Spouse name: Pa     Number of " "children: 1     Years of education: N/A     Occupational History     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 Yes     Social History Narrative       Family History   Problem Relation Age of Onset     Diabetes Maternal Grandfather      insulin     Hypertension Maternal Grandfather      Obesity Maternal Grandfather      Respiratory Maternal Grandfather      asthma     Obesity Maternal Grandmother      Hypertension Mother      GASTROINTESTINAL DISEASE Mother      Pancreatitis-flare ups often     Lipids Mother      high     Blood Disease Mother      Diabetes Mother      Family History Negative Father      HEART DISEASE Father      Coronary Artery Disease Father      Allergies Son      Allergic to peanuts, different foods, Amoxicillin     Allergies   Allergen Reactions     Moxifloxacin Hydrochloride Nausea and Vomiting     Avelox-vomiting     Cats Itching     Codeine GI Disturbance     Darvocet [Propoxyphene N-Apap] Hives     PN: LW Reaction: Rash, Generalized     Demerol Hives     Dog Hair [Dogs] Unknown     Dust Mites Itching and Swelling     Meperidine      PN: LW Reaction: Rash, Generalized     Oxycodone-Acetaminophen      PN: LW Reaction: SHORTNESS OF BREATH     Pollen Extract Itching     Vicodin [Hydrocodone-Acetaminophen] GI Disturbance     PN: LW Reaction: Rash, Generalized     Latex Itching, Swelling and Rash     \"sensitivity\"       Current Outpatient Prescriptions   Medication     albuterol (2.5 MG/3ML) 0.083% nebulizer solution     albuterol (PROAIR HFA, PROVENTIL HFA, VENTOLIN HFA) 108 (90 BASE) MCG/ACT inhaler     cetirizine (ZYRTEC ALLERGY) 10 MG tablet     citalopram (CELEXA) 20 MG tablet     OLANZapine " "(ZYPREXA) 5 MG tablet     ondansetron (ZOFRAN) 4 MG tablet     QUEtiapine (SEROQUEL) 25 MG tablet     tolterodine (DETROL LA) 4 MG 24 hr capsule     valACYclovir (VALTREX) 1000 mg tablet     No current facility-administered medications for this visit.          PEx:   Blood pressure 124/66, pulse 66, height 1.562 m (5' 1.5\"), weight 63.5 kg (140 lb), last menstrual period 06/27/2018, not currently breastfeeding.  5' 1.5\", Body mass index is 26.02 kg/(m^2)., 140 lbs 0 oz  Gen appearance:  Well groomed,: age-appropriate appearing female in NAD.   HEENT:  EOMI, AT NC, CN grossly normal  Psych:  alert , comfortable in no acute distress  Neuro:  A/O X 3  Skin:  Warm to touch, clear of rashes, ecchymoses  Resp:  No increased respiratory effort  lymph:  No LE edema  Abd:  Soft/NT, ND, no palpable masses, no CVAT  Back: bony spine is non-tender, flanks are nontender    Urine: Neg  PVR 0cc      A/P: Ashlie Matthews is a 33 year old female with OAB  -Eliminate irritants  -Detrol LA 4mg. SE discussed. Will check with her insurance on options if this is too costly.     Анна Dowell PA-C  [facility]    30 minutes were spent with the patient today, > 50% in counseling and coordination of care of OAB.  "

## 2018-10-12 NOTE — PROGRESS NOTES
"  October 12, 2018      CC:  Urinary urgency    HPI:  Ashlie Matthews is a 33 year old female who presents in consultation from Danielle Mims PA-C for evaluation of the above. Has been ongoing for years, but worse since having three treatments of \"Core Intima\" laser therapy. Hx of stress incontinence and the laser treatment has kept her dry. She now describes bladder spasms and urge to urinate frequently. Drinks mainly water.  Bowels regular.  Has tried Vesicare in the past without improvement. Has benefited from pelvic floor PT in the past.  Nocturia x 3-4.     No gross hematuria, dysuria, flank pain, fevers or chills.     Past Medical History:   Diagnosis Date     Allergic rhinitis      Anxiety      Asthma      Cyclic vomiting syndrome      Depressive disorder      Diet controlled gestational diabetes mellitus in third trimester      Dysmenorrhea      Hypertension in pregnancy      Pneumothorax 2004    from bad coughing       Past Surgical History:   Procedure Laterality Date     ARTHROSCOPY SHOULDER Bilateral 2008,2009     GYN SURGERY  7/16/18     Laparoscopy to look for endometriosis  8/2004     LAPAROTOMY MINI, TUBAL LIGATION (POST PARTUM), COMBINED Bilateral 5/11/2016    Procedure: COMBINED LAPAROTOMY MINI, TUBAL LIGATION (POST PARTUM);  Surgeon: Nannette Shane DO;  Location: [facility] OR     MYRINGOTOMY, INSERT TUBE BILATERAL, COMBINED  as a child     ORTHOPEDIC SURGERY  2008/2009    Shoulder     Septoplasty for deviated septum  7/2005     SINUS SURGERY  2007     TONSILLECTOMY  2006       Social History     Social History     Marital status:      Spouse name: Pa     Number of children: 1     Years of education: N/A     Occupational History     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 Yes     Social History Narrative       Family History   Problem Relation Age of Onset     Diabetes Maternal Grandfather      insulin     Hypertension Maternal Grandfather      Obesity Maternal Grandfather      Respiratory Maternal Grandfather      asthma     Obesity Maternal Grandmother      Hypertension Mother      GASTROINTESTINAL DISEASE Mother      Pancreatitis-flare ups often     Lipids Mother      high     Blood Disease Mother      Diabetes Mother      Family History Negative Father      HEART DISEASE Father      Coronary Artery Disease Father      Allergies Son      Allergic to peanuts, different foods, Amoxicillin       ROS:14 point ROS neg other than the symptoms noted above in the HPI.    Allergies   Allergen Reactions     Moxifloxacin Hydrochloride Nausea and Vomiting     Avelox-vomiting     Cats Itching     Codeine GI Disturbance     Darvocet [Propoxyphene N-Apap] Hives     PN: LW Reaction: Rash, Generalized     Demerol Hives     Dog Hair [Dogs] Unknown     Dust Mites Itching and Swelling     Meperidine      PN: LW Reaction: Rash, Generalized     Oxycodone-Acetaminophen      PN: LW Reaction: SHORTNESS OF BREATH     Pollen Extract Itching     Vicodin [Hydrocodone-Acetaminophen] GI Disturbance     PN: LW Reaction: Rash, Generalized     Latex Itching, Swelling and Rash     \"sensitivity\"       Current Outpatient Prescriptions   Medication     albuterol (2.5 MG/3ML) 0.083% nebulizer solution     albuterol (PROAIR HFA, PROVENTIL HFA, VENTOLIN HFA) 108 (90 BASE) MCG/ACT inhaler     cetirizine (ZYRTEC ALLERGY) 10 MG tablet     citalopram (CELEXA) 20 MG tablet     OLANZapine (ZYPREXA) 5 MG tablet     ondansetron (ZOFRAN) 4 MG tablet     QUEtiapine (SEROQUEL) 25 MG tablet     tolterodine (DETROL LA) 4 MG 24 hr capsule     valACYclovir (VALTREX) 1000 mg tablet     No current facility-administered medications for this " "visit.          PEx:   Blood pressure 124/66, pulse 66, height 1.562 m (5' 1.5\"), weight 63.5 kg (140 lb), last menstrual period 06/27/2018, not currently breastfeeding.  5' 1.5\", Body mass index is 26.02 kg/(m^2)., 140 lbs 0 oz  Gen appearance:  Well groomed,: age-appropriate appearing female in NAD.   HEENT:  EOMI, AT NC, CN grossly normal  Psych:  alert , comfortable in no acute distress  Neuro:  A/O X 3  Skin:  Warm to touch, clear of rashes, ecchymoses  Resp:  No increased respiratory effort  lymph:  No LE edema  Abd:  Soft/NT, ND, no palpable masses, no CVAT  Back: bony spine is non-tender, flanks are nontender    Urine: Neg  PVR 0cc      A/P: Ashlie Matthews is a 33 year old female with OAB  -Eliminate irritants  -Detrol LA 4mg. SE discussed. Will check with her insurance on options if this is too costly.     Анна Dowell PA-C  [facility]    30 minutes were spent with the patient today, > 50% in counseling and coordination of care of OAB.                "

## 2018-10-12 NOTE — MR AVS SNAPSHOT
After Visit Summary   10/12/2018    Ashlie Matthews    MRN: [id number]           Patient Information     Date Of Birth          1985        Visit Information        Provider Department      10/12/2018 10:00 AM Анна Dowell PA-C [facility] [place]        Today's Diagnoses     OAB (overactive bladder)    -  1      Care Instructions    Below is a list of things that can irritate the bladder and should be avoided:      Caffeinated soft drinks.    Coffee.    Tea.    Chocolate.    Tomato-based foods.    Acidic juices and fruits. (includes cranberry juice)    Alcohol.    Carbonated drinks.    Aspartame/Nutrasweet.              Follow-ups after your visit        Follow-up notes from your care team     Return in about 3 months (around 1/12/2019).      Who to contact     If you have questions or need follow up information about today's clinic visit or your schedule please contact [facility] directly at [phone].  Normal or non-critical lab and imaging results will be communicated to you by [employer] Kineticshart, letter or phone within 4 business days after the clinic has received the results. If you do not hear from us within 7 days, please contact the clinic through Cytoot or phone. If you have a critical or abnormal lab result, we will notify you by phone as soon as possible.  Submit refill requests through [employer] or call your pharmacy and they will forward the refill request to us. Please allow 3 business days for your refill to be completed.          Additional Information About Your Visit        MyChart Information     [employer] gives you secure access to your electronic health record. If you see a primary care provider, you can also send messages to your care team and make appointments. If you have questions, please call your primary care clinic.  If you do not have a primary care provider, please call [phone] and  "they will assist you.        Care EveryWhere ID     This is your Care EveryWhere ID. This could be used by other organizations to access your [place] medical records  [id number]        Your Vitals Were     Pulse Height Last Period BMI (Body Mass Index)          66 1.562 m (5' 1.5\") 06/27/2018 (Exact Date) 26.02 kg/m2         Blood Pressure from Last 3 Encounters:   10/12/18 124/66   09/11/18 100/66   08/13/18 108/62    Weight from Last 3 Encounters:   10/12/18 63.5 kg (140 lb)   09/11/18 67.1 kg (148 lb)   08/13/18 62.4 kg (137 lb 8 oz)              We Performed the Following     MEASURE POST-VOID RESIDUAL URINE/BLADDER CAPACITY, US NON-IMAGING (99315)     UA without Microscopic          Today's Medication Changes          These changes are accurate as of 10/12/18 10:34 AM.  If you have any questions, ask your nurse or doctor.               Start taking these medicines.        Dose/Directions    tolterodine 4 MG 24 hr capsule   Commonly known as:  DETROL LA   Used for:  OAB (overactive bladder)   Started by:  Анна Dowell PA-C        Dose:  4 mg   Take 1 capsule (4 mg) by mouth daily   Quantity:  90 capsule   Refills:  1            Where to get your medicines      These medications were sent to [facility] [address] - [place], [place] - [address]  [facility]  [address] [facility] [address]     Phone:  [phone]     tolterodine 4 MG 24 hr capsule                Primary Care Provider Office Phone # Fax #    Danielle Mims PA-C [phone]       [address]  [facility] [address]        Equal Access to Services     RHONDA ALMAGUER : Hadii harpreet mackey Solisbeth, waaxda luqadaha, qaybta kaalmada adeoscaryacheri, mary reza. So [facility] [phone].    ATENCIÓN: Si habla español, tiene a calvillo disposición servicios gratuitos de asistencia lingüística. Llame al [phone].    We comply with applicable federal civil rights laws and [place] laws. We do not " discriminate on the basis of race, color, national origin, age, disability, sex, sexual orientation, or gender identity.            Thank you!     Thank you for choosing [facility] [place]  for your care. Our goal is always to provide you with excellent care. Hearing back from our patients is one way we can continue to improve our services. Please take a few minutes to complete the written survey that you may receive in the mail after your visit with us. Thank you!             Your Updated Medication List - Protect others around you: Learn how to safely use, store and throw away your medicines at www.disposemymeds.org.          This list is accurate as of 10/12/18 10:34 AM.  Always use your most recent med list.                   Brand Name Dispense Instructions for use Diagnosis    * albuterol 108 (90 Base) MCG/ACT inhaler    PROAIR HFA/PROVENTIL HFA/VENTOLIN HFA    1 Inhaler    Inhale 2 puffs into the lungs every 6 hours as needed for shortness of breath / dyspnea or wheezing    Mild intermittent asthma       * albuterol (2.5 MG/3ML) 0.083% neb solution     30 vial    Take 1 vial (2.5 mg) by nebulization every 6 hours as needed for shortness of breath / dyspnea or wheezing    Mild intermittent asthma       citalopram 20 MG tablet    celeXA    90 tablet    Take 1 tablet (20 mg) by mouth daily    Depression with suicidal ideation       OLANZapine 5 MG tablet    zyPREXA    30 tablet    Take 1 tablet (5 mg) by mouth At Bedtime    Depression with suicidal ideation       ondansetron 4 MG tablet    ZOFRAN    30 tablet    Take 1 tablet (4 mg) by mouth every 6 hours as needed for nausea    Vomiting and diarrhea       QUEtiapine 25 MG tablet    SEROquel    60 tablet    Take 1 tablet (25 mg) by mouth nightly as needed (anxiety)    Depression with suicidal ideation       tolterodine 4 MG 24 hr capsule    DETROL LA    90 capsule    Take 1 capsule (4 mg) by mouth daily    OAB (overactive  bladder)       valACYclovir 1000 mg tablet    VALTREX    21 tablet    Take 1 tablet (1,000 mg) by mouth 3 times daily    Herpes zoster without complication       ZYRTEC ALLERGY 10 MG tablet   Generic drug:  cetirizine      Take 10 mg by mouth every morning        * Notice:  This list has 2 medication(s) that are the same as other medications prescribed for you. Read the directions carefully, and ask your doctor or other care provider to review them with you.

## 2018-10-12 NOTE — PATIENT INSTRUCTIONS
Below is a list of things that can irritate the bladder and should be avoided:      Caffeinated soft drinks.    Coffee.    Tea.    Chocolate.    Tomato-based foods.    Acidic juices and fruits. (includes cranberry juice)    Alcohol.    Carbonated drinks.    Aspartame/Nutrasweet.

## 2018-11-07 ENCOUNTER — OFFICE VISIT (OUTPATIENT)
Dept: FAMILY MEDICINE | Facility: CLINIC | Age: 33
End: 2018-11-07
Payer: COMMERCIAL

## 2018-11-07 VITALS
OXYGEN SATURATION: 97 % | HEART RATE: 96 BPM | HEIGHT: 62 IN | SYSTOLIC BLOOD PRESSURE: 128 MMHG | BODY MASS INDEX: 25.76 KG/M2 | TEMPERATURE: 98.4 F | RESPIRATION RATE: 16 BRPM | WEIGHT: 140 LBS | DIASTOLIC BLOOD PRESSURE: 82 MMHG

## 2018-11-07 DIAGNOSIS — J01.00 ACUTE NON-RECURRENT MAXILLARY SINUSITIS: ICD-10-CM

## 2018-11-07 DIAGNOSIS — F41.9 ANXIETY: ICD-10-CM

## 2018-11-07 DIAGNOSIS — F32.A DEPRESSION WITH SUICIDAL IDEATION: Primary | ICD-10-CM

## 2018-11-07 DIAGNOSIS — R45.851 DEPRESSION WITH SUICIDAL IDEATION: Primary | ICD-10-CM

## 2018-11-07 PROCEDURE — 99214 OFFICE O/P EST MOD 30 MIN: CPT | Performed by: PHYSICIAN ASSISTANT

## 2018-11-07 RX ORDER — ONDANSETRON 4 MG/1
4 TABLET, FILM COATED ORAL EVERY 6 HOURS PRN
Qty: 30 TABLET | Refills: 0 | Status: SHIPPED | OUTPATIENT
Start: 2018-11-07 | End: 2021-01-05

## 2018-11-07 RX ORDER — QUETIAPINE FUMARATE 25 MG/1
25 TABLET, FILM COATED ORAL
Qty: 60 TABLET | Refills: 0 | Status: CANCELLED | OUTPATIENT
Start: 2018-11-07

## 2018-11-07 ASSESSMENT — ANXIETY QUESTIONNAIRES
7. FEELING AFRAID AS IF SOMETHING AWFUL MIGHT HAPPEN: MORE THAN HALF THE DAYS
6. BECOMING EASILY ANNOYED OR IRRITABLE: SEVERAL DAYS
IF YOU CHECKED OFF ANY PROBLEMS ON THIS QUESTIONNAIRE, HOW DIFFICULT HAVE THESE PROBLEMS MADE IT FOR YOU TO DO YOUR WORK, TAKE CARE OF THINGS AT HOME, OR GET ALONG WITH OTHER PEOPLE: NOT DIFFICULT AT ALL
GAD7 TOTAL SCORE: 15
2. NOT BEING ABLE TO STOP OR CONTROL WORRYING: NEARLY EVERY DAY
1. FEELING NERVOUS, ANXIOUS, OR ON EDGE: MORE THAN HALF THE DAYS
3. WORRYING TOO MUCH ABOUT DIFFERENT THINGS: NEARLY EVERY DAY
5. BEING SO RESTLESS THAT IT IS HARD TO SIT STILL: MORE THAN HALF THE DAYS

## 2018-11-07 ASSESSMENT — PATIENT HEALTH QUESTIONNAIRE - PHQ9
5. POOR APPETITE OR OVEREATING: MORE THAN HALF THE DAYS
SUM OF ALL RESPONSES TO PHQ QUESTIONS 1-9: 12

## 2018-11-07 NOTE — PROGRESS NOTES
SUBJECTIVE:   Ashlie Matthews is a 33 year old female who presents to clinic today for the following health issues:      1. Depression and Anxiety Follow-Up    Status since last visit (8/13/18): feels like it has worsened over the last months    Other associated symptoms:doesn't feel like the Celexa is helping anymore; has been feeling much more anxious and down, with no specific cause    Complicating factors:     Significant life event: No     Current substance abuse: None    PHQ 6/28/2016 6/25/2018 8/13/2018   PHQ-9 Total Score 0 13 8   Q9: Suicide Ideation Not at all Several days More than half the days     SLIM-7 SCORE 6/28/2016 6/25/2018 8/13/2018   Total Score 0 12 7       PHQ-9  English  PHQ-9   Any Language  SLIM-7  Suicide Assessment Five-step Evaluation and Treatment (SAFE-T)    Amount of exercise or physical activity: 6-7 days/week for an average of 30-45 minutes    Problems taking medications regularly: No    Medication side effects: feels like Celexa is making her gain weight, refused to take weight today    Diet: regular (no restrictions)    Patient is here today concerned that her mood is worsening  She notes she feels like her depression is worsening and thus making her more anxious  She also feels like she is gaining weight and wonders if it is the Celexa.  She admits to increasing thoughts of suicide- similar to her plan last May, but has no plans or intent to do so  She notes her  commented she isn't as chatty as before and suggested she come in  Has been on Vibryd and Paxil before without relief      2. RESPIRATORY SYMPTOMS      Duration: one month, has been worse the last two weeks    Description  nasal congestion, rhinorrhea, facial pain/pressure, ear pain left and headache    Severity: mild-moderate    Accompanying signs and symptoms: mild sore throat from PND    History (predisposing factors):  none    Precipitating or alleviating factors: None    Therapies tried and outcome:   Sudafed and advil cold and sinus; only helps a little    Patient is also concerned about ongoing sinus pain and pressure for 1 month  Notes sinus congestion, ear pain on left, headache and tooth pain  No sore throat and minimal cough  Using OTCs without relief    Problem list and histories reviewed & adjusted, as indicated.  Additional history: as documented    Patient Active Problem List   Diagnosis     Allergic state     Chronic maxillary sinusitis     Chronic tonsillitis     Mild intermittent asthma     CARDIOVASCULAR SCREENING; LDL GOAL LESS THAN 160     Perforated tympanic membrane     AD (atopic dermatitis)     Diet controlled gestational diabetes mellitus in third trimester     Depression with suicidal ideation     Anxiety     Past Surgical History:   Procedure Laterality Date     ARTHROSCOPY SHOULDER Bilateral 2008,2009     GYN SURGERY  7/16/18     Laparoscopy to look for endometriosis  8/2004     LAPAROTOMY MINI, TUBAL LIGATION (POST PARTUM), COMBINED Bilateral 5/11/2016    Procedure: COMBINED LAPAROTOMY MINI, TUBAL LIGATION (POST PARTUM);  Surgeon: Nannette Shane DO;  Location: [facility] OR     MYRINGOTOMY, INSERT TUBE BILATERAL, COMBINED  as a child     ORTHOPEDIC SURGERY  2008/2009    Shoulder     Septoplasty for deviated septum  7/2005     SINUS SURGERY  2007     TONSILLECTOMY  2006       Social History   Substance Use Topics     Smoking status: Never Smoker     Smokeless tobacco: Never Used     Alcohol use No     Family History   Problem Relation Age of Onset     Diabetes Maternal Grandfather      insulin     Hypertension Maternal Grandfather      Obesity Maternal Grandfather      Respiratory Maternal Grandfather      asthma     Obesity Maternal Grandmother      Hypertension Mother      GASTROINTESTINAL DISEASE Mother      Pancreatitis-flare ups often     Lipids Mother      high     Blood Disease Mother      Diabetes Mother      Family History Negative Father      HEART DISEASE Father      Coronary  "Artery Disease Father      Allergies Son      Allergic to peanuts, different foods, Amoxicillin         Current Outpatient Prescriptions   Medication Sig Dispense Refill     albuterol (2.5 MG/3ML) 0.083% nebulizer solution Take 1 vial (2.5 mg) by nebulization every 6 hours as needed for shortness of breath / dyspnea or wheezing 30 vial 3     albuterol (PROAIR HFA, PROVENTIL HFA, VENTOLIN HFA) 108 (90 BASE) MCG/ACT inhaler Inhale 2 puffs into the lungs every 6 hours as needed for shortness of breath / dyspnea or wheezing 1 Inhaler 11     amoxicillin-clavulanate (AUGMENTIN) 875-125 MG per tablet Take 1 tablet by mouth 2 times daily 20 tablet 0     cetirizine (ZYRTEC ALLERGY) 10 MG tablet Take 10 mg by mouth every morning       OLANZapine (ZYPREXA) 5 MG tablet Take 1 tablet (5 mg) by mouth At Bedtime 30 tablet 0     ondansetron (ZOFRAN) 4 MG tablet Take 1 tablet (4 mg) by mouth every 6 hours as needed for nausea 30 tablet 0     QUEtiapine (SEROQUEL) 25 MG tablet Take 1 tablet (25 mg) by mouth nightly as needed (anxiety) 60 tablet 0     sertraline (ZOLOFT) 50 MG tablet Take 1/2 tablet (25 mg) for 1-2 weeks, then increase to 1 tablet orally daily 30 tablet 0     tolterodine (DETROL LA) 4 MG 24 hr capsule Take 1 capsule (4 mg) by mouth daily 90 capsule 1     Allergies   Allergen Reactions     Moxifloxacin Hydrochloride Nausea and Vomiting     Avelox-vomiting     Cats Itching     Codeine GI Disturbance     Darvocet [Propoxyphene N-Apap] Hives     PN: LW Reaction: Rash, Generalized     Demerol Hives     Dog Hair [Dogs] Unknown     Dust Mites Itching and Swelling     Meperidine      PN: LW Reaction: Rash, Generalized     Oxycodone-Acetaminophen      PN: LW Reaction: SHORTNESS OF BREATH     Pollen Extract Itching     Vicodin [Hydrocodone-Acetaminophen] GI Disturbance     PN: LW Reaction: Rash, Generalized     Latex Itching, Swelling and Rash     \"sensitivity\"       Reviewed and updated as needed this visit by clinical " "staff       Reviewed and updated as needed this visit by Provider         ROS:  Constitutional, HEENT, cardiovascular, pulmonary, gi and gu systems are negative, except as otherwise noted.    OBJECTIVE:     /82 (BP Location: Right arm, Patient Position: Chair, Cuff Size: Adult Regular)  Pulse 96  Temp 98.4  F (36.9  C) (Oral)  Resp 16  Ht 5' 1.5\" (1.562 m)  Wt 140 lb (63.5 kg)  LMP 06/27/2018 (Exact Date)  SpO2 97%  Breastfeeding? No  BMI 26.02 kg/m2  Body mass index is 26.02 kg/(m^2).  GENERAL: healthy, alert and no distress  EYES: Eyes grossly normal to inspection, PERRL and conjunctivae and sclerae normal  HENT: normal cephalic/atraumatic, ear canals and TM's normal, nose and mouth without ulcers or lesions, oropharynx clear, oral mucous membranes moist and sinuses: maxillary tenderness on bilateral  NECK: no adenopathy, no asymmetry, masses, or scars and thyroid normal to palpation  RESP: lungs clear to auscultation - no rales, rhonchi or wheezes  CV: regular rate and rhythm, normal S1 S2, no S3 or S4, no murmur, click or rub, no peripheral edema and peripheral pulses strong  MS: no gross musculoskeletal defects noted, no edema  PSYCH: affect normal and bright, good eye contact, well groomed    Diagnostic Test Results:  none     ASSESSMENT/PLAN:             1. Depression with suicidal ideation  2. Anxiety  Chronic issue, will trial tapering off Celexa by having patient 1/2 tab daily, and will have her start Zoloft titration. Recheck in 1 month. Discussed if increasing SI/HI, or plan to contact us. Patient agreeable. Refill of Zofran given for anxiety associated nausea.  - ondansetron (ZOFRAN) 4 MG tablet; Take 1 tablet (4 mg) by mouth every 6 hours as needed for nausea  Dispense: 30 tablet; Refill: 0  - sertraline (ZOLOFT) 50 MG tablet; Take 1/2 tablet (25 mg) for 1-2 weeks, then increase to 1 tablet orally daily  Dispense: 30 tablet; Refill: 0    3. Acute non-recurrent maxillary sinusitis  New " problem, will treat with Augmentin BID x 10 days. Advised rest, fluids and OTCs.  F/U if symptoms worsen or do not improve.  - amoxicillin-clavulanate (AUGMENTIN) 875-125 MG per tablet; Take 1 tablet by mouth 2 times daily  Dispense: 20 tablet; Refill: 0    Risks, benefits and alternatives were discussed with patient. Agreeable to the plan of care.      Danielle Mims PA-C  [facility]

## 2018-11-07 NOTE — MR AVS SNAPSHOT
After Visit Summary   11/7/2018    Ashlie Matthews    MRN: [id number]           Patient Information     Date Of Birth          1985        Visit Information        Provider Department      11/7/2018 9:30 AM Danielle Mims PA-C [facility]        Today's Diagnoses     Depression with suicidal ideation    -  1    Anxiety        Acute non-recurrent maxillary sinusitis           Follow-ups after your visit        Follow-up notes from your care team     Return in about 4 weeks (around 12/5/2018) for Mood Recheck.      Who to contact     If you have questions or need follow up information about today's clinic visit or your schedule please contact [facility] directly at [phone].  Normal or non-critical lab and imaging results will be communicated to you by Kahnoodlehart, letter or phone within 4 business days after the clinic has received the results. If you do not hear from us within 7 days, please contact the clinic through Kahnoodlehart or phone. If you have a critical or abnormal lab result, we will notify you by phone as soon as possible.  Submit refill requests through Geddit or call your pharmacy and they will forward the refill request to us. Please allow 3 business days for your refill to be completed.          Additional Information About Your Visit        MyChart Information     [employer] gives you secure access to your electronic health record. If you see a primary care provider, you can also send messages to your care team and make appointments. If you have questions, please call your primary care clinic.  If you do not have a primary care provider, please call [phone] and they will assist you.        Care EveryWhere ID     This is your Care EveryWhere ID. This could be used by other organizations to access your [place] medical records  [id number]        Your Vitals Were     Pulse Temperature Respirations Height Last Period Pulse Oximetry    96  "98.4  F (36.9  C) (Oral) 16 5' 1.5\" (1.562 m) 06/27/2018 (Exact Date) 97%    Breastfeeding? BMI (Body Mass Index)                No 26.02 kg/m2           Blood Pressure from Last 3 Encounters:   11/07/18 128/82   10/12/18 124/66   09/11/18 100/66    Weight from Last 3 Encounters:   11/07/18 140 lb (63.5 kg)   10/12/18 140 lb (63.5 kg)   09/11/18 148 lb (67.1 kg)              Today, you had the following     No orders found for display         Today's Medication Changes          These changes are accurate as of 11/7/18  9:47 AM.  If you have any questions, ask your nurse or doctor.               Start taking these medicines.        Dose/Directions    amoxicillin-clavulanate 875-125 MG per tablet   Commonly known as:  AUGMENTIN   Used for:  Acute non-recurrent maxillary sinusitis   Started by:  Danielle Mims PA-C        Dose:  1 tablet   Take 1 tablet by mouth 2 times daily   Quantity:  20 tablet   Refills:  0       sertraline 50 MG tablet   Commonly known as:  ZOLOFT   Used for:  Depression with suicidal ideation, Anxiety   Started by:  Danielle Mims PA-C        Take 1/2 tablet (25 mg) for 1-2 weeks, then increase to 1 tablet orally daily   Quantity:  30 tablet   Refills:  0         Stop taking these medicines if you haven't already. Please contact your care team if you have questions.     citalopram 20 MG tablet   Commonly known as:  celeXA   Stopped by:  Danielle Mims PA-C                Where to get your medicines      These medications were sent to [facility] [address] - [place], [place] - [address]  [facility]  [address]  [facility] [address]     Phone:  [phone]     amoxicillin-clavulanate 875-125 MG per tablet    ondansetron 4 MG tablet    sertraline 50 MG tablet                Primary Care Provider Office Phone # Fax #    Danielle Mims PA-C [phone] [address] [place] [address]        Equal Access to Services     [facility] " GAAR : Hadii aad ku hadlindao Sonataliyaali, waaxda luqadaha, qaybta kaalmada adejaylan, mary miriin hayaan lizetoscar orellana carin . So [facility] [phone].    ATENCIÓN: Si habla español, tiene a calvillo disposición servicios gratuitos de asistencia lingüística. Llame al [phone].    We comply with applicable federal civil rights laws and [place] laws. We do not discriminate on the basis of race, color, national origin, age, disability, sex, sexual orientation, or gender identity.            Thank you!     [facility] you [facility]  [facility] your care. Our goal is always to provide you with excellent care. Hearing back from our patients is one way we can continue to improve our services. Please take a few minutes to complete the written survey that you may receive in the mail after your visit with us. Thank you!             Your Updated Medication List - Protect others around you: Learn how to safely use, store and throw away your medicines at www.disposemymeds.org.          This list is accurate as of 11/7/18  9:47 AM.  Always use your most recent med list.                   Brand Name Dispense Instructions for use Diagnosis    * albuterol 108 (90 Base) MCG/ACT inhaler    PROAIR HFA/PROVENTIL HFA/VENTOLIN HFA    1 Inhaler    Inhale 2 puffs into the lungs every 6 hours as needed for shortness of breath / dyspnea or wheezing    Mild intermittent asthma       * albuterol (2.5 MG/3ML) 0.083% neb solution     30 vial    Take 1 vial (2.5 mg) by nebulization every 6 hours as needed for shortness of breath / dyspnea or wheezing    Mild intermittent asthma       amoxicillin-clavulanate 875-125 MG per tablet    AUGMENTIN    20 tablet    Take 1 tablet by mouth 2 times daily    Acute non-recurrent maxillary sinusitis       OLANZapine 5 MG tablet    zyPREXA    30 tablet    Take 1 tablet (5 mg) by mouth At Bedtime    Depression with suicidal ideation       ondansetron 4 MG tablet    ZOFRAN    30 tablet    Take 1  tablet (4 mg) by mouth every 6 hours as needed for nausea    Depression with suicidal ideation, Anxiety       QUEtiapine 25 MG tablet    SEROquel    60 tablet    Take 1 tablet (25 mg) by mouth nightly as needed (anxiety)    Depression with suicidal ideation       sertraline 50 MG tablet    ZOLOFT    30 tablet    Take 1/2 tablet (25 mg) for 1-2 weeks, then increase to 1 tablet orally daily    Depression with suicidal ideation, Anxiety       tolterodine 4 MG 24 hr capsule    DETROL LA    90 capsule    Take 1 capsule (4 mg) by mouth daily    OAB (overactive bladder)       ZYRTEC ALLERGY 10 MG tablet   Generic drug:  cetirizine      Take 10 mg by mouth every morning        * Notice:  This list has 2 medication(s) that are the same as other medications prescribed for you. Read the directions carefully, and ask your doctor or other care provider to review them with you.

## 2018-11-07 NOTE — LETTER
My Asthma Action Plan  Name: Ashlie Matthews   YOB: 1985  Date: 11/7/2018   My doctor: Danielle Mims PA-C   My clinic: [facility]        My Control Medicine: { :725606}  My Rescue Medicine: { :593325}  {AAP include Oral Steroid:444682} My Asthma Severity: { :904186}  Avoid your asthma triggers: { :834193}        {Is patient a child or adult?:924687}       GREEN ZONE   Good Control    I feel good    No cough or wheeze    Can work, sleep and play without asthma symptoms       Take your asthma control medicine every day.     1. If exercise triggers your asthma, take your rescue medication    15 minutes before exercise or sports, and    During exercise if you have asthma symptoms  2. Spacer to use with inhaler: If you have a spacer, make sure to use it with your inhaler             YELLOW ZONE Getting Worse  I have ANY of these:    I do not feel good    Cough or wheeze    Chest feels tight    Wake up at night   1. Keep taking your Green Zone medications  2. Start taking your rescue medicine:    every 20 minutes for up to 1 hour. Then every 4 hours for 24-48 hours.  3. If you stay in the Yellow Zone for more than 12-24 hours, contact your doctor.  4. If you do not return to the Green Zone in 12-24 hours or you get worse, start taking your oral steroid medicine if prescribed by your provider.           RED ZONE Medical Alert - Get Help  I have ANY of these:    I feel awful    Medicine is not helping    Breathing getting harder    Trouble walking or talking    Nose opens wide to breathe       1. Take your rescue medicine NOW  2. If your provider has prescribed an oral steroid medicine, start taking it NOW  3. Call your doctor NOW  4. If you are still in the Red Zone after 20 minutes and you have not reached your doctor:    Take your rescue medicine again and    Call 911 or go to the emergency room right away    See your regular doctor within 2 weeks of an Emergency Room or  Urgent Care visit for follow-up treatment.          Annual Reminders:  Meet with Asthma Educator,  Flu Shot in the Fall, consider Pneumonia Vaccination for patients with asthma (aged 19 and older).    Pharmacy:    [id number] [place], [place] - [address]  [facility] [place], [place] - [address] [address] [place] [facility] [address]  [facility] [address] [place] [facility] [address] [facility]                      Asthma Triggers  How To Control Things That Make Your Asthma Worse    Triggers are things that make your asthma worse.  Look at the list below to help you find your triggers and what you can do about them.  You can help prevent asthma flare-ups by staying away from your triggers.      Trigger                                                          What you can do   Cigarette Smoke  Tobacco smoke can make asthma worse. Do not allow smoking in your home, car or around you.  Be sure no one smokes at a child s day care or school.  If you smoke, ask your health care provider for ways to help you quit.  Ask family members to quit too.  Ask your health care provider for a referral to Quit Plan to help you quit smoking, or call 5-574-782-PLAN.     Colds, Flu, Bronchitis  These are common triggers of asthma. Wash your hands often.  Don t touch your eyes, nose or mouth.  Get a flu shot every year.     Dust Mites  These are tiny bugs that live in cloth or carpet. They are too small to see. Wash sheets and blankets in hot water every week.   Encase pillows and mattress in dust mite proof covers.  Avoid having carpet if you can. If you have carpet, vacuum weekly.   Use a dust mask and HEPA vacuum.   Pollen and Outdoor Mold  Some people are allergic to trees, grass, or weed pollen, or molds. Try to keep your windows closed.  Limit time out doors when pollen count is high.   Ask you health care provider about taking medicine during allergy season.      Animal Dander  Some people are allergic to skin flakes, urine or saliva from pets with fur or feathers. Keep pets with fur or feathers out of your home.    If you can t keep the pet outdoors, then keep the pet out of your bedroom.  Keep the bedroom door closed.  Keep pets off cloth furniture and away from stuffed toys.     Mice, Rats, and Cockroaches  Some people are allergic to the waste from these pests.   Cover food and garbage.  Clean up spills and food crumbs.  Store grease in the refrigerator.   Keep food out of the bedroom.   Indoor Mold  This can be a trigger if your home has high moisture. Fix leaking faucets, pipes, or other sources of water.   Clean moldy surfaces.  Dehumidify basement if it is damp and smelly.   Smoke, Strong Odors, and Sprays  These can reduce air quality. Stay away from strong odors and sprays, such as perfume, powder, hair spray, paints, smoke incense, paint, cleaning products, candles and new carpet.   Exercise or Sports  Some people with asthma have this trigger. Be active!  Ask your doctor about taking medicine before sports or exercise to prevent symptoms.    Warm up for 5-10 minutes before and after sports or exercise.     Other Triggers of Asthma  Cold air:  Cover your nose and mouth with a scarf.  Sometimes laughing or crying can be a trigger.  Some medicines and food can trigger asthma.

## 2018-11-07 NOTE — PROGRESS NOTES
"    Injectable Influenza Immunization Documentation    1.  Is the person to be vaccinated sick today?  {YES/NO DEFAULT NO:12521::\" No\"}    2. Does the person to be vaccinated have an allergy to a component   of the vaccine?  {YES/NO DEFAULT NO:19577::\" No\"}  Egg Allergy Algorithm Link    3. Has the person to be vaccinated ever had a serious reaction   to influenza vaccine in the past?  {YES/NO DEFAULT NO:03325::\" No\"}    4. Has the person to be vaccinated ever had Guillain-Barré syndrome?  {YES/NO DEFAULT NO:88786::\" No\"}    Form completed by ***         "

## 2018-11-07 NOTE — LETTER
My Asthma Action Plan  Name: Ashlie Matthews   YOB: 1985  Date: 11/7/2018   My doctor: Danielle Mims PA-C   My clinic: [facility]        My Control Medicine: None  My Rescue Medicine: Albuterol nebulizer solution 1 vial inhaled q 4-6 hours prn SOB  Albuterol (Proair/Ventolin/Proventil) inhaler 1-2 puffs q 4-6 hours prn SOB   My Asthma Severity: intermittent  Avoid your asthma triggers: upper respiratory infections               GREEN ZONE   Good Control    I feel good    No cough or wheeze    Can work, sleep and play without asthma symptoms       Take your asthma control medicine every day.     1. If exercise triggers your asthma, take your rescue medication    15 minutes before exercise or sports, and    During exercise if you have asthma symptoms  2. Spacer to use with inhaler: If you have a spacer, make sure to use it with your inhaler             YELLOW ZONE Getting Worse  I have ANY of these:    I do not feel good    Cough or wheeze    Chest feels tight    Wake up at night   1. Keep taking your Green Zone medications  2. Start taking your rescue medicine:    every 20 minutes for up to 1 hour. Then every 4 hours for 24-48 hours.  3. If you stay in the Yellow Zone for more than 12-24 hours, contact your doctor.  4. If you do not return to the Green Zone in 12-24 hours or you get worse, start taking your oral steroid medicine if prescribed by your provider.           RED ZONE Medical Alert - Get Help  I have ANY of these:    I feel awful    Medicine is not helping    Breathing getting harder    Trouble walking or talking    Nose opens wide to breathe       1. Take your rescue medicine NOW  2. If your provider has prescribed an oral steroid medicine, start taking it NOW  3. Call your doctor NOW  4. If you are still in the Red Zone after 20 minutes and you have not reached your doctor:    Take your rescue medicine again and    Call 911 or go to the emergency room right  away    See your regular doctor within 2 weeks of an Emergency Room or Urgent Care visit for follow-up treatment.          Annual Reminders:  Meet with Asthma Educator,  Flu Shot in the Fall, consider Pneumonia Vaccination for patients with asthma (aged 19 and older).    Pharmacy:    [facility] [place], [place] - [address]  [facility] [address] [facility] [address] [place] [facility] [address]  [facility] [address] [place] [facility] [address] [facility]                      Asthma Triggers  How To Control Things That Make Your Asthma Worse    Triggers are things that make your asthma worse.  Look at the list below to help you find your triggers and what you can do about them.  You can help prevent asthma flare-ups by staying away from your triggers.      Trigger                                                          What you can do   Cigarette Smoke  Tobacco smoke can make asthma worse. Do not allow smoking in your home, car or around you.  Be sure no one smokes at a child s day care or school.  If you smoke, ask your health care provider for ways to help you quit.  Ask family members to quit too.  Ask your health care provider for a referral to Quit Plan to help you quit smoking, or call 4-464-692-PLAN.     Colds, Flu, Bronchitis  These are common triggers of asthma. Wash your hands often.  Don t touch your eyes, nose or mouth.  Get a flu shot every year.     Dust Mites  These are tiny bugs that live in cloth or carpet. They are too small to see. Wash sheets and blankets in hot water every week.   Encase pillows and mattress in dust mite proof covers.  Avoid having carpet if you can. If you have carpet, vacuum weekly.   Use a dust mask and HEPA vacuum.   Pollen and Outdoor Mold  Some people are allergic to trees, grass, or weed pollen, or molds. Try to keep your windows closed.  Limit time out doors when pollen count is high.   Ask you  health care provider about taking medicine during allergy season.     Animal Dander  Some people are allergic to skin flakes, urine or saliva from pets with fur or feathers. Keep pets with fur or feathers out of your home.    If you can t keep the pet outdoors, then keep the pet out of your bedroom.  Keep the bedroom door closed.  Keep pets off cloth furniture and away from stuffed toys.     Mice, Rats, and Cockroaches  Some people are allergic to the waste from these pests.   Cover food and garbage.  Clean up spills and food crumbs.  Store grease in the refrigerator.   Keep food out of the bedroom.   Indoor Mold  This can be a trigger if your home has high moisture. Fix leaking faucets, pipes, or other sources of water.   Clean moldy surfaces.  Dehumidify basement if it is damp and smelly.   Smoke, Strong Odors, and Sprays  These can reduce air quality. Stay away from strong odors and sprays, such as perfume, powder, hair spray, paints, smoke incense, paint, cleaning products, candles and new carpet.   Exercise or Sports  Some people with asthma have this trigger. Be active!  Ask your doctor about taking medicine before sports or exercise to prevent symptoms.    Warm up for 5-10 minutes before and after sports or exercise.     Other Triggers of Asthma  Cold air:  Cover your nose and mouth with a scarf.  Sometimes laughing or crying can be a trigger.  Some medicines and food can trigger asthma.

## 2018-11-08 ASSESSMENT — ANXIETY QUESTIONNAIRES: GAD7 TOTAL SCORE: 15

## 2018-11-08 ASSESSMENT — ASTHMA QUESTIONNAIRES: ACT_TOTALSCORE: 25

## 2018-12-03 DIAGNOSIS — F32.A DEPRESSION WITH SUICIDAL IDEATION: ICD-10-CM

## 2018-12-03 DIAGNOSIS — R45.851 DEPRESSION WITH SUICIDAL IDEATION: ICD-10-CM

## 2018-12-03 DIAGNOSIS — F41.9 ANXIETY: ICD-10-CM

## 2018-12-03 NOTE — TELEPHONE ENCOUNTER
"Requested Prescriptions   Pending Prescriptions Disp Refills     sertraline (ZOLOFT) 50 MG tablet [Pharmacy Med Name: SERTRALINE HCL 50 MG TABLET]  Last Written Prescription Date:  11/7/18  Last Fill Quantity: 30,  # refills: 0   Last office visit: 11/7/2018 with prescribing provider:      Danielle Mims PA-C         Future Office Visit:   Next 5 appointments (look out 90 days)     Dec 12, 2018  9:30 AM CST   SHORT with Danielle Mims PA-C   [facility] ([facility]    [address] [address] [phone]                  30 tablet 0     Sig: TAKE 1/2 TABLETBY MOUTH FOR 1-2 WEEKS, THEN INCREASE TO 1 TABLET DAILY    SSRIs Protocol Failed    12/3/2018 10:34 AM       Failed - PHQ-9 score less than 5 in past 6 months    Please review last PHQ-9 score.   PHQ-9 SCORE 6/25/2018 8/13/2018 11/7/2018   PHQ-9 Total Score - - -   PHQ-9 Total Score MyChart - - -   PHQ-9 Total Score 13 8 12     SLIM-7 SCORE 6/25/2018 8/13/2018 11/7/2018   Total Score 12 7 15              Passed - Patient is age 18 or older       Passed - No active pregnancy on record       Passed - No positive pregnancy test in last 12 months       Passed - Recent (6 mo) or future (30 days) visit within the authorizing provider's specialty    Patient had office visit in the last 6 months or has a visit in the next 30 days with authorizing provider or within the authorizing provider's specialty.  See \"Patient Info\" tab in inbasket, or \"Choose Columns\" in Meds & Orders section of the refill encounter.              "

## 2018-12-05 NOTE — TELEPHONE ENCOUNTER
Routing refill request to provider for review/approval because:  phq9 > 4, due for appt, has one scheduled 12/17/18    Spoke to Kirsten Eng about this as Danielle Mims is out of the office.  She gave a verbal order to dispense 30.

## 2018-12-17 ENCOUNTER — OFFICE VISIT (OUTPATIENT)
Dept: FAMILY MEDICINE | Facility: CLINIC | Age: 33
End: 2018-12-17
Payer: COMMERCIAL

## 2018-12-17 VITALS
TEMPERATURE: 98.4 F | HEIGHT: 62 IN | HEART RATE: 87 BPM | DIASTOLIC BLOOD PRESSURE: 78 MMHG | RESPIRATION RATE: 16 BRPM | OXYGEN SATURATION: 95 % | WEIGHT: 148 LBS | SYSTOLIC BLOOD PRESSURE: 126 MMHG | BODY MASS INDEX: 27.23 KG/M2

## 2018-12-17 DIAGNOSIS — E66.3 OVERWEIGHT: ICD-10-CM

## 2018-12-17 DIAGNOSIS — F32.A DEPRESSION WITH SUICIDAL IDEATION: Primary | ICD-10-CM

## 2018-12-17 DIAGNOSIS — F41.9 ANXIETY: ICD-10-CM

## 2018-12-17 DIAGNOSIS — Z13.220 LIPID SCREENING: ICD-10-CM

## 2018-12-17 DIAGNOSIS — R45.851 DEPRESSION WITH SUICIDAL IDEATION: Primary | ICD-10-CM

## 2018-12-17 PROCEDURE — 80061 LIPID PANEL: CPT | Performed by: PHYSICIAN ASSISTANT

## 2018-12-17 PROCEDURE — 36415 COLL VENOUS BLD VENIPUNCTURE: CPT | Performed by: PHYSICIAN ASSISTANT

## 2018-12-17 PROCEDURE — 99214 OFFICE O/P EST MOD 30 MIN: CPT | Performed by: PHYSICIAN ASSISTANT

## 2018-12-17 RX ORDER — ALBUTEROL SULFATE 0.83 MG/ML
2.5 SOLUTION RESPIRATORY (INHALATION) EVERY 6 HOURS PRN
Qty: 30 VIAL | Refills: 3 | Status: CANCELLED | OUTPATIENT
Start: 2018-12-17

## 2018-12-17 RX ORDER — QUETIAPINE FUMARATE 25 MG/1
25 TABLET, FILM COATED ORAL
Qty: 90 TABLET | Refills: 0 | Status: SHIPPED | OUTPATIENT
Start: 2018-12-17 | End: 2020-01-03

## 2018-12-17 RX ORDER — VILAZODONE HYDROCHLORIDE 10 MG/1
10 TABLET ORAL DAILY
Qty: 90 TABLET | Refills: 0 | Status: SHIPPED | OUTPATIENT
Start: 2018-12-17 | End: 2019-06-19

## 2018-12-17 RX ORDER — ALBUTEROL SULFATE 90 UG/1
2 AEROSOL, METERED RESPIRATORY (INHALATION) EVERY 6 HOURS PRN
Qty: 1 INHALER | Refills: 11 | Status: CANCELLED | OUTPATIENT
Start: 2018-12-17

## 2018-12-17 RX ORDER — OLANZAPINE 5 MG/1
5 TABLET ORAL AT BEDTIME
Qty: 90 TABLET | Refills: 0 | Status: SHIPPED | OUTPATIENT
Start: 2018-12-17 | End: 2019-05-16

## 2018-12-17 ASSESSMENT — ANXIETY QUESTIONNAIRES
1. FEELING NERVOUS, ANXIOUS, OR ON EDGE: MORE THAN HALF THE DAYS
GAD7 TOTAL SCORE: 12
6. BECOMING EASILY ANNOYED OR IRRITABLE: NOT AT ALL
5. BEING SO RESTLESS THAT IT IS HARD TO SIT STILL: MORE THAN HALF THE DAYS
7. FEELING AFRAID AS IF SOMETHING AWFUL MIGHT HAPPEN: MORE THAN HALF THE DAYS
IF YOU CHECKED OFF ANY PROBLEMS ON THIS QUESTIONNAIRE, HOW DIFFICULT HAVE THESE PROBLEMS MADE IT FOR YOU TO DO YOUR WORK, TAKE CARE OF THINGS AT HOME, OR GET ALONG WITH OTHER PEOPLE: SOMEWHAT DIFFICULT
2. NOT BEING ABLE TO STOP OR CONTROL WORRYING: MORE THAN HALF THE DAYS
3. WORRYING TOO MUCH ABOUT DIFFERENT THINGS: MORE THAN HALF THE DAYS

## 2018-12-17 ASSESSMENT — MIFFLIN-ST. JEOR: SCORE: 1316.63

## 2018-12-17 ASSESSMENT — PATIENT HEALTH QUESTIONNAIRE - PHQ9
5. POOR APPETITE OR OVEREATING: MORE THAN HALF THE DAYS
SUM OF ALL RESPONSES TO PHQ QUESTIONS 1-9: 11

## 2018-12-17 NOTE — PROGRESS NOTES
SUBJECTIVE:   Ashlie Matthews is a 33 year old female who presents to clinic today for the following health issues:      Medication Followup of Zoloft 50 mg daily    Taking Medication as prescribed: NO, has still only been taking the half pill for the last 6 weeks (25 mg)    Side Effects:  Having upset stomach, nausea, dizziness; usually moderate, sometimes has to lay down because she feels too sick    Medication Helping Symptoms:  NO     Patient is here today for follow up on mood  She notes that the medication isn't really working for her  She feels anxious still  She states the medication makes her stomach upset and she is dizzy    Needs albuterol neb and inhaler refilled for updated prescription, does not want filled until she needs it    Is taking Zyprexa and Seroquel who was rx by someone else but wants to have them signed here  She is also requesting to go back on the Viibryd for her mood, wants to see psychiatrist    Patient is fasting, wants lipids done      Problem list and histories reviewed & adjusted, as indicated.  Additional history: as documented    Patient Active Problem List   Diagnosis     Allergic state     Chronic maxillary sinusitis     Chronic tonsillitis     Mild intermittent asthma     CARDIOVASCULAR SCREENING; LDL GOAL LESS THAN 160     Perforated tympanic membrane     AD (atopic dermatitis)     Diet controlled gestational diabetes mellitus in third trimester     Depression with suicidal ideation     Anxiety     Past Surgical History:   Procedure Laterality Date     ARTHROSCOPY SHOULDER Bilateral 2008,2009     GYN SURGERY  7/16/18     Laparoscopy to look for endometriosis  8/2004     LAPAROTOMY MINI, TUBAL LIGATION (POST PARTUM), COMBINED Bilateral 5/11/2016    Procedure: COMBINED LAPAROTOMY MINI, TUBAL LIGATION (POST PARTUM);  Surgeon: Nannette Shane DO;  Location: [facility] OR     MYRINGOTOMY, INSERT TUBE BILATERAL, COMBINED  as a child     ORTHOPEDIC SURGERY  2008/2009    Shoulder      Septoplasty for deviated septum  7/2005     SINUS SURGERY  2007     TONSILLECTOMY  2006       Social History     Tobacco Use     Smoking status: Never Smoker     Smokeless tobacco: Never Used   Substance Use Topics     Alcohol use: No     Family History   Problem Relation Age of Onset     Diabetes Maternal Grandfather         insulin     Hypertension Maternal Grandfather      Obesity Maternal Grandfather      Respiratory Maternal Grandfather         asthma     Obesity Maternal Grandmother      Hypertension Mother      Gastrointestinal Disease Mother         Pancreatitis-flare ups often     Lipids Mother         high     Blood Disease Mother      Diabetes Mother      Family History Negative Father      Heart Disease Father      Coronary Artery Disease Father      Allergies Son         Allergic to peanuts, different foods, Amoxicillin         Current Outpatient Medications   Medication Sig Dispense Refill     albuterol (2.5 MG/3ML) 0.083% nebulizer solution Take 1 vial (2.5 mg) by nebulization every 6 hours as needed for shortness of breath / dyspnea or wheezing 30 vial 3     albuterol (PROAIR HFA, PROVENTIL HFA, VENTOLIN HFA) 108 (90 BASE) MCG/ACT inhaler Inhale 2 puffs into the lungs every 6 hours as needed for shortness of breath / dyspnea or wheezing 1 Inhaler 11     cetirizine (ZYRTEC ALLERGY) 10 MG tablet Take 10 mg by mouth every morning       OLANZapine (ZYPREXA) 5 MG tablet Take 1 tablet (5 mg) by mouth At Bedtime 90 tablet 0     ondansetron (ZOFRAN) 4 MG tablet Take 1 tablet (4 mg) by mouth every 6 hours as needed for nausea 30 tablet 0     QUEtiapine (SEROQUEL) 25 MG tablet Take 1 tablet (25 mg) by mouth nightly as needed (anxiety) 90 tablet 0     tolterodine (DETROL LA) 4 MG 24 hr capsule Take 1 capsule (4 mg) by mouth daily 90 capsule 1     vilazodone (VIIBRYD) 10 MG TABS tablet Take 1 tablet (10 mg) by mouth daily 90 tablet 0     Allergies   Allergen Reactions     Moxifloxacin Hydrochloride Nausea  "and Vomiting     Avelox-vomiting     Cats Itching     Codeine GI Disturbance     Darvocet [Propoxyphene N-Apap] Hives     PN: LW Reaction: Rash, Generalized     Demerol Hives     Dog Hair [Dogs] Unknown     Dust Mites Itching and Swelling     Meperidine      PN: LW Reaction: Rash, Generalized     Oxycodone-Acetaminophen      PN: LW Reaction: SHORTNESS OF BREATH     Pollen Extract Itching     Vicodin [Hydrocodone-Acetaminophen] GI Disturbance     PN: LW Reaction: Rash, Generalized     Latex Itching, Swelling and Rash     \"sensitivity\"       Reviewed and updated as needed this visit by clinical staff  Tobacco  Allergies  Meds  Med Hx  Surg Hx  Fam Hx  Soc Hx      Reviewed and updated as needed this visit by Provider         ROS:  Constitutional, HEENT, cardiovascular, pulmonary, gi and gu systems are negative, except as otherwise noted.    OBJECTIVE:     /78 (BP Location: Right arm, Patient Position: Chair, Cuff Size: Adult Regular)   Pulse 87   Temp 98.4  F (36.9  C) (Oral)   Resp 16   Ht 1.562 m (5' 1.5\")   Wt 67.1 kg (148 lb)   LMP 06/27/2018 (Exact Date)   SpO2 95%   Breastfeeding? No   BMI 27.51 kg/m    Body mass index is 27.51 kg/m .  GENERAL: healthy, alert and no distress  NECK: no adenopathy, no asymmetry, masses, or scars and thyroid normal to palpation  RESP: lungs clear to auscultation - no rales, rhonchi or wheezes  CV: regular rate and rhythm, normal S1 S2, no S3 or S4, no murmur, click or rub, no peripheral edema and peripheral pulses strong  MS: no gross musculoskeletal defects noted, no edema  PSYCH: mentation appears normal, affect normal/bright    Diagnostic Test Results:  none     ASSESSMENT/PLAN:             1. Depression with suicidal ideation  2. Anxiety  Chronic issue, will stop the Zoloft.  Will refill her Zyprexa and Seroquel, will also restart Viibryd 10mg daily.  Referral to psychiatry given.  Follow up in 8 weeks if not in with psychiatry by then.  - OLANZapine " (ZYPREXA) 5 MG tablet; Take 1 tablet (5 mg) by mouth At Bedtime  Dispense: 90 tablet; Refill: 0  - QUEtiapine (SEROQUEL) 25 MG tablet; Take 1 tablet (25 mg) by mouth nightly as needed (anxiety)  Dispense: 90 tablet; Refill: 0  - vilazodone (VIIBRYD) 10 MG TABS tablet; Take 1 tablet (10 mg) by mouth daily  Dispense: 90 tablet; Refill: 0  - MENTAL HEALTH REFERRAL  - Adult; Psychiatry and Medication Management; Psychiatry; [facility]: [facility] Psychiatry Service [phone].  Medication management & future refills will be returned to [facility] PCP upon completion of evaluation; We dolores...  - vilazodone (VIIBRYD) 10 MG TABS tablet; Take 1 tablet (10 mg) by mouth daily  Dispense: 90 tablet; Refill: 0  - MENTAL HEALTH REFERRAL  - Adult; Psychiatry and Medication Management; Psychiatry; [facility]: [facility] Psychiatry Service [phone].  Medication management & future refills will be returned to [facility] PCP upon completion of evaluation; We dolores...    3. Overweight  New problem, continues to gain weight despite diet and exercise, referral to Endocrine to discuss medication.  - ENDOCRINOLOGY ADULT REFERRAL    4. Lipid screening  - Lipid panel reflex to direct LDL Fasting    Risks, benefits and alternatives were discussed with patient. Agreeable to the plan of care.      Danielle Mims PA-C  [facility]

## 2018-12-18 LAB
CHOLEST SERPL-MCNC: 203 MG/DL
HDLC SERPL-MCNC: 59 MG/DL
LDLC SERPL CALC-MCNC: 126 MG/DL
NONHDLC SERPL-MCNC: 144 MG/DL
TRIGL SERPL-MCNC: 90 MG/DL

## 2018-12-18 ASSESSMENT — ANXIETY QUESTIONNAIRES: GAD7 TOTAL SCORE: 12

## 2018-12-28 ENCOUNTER — E-VISIT (OUTPATIENT)
Dept: FAMILY MEDICINE | Facility: CLINIC | Age: 33
End: 2018-12-28
Payer: COMMERCIAL

## 2018-12-28 DIAGNOSIS — J01.00 ACUTE NON-RECURRENT MAXILLARY SINUSITIS: Primary | ICD-10-CM

## 2018-12-28 PROCEDURE — 99444 ZZC PHYSICIAN ONLINE EVALUATION & MANAGEMENT SERVICE: CPT | Performed by: PHYSICIAN ASSISTANT

## 2018-12-28 NOTE — PATIENT INSTRUCTIONS

## 2019-01-02 DIAGNOSIS — F32.A DEPRESSION WITH SUICIDAL IDEATION: ICD-10-CM

## 2019-01-02 DIAGNOSIS — R45.851 DEPRESSION WITH SUICIDAL IDEATION: ICD-10-CM

## 2019-01-02 DIAGNOSIS — F41.9 ANXIETY: ICD-10-CM

## 2019-01-03 NOTE — TELEPHONE ENCOUNTER
"Requested Prescriptions   Pending Prescriptions Disp Refills     sertraline (ZOLOFT) 50 MG tablet [Pharmacy Med Name: SERTRALINE HCL 50 MG TABLET]  Last Written Prescription Date:  12/5/18 (discontinued)  Last Fill Quantity: 30,  # refills: 0   Last office visit: 12/17/2018 with prescribing provider:  Danielle Mims PA-C    Future Office Visit:     30 tablet 0     Sig: TAKE 1/2 TABLET BY MOUTH FOR 1-2 WEEKS, THEN INCREASE TO TABLET DAILY    SSRIs Protocol Failed - 1/2/2019  6:03 PM       Failed - PHQ-9 score less than 5 in past 6 months    Please review last PHQ-9 score.   PHQ-9 SCORE 8/13/2018 11/7/2018 12/17/2018   PHQ-9 Total Score - - -   PHQ-9 Total Score MyChart - - -   PHQ-9 Total Score 8 12 11     SLIM-7 SCORE 8/13/2018 11/7/2018 12/17/2018   Total Score 7 15 12              Passed - Patient is age 18 or older       Passed - No active pregnancy on record       Passed - No positive pregnancy test in last 12 months       Passed - Recent (6 mo) or future (30 days) visit within the authorizing provider's specialty    Patient had office visit in the last 6 months or has a visit in the next 30 days with authorizing provider or within the authorizing provider's specialty.  See \"Patient Info\" tab in inbasket, or \"Choose Columns\" in Meds & Orders section of the refill encounter.              "

## 2019-01-04 NOTE — TELEPHONE ENCOUNTER
Routing refill request to provider for review/approval because:  Discontinued at LOV by PCP.  No notes at OV to continue or discontinue to verify order for patient.  Please clarify if should be continued.  Mari FAN RN - Triage  [facility]

## 2019-01-15 ENCOUNTER — OFFICE VISIT (OUTPATIENT)
Dept: UROLOGY | Facility: CLINIC | Age: 34
End: 2019-01-15
Payer: COMMERCIAL

## 2019-01-15 VITALS — BODY MASS INDEX: 25.76 KG/M2 | WEIGHT: 140 LBS | HEIGHT: 62 IN | OXYGEN SATURATION: 98 % | HEART RATE: 98 BPM

## 2019-01-15 DIAGNOSIS — N32.81 OAB (OVERACTIVE BLADDER): Primary | ICD-10-CM

## 2019-01-15 LAB
ALBUMIN UR-MCNC: NEGATIVE MG/DL
APPEARANCE UR: CLEAR
BILIRUB UR QL STRIP: NEGATIVE
COLOR UR AUTO: YELLOW
GLUCOSE UR STRIP-MCNC: NEGATIVE MG/DL
HGB UR QL STRIP: NEGATIVE
KETONES UR STRIP-MCNC: NEGATIVE MG/DL
LEUKOCYTE ESTERASE UR QL STRIP: NEGATIVE
NITRATE UR QL: NEGATIVE
PH UR STRIP: 8.5 PH (ref 5–7)
RESIDUAL VOLUME (RV) (EXTERNAL): 13
SOURCE: ABNORMAL
SP GR UR STRIP: 1.01 (ref 1–1.03)
UROBILINOGEN UR STRIP-ACNC: 0.2 EU/DL (ref 0.2–1)

## 2019-01-15 PROCEDURE — 81003 URINALYSIS AUTO W/O SCOPE: CPT | Mod: QW | Performed by: PHYSICIAN ASSISTANT

## 2019-01-15 PROCEDURE — 99213 OFFICE O/P EST LOW 20 MIN: CPT | Performed by: PHYSICIAN ASSISTANT

## 2019-01-15 ASSESSMENT — PAIN SCALES - GENERAL: PAINLEVEL: NO PAIN (0)

## 2019-01-15 ASSESSMENT — MIFFLIN-ST. JEOR: SCORE: 1288.29

## 2019-01-15 NOTE — PROGRESS NOTES
"CC:  Urinary urgency    HPI:  Ashlie Matthews is a 33 year old female who presents in follow up of the above. Has been ongoing for years, but worse since having three treatments of \"Core Intima\" laser therapy. Hx of stress incontinence and the laser treatment has kept her dry. She described bladder spasms and urge to urinate frequently. Drinks mainly water.  Bowels regular.  Has tried Vesicare in the past without improvement. Has benefited from pelvic floor PT in the past.  Had Nocturia x 3-4.     Started on Detrol LA 3 months ago and notes improvement in all of the above.     No gross hematuria, dysuria, flank pain, fevers or chills.     Past Medical History:   Diagnosis Date     Allergic rhinitis      Anxiety      Asthma      Cyclic vomiting syndrome      Depressive disorder      Diet controlled gestational diabetes mellitus in third trimester      Dysmenorrhea      Hypertension in pregnancy      Pneumothorax 2004    from bad coughing       Past Surgical History:   Procedure Laterality Date     ARTHROSCOPY SHOULDER Bilateral 2008,2009     GYN SURGERY  7/16/18     Laparoscopy to look for endometriosis  8/2004     LAPAROTOMY MINI, TUBAL LIGATION (POST PARTUM), COMBINED Bilateral 5/11/2016    Procedure: COMBINED LAPAROTOMY MINI, TUBAL LIGATION (POST PARTUM);  Surgeon: Nannette Shane DO;  Location: [facility] OR     MYRINGOTOMY, INSERT TUBE BILATERAL, COMBINED  as a child     ORTHOPEDIC SURGERY  2008/2009    Shoulder     Septoplasty for deviated septum  7/2005     SINUS SURGERY  2007     TONSILLECTOMY  2006       Social History     Socioeconomic History     Marital status:      Spouse name: Pa     Number of children: 1     Years of education: Not on file     Highest education level: Not on file   Social Needs     Financial resource strain: Not on file     Food insecurity - worry: Not on file     Food insecurity - inability: Not on file     Transportation needs - medical: Not on file     Transportation needs " "- non-medical: Not on file   Occupational History     Occupation: home with child     Employer: [employer]   Tobacco Use     Smoking status: Never Smoker     Smokeless tobacco: Never Used   Substance and Sexual Activity     Alcohol use: No     Drug use: No     Sexual activity: Yes     Partners: Male     Birth control/protection: Female Surgical   Other Topics Concern     Parent/sibling w/ CABG, MI or angioplasty before 65F 55M? Yes   Social History Narrative     Not on file       Family History   Problem Relation Age of Onset     Diabetes Maternal Grandfather         insulin     Hypertension Maternal Grandfather      Obesity Maternal Grandfather      Respiratory Maternal Grandfather         asthma     Obesity Maternal Grandmother      Hypertension Mother      Gastrointestinal Disease Mother         Pancreatitis-flare ups often     Lipids Mother         high     Blood Disease Mother      Diabetes Mother      Family History Negative Father      Heart Disease Father      Coronary Artery Disease Father      Allergies Son         Allergic to peanuts, different foods, Amoxicillin       ROS:14 point ROS neg other than the symptoms noted above in the HPI.    Allergies   Allergen Reactions     Moxifloxacin Hydrochloride Nausea and Vomiting     Avelox-vomiting     Cats Itching     Codeine GI Disturbance     Darvocet [Propoxyphene N-Apap] Hives     PN: LW Reaction: Rash, Generalized     Demerol Hives     Dog Hair [Dogs] Unknown     Dust Mites Itching and Swelling     Meperidine      PN: LW Reaction: Rash, Generalized     Oxycodone-Acetaminophen      PN: LW Reaction: SHORTNESS OF BREATH     Pollen Extract Itching     Vicodin [Hydrocodone-Acetaminophen] GI Disturbance     PN: LW Reaction: Rash, Generalized     Latex Itching, Swelling and Rash     \"sensitivity\"       Current Outpatient Medications   Medication     albuterol (2.5 MG/3ML) 0.083% nebulizer solution     albuterol (PROAIR HFA, PROVENTIL HFA, VENTOLIN HFA) 108 " "(90 BASE) MCG/ACT inhaler     cetirizine (ZYRTEC ALLERGY) 10 MG tablet     OLANZapine (ZYPREXA) 5 MG tablet     ondansetron (ZOFRAN) 4 MG tablet     QUEtiapine (SEROQUEL) 25 MG tablet     tolterodine (DETROL LA) 4 MG 24 hr capsule     vilazodone (VIIBRYD) 10 MG TABS tablet     No current facility-administered medications for this visit.          PEx:   Pulse 98, height 1.575 m (5' 2\"), weight 63.5 kg (140 lb), last menstrual period 06/27/2018, SpO2 98 %, not currently breastfeeding.  5' 2\", Body mass index is 25.61 kg/m ., 140 lbs 0 oz  GEN: NAD  EYES: EOMI  MOUTH: MMM  NECK: Supple  RESP: Unlabored breathing  CARDIAC: No LE edema  SKIN: Warm  ABD: soft  NEURO: AAO    Urine: Neg  PVR 13cc      A/P: Ashlie OLIVARES Byron is a 33 year old female with OAB, improved  -Eliminate irritants  -Detrol LA 4mg. SE discussed.   -Can see PCP for refills if she prefers. Happy to see her if symptoms worsen.     NICKIE Barriga [facility]    10 minutes were spent with the patient today, > 50% in counseling and coordination of care of OAB.                "

## 2019-01-15 NOTE — NURSING NOTE
pvr by scanner=13mL  Pt states she is sleeping better at nt.  Pt still sometimes has urgency.  Pt states detrol is working for pt.  Pt denies dysuria.  MENG Trujillo CMA

## 2019-01-15 NOTE — LETTER
"1/15/2019       RE: Ashlie Matthews  [address]  [place] [id number]     Dear Colleague,    Thank you for referring your patient, Ashlie Matthews, to the [facility] [place] at [facility]. Please see a copy of my visit note below.    CC:  Urinary urgency    HPI:  Ashlie Matthews is a 33 year old female who presents in follow up of the above. Has been ongoing for years, but worse since having three treatments of \"Core Intima\" laser therapy. Hx of stress incontinence and the laser treatment has kept her dry. She described bladder spasms and urge to urinate frequently. Drinks mainly water.  Bowels regular.  Has tried Vesicare in the past without improvement. Has benefited from pelvic floor PT in the past.  Had Nocturia x 3-4.     Started on Detrol LA 3 months ago and notes improvement in all of the above.     No gross hematuria, dysuria, flank pain, fevers or chills.     Past Medical History:   Diagnosis Date     Allergic rhinitis      Anxiety      Asthma      Cyclic vomiting syndrome      Depressive disorder      Diet controlled gestational diabetes mellitus in third trimester      Dysmenorrhea      Hypertension in pregnancy      Pneumothorax 2004    from bad coughing       Past Surgical History:   Procedure Laterality Date     ARTHROSCOPY SHOULDER Bilateral 2008,2009     GYN SURGERY  7/16/18     Laparoscopy to look for endometriosis  8/2004     LAPAROTOMY MINI, TUBAL LIGATION (POST PARTUM), COMBINED Bilateral 5/11/2016    Procedure: COMBINED LAPAROTOMY MINI, TUBAL LIGATION (POST PARTUM);  Surgeon: Nannette Shane DO;  Location: [facility] OR     MYRINGOTOMY, INSERT TUBE BILATERAL, COMBINED  as a child     ORTHOPEDIC SURGERY  2008/2009    Shoulder     Septoplasty for deviated septum  7/2005     SINUS SURGERY  2007     TONSILLECTOMY  2006       Social History     Socioeconomic History     Marital status:      Spouse name: Pa "     Number of children: 1     Years of education: Not on file     Highest education level: Not on file   Social Needs     Financial resource strain: Not on file     Food insecurity - worry: Not on file     Food insecurity - inability: Not on file     Transportation needs - medical: Not on file     Transportation needs - non-medical: Not on file   Occupational History     Occupation: home with child     Employer: [employer]   Tobacco Use     Smoking status: Never Smoker     Smokeless tobacco: Never Used   Substance and Sexual Activity     Alcohol use: No     Drug use: No     Sexual activity: Yes     Partners: Male     Birth control/protection: Female Surgical   Other Topics Concern     Parent/sibling w/ CABG, MI or angioplasty before 65F 55M? Yes   Social History Narrative     Not on file       Family History   Problem Relation Age of Onset     Diabetes Maternal Grandfather         insulin     Hypertension Maternal Grandfather      Obesity Maternal Grandfather      Respiratory Maternal Grandfather         asthma     Obesity Maternal Grandmother      Hypertension Mother      Gastrointestinal Disease Mother         Pancreatitis-flare ups often     Lipids Mother         high     Blood Disease Mother      Diabetes Mother      Family History Negative Father      Heart Disease Father      Coronary Artery Disease Father      Allergies Son         Allergic to peanuts, different foods, Amoxicillin     Allergies   Allergen Reactions     Moxifloxacin Hydrochloride Nausea and Vomiting     Avelox-vomiting     Cats Itching     Codeine GI Disturbance     Darvocet [Propoxyphene N-Apap] Hives     PN: LW Reaction: Rash, Generalized     Demerol Hives     Dog Hair [Dogs] Unknown     Dust Mites Itching and Swelling     Meperidine      PN: LW Reaction: Rash, Generalized     Oxycodone-Acetaminophen      PN: LW Reaction: SHORTNESS OF BREATH     Pollen Extract Itching     Vicodin [Hydrocodone-Acetaminophen] GI Disturbance     PN: LW  "Reaction: Rash, Generalized     Latex Itching, Swelling and Rash     \"sensitivity\"       Current Outpatient Medications   Medication     albuterol (2.5 MG/3ML) 0.083% nebulizer solution     albuterol (PROAIR HFA, PROVENTIL HFA, VENTOLIN HFA) 108 (90 BASE) MCG/ACT inhaler     cetirizine (ZYRTEC ALLERGY) 10 MG tablet     OLANZapine (ZYPREXA) 5 MG tablet     ondansetron (ZOFRAN) 4 MG tablet     QUEtiapine (SEROQUEL) 25 MG tablet     tolterodine (DETROL LA) 4 MG 24 hr capsule     vilazodone (VIIBRYD) 10 MG TABS tablet     No current facility-administered medications for this visit.          PEx:   Pulse 98, height 1.575 m (5' 2\"), weight 63.5 kg (140 lb), last menstrual period 06/27/2018, SpO2 98 %, not currently breastfeeding.  5' 2\", Body mass index is 25.61 kg/m ., 140 lbs 0 oz  GEN: NAD  EYES: EOMI  MOUTH: MMM  NECK: Supple  RESP: Unlabored breathing  CARDIAC: No LE edema  SKIN: Warm  ABD: soft  NEURO: AAO    Urine: Neg  PVR 13cc      A/P: Ashlie ELISE Byron is a 33 year old female with OAB, improved  -Eliminate irritants  -Detrol LA 4mg. SE discussed.   -Can see PCP for refills if she prefers. Happy to see her if symptoms worsen.     NICKIE Barriga [facility]    10 minutes were spent with the patient today, > 50% in counseling and coordination of care of OAB.  "

## 2019-01-16 DIAGNOSIS — R45.851 DEPRESSION WITH SUICIDAL IDEATION: ICD-10-CM

## 2019-01-16 DIAGNOSIS — F41.9 ANXIETY: ICD-10-CM

## 2019-01-16 DIAGNOSIS — F32.A DEPRESSION WITH SUICIDAL IDEATION: ICD-10-CM

## 2019-01-17 NOTE — TELEPHONE ENCOUNTER
"Requested Prescriptions   Pending Prescriptions Disp Refills     sertraline (ZOLOFT) 50 MG tablet [Pharmacy Med Name: SERTRALINE HCL 50 MG TABLET]  Last Written Prescription Date:  12/17/18  Last Fill Quantity: 30,  # refills: 0   Last office visit: 12/17/2018 with prescribing provider:  Danielle Mims PA-C    Future Office Visit:     30 tablet 0     Sig: TAKE 1/2 TABLET BY MOUTH FOR 1-2 WEEKS, THEN INCREASE TO TABLET DAILY    SSRIs Protocol Failed - 1/16/2019  3:59 PM       Failed - PHQ-9 score less than 5 in past 6 months    Please review last PHQ-9 score.   PHQ-9 SCORE 8/13/2018 11/7/2018 12/17/2018   PHQ-9 Total Score - - -   PHQ-9 Total Score MyChart - - -   PHQ-9 Total Score 8 12 11     SLIM-7 SCORE 8/13/2018 11/7/2018 12/17/2018   Total Score 7 15 12              Failed - Medication is active on med list       Passed - Patient is age 18 or older       Passed - No active pregnancy on record       Passed - No positive pregnancy test in last 12 months       Passed - Recent (6 mo) or future (30 days) visit within the authorizing provider's specialty    Patient had office visit in the last 6 months or has a visit in the next 30 days with authorizing provider or within the authorizing provider's specialty.  See \"Patient Info\" tab in inbasket, or \"Choose Columns\" in Meds & Orders section of the refill encounter.              "

## 2019-01-23 DIAGNOSIS — F41.9 ANXIETY: ICD-10-CM

## 2019-01-23 DIAGNOSIS — F32.A DEPRESSION WITH SUICIDAL IDEATION: ICD-10-CM

## 2019-01-23 DIAGNOSIS — R45.851 DEPRESSION WITH SUICIDAL IDEATION: ICD-10-CM

## 2019-01-24 NOTE — TELEPHONE ENCOUNTER
Medication discontinued at LOV with PCP and new medication started.  Refill denied and advised to discontinue from patient list.  2. Anxiety  Chronic issue, will stop the Zoloft.  Will refill her Zyprexa and Seroquel, will also restart Viibryd 10mg daily.  Referral to psychiatry given.  Follow up in 8 weeks if not in with psychiatry by then.      Mari FAN RN - [facility]

## 2019-01-24 NOTE — TELEPHONE ENCOUNTER
"Requested Prescriptions   Pending Prescriptions Disp Refills     sertraline (ZOLOFT) 50 MG tablet [Pharmacy Med Name: SERTRALINE HCL 50 MG TABLET]  Last Written Prescription Date:  12/5/18  Last Fill Quantity: 30,  # refills: 0   Last office visit: 12/17/2018 with prescribing provider:  Danielle Mims PA-C    Future Office Visit:     30 tablet 0     Sig: TAKE 1/2 TABLET BY MOUTH FOR 1-2 WEEKS, THEN INCREASE TO TABLET DAILY    SSRIs Protocol Failed - 1/23/2019  3:46 PM       Failed - PHQ-9 score less than 5 in past 6 months    Please review last PHQ-9 score.   PHQ-9 SCORE 8/13/2018 11/7/2018 12/17/2018   PHQ-9 Total Score - - -   PHQ-9 Total Score MyChart - - -   PHQ-9 Total Score 8 12 11     SLIM-7 SCORE 8/13/2018 11/7/2018 12/17/2018   Total Score 7 15 12                Failed - Medication is active on med list       Passed - Patient is age 18 or older       Passed - No active pregnancy on record       Passed - No positive pregnancy test in last 12 months       Passed - Recent (6 mo) or future (30 days) visit within the authorizing provider's specialty    Patient had office visit in the last 6 months or has a visit in the next 30 days with authorizing provider or within the authorizing provider's specialty.  See \"Patient Info\" tab in inbasket, or \"Choose Columns\" in Meds & Orders section of the refill encounter.              "

## 2019-02-06 ENCOUNTER — TRANSFERRED RECORDS (OUTPATIENT)
Dept: HEALTH INFORMATION MANAGEMENT | Facility: CLINIC | Age: 34
End: 2019-02-06

## 2019-02-07 DIAGNOSIS — R35.0 URINARY FREQUENCY: Primary | ICD-10-CM

## 2019-02-07 DIAGNOSIS — R32 URINARY INCONTINENCE, UNSPECIFIED TYPE: ICD-10-CM

## 2019-02-08 DIAGNOSIS — R35.0 URINARY FREQUENCY: ICD-10-CM

## 2019-02-08 DIAGNOSIS — R32 URINARY INCONTINENCE, UNSPECIFIED TYPE: ICD-10-CM

## 2019-02-08 LAB
ALBUMIN UR-MCNC: ABNORMAL MG/DL
APPEARANCE UR: CLEAR
BILIRUB UR QL STRIP: NEGATIVE
COLOR UR AUTO: YELLOW
GLUCOSE UR STRIP-MCNC: NEGATIVE MG/DL
HGB UR QL STRIP: NEGATIVE
KETONES UR STRIP-MCNC: ABNORMAL MG/DL
LEUKOCYTE ESTERASE UR QL STRIP: NEGATIVE
NITRATE UR QL: NEGATIVE
PH UR STRIP: 6 PH (ref 5–7)
SOURCE: ABNORMAL
SP GR UR STRIP: >1.03 (ref 1–1.03)
UROBILINOGEN UR STRIP-ACNC: 0.2 EU/DL (ref 0.2–1)

## 2019-02-08 PROCEDURE — 81003 URINALYSIS AUTO W/O SCOPE: CPT | Performed by: PHYSICIAN ASSISTANT

## 2019-02-08 PROCEDURE — 87109 MYCOPLASMA: CPT | Performed by: PHYSICIAN ASSISTANT

## 2019-02-12 DIAGNOSIS — N32.81 OAB (OVERACTIVE BLADDER): Primary | ICD-10-CM

## 2019-02-12 RX ORDER — TROSPIUM CHLORIDE ER 60 MG/1
60 CAPSULE ORAL EVERY MORNING
Qty: 30 CAPSULE | Refills: 11 | Status: SHIPPED | OUTPATIENT
Start: 2019-02-12 | End: 2019-02-27

## 2019-02-15 LAB
BACTERIA SPEC CULT: NORMAL
SPECIMEN SOURCE: NORMAL

## 2019-02-18 ENCOUNTER — TELEPHONE (OUTPATIENT)
Dept: UROLOGY | Facility: CLINIC | Age: 34
End: 2019-02-18

## 2019-02-18 NOTE — TELEPHONE ENCOUNTER
Called Sima with results as below. She expressed understanding.      Notes recorded by Анна Dowell PA-C on 2/15/2019 at 3:42 PM CST  Results normal, please call pt and let them know.   NICKIE TERRAZAS   Ureaplasma culture [[id number]]   Order: [id number]   Status:  Final result   Visible to patient:  Yes (MyChart) Dx:  Urinary frequency; Urinary incontinen...   Specimen Information: Midstream Urine

## 2019-02-27 ENCOUNTER — OFFICE VISIT (OUTPATIENT)
Dept: ENDOCRINOLOGY | Facility: CLINIC | Age: 34
End: 2019-02-27
Payer: COMMERCIAL

## 2019-02-27 VITALS
HEART RATE: 140 BPM | OXYGEN SATURATION: 96 % | WEIGHT: 156 LBS | TEMPERATURE: 98.5 F | HEIGHT: 62 IN | SYSTOLIC BLOOD PRESSURE: 128 MMHG | BODY MASS INDEX: 28.71 KG/M2 | DIASTOLIC BLOOD PRESSURE: 72 MMHG

## 2019-02-27 DIAGNOSIS — E66.3 OVERWEIGHT (BMI 25.0-29.9): Primary | ICD-10-CM

## 2019-02-27 PROCEDURE — 99203 OFFICE O/P NEW LOW 30 MIN: CPT | Performed by: INTERNAL MEDICINE

## 2019-02-27 ASSESSMENT — MIFFLIN-ST. JEOR: SCORE: 1355.86

## 2019-02-27 NOTE — NURSING NOTE
"ENDOCRINOLOGY INTAKE FORM    Patient Name:  Ashlie Matthews  :  1985    Is patient Diabetic?   Yes: gestational dm with last pregnancy  Does patient have non-diabetic or other endocrine issues?  Yes: overweight    Vitals: /72 (BP Location: Right arm, Patient Position: Chair, Cuff Size: Adult Regular)   Pulse 140   Temp 98.5  F (36.9  C) (Oral)   Ht 1.575 m (5' 2\")   Wt 70.8 kg (156 lb)   LMP 2018 (Exact Date)   SpO2 96%   Breastfeeding? No   BMI 28.53 kg/m    BMI= Body mass index is 28.53 kg/m .    Flu vaccine:  No  Pneumonia vaccine:  No    Smoking and Alcohol use:  Social History     Tobacco Use     Smoking status: Never Smoker     Smokeless tobacco: Never Used   Substance Use Topics     Alcohol use: No     Drug use: No         OBESITY CONCERNS:  No ref. provider found       Initial Visit Previous Visit Current Change   Weight 156      Height       BMI 28.53        Weight at graduation of high school:   Diabetes:  No  Sleep Apnea/Snores: No  Hypertension:  No  Hyperlipidemia:  No  Use of steroids:  YES inhaler as needed  Family history of obesity:  Yes: mom and grandparents  Diet:  none  Exercise: Yes: 5-6 days a week    Staff Signature:  Hermelinda Wilson CMA      "

## 2019-02-27 NOTE — PROGRESS NOTES
Name: Ashlie Matthews  Seen in consultation with Danielle Mims for obesity.  HPI:  Ashlie Matthews is a 34 year old female who presents for the evaluation of obestiy.  Body mass index is 28.53 kg/m .    Weight gain started: after hysterectomy in June 2018.  Before hysterectomy 135 lbs  Wt Readings from Last 2 Encounters:   02/27/19 70.8 kg (156 lb)   01/15/19 63.5 kg (140 lb)   Body mass index is 28.53 kg/m .    Highest weight as an adult:   Lowest weight as an adult:    Has 3 kids. youngest is 3 years old.    Was able to go down to pre pregnancy weight with each pregnancy    Hypothyroidism: no    Use of Steroids:No  Family history of Obesity: parents and grandparents  Diet: currently pt is working on-- healthy diet  Exercise:bbg 15- 20 min/day  Diarrhea/Constipation:No  Changes in Hair or Skin:No  Diabetes: h/o GDM diet control during last pregnnacy  Sleep Apnea/Snores:No  Hypertension:No  Hyperlipidemia:No  Hirsutism:No  PMH/PSH:  Past Medical History:   Diagnosis Date     Allergic rhinitis      Anxiety      Asthma      Cyclic vomiting syndrome      Depressive disorder      Diet controlled gestational diabetes mellitus in third trimester      Dysmenorrhea      Hypertension in pregnancy      Pneumothorax 2004    from bad coughing     Past Surgical History:   Procedure Laterality Date     ARTHROSCOPY SHOULDER Bilateral 2008,2009     GYN SURGERY  7/16/18     Laparoscopy to look for endometriosis  8/2004     LAPAROTOMY MINI, TUBAL LIGATION (POST PARTUM), COMBINED Bilateral 5/11/2016    Procedure: COMBINED LAPAROTOMY MINI, TUBAL LIGATION (POST PARTUM);  Surgeon: Nannette Shane DO;  Location: [facility] OR     MYRINGOTOMY, INSERT TUBE BILATERAL, COMBINED  as a child     ORTHOPEDIC SURGERY  2008/2009    Shoulder     Septoplasty for deviated septum  7/2005     SINUS SURGERY  2007     TONSILLECTOMY  2006     Family Hx:  Family History   Problem Relation Age of Onset     Diabetes Maternal Grandfather          insulin     Hypertension Maternal Grandfather      Obesity Maternal Grandfather      Respiratory Maternal Grandfather         asthma     Obesity Maternal Grandmother      Hypertension Mother      Gastrointestinal Disease Mother         Pancreatitis-flare ups often     Lipids Mother         high     Blood Disease Mother      Diabetes Mother      Family History Negative Father      Heart Disease Father      Coronary Artery Disease Father      Allergies Son         Allergic to peanuts, different foods, Amoxicillin           Social Hx:  Social History     Socioeconomic History     Marital status:      Spouse name: Pa     Number of children: 1     Years of education: Not on file     Highest education level: Not on file   Occupational History     Occupation: home with child     Employer: [employer]   Social Needs     Financial resource strain: Not on file     Food insecurity:     Worry: Not on file     Inability: Not on file     Transportation needs:     Medical: Not on file     Non-medical: Not on file   Tobacco Use     Smoking status: Never Smoker     Smokeless tobacco: Never Used   Substance and Sexual Activity     Alcohol use: No     Drug use: No     Sexual activity: Yes     Partners: Male     Birth control/protection: Female Surgical   Lifestyle     Physical activity:     Days per week: Not on file     Minutes per session: Not on file     Stress: Not on file   Relationships     Social connections:     Talks on phone: Not on file     Gets together: Not on file     Attends Pentec[employer]alism service: Not on file     Active member of club or organization: Not on file     Attends meetings of clubs or organizations: Not on file     Relationship status: Not on file     Intimate partner violence:     Fear of current or ex partner: Not on file     Emotionally abused: Not on file     Physically abused: Not on file     Forced sexual activity: Not on file   Other Topics Concern     Parent/sibling w/ CABG, MI or  "angioplasty before 65F 55M? Yes   Social History Narrative     Not on file          MEDICATIONS:  has a current medication list which includes the following prescription(s): albuterol, albuterol, cetirizine, olanzapine, ondansetron, quetiapine, and vilazodone.    ROS     ROS: 10 point ROS neg other than the symptoms noted above in the HPI.    Physical Exam   VS: /72 (BP Location: Right arm, Patient Position: Chair, Cuff Size: Adult Regular)   Pulse 140   Temp 98.5  F (36.9  C) (Oral)   Ht 1.575 m (5' 2\")   Wt 70.8 kg (156 lb)   LMP 06/27/2018 (Exact Date)   SpO2 96%   Breastfeeding? No   BMI 28.53 kg/m    GENERAL: AXOX3, NAD, well dressed, answering questions appropriately, appears stated age.  HEENT: No exopthalmous, no proptosis, EOMI, no lig lag, no retraction  NECK: Thyroid normal in size, non tender, no nodules were palpated.  CV: RRR, no rubs, gallops, no murmurs  LUNGS: CTAB, no wheezes, rales, or ronchi  ABDOMEN: +BS, no stretch marks  EXTREMITIES: no edema, +pulses, no rashes, no lesions  NEUROLOGY: CN grossly intact, + DTR upper and lower extremity, no tremors  MSK: grossly intact  SKIN: no rashes, no lesions    LABS:  Last Basic Metabolic Panel:  Lab Results   Component Value Date     05/03/2018      Lab Results   Component Value Date    POTASSIUM 3.6 05/03/2018     Lab Results   Component Value Date    CHLORIDE 111 05/03/2018     Lab Results   Component Value Date    MURIEL 8.8 05/03/2018     Lab Results   Component Value Date    CO2 23 05/03/2018     Lab Results   Component Value Date    BUN 7 05/03/2018     Lab Results   Component Value Date    CR 0.58 05/03/2018     Lab Results   Component Value Date     05/03/2018       Lab Results   Component Value Date    TSH 2.24 05/03/2018       No results found for: A1C      All pertinent notes, labs, and images personally reviewed by me.     A/P  Ms.Angela ELISE Matthews is a 34 year old here for the evaluation of obestiy:    1. Obesity-  "   Body mass index is 28.53 kg/m .  Obesity is associated with a significant increase in mortality and risk of many disorders, including diabetes mellitus, hypertension, dyslipidemia, heart disease, stroke, sleep apnea, cancer, and many others. Conversely, weight loss is associated with a reduction in obesity-associated morbidity.  Endocrine evaluation of obesity includes Cushing's and thyroid dysfunction.    She does not has hypertension, dyslipidemia, diabetes or sleep apnea.  Given history of gestational diabetes with last pregnancy we will also screen for type 2 diabetes  Plan to obtain labs as noted below  I discussed with patient that at her weight and current BMI medical weight loss management is not indicated  Further workup based on labs.  The patient is advised to Make better food choices: reduce carbs, Reduce portion size, weight loss and exercise 3-4 times a week.    Plan: Cortisol, TSH with free T4 reflex, Hemoglobin         A1c          Discussed:  I informed the pt that:  1.Weight loss medications can be taken to assist with weight reduction when combined with appropriate healthy lifestyle changes.  2.I discussed possible s/e, risks and benefits of weight loss medications.  3.All medications are FDA approved, however, some may be used ''off label'' for their weight loss benefitIs and some ''short term'' medications can be used for longer periods to achieve desired clinical outcomes.  4.All patients taking weight loss medications must be seen in clinic for refill authorization.  Risks of obestiy discussed. Encourage healthy diet. Limit snacks, fluid calories, portions. Limit TV/computer time to one hour a day. Encourage physical activity. Recheck in six months or sooner with concerns.    Obesity is a biological preventable and treatable disease, which is associated with significantly increased risk of many acute and chronic health conditions. Obesity has now been recognized as a chronic disease by the  American Medical Association.    A range of serious co-morbidities are associated with obesity including increased risk for hypertension, stroke, coronary artery disease, dyslipidemia, Type II diabetes, depression, sleep apnea, cancers of the colon, breast and endometrium, osteoarthritis and female infertility. Therefore, obesity is not just a problem; it s a disease that warrants serious evidence based treatements.    I explained to the patient relevant work up for the diagnosis and management of obesity and discussed treatment options. Body Mass Index (BMI) has been a standard means for calculating risk for overweight and obesity. The new American Association of Clinical Endocrinology (AACE) algorithm recommends lifestyle modifications as the initial phase of treatment for all patients with the BMI equal or greater than 25 kg/m2. Lifestyle modifications includes use of medical nutrition therapy, exercise, tobacco cessation, adequate quality and quantity of sleep, limited consumption of alcohol and reduced stress through implementation of a structured, multidisciplinary program.    In patients with complications associated with obesity, graded interventions are recommended including pharmacological therapy such as phentermine, orlistat, lorcaserin and phentermine/topiramate ER, contrave ( buproprion/naltreone) and the use of very low calorie meal replacement programs.    If medical intervention is insufficient, surgical therapy may be considered, especially in patients with BMI greater than or equal to 35 kg/m2 with multiple complications. Surgical treatments include lap-band, gastric sleeve or gastric bypass surgery.      More than 50% of the time spent with Ms. Matthews on counseling / coordinating her care.  Total appointment time was 30 minutes.      Follow-up:  Based on labs.    Dixie Mancini MD  Endocrinology   [facility]    CC: Danielle Mims    Addendum to above note and  clinic visit:    Labs reviewed.    See result note/telephone encounter.

## 2019-02-27 NOTE — LETTER
2/27/2019         RE: Ashlie Matthews  [address]  [place] [place] [address]        Dear Colleague,    Thank you for referring your patient, Ashlie Matthews, to the [facility]. Please see a copy of my visit note below.    Name: Ashlie Matthews  Seen in consultation with Danielle Mims for obesity.  HPI:  Ashlie Matthews is a 34 year old female who presents for the evaluation of obestiy.  Body mass index is 28.53 kg/m .    Weight gain started: after hysterectomy in June 2018.  Before hysterectomy 135 lbs  Wt Readings from Last 2 Encounters:   02/27/19 70.8 kg (156 lb)   01/15/19 63.5 kg (140 lb)   Body mass index is 28.53 kg/m .    Highest weight as an adult:   Lowest weight as an adult:    Has 3 kids. youngest is 3 years old.    Was able to go down to pre pregnancy weight with each pregnancy    Hypothyroidism: no    Use of Steroids:No  Family history of Obesity: parents and grandparents  Diet: currently pt is working on-- healthy diet  Exercise:bbg 15- 20 min/day  Diarrhea/Constipation:No  Changes in Hair or Skin:No  Diabetes: h/o GDM diet control during last pregnnacy  Sleep Apnea/Snores:No  Hypertension:No  Hyperlipidemia:No  Hirsutism:No  PMH/PSH:  Past Medical History:   Diagnosis Date     Allergic rhinitis      Anxiety      Asthma      Cyclic vomiting syndrome      Depressive disorder      Diet controlled gestational diabetes mellitus in third trimester      Dysmenorrhea      Hypertension in pregnancy      Pneumothorax 2004    from bad coughing     Past Surgical History:   Procedure Laterality Date     ARTHROSCOPY SHOULDER Bilateral 2008,2009     GYN SURGERY  7/16/18     Laparoscopy to look for endometriosis  8/2004     LAPAROTOMY MINI, TUBAL LIGATION (POST PARTUM), COMBINED Bilateral 5/11/2016    Procedure: COMBINED LAPAROTOMY MINI, TUBAL LIGATION (POST PARTUM);  Surgeon: Nannette Shane DO;  Location: [facility] OR     MYRINGOTOMY, INSERT TUBE BILATERAL, COMBINED  as a  child     ORTHOPEDIC SURGERY  2008/2009    Shoulder     Septoplasty for deviated septum  7/2005     SINUS SURGERY  2007     TONSILLECTOMY  2006     Family Hx:  Family History   Problem Relation Age of Onset     Diabetes Maternal Grandfather         insulin     Hypertension Maternal Grandfather      Obesity Maternal Grandfather      Respiratory Maternal Grandfather         asthma     Obesity Maternal Grandmother      Hypertension Mother      Gastrointestinal Disease Mother         Pancreatitis-flare ups often     Lipids Mother         high     Blood Disease Mother      Diabetes Mother      Family History Negative Father      Heart Disease Father      Coronary Artery Disease Father      Allergies Son         Allergic to peanuts, different foods, Amoxicillin           Social Hx:  Social History     Socioeconomic History     Marital status:      Spouse name: Pa     Number of children: 1     Years of education: Not on file     Highest education level: Not on file   Occupational History     Occupation: home with child     Employer: [employer]   Social Needs     Financial resource strain: Not on file     Food insecurity:     Worry: Not on file     Inability: Not on file     Transportation needs:     Medical: Not on file     Non-medical: Not on file   Tobacco Use     Smoking status: Never Smoker     Smokeless tobacco: Never Used   Substance and Sexual Activity     Alcohol use: No     Drug use: No     Sexual activity: Yes     Partners: Male     Birth control/protection: Female Surgical   Lifestyle     Physical activity:     Days per week: Not on file     Minutes per session: Not on file     Stress: Not on file   Relationships     Social connections:     Talks on phone: Not on file     Gets together: Not on file     Attends [employer]ism service: Not on file     Active member of club or organization: Not on file     Attends meetings of clubs or organizations: Not on file     Relationship status: Not on file      "Intimate partner violence:     Fear of current or ex partner: Not on file     Emotionally abused: Not on file     Physically abused: Not on file     Forced sexual activity: Not on file   Other Topics Concern     Parent/sibling w/ CABG, MI or angioplasty before 65F 55M? Yes   Social History Narrative     Not on file          MEDICATIONS:  has a current medication list which includes the following prescription(s): albuterol, albuterol, cetirizine, olanzapine, ondansetron, quetiapine, and vilazodone.    ROS     ROS: 10 point ROS neg other than the symptoms noted above in the HPI.    Physical Exam   VS: /72 (BP Location: Right arm, Patient Position: Chair, Cuff Size: Adult Regular)   Pulse 140   Temp 98.5  F (36.9  C) (Oral)   Ht 1.575 m (5' 2\")   Wt 70.8 kg (156 lb)   LMP 06/27/2018 (Exact Date)   SpO2 96%   Breastfeeding? No   BMI 28.53 kg/m     GENERAL: AXOX3, NAD, well dressed, answering questions appropriately, appears stated age.  HEENT: No exopthalmous, no proptosis, EOMI, no lig lag, no retraction  NECK: Thyroid normal in size, non tender, no nodules were palpated.  CV: RRR, no rubs, gallops, no murmurs  LUNGS: CTAB, no wheezes, rales, or ronchi  ABDOMEN: +BS, no stretch marks  EXTREMITIES: no edema, +pulses, no rashes, no lesions  NEUROLOGY: CN grossly intact, + DTR upper and lower extremity, no tremors  MSK: grossly intact  SKIN: no rashes, no lesions    LABS:  Last Basic Metabolic Panel:  Lab Results   Component Value Date     05/03/2018      Lab Results   Component Value Date    POTASSIUM 3.6 05/03/2018     Lab Results   Component Value Date    CHLORIDE 111 05/03/2018     Lab Results   Component Value Date    MURIEL 8.8 05/03/2018     Lab Results   Component Value Date    CO2 23 05/03/2018     Lab Results   Component Value Date    BUN 7 05/03/2018     Lab Results   Component Value Date    CR 0.58 05/03/2018     Lab Results   Component Value Date     05/03/2018       Lab Results "   Component Value Date    TSH 2.24 05/03/2018       No results found for: A1C      All pertinent notes, labs, and images personally reviewed by me.     A/P  Ms.Ashlie Matthews is a 34 year old here for the evaluation of obestiy:    1. Obesity-    Body mass index is 28.53 kg/m .  Obesity is associated with a significant increase in mortality and risk of many disorders, including diabetes mellitus, hypertension, dyslipidemia, heart disease, stroke, sleep apnea, cancer, and many others. Conversely, weight loss is associated with a reduction in obesity-associated morbidity.  Endocrine evaluation of obesity includes Cushing's and thyroid dysfunction.    She does not has hypertension, dyslipidemia, diabetes or sleep apnea.  Given history of gestational diabetes with last pregnancy we will also screen for type 2 diabetes  Plan to obtain labs as noted below  I discussed with patient that at her weight and current BMI medical weight loss management is not indicated  Further workup based on labs.  The patient is advised to Make better food choices: reduce carbs, Reduce portion size, weight loss and exercise 3-4 times a week.    Plan: Cortisol, TSH with free T4 reflex, Hemoglobin         A1c          Discussed:  I informed the pt that:  1.Weight loss medications can be taken to assist with weight reduction when combined with appropriate healthy lifestyle changes.  2.I discussed possible s/e, risks and benefits of weight loss medications.  3.All medications are FDA approved, however, some may be used ''off label'' for their weight loss benefitIs and some ''short term'' medications can be used for longer periods to achieve desired clinical outcomes.  4.All patients taking weight loss medications must be seen in clinic for refill authorization.  Risks of obestiy discussed. Encourage healthy diet. Limit snacks, fluid calories, portions. Limit TV/computer time to one hour a day. Encourage physical activity. Recheck in six months  or sooner with concerns.    Obesity is a biological preventable and treatable disease, which is associated with significantly increased risk of many acute and chronic health conditions. Obesity has now been recognized as a chronic disease by the American Medical Association.    A range of serious co-morbidities are associated with obesity including increased risk for hypertension, stroke, coronary artery disease, dyslipidemia, Type II diabetes, depression, sleep apnea, cancers of the colon, breast and endometrium, osteoarthritis and female infertility. Therefore, obesity is not just a problem; it s a disease that warrants serious evidence based treatements.    I explained to the patient relevant work up for the diagnosis and management of obesity and discussed treatment options. Body Mass Index (BMI) has been a standard means for calculating risk for overweight and obesity. The new American Association of Clinical Endocrinology (AACE) algorithm recommends lifestyle modifications as the initial phase of treatment for all patients with the BMI equal or greater than 25 kg/m2. Lifestyle modifications includes use of medical nutrition therapy, exercise, tobacco cessation, adequate quality and quantity of sleep, limited consumption of alcohol and reduced stress through implementation of a structured, multidisciplinary program.    In patients with complications associated with obesity, graded interventions are recommended including pharmacological therapy such as phentermine, orlistat, lorcaserin and phentermine/topiramate ER, contrave ( buproprion/naltreone) and the use of very low calorie meal replacement programs.    If medical intervention is insufficient, surgical therapy may be considered, especially in patients with BMI greater than or equal to 35 kg/m2 with multiple complications. Surgical treatments include lap-band, gastric sleeve or gastric bypass surgery.      More than 50% of the time spent with Ms. Matthews  on counseling / coordinating her care.  Total appointment time was 30 minutes.      Follow-up:  Based on labs.    Dixie Mancini MD  Endocrinology   [facility]    CC: Danielle Mims    Addendum to above note and clinic visit:    Labs reviewed.    See result note/telephone encounter.            Again, thank you for allowing me to participate in the care of your patient.        Sincerely,        Dixie Mancini MD

## 2019-02-27 NOTE — PATIENT INSTRUCTIONS
[facility]   Dr Mancini, Endocrinology Department      [facility]   [address]  [place] [address]  Appointment [phone]  Fax: [phone]  [place]: Monday and Tuesday         [place] [address]  [place] [address]  Appointment [phone]  Fax: [phone]  [place]: Wednesday and Thursday            Need morning labs  Further work up based on that  The patient is advised to Make better food choices: reduce carbs, Reduce portion size, weight loss and exercise 3-4 times a week.

## 2019-03-01 DIAGNOSIS — E66.3 OVERWEIGHT (BMI 25.0-29.9): ICD-10-CM

## 2019-03-01 LAB
CORTIS SERPL-MCNC: 6.7 UG/DL (ref 4–22)
HBA1C MFR BLD: 5.4 % (ref 0–5.6)
TSH SERPL DL<=0.005 MIU/L-ACNC: 1.02 MU/L (ref 0.4–4)

## 2019-03-01 PROCEDURE — 82533 TOTAL CORTISOL: CPT | Performed by: INTERNAL MEDICINE

## 2019-03-01 PROCEDURE — 83036 HEMOGLOBIN GLYCOSYLATED A1C: CPT | Performed by: INTERNAL MEDICINE

## 2019-03-01 PROCEDURE — 84443 ASSAY THYROID STIM HORMONE: CPT | Performed by: INTERNAL MEDICINE

## 2019-03-01 PROCEDURE — 36415 COLL VENOUS BLD VENIPUNCTURE: CPT | Performed by: INTERNAL MEDICINE

## 2019-03-11 ENCOUNTER — MYC MEDICAL ADVICE (OUTPATIENT)
Dept: FAMILY MEDICINE | Facility: CLINIC | Age: 34
End: 2019-03-11

## 2019-03-11 ENCOUNTER — TELEPHONE (OUTPATIENT)
Dept: FAMILY MEDICINE | Facility: CLINIC | Age: 34
End: 2019-03-11

## 2019-03-11 NOTE — TELEPHONE ENCOUNTER
Type of outreach:  Sent [employer] message.  Health Maintenance Due   Topic Date Due     PREVENTIVE CARE VISIT  04/12/2008     INFLUENZA VACCINE (1) 09/01/2018     Needs depression follow-up in office visit.  Also update PHQ/SLIM.  Emely Lezama CMA (AAMA)

## 2019-03-18 ENCOUNTER — MYC MEDICAL ADVICE (OUTPATIENT)
Dept: FAMILY MEDICINE | Facility: CLINIC | Age: 34
End: 2019-03-18

## 2019-03-20 ENCOUNTER — TRANSFERRED RECORDS (OUTPATIENT)
Dept: HEALTH INFORMATION MANAGEMENT | Facility: CLINIC | Age: 34
End: 2019-03-20

## 2019-03-21 ENCOUNTER — MYC MEDICAL ADVICE (OUTPATIENT)
Dept: FAMILY MEDICINE | Facility: CLINIC | Age: 34
End: 2019-03-21

## 2019-04-17 ENCOUNTER — OFFICE VISIT (OUTPATIENT)
Dept: FAMILY MEDICINE | Facility: CLINIC | Age: 34
End: 2019-04-17
Payer: COMMERCIAL

## 2019-04-17 VITALS
RESPIRATION RATE: 16 BRPM | HEIGHT: 62 IN | TEMPERATURE: 98.6 F | WEIGHT: 158.2 LBS | OXYGEN SATURATION: 97 % | BODY MASS INDEX: 29.11 KG/M2 | HEART RATE: 92 BPM | SYSTOLIC BLOOD PRESSURE: 110 MMHG | DIASTOLIC BLOOD PRESSURE: 76 MMHG

## 2019-04-17 DIAGNOSIS — R45.851 DEPRESSION WITH SUICIDAL IDEATION: ICD-10-CM

## 2019-04-17 DIAGNOSIS — Z01.818 PREOP GENERAL PHYSICAL EXAM: Primary | ICD-10-CM

## 2019-04-17 DIAGNOSIS — N32.81 OAB (OVERACTIVE BLADDER): ICD-10-CM

## 2019-04-17 DIAGNOSIS — F32.A DEPRESSION WITH SUICIDAL IDEATION: ICD-10-CM

## 2019-04-17 DIAGNOSIS — F41.9 ANXIETY: ICD-10-CM

## 2019-04-17 PROCEDURE — 99214 OFFICE O/P EST MOD 30 MIN: CPT | Performed by: PHYSICIAN ASSISTANT

## 2019-04-17 RX ORDER — OLANZAPINE 5 MG/1
5 TABLET ORAL AT BEDTIME
Qty: 90 TABLET | Refills: 0 | Status: CANCELLED | OUTPATIENT
Start: 2019-04-17

## 2019-04-17 RX ORDER — VILAZODONE HYDROCHLORIDE 10 MG/1
10 TABLET ORAL DAILY
Qty: 90 TABLET | Refills: 0 | Status: CANCELLED | OUTPATIENT
Start: 2019-04-17

## 2019-04-17 RX ORDER — QUETIAPINE FUMARATE 25 MG/1
25 TABLET, FILM COATED ORAL
Qty: 90 TABLET | Refills: 0 | Status: CANCELLED | OUTPATIENT
Start: 2019-04-17

## 2019-04-17 ASSESSMENT — ANXIETY QUESTIONNAIRES
2. NOT BEING ABLE TO STOP OR CONTROL WORRYING: MORE THAN HALF THE DAYS
5. BEING SO RESTLESS THAT IT IS HARD TO SIT STILL: MORE THAN HALF THE DAYS
1. FEELING NERVOUS, ANXIOUS, OR ON EDGE: MORE THAN HALF THE DAYS
GAD7 TOTAL SCORE: 10
IF YOU CHECKED OFF ANY PROBLEMS ON THIS QUESTIONNAIRE, HOW DIFFICULT HAVE THESE PROBLEMS MADE IT FOR YOU TO DO YOUR WORK, TAKE CARE OF THINGS AT HOME, OR GET ALONG WITH OTHER PEOPLE: SOMEWHAT DIFFICULT
6. BECOMING EASILY ANNOYED OR IRRITABLE: NOT AT ALL
3. WORRYING TOO MUCH ABOUT DIFFERENT THINGS: MORE THAN HALF THE DAYS
7. FEELING AFRAID AS IF SOMETHING AWFUL MIGHT HAPPEN: SEVERAL DAYS

## 2019-04-17 ASSESSMENT — MIFFLIN-ST. JEOR: SCORE: 1365.84

## 2019-04-17 ASSESSMENT — PATIENT HEALTH QUESTIONNAIRE - PHQ9
SUM OF ALL RESPONSES TO PHQ QUESTIONS 1-9: 6
5. POOR APPETITE OR OVEREATING: SEVERAL DAYS

## 2019-04-17 NOTE — LETTER
My Depression Action Plan  Name: Ashlie Matthews   Date of Birth 1985  Date: 4/17/2019    My doctor: Danielle Mims   My clinic: [facility]  [address] [facility] [address] [phone]          GREEN    ZONE   Good Control    What it looks like:     Things are going generally well. You have normal up s and down s. You may even feel depressed from time to time, but bad moods usually last less than a day.   What you need to do:  1. Continue to care for yourself (see self care plan)  2. Check your depression survival kit and update it as needed  3. Follow your physician s recommendations including any medication.  4. Do not stop taking medication unless you consult with your physician first.           YELLOW         ZONE Getting Worse    What it looks like:     Depression is starting to interfere with your life.     It may be hard to get out of bed; you may be starting to isolate yourself from others.    Symptoms of depression are starting to last most all day and this has happened for several days.     You may have suicidal thoughts but they are not constant.   What you need to do:     1. Call your care team, your response to treatment will improve if you keep your care team informed of your progress. Yellow periods are signs an adjustment may need to be made.     2. Continue your self-care, even if you have to fake it!    3. Talk to someone in your support network    4. Open up your depression survival kit           RED    ZONE Medical Alert - Get Help    What it looks like:     Depression is seriously interfering with your life.     You may experience these or other symptoms: You can t get out of bed most days, can t work or engage in other necessary activities, you have trouble taking care of basic hygiene, or basic responsibilities, thoughts of suicide or death that will not go away, self-injurious behavior.     What you need to do:  1. Call your care  team and request a same-day appointment. If they are not available (weekends or after hours) call your local crisis line, emergency room or 911.            Depression Self Care Plan / Survival Kit    Self-Care for Depression  Here s the deal. Your body and mind are really not as separate as most people think.  What you do and think affects how you feel and how you feel influences what you do and think. This means if you do things that people who feel good do, it will help you feel better.  Sometimes this is all it takes.  There is also a place for medication and therapy depending on how severe your depression is, so be sure to consult with your medical provider and/ or Behavioral Health Consultant if your symptoms are worsening or not improving.     In order to better manage my stress, I will:    Exercise  Get some form of exercise, every day. This will help reduce pain and release endorphins, the  feel good  chemicals in your brain. This is almost as good as taking antidepressants!  This is not the same as joining a gym and then never going! (they count on that by the way ) It can be as simple as just going for a walk or doing some gardening, anything that will get you moving.      Hygiene   Maintain good hygiene (Get out of bed in the morning, Make your bed, Brush your teeth, Take a shower, and Get dressed like you were going to work, even if you are unemployed).  If your clothes don't fit try to get ones that do.    Diet  I will strive to eat foods that are good for me, drink plenty of water, and avoid excessive sugar, caffeine, alcohol, and other mood-altering substances.  Some foods that are helpful in depression are: complex carbohydrates, B vitamins, flaxseed, fish or fish oil, fresh fruits and vegetables.    Psychotherapy  I agree to participate in Individual Therapy (if recommended).    Medication  If prescribed medications, I agree to take them.  Missing doses can result in serious side effects.  I  understand that drinking alcohol, or other illicit drug use, may cause potential side effects.  I will not stop my medication abruptly without first discussing it with my provider.    Staying Connected With Others  I will stay in touch with my friends, family members, and my primary care provider/team.    Use your imagination  Be creative.  We all have a creative side; it doesn t matter if it s oil painting, sand castles, or mud pies! This will also kick up the endorphins.    Witness Beauty  (AKA stop and smell the roses) Take a look outside, even in mid-winter. Notice colors, textures. Watch the squirrels and birds.     Service to others  Be of service to others.  There is always someone else in need.  By helping others we can  get out of ourselves  and remember the really important things.  This also provides opportunities for practicing all the other parts of the program.    Humor  Laugh and be silly!  Adjust your TV habits for less news and crime-drama and more comedy.    Control your stress  Try breathing deep, massage therapy, biofeedback, and meditation. Find time to relax each day.     My support system    Clinic Contact:  Phone number:    Contact 1:  Phone number:    Contact 2:  Phone number:    [employer]/:  Phone number:    Therapist:  Phone number:    Local crisis center:    Phone number:    Other community support:  Phone number:

## 2019-04-17 NOTE — PROGRESS NOTES
[facility]  [address] [facility] [address] [phone]  Dept: [phone]    PRE-OP EVALUATION:  Today's date: 2019    Ashlie Matthews (: 1985) presents for pre-operative evaluation assessment as requested by Dr. Lin.  She requires evaluation and anesthesia risk assessment prior to undergoing surgery/procedure for treatment of :  Stage 1 (19) and Stage 2 (19) interstim implant, pulse generator/battery placement    Fax number for surgical facility: [phone]  Primary Physician: Danielle Mims  Type of Anesthesia Anticipated: unknown to patient    Patient has a Health Care Directive or Living Will:  NO    Preop Questions 2019   Who is doing your surgery? Dr Lin   What are you having done? interstim   Date of Surgery/Procedure: ,    Facility or Hospital where procedure/surgery will be performed: [facility]   1.  Do you have a history of Heart attack, stroke, stent, coronary bypass surgery, or other heart surgery? No   2.  Do you ever have any pain or discomfort in your chest? No   3.  Do you have a history of  Heart Failure? No   4.   Are you troubled by shortness of breath when:  walking on a level surface, or up a slight hill, or at night? No   5.  Do you currently have a cold, bronchitis or other respiratory infection? No   6.  Do you have a cough, shortness of breath, or wheezing? No   7.  Do you sometimes get pains in the calves of your legs when you walk? No   8. Do you or anyone in your family have previous history of blood clots? No   9.  Do you or does anyone in your family have a serious bleeding problem such as prolonged bleeding following surgeries or cuts? No   10. Have you ever had problems with anemia or been told to take iron pills? No   11. Have you had any abnormal blood loss such as black, tarry or bloody stools, or abnormal vaginal bleeding? No   12. Have you ever had a blood transfusion? No   13.  Have you or any of your relatives ever had problems with anesthesia? No   14. Do you have sleep apnea, excessive snoring or daytime drowsiness? No   15. Do you have any prosthetic heart valves? No   16. Do you have prosthetic joints? No   17. Is there any chance that you may be pregnant? No         HPI:     HPI related to upcoming procedure: patient complains of persistent issues with OAB, medication did not help, this was thought may reduce symptoms      See problem list for active medical problems.  Problems all longstanding and stable, except as noted/documented.  See ROS for pertinent symptoms related to these conditions.                                                                                                                                                          .    MEDICAL HISTORY:     Patient Active Problem List    Diagnosis Date Noted     Anxiety 11/07/2018     Priority: Medium     Depression with suicidal ideation 05/03/2018     Priority: Medium     Diet controlled gestational diabetes mellitus in third trimester 03/05/2016     Priority: Medium     AD (atopic dermatitis) 10/16/2013     Priority: Medium     Perforated tympanic membrane 01/26/2011     Priority: Medium     left       CARDIOVASCULAR SCREENING; LDL GOAL LESS THAN 160 10/31/2010     Priority: Medium     Mild intermittent asthma 04/11/2006     Priority: Medium     Chronic tonsillitis 02/21/2006     Priority: Medium     Chronic maxillary sinusitis 03/28/2005     Priority: Medium     Allergic state 02/26/2004     Priority: Medium     Problem list name updated by automated process. Provider to review        Past Medical History:   Diagnosis Date     Allergic rhinitis      Anxiety      Asthma      Cyclic vomiting syndrome      Depressive disorder      Diet controlled gestational diabetes mellitus in third trimester      Dysmenorrhea      Hypertension in pregnancy      Pneumothorax 2004    from bad coughing     Past Surgical History:    Procedure Laterality Date     ARTHROSCOPY SHOULDER Bilateral 2008,2009     GYN SURGERY  7/16/18     Laparoscopy to look for endometriosis  8/2004     LAPAROTOMY MINI, TUBAL LIGATION (POST PARTUM), COMBINED Bilateral 5/11/2016    Procedure: COMBINED LAPAROTOMY MINI, TUBAL LIGATION (POST PARTUM);  Surgeon: Nannette Shane DO;  Location: [facility] OR     MYRINGOTOMY, INSERT TUBE BILATERAL, COMBINED  as a child     ORTHOPEDIC SURGERY  2008/2009    Shoulder     Septoplasty for deviated septum  7/2005     SINUS SURGERY  2007     TONSILLECTOMY  2006     Current Outpatient Medications   Medication Sig Dispense Refill     albuterol (2.5 MG/3ML) 0.083% nebulizer solution Take 1 vial (2.5 mg) by nebulization every 6 hours as needed for shortness of breath / dyspnea or wheezing 30 vial 3     albuterol (PROAIR HFA, PROVENTIL HFA, VENTOLIN HFA) 108 (90 BASE) MCG/ACT inhaler Inhale 2 puffs into the lungs every 6 hours as needed for shortness of breath / dyspnea or wheezing 1 Inhaler 11     cetirizine (ZYRTEC ALLERGY) 10 MG tablet Take 10 mg by mouth every morning       OLANZapine (ZYPREXA) 5 MG tablet Take 1 tablet (5 mg) by mouth At Bedtime 90 tablet 0     ondansetron (ZOFRAN) 4 MG tablet Take 1 tablet (4 mg) by mouth every 6 hours as needed for nausea 30 tablet 0     QUEtiapine (SEROQUEL) 25 MG tablet Take 1 tablet (25 mg) by mouth nightly as needed (anxiety) 90 tablet 0     vilazodone (VIIBRYD) 10 MG TABS tablet Take 1 tablet (10 mg) by mouth daily 90 tablet 0     OTC products: no recent use of OTC ASA, NSAIDS or Steroids    Allergies   Allergen Reactions     Moxifloxacin Hydrochloride Nausea and Vomiting     Avelox-vomiting     Cats Itching     Codeine GI Disturbance     Darvocet [Propoxyphene N-Apap] Hives     PN: LW Reaction: Rash, Generalized     Demerol Hives     Dog Hair [Dogs] Unknown     Dust Mites Itching and Swelling     Meperidine      PN: LW Reaction: Rash, Generalized     Oxycodone-Acetaminophen      PN: LW  "Reaction: SHORTNESS OF BREATH     Pollen Extract Itching     Vicodin [Hydrocodone-Acetaminophen] GI Disturbance     PN: LW Reaction: Rash, Generalized     Latex Itching, Swelling and Rash     \"sensitivity\"      Latex Allergy: YES: Precautions to take: no latex    Social History     Tobacco Use     Smoking status: Never Smoker     Smokeless tobacco: Never Used   Substance Use Topics     Alcohol use: No     History   Drug Use No       REVIEW OF SYSTEMS:   CONSTITUTIONAL: NEGATIVE for fever, chills, change in weight  INTEGUMENTARY/SKIN: NEGATIVE for worrisome rashes, moles or lesions  EYES: NEGATIVE for vision changes or irritation  ENT/MOUTH: NEGATIVE for ear, mouth and throat problems  RESP: NEGATIVE for significant cough or SOB  BREAST: NEGATIVE for masses, tenderness or discharge  CV: NEGATIVE for chest pain, palpitations or peripheral edema  GI: NEGATIVE for nausea, abdominal pain, heartburn, or change in bowel habits   female: frequency, incontinence-urge and incontinence-stress  MUSCULOSKELETAL: NEGATIVE for significant arthralgias or myalgia  NEURO: NEGATIVE for weakness, dizziness or paresthesias  ENDOCRINE: NEGATIVE for temperature intolerance, skin/hair changes  HEME: NEGATIVE for bleeding problems  PSYCHIATRIC: NEGATIVE for changes in mood or affect    EXAM:   /76 (BP Location: Right arm, Patient Position: Chair, Cuff Size: Adult Regular)   Pulse 92   Temp 98.6  F (37  C) (Oral)   Resp 16   Ht 1.575 m (5' 2\")   Wt 71.8 kg (158 lb 3.2 oz)   LMP 06/27/2018 (Exact Date)   SpO2 97%   Breastfeeding? No   BMI 28.94 kg/m      GENERAL APPEARANCE: healthy, alert and no distress     EYES: EOMI, PERRL     HENT: ear canals and TM's normal and nose and mouth without ulcers or lesions     NECK: no adenopathy, no asymmetry, masses, or scars and thyroid normal to palpation     RESP: lungs clear to auscultation - no rales, rhonchi or wheezes     CV: regular rates and rhythm, normal S1 S2, no S3 or S4 and no " murmur, click or rub     ABDOMEN:  soft, nontender, no HSM or masses and bowel sounds normal     MS: extremities normal- no gross deformities noted, no evidence of inflammation in joints, FROM in all extremities.     SKIN: no suspicious lesions or rashes     NEURO: Normal strength and tone, sensory exam grossly normal, mentation intact and speech normal     PSYCH: mentation appears normal. and affect normal/bright     LYMPHATICS: No cervical adenopathy    DIAGNOSTICS:   No labs or EKG required for low risk surgery    Recent Labs   Lab Test 03/01/19  0924 07/13/18  0850 07/11/18  1326 05/03/18  1815 01/20/18  1047   HGB  --  12.6 12.3  Canceled, Test credited 13.7 15.0   PLT  --  214 234 180 141*   NA  --   --   --  142 139   POTASSIUM  --   --   --  3.6 3.6   CR  --   --   --  0.58 0.62   A1C 5.4  --   --   --   --          IMPRESSION:   Reason for surgery/procedure: Stage 1 (4/24/19) and Stage 2 (5/8/19) interstim implant, pulse generator/battery placement  Diagnosis/reason for consult: OAB    The proposed surgical procedure is considered INTERMEDIATE risk.    REVISED CARDIAC RISK INDEX  The patient has the following serious cardiovascular risks for perioperative complications such as (MI, PE, VFib and 3  AV Block):  No serious cardiac risks  INTERPRETATION: 0 risks: Class I (very low risk - 0.4% complication rate)    The patient has the following additional risks for perioperative complications:  No identified additional risks      ICD-10-CM    1. Preop general physical exam Z01.818    2. OAB (overactive bladder) N32.81    3. Depression with suicidal ideation F32.9     R45.851    4. Anxiety F41.9        RECOMMENDATIONS:     --Consult hospital rounder / IM to assist post-op medical management    --Patient is to SKIP all scheduled medications on the day of surgery EXCEPT for modifications listed below.  --Patient knows where to be and when to be for surgery, knows when to be NPO.  --Advised no ASA, NSAIDs or  Steroids 7 days prior to both procedures, Tylenol is okay.    APPROVAL GIVEN to proceed with proposed procedure, without further diagnostic evaluation       Signed Electronically by: Danielle Mims PA-C    Copy of this evaluation report is provided to requesting physician.    [facility] Preop Guidelines    Revised Cardiac Risk Index

## 2019-04-17 NOTE — NURSING NOTE
HM: updated PHQ/ACT/DAP.  FAITH sent for [facility] in [place], [facility] but need HPV records.  Emely Lezama CMA ([facility])

## 2019-04-18 ASSESSMENT — ASTHMA QUESTIONNAIRES: ACT_TOTALSCORE: 25

## 2019-04-18 ASSESSMENT — ANXIETY QUESTIONNAIRES: GAD7 TOTAL SCORE: 10

## 2019-05-03 ENCOUNTER — HEALTH MAINTENANCE LETTER (OUTPATIENT)
Age: 34
End: 2019-05-03

## 2019-05-16 DIAGNOSIS — F32.A DEPRESSION WITH SUICIDAL IDEATION: ICD-10-CM

## 2019-05-16 DIAGNOSIS — R45.851 DEPRESSION WITH SUICIDAL IDEATION: ICD-10-CM

## 2019-05-16 NOTE — TELEPHONE ENCOUNTER
Requested Prescriptions   Pending Prescriptions Disp Refills     OLANZapine (ZYPREXA) 5 MG tablet [Pharmacy Med Name: OLANZAPINE 5 MG TABLET]  Last Written Prescription Date:  12/17/18  Last Fill Quantity: 90,  # refills: 0    Last office visit: 4/17/2019 with prescribing provider:  Danielle Mims PA-C        Future Office Visit:     90 tablet 0     Sig: TAKE 1 TABLET BY MOUTH AT BEDTIME       Antipsychotic Medications Passed - 5/16/2019 12:05 PM        Passed - Blood pressure under 140/90 in past 12 months     BP Readings from Last 3 Encounters:   04/17/19 110/76   02/27/19 128/72   12/17/18 126/78                 Passed - Patient is 12 years of age or older        Passed - Lipid panel on file within the past 12 months     Recent Labs   Lab Test 12/17/18  1113   CHOL 203*   TRIG 90   HDL 59   *   NHDL 144*               Passed - CBC on file in past 12 months     Recent Labs   Lab Test 07/13/18  0850   WBC 6.9   RBC 4.28   HGB 12.6   HCT 38.5                    Passed - Heart Rate on file within past 12 months     Pulse Readings from Last 3 Encounters:   04/17/19 92   02/27/19 140   01/15/19 98               Passed - A1c or Glucose on file in past 12 months     Recent Labs   Lab Test 03/01/19  0924 05/03/18  1815   GLC  --  101*   A1C 5.4  --        Please review patients last 3 weights. If a weight gain of >10 lbs exists, you may refill the prescription once after instructing the patient to schedule an appointment within the next 30 days.    Wt Readings from Last 3 Encounters:   04/17/19 71.8 kg (158 lb 3.2 oz)   02/27/19 70.8 kg (156 lb)   01/15/19 63.5 kg (140 lb)             Passed - Medication is active on med list        Passed - Patient is not pregnant        Passed - No positve pregnancy test on file in past 12 months        Passed - Recent (6 mo) or future (30 days) visit within the authorizing provider's specialty     Patient had office visit in the last 6 months or has a visit in  "the next 30 days with authorizing provider or within the authorizing provider's specialty.  See \"Patient Info\" tab in inbasket, or \"Choose Columns\" in Meds & Orders section of the refill encounter.              "

## 2019-05-17 RX ORDER — OLANZAPINE 5 MG/1
TABLET ORAL
Qty: 90 TABLET | Refills: 1 | Status: SHIPPED | OUTPATIENT
Start: 2019-05-17 | End: 2020-08-03

## 2019-06-13 ENCOUNTER — E-VISIT (OUTPATIENT)
Dept: FAMILY MEDICINE | Facility: CLINIC | Age: 34
End: 2019-06-13
Payer: COMMERCIAL

## 2019-06-13 ENCOUNTER — TRANSFERRED RECORDS (OUTPATIENT)
Dept: HEALTH INFORMATION MANAGEMENT | Facility: CLINIC | Age: 34
End: 2019-06-13

## 2019-06-13 DIAGNOSIS — J01.00 ACUTE NON-RECURRENT MAXILLARY SINUSITIS: Primary | ICD-10-CM

## 2019-06-13 PROCEDURE — 99444 ZZC PHYSICIAN ONLINE EVALUATION & MANAGEMENT SERVICE: CPT | Performed by: PHYSICIAN ASSISTANT

## 2019-06-13 NOTE — PATIENT INSTRUCTIONS
Thank you for choosing us for your care. I have placed an order for a prescription so that you can start treatment. View your full visit summary for details by clicking on the link below. Your pharmacist will able to address any questions you may have about the medication.     If you're not feeling better within 5-7 days, please schedule an appointment.  You can schedule an appointment right here [place], or call [phone]  If the visit is for the same symptoms as your e-visit, we'll refund the cost of your e-visit if seen within seven days.

## 2019-06-19 ENCOUNTER — OFFICE VISIT (OUTPATIENT)
Dept: FAMILY MEDICINE | Facility: CLINIC | Age: 34
End: 2019-06-19

## 2019-06-19 ENCOUNTER — TELEPHONE (OUTPATIENT)
Dept: FAMILY MEDICINE | Facility: CLINIC | Age: 34
End: 2019-06-19

## 2019-06-19 VITALS
HEART RATE: 95 BPM | BODY MASS INDEX: 29.09 KG/M2 | TEMPERATURE: 98.2 F | DIASTOLIC BLOOD PRESSURE: 68 MMHG | WEIGHT: 158.1 LBS | RESPIRATION RATE: 16 BRPM | OXYGEN SATURATION: 96 % | HEIGHT: 62 IN | SYSTOLIC BLOOD PRESSURE: 120 MMHG

## 2019-06-19 DIAGNOSIS — H66.011 NON-RECURRENT ACUTE SUPPURATIVE OTITIS MEDIA OF RIGHT EAR WITH SPONTANEOUS RUPTURE OF TYMPANIC MEMBRANE: Primary | ICD-10-CM

## 2019-06-19 PROCEDURE — 99213 OFFICE O/P EST LOW 20 MIN: CPT | Performed by: PHYSICIAN ASSISTANT

## 2019-06-19 RX ORDER — VILAZODONE HYDROCHLORIDE 20 MG/1
20 TABLET ORAL AT BEDTIME
Refills: 11 | COMMUNITY
Start: 2019-05-14 | End: 2020-07-03

## 2019-06-19 RX ORDER — CIPROFLOXACIN 0.5 MG/.25ML
0.25 SOLUTION/ DROPS AURICULAR (OTIC) 2 TIMES DAILY
Qty: 14 EACH | Refills: 0 | Status: SHIPPED | OUTPATIENT
Start: 2019-06-19 | End: 2019-06-26

## 2019-06-19 ASSESSMENT — MIFFLIN-ST. JEOR: SCORE: 1365.39

## 2019-06-19 ASSESSMENT — ANXIETY QUESTIONNAIRES
1. FEELING NERVOUS, ANXIOUS, OR ON EDGE: SEVERAL DAYS
GAD7 TOTAL SCORE: 5
5. BEING SO RESTLESS THAT IT IS HARD TO SIT STILL: NOT AT ALL
7. FEELING AFRAID AS IF SOMETHING AWFUL MIGHT HAPPEN: NOT AT ALL
IF YOU CHECKED OFF ANY PROBLEMS ON THIS QUESTIONNAIRE, HOW DIFFICULT HAVE THESE PROBLEMS MADE IT FOR YOU TO DO YOUR WORK, TAKE CARE OF THINGS AT HOME, OR GET ALONG WITH OTHER PEOPLE: SOMEWHAT DIFFICULT
2. NOT BEING ABLE TO STOP OR CONTROL WORRYING: SEVERAL DAYS
3. WORRYING TOO MUCH ABOUT DIFFERENT THINGS: MORE THAN HALF THE DAYS
6. BECOMING EASILY ANNOYED OR IRRITABLE: NOT AT ALL

## 2019-06-19 ASSESSMENT — PATIENT HEALTH QUESTIONNAIRE - PHQ9
5. POOR APPETITE OR OVEREATING: SEVERAL DAYS
SUM OF ALL RESPONSES TO PHQ QUESTIONS 1-9: 6

## 2019-06-19 NOTE — NURSING NOTE
HM: PHQ updated today, FAITH signed for [facility] in [place] for HPV records.  Emely Lezama CMA ([facility])

## 2019-06-19 NOTE — TELEPHONE ENCOUNTER
Pharmacy is requesting alternative to ciprofloxacin (CETRAXAL) 0.2 % otic solution.     States the above medication is too expensive for the patient.

## 2019-06-19 NOTE — PROGRESS NOTES
Subjective     Ashlie Matthews is a 34 year old female who presents to clinic today for the following health issues:    HPI   Ear Problem      Duration:  6 days ago    Description (location/character/radiation): right ear drum ruptured, had lots of drainage, complete loss of hearing in right ear, intermittent pain    Intensity:  moderate    Accompanying signs and symptoms: having nasal drainage, sinus pressure, congestion    History (similar episodes/previous evaluation): E-visit on 6/13 for sinus/cold symptoms    Precipitating or alleviating factors: None    Therapies tried and outcome: given augmentin on 6/13, has tried mucinex, motrin, and tylenol, nothing has helped     Patient is here today for evaluation of right ear drainage, she suspects a ruptured ear drum  She was recently treated for sinus infection, is on Augmentin, has a few days left  + off balance, + decreased hearing  She otherwise notes her sinus symptoms seem to be improving  Has hx of TM rupture    Patient Active Problem List   Diagnosis     Allergic state     Chronic maxillary sinusitis     Chronic tonsillitis     Mild intermittent asthma     CARDIOVASCULAR SCREENING; LDL GOAL LESS THAN 160     Perforated tympanic membrane     AD (atopic dermatitis)     Diet controlled gestational diabetes mellitus in third trimester     Depression with suicidal ideation     Anxiety     Past Surgical History:   Procedure Laterality Date     ARTHROSCOPY SHOULDER Bilateral 2008,2009     GYN SURGERY  7/16/18     Laparoscopy to look for endometriosis  8/2004     LAPAROTOMY MINI, TUBAL LIGATION (POST PARTUM), COMBINED Bilateral 5/11/2016    Procedure: COMBINED LAPAROTOMY MINI, TUBAL LIGATION (POST PARTUM);  Surgeon: Nannette Shnae DO;  Location: [facility] OR     MYRINGOTOMY, INSERT TUBE BILATERAL, COMBINED  as a child     ORTHOPEDIC SURGERY  2008/2009    Shoulder     Septoplasty for deviated septum  7/2005     SINUS SURGERY  2007     TONSILLECTOMY  2006       Social  History     Tobacco Use     Smoking status: Never Smoker     Smokeless tobacco: Never Used   Substance Use Topics     Alcohol use: No     Family History   Problem Relation Age of Onset     Diabetes Maternal Grandfather         insulin     Hypertension Maternal Grandfather      Obesity Maternal Grandfather      Respiratory Maternal Grandfather         asthma     Obesity Maternal Grandmother      Hypertension Mother      Gastrointestinal Disease Mother         Pancreatitis-flare ups often     Lipids Mother         high     Blood Disease Mother      Diabetes Mother      Family History Negative Father      Heart Disease Father      Coronary Artery Disease Father      Allergies Son         Allergic to peanuts, different foods, Amoxicillin         Current Outpatient Medications   Medication Sig Dispense Refill     albuterol (2.5 MG/3ML) 0.083% nebulizer solution Take 1 vial (2.5 mg) by nebulization every 6 hours as needed for shortness of breath / dyspnea or wheezing 30 vial 3     albuterol (PROAIR HFA, PROVENTIL HFA, VENTOLIN HFA) 108 (90 BASE) MCG/ACT inhaler Inhale 2 puffs into the lungs every 6 hours as needed for shortness of breath / dyspnea or wheezing 1 Inhaler 11     amoxicillin-clavulanate (AUGMENTIN) 875-125 MG tablet Take 1 tablet by mouth 2 times daily for 10 days 20 tablet 0     cetirizine (ZYRTEC ALLERGY) 10 MG tablet Take 10 mg by mouth every morning       ciprofloxacin (CETRAXAL) 0.2 % otic solution Place 0.25 mLs Into the left ear 2 times daily for 14 doses 14 each 0     OLANZapine (ZYPREXA) 5 MG tablet TAKE 1 TABLET BY MOUTH AT BEDTIME 90 tablet 1     ondansetron (ZOFRAN) 4 MG tablet Take 1 tablet (4 mg) by mouth every 6 hours as needed for nausea 30 tablet 0     QUEtiapine (SEROQUEL) 25 MG tablet Take 1 tablet (25 mg) by mouth nightly as needed (anxiety) 90 tablet 0     VIIBRYD 20 MG TABS tablet Take 20 mg by mouth At Bedtime  11     Allergies   Allergen Reactions     Moxifloxacin Hydrochloride Nausea  "and Vomiting     Avelox-vomiting     Cats Itching     Codeine GI Disturbance     Darvocet [Propoxyphene N-Apap] Hives     PN: LW Reaction: Rash, Generalized     Demerol Hives     Dog Hair [Dogs] Unknown     Dust Mites Itching and Swelling     Meperidine      PN: LW Reaction: Rash, Generalized     Oxycodone-Acetaminophen      PN: LW Reaction: SHORTNESS OF BREATH     Pollen Extract Itching     Vicodin [Hydrocodone-Acetaminophen] GI Disturbance     PN: LW Reaction: Rash, Generalized     Latex Itching, Swelling and Rash     \"sensitivity\"       Reviewed and updated as needed this visit by Provider         Review of Systems   ROS COMP: Constitutional, HEENT, cardiovascular, pulmonary, gi and gu systems are negative, except as otherwise noted.      Objective    /68 (BP Location: Right arm, Patient Position: Chair, Cuff Size: Adult Regular)   Pulse 95   Temp 98.2  F (36.8  C) (Oral)   Resp 16   Ht 1.575 m (5' 2\")   Wt 71.7 kg (158 lb 1.6 oz)   LMP 06/27/2018 (Exact Date)   SpO2 96%   Breastfeeding? No   BMI 28.92 kg/m    Body mass index is 28.92 kg/m .  Physical Exam   GENERAL: healthy, alert and no distress  HENT: normal cephalic/atraumatic, right ear: perforation of right TM and purulent drainage in canal, left ear: normal: no effusions, no erythema, normal landmarks, nose and mouth without ulcers or lesions, oropharynx clear and oral mucous membranes moist  NECK: no adenopathy, no asymmetry, masses, or scars and thyroid normal to palpation  RESP: lungs clear to auscultation - no rales, rhonchi or wheezes  CV: regular rate and rhythm, normal S1 S2, no S3 or S4, no murmur, click or rub, no peripheral edema and peripheral pulses strong  MS: no gross musculoskeletal defects noted, no edema    Diagnostic Test Results:  none         Assessment & Plan     1. Non-recurrent acute suppurative otitis media of right ear with spontaneous rupture of tympanic membrane  New problem, will treat with Cipro Otic drops. " Recommend she finish out oral antibiotics.  F/U if symptoms worsen or do not improve.  - ciprofloxacin (CETRAXAL) 0.2 % otic solution; Place 0.25 mLs Into the left ear 2 times daily for 14 doses  Dispense: 14 each; Refill: 0       Risks, benefits and alternatives were discussed with patient. Agreeable to the plan of care.      Return in about 1 week (around 6/26/2019) for If symptoms worsen or fail to improve.    Danielle Mims PA-C  [facility]

## 2019-06-20 ENCOUNTER — TELEPHONE (OUTPATIENT)
Dept: FAMILY MEDICINE | Facility: CLINIC | Age: 34
End: 2019-06-20

## 2019-06-20 RX ORDER — NEOMYCIN SULFATE, POLYMYXIN B SULFATE AND HYDROCORTISONE 10; 3.5; 1 MG/ML; MG/ML; [USP'U]/ML
3 SUSPENSION/ DROPS AURICULAR (OTIC) 4 TIMES DAILY
Qty: 10 ML | Refills: 0 | Status: SHIPPED | OUTPATIENT
Start: 2019-06-20 | End: 2019-06-30

## 2019-06-20 ASSESSMENT — ANXIETY QUESTIONNAIRES: GAD7 TOTAL SCORE: 5

## 2019-06-20 NOTE — TELEPHONE ENCOUNTER
Pharmacy comment: This med is expensive for pt. Please send a cheaper alternative.    Disp Refills Start End SUSANNA    ciprofloxacin (CETRAXAL) 0.2 % otic solution 14 each 0 6/19/2019 6/26/2019 --   Sig - Route: Place 0.25 mLs Into the left ear 2 times daily for 14 doses - Left Ear   Sent to pharmacy as: ciprofloxacin (CETRAXAL) 0.2 % otic solution

## 2019-06-20 NOTE — TELEPHONE ENCOUNTER
Left detailed message on voice mail of prescription sent and to use in affected ear.    Sheela Chavez RN

## 2019-06-20 NOTE — TELEPHONE ENCOUNTER
Patient seen 6/19 by JEFFREY. Currently on oral antibiotic for sinusitis.    Requesting alternate ear drops due to cost.    Routed to pcp and POD    Sheela Chavez RN

## 2019-09-27 ENCOUNTER — HEALTH MAINTENANCE LETTER (OUTPATIENT)
Age: 34
End: 2019-09-27

## 2019-10-08 ENCOUNTER — E-VISIT (OUTPATIENT)
Dept: FAMILY MEDICINE | Facility: CLINIC | Age: 34
End: 2019-10-08

## 2019-10-08 DIAGNOSIS — J01.90 ACUTE RHINOSINUSITIS: Primary | ICD-10-CM

## 2019-10-08 PROCEDURE — 99444 ZZC PHYSICIAN ONLINE EVALUATION & MANAGEMENT SERVICE: CPT | Performed by: PHYSICIAN ASSISTANT

## 2020-01-01 DIAGNOSIS — F32.A DEPRESSION WITH SUICIDAL IDEATION: ICD-10-CM

## 2020-01-01 DIAGNOSIS — R45.851 DEPRESSION WITH SUICIDAL IDEATION: ICD-10-CM

## 2020-01-02 ENCOUNTER — MYC MEDICAL ADVICE (OUTPATIENT)
Dept: FAMILY MEDICINE | Facility: CLINIC | Age: 35
End: 2020-01-02

## 2020-01-02 NOTE — TELEPHONE ENCOUNTER
"Requested Prescriptions   Pending Prescriptions Disp Refills     QUEtiapine (SEROQUEL) 25 MG tablet [Pharmacy Med Name: QUETIAPINE FUMARATE 25 MG TAB] 30 tablet 2     Sig: TAKE 1 TABLET BY MOUTH NIGHTLY AS NEEDED FOR ANXIETY  Last Written Prescription Date:  12/17/18  Last Fill Quantity: 90 tab,  # refills: 0   Last office visit: 6/19/2019 with prescribing provider: Prasanna   Future Office Visit:         Antipsychotic Medications Failed - 1/1/2020  1:43 AM        Failed - Lipid panel on file within the past 12 months     Recent Labs   Lab Test 12/17/18  1113   CHOL 203*   TRIG 90   HDL 59   *   NHDL 144*               Failed - CBC on file in past 12 months     Recent Labs   Lab Test 07/13/18  0850   WBC 6.9   RBC 4.28   HGB 12.6   HCT 38.5                    Failed - Recent (6 mo) or future (30 days) visit within the authorizing provider's specialty     Patient had office visit in the last 6 months or has a visit in the next 30 days with authorizing provider or within the authorizing provider's specialty.  See \"Patient Info\" tab in inbasket, or \"Choose Columns\" in Meds & Orders section of the refill encounter.            Passed - Blood pressure under 140/90 in past 12 months     BP Readings from Last 3 Encounters:   06/19/19 120/68   04/17/19 110/76   02/27/19 128/72                 Passed - Patient is 12 years of age or older        Passed - Heart Rate on file within past 12 months     Pulse Readings from Last 3 Encounters:   06/19/19 95   04/17/19 92   02/27/19 140               Passed - A1c or Glucose on file in past 12 months     Recent Labs   Lab Test 03/01/19  0924 05/03/18  1815   GLC  --  101*   A1C 5.4  --        Please review patients last 3 weights. If a weight gain of >10 lbs exists, you may refill the prescription once after instructing the patient to schedule an appointment within the next 30 days.    Wt Readings from Last 3 Encounters:   06/19/19 71.7 kg (158 lb 1.6 oz)   04/17/19 71.8 " kg (158 lb 3.2 oz)   02/27/19 70.8 kg (156 lb)             Passed - Medication is active on med list        Passed - Patient is not pregnant        Passed - No positve pregnancy test on file in past 12 months

## 2020-01-02 NOTE — TELEPHONE ENCOUNTER
Routing refill request to provider for review/approval because:  Labs out of range:  As below.    Please refill as appropriate.  Ximena Miles RN

## 2020-01-03 RX ORDER — QUETIAPINE FUMARATE 25 MG/1
TABLET, FILM COATED ORAL
Qty: 30 TABLET | Refills: 2 | Status: SHIPPED | OUTPATIENT
Start: 2020-01-03 | End: 2020-04-10

## 2020-01-06 ENCOUNTER — E-VISIT (OUTPATIENT)
Dept: FAMILY MEDICINE | Facility: CLINIC | Age: 35
End: 2020-01-06

## 2020-01-06 DIAGNOSIS — J01.00 ACUTE MAXILLARY SINUSITIS, RECURRENCE NOT SPECIFIED: Primary | ICD-10-CM

## 2020-01-06 PROCEDURE — 99421 OL DIG E/M SVC 5-10 MIN: CPT | Performed by: PHYSICIAN ASSISTANT

## 2020-03-04 ENCOUNTER — E-VISIT (OUTPATIENT)
Dept: FAMILY MEDICINE | Facility: CLINIC | Age: 35
End: 2020-03-04

## 2020-03-04 DIAGNOSIS — J01.00 ACUTE MAXILLARY SINUSITIS, RECURRENCE NOT SPECIFIED: Primary | ICD-10-CM

## 2020-03-04 DIAGNOSIS — J01.90 ACUTE SINUSITIS WITH SYMPTOMS > 10 DAYS: ICD-10-CM

## 2020-03-04 PROCEDURE — 99421 OL DIG E/M SVC 5-10 MIN: CPT | Performed by: NURSE PRACTITIONER

## 2020-03-06 RX ORDER — PREDNISONE 20 MG/1
20 TABLET ORAL DAILY
Qty: 3 TABLET | Refills: 0 | Status: SHIPPED | OUTPATIENT
Start: 2020-03-06 | End: 2020-07-03

## 2020-03-06 RX ORDER — DOXYCYCLINE 100 MG/1
100 CAPSULE ORAL 2 TIMES DAILY
Qty: 20 CAPSULE | Refills: 0 | Status: SHIPPED | OUTPATIENT
Start: 2020-03-06 | End: 2020-07-03

## 2020-04-08 DIAGNOSIS — R45.851 DEPRESSION WITH SUICIDAL IDEATION: ICD-10-CM

## 2020-04-08 DIAGNOSIS — F32.A DEPRESSION WITH SUICIDAL IDEATION: ICD-10-CM

## 2020-04-09 NOTE — TELEPHONE ENCOUNTER
"Requested Prescriptions   Pending Prescriptions Disp Refills     QUEtiapine (SEROQUEL) 25 MG tablet [Pharmacy Med Name: QUETIAPINE FUMARATE 25 MG TAB]  Last Written Prescription Date:  1/3/2020  Last Fill Quantity: 30,  # refills: 2   Last office visit: 6/19/2019 with prescribing provider:  Prasanna   Future Office Visit:     30 tablet 2     Sig: TAKE 1 TABLET BY MOUTH NIGHTLY AS NEEDED FOR ANXIETY       Antipsychotic Medications Failed - 4/8/2020 12:54 AM        Failed - Lipid panel on file within the past 12 months     Recent Labs   Lab Test 12/17/18  1113   CHOL 203*   TRIG 90   HDL 59   *   NHDL 144*               Failed - CBC on file in past 12 months     Recent Labs   Lab Test 07/13/18  0850   WBC 6.9   RBC 4.28   HGB 12.6   HCT 38.5                    Failed - A1c or Glucose on file in past 12 months     Recent Labs   Lab Test 03/01/19  0924 05/03/18  1815   GLC  --  101*   A1C 5.4  --        Please review patients last 3 weights. If a weight gain of >10 lbs exists, you may refill the prescription once after instructing the patient to schedule an appointment within the next 30 days.    Wt Readings from Last 3 Encounters:   06/19/19 71.7 kg (158 lb 1.6 oz)   04/17/19 71.8 kg (158 lb 3.2 oz)   02/27/19 70.8 kg (156 lb)             Failed - Recent (6 mo) or future (30 days) visit within the authorizing provider's specialty     Patient had office visit in the last 6 months or has a visit in the next 30 days with authorizing provider or within the authorizing provider's specialty.  See \"Patient Info\" tab in inbasket, or \"Choose Columns\" in Meds & Orders section of the refill encounter.            Passed - Blood pressure under 140/90 in past 12 months     BP Readings from Last 3 Encounters:   06/19/19 120/68   04/17/19 110/76   02/27/19 128/72                 Passed - Patient is 12 years of age or older        Passed - Heart Rate on file within past 12 months     Pulse Readings from Last 3 " Encounters:   06/19/19 95   04/17/19 92   02/27/19 140               Passed - Medication is active on med list        Passed - Patient is not pregnant        Passed - No positve pregnancy test on file in past 12 months

## 2020-04-10 RX ORDER — QUETIAPINE FUMARATE 25 MG/1
TABLET, FILM COATED ORAL
Qty: 90 TABLET | Refills: 0 | Status: SHIPPED | OUTPATIENT
Start: 2020-04-10 | End: 2020-08-03

## 2020-04-10 NOTE — TELEPHONE ENCOUNTER
1st attempt, LMTCB.  Needs to schedule fasting lab only.    Emely Lezama CMA ([facility])

## 2020-04-20 NOTE — TELEPHONE ENCOUNTER
Called patient to inform, she states that she has not taken medication in over a month - has not needed. Pt declined to make lab appt due to not having health insurance right now and also with current COVID situation, not wanting to bring children out if she does not have to.     Patient will call back and schedule at a different time, most likely when she has health insurance.   Catrachita Smith MA

## 2020-06-17 ENCOUNTER — HOSPITAL ENCOUNTER (OUTPATIENT)
Dept: CT IMAGING | Facility: CLINIC | Age: 35
Discharge: HOME | End: 2020-06-17
Attending: OBSTETRICS & GYNECOLOGY | Admitting: OBSTETRICS & GYNECOLOGY
Payer: COMMERCIAL

## 2020-06-17 DIAGNOSIS — R10.2 PELVIC PAIN: ICD-10-CM

## 2020-06-17 PROCEDURE — 25000125 ZZHC RX 250: Performed by: OBSTETRICS & GYNECOLOGY

## 2020-06-17 PROCEDURE — 25000128 H RX IP 250 OP 636: Performed by: OBSTETRICS & GYNECOLOGY

## 2020-06-17 PROCEDURE — 74177 CT ABD & PELVIS W/CONTRAST: CPT

## 2020-06-17 RX ORDER — IOPAMIDOL 755 MG/ML
500 INJECTION, SOLUTION INTRAVASCULAR ONCE
Status: COMPLETED | OUTPATIENT
Start: 2020-06-17 | End: 2020-06-17

## 2020-06-17 RX ADMIN — SODIUM CHLORIDE 61 ML: 9 INJECTION, SOLUTION INTRAVENOUS at 10:21

## 2020-06-17 RX ADMIN — IOPAMIDOL 80 ML: 755 INJECTION, SOLUTION INTRAVENOUS at 10:21

## 2020-06-25 ENCOUNTER — TRANSFERRED RECORDS (OUTPATIENT)
Dept: HEALTH INFORMATION MANAGEMENT | Facility: CLINIC | Age: 35
End: 2020-06-25

## 2020-07-02 DIAGNOSIS — Z11.59 ENCOUNTER FOR SCREENING FOR OTHER VIRAL DISEASES: Primary | ICD-10-CM

## 2020-07-03 DIAGNOSIS — Z11.59 ENCOUNTER FOR SCREENING FOR OTHER VIRAL DISEASES: ICD-10-CM

## 2020-07-03 PROCEDURE — U0003 INFECTIOUS AGENT DETECTION BY NUCLEIC ACID (DNA OR RNA); SEVERE ACUTE RESPIRATORY SYNDROME CORONAVIRUS 2 (SARS-COV-2) (CORONAVIRUS DISEASE [COVID-19]), AMPLIFIED PROBE TECHNIQUE, MAKING USE OF HIGH THROUGHPUT TECHNOLOGIES AS DESCRIBED BY CMS-2020-01-R: HCPCS | Performed by: OBSTETRICS & GYNECOLOGY

## 2020-07-03 PROCEDURE — 99207 ZZC NO BILLABLE SERVICE THIS VISIT: CPT

## 2020-07-03 ASSESSMENT — MIFFLIN-ST. JEOR: SCORE: 1353.14

## 2020-07-04 LAB
SARS-COV-2 RNA SPEC QL NAA+PROBE: NOT DETECTED
SPECIMEN SOURCE: NORMAL

## 2020-07-06 ENCOUNTER — HOSPITAL ENCOUNTER (OUTPATIENT)
Facility: CLINIC | Age: 35
Discharge: HOME | End: 2020-07-06
Attending: OBSTETRICS & GYNECOLOGY | Admitting: OBSTETRICS & GYNECOLOGY
Payer: COMMERCIAL

## 2020-07-06 ENCOUNTER — ANESTHESIA (OUTPATIENT)
Dept: SURGERY | Facility: CLINIC | Age: 35
End: 2020-07-06
Payer: COMMERCIAL

## 2020-07-06 ENCOUNTER — ANESTHESIA EVENT (OUTPATIENT)
Dept: SURGERY | Facility: CLINIC | Age: 35
End: 2020-07-06
Payer: COMMERCIAL

## 2020-07-06 ENCOUNTER — APPOINTMENT (OUTPATIENT)
Dept: GENERAL RADIOLOGY | Facility: CLINIC | Age: 35
End: 2020-07-06
Attending: OBSTETRICS & GYNECOLOGY
Payer: COMMERCIAL

## 2020-07-06 VITALS
HEART RATE: 109 BPM | BODY MASS INDEX: 31.4 KG/M2 | OXYGEN SATURATION: 97 % | SYSTOLIC BLOOD PRESSURE: 123 MMHG | DIASTOLIC BLOOD PRESSURE: 77 MMHG | TEMPERATURE: 97.1 F | WEIGHT: 166.3 LBS | RESPIRATION RATE: 14 BRPM | HEIGHT: 61 IN

## 2020-07-06 LAB
GRAM STN SPEC: ABNORMAL
GRAM STN SPEC: ABNORMAL
GRAM STN SPEC: NORMAL
GRAM STN SPEC: NORMAL
Lab: ABNORMAL
Lab: NORMAL
SPECIMEN SOURCE: ABNORMAL
SPECIMEN SOURCE: NORMAL

## 2020-07-06 PROCEDURE — 87205 SMEAR GRAM STAIN: CPT | Mod: XS | Performed by: OBSTETRICS & GYNECOLOGY

## 2020-07-06 PROCEDURE — 40000277 XR SURGERY CARM FLUORO LESS THAN 5 MIN W STILLS: Mod: TC

## 2020-07-06 PROCEDURE — 25800030 ZZH RX IP 258 OP 636: Performed by: ANESTHESIOLOGY

## 2020-07-06 PROCEDURE — 87070 CULTURE OTHR SPECIMN AEROBIC: CPT | Performed by: OBSTETRICS & GYNECOLOGY

## 2020-07-06 PROCEDURE — 25000128 H RX IP 250 OP 636: Performed by: OBSTETRICS & GYNECOLOGY

## 2020-07-06 PROCEDURE — 71000027 ZZH RECOVERY PHASE 2 EACH 15 MINS: Performed by: OBSTETRICS & GYNECOLOGY

## 2020-07-06 PROCEDURE — 88300 SURGICAL PATH GROSS: CPT | Mod: 26 | Performed by: OBSTETRICS & GYNECOLOGY

## 2020-07-06 PROCEDURE — 27210794 ZZH OR GENERAL SUPPLY STERILE: Performed by: OBSTETRICS & GYNECOLOGY

## 2020-07-06 PROCEDURE — 25000125 ZZHC RX 250: Performed by: NURSE ANESTHETIST, CERTIFIED REGISTERED

## 2020-07-06 PROCEDURE — 25000125 ZZHC RX 250: Performed by: ANESTHESIOLOGY

## 2020-07-06 PROCEDURE — 37000008 ZZH ANESTHESIA TECHNICAL FEE, 1ST 30 MIN: Performed by: OBSTETRICS & GYNECOLOGY

## 2020-07-06 PROCEDURE — 88300 SURGICAL PATH GROSS: CPT | Performed by: OBSTETRICS & GYNECOLOGY

## 2020-07-06 PROCEDURE — 87186 SC STD MICRODIL/AGAR DIL: CPT | Performed by: OBSTETRICS & GYNECOLOGY

## 2020-07-06 PROCEDURE — 40000306 ZZH STATISTIC PRE PROC ASSESS II: Performed by: OBSTETRICS & GYNECOLOGY

## 2020-07-06 PROCEDURE — 25000125 ZZHC RX 250: Performed by: OBSTETRICS & GYNECOLOGY

## 2020-07-06 PROCEDURE — 36000058 ZZH SURGERY LEVEL 3 EA 15 ADDTL MIN: Performed by: OBSTETRICS & GYNECOLOGY

## 2020-07-06 PROCEDURE — 87077 CULTURE AEROBIC IDENTIFY: CPT | Performed by: OBSTETRICS & GYNECOLOGY

## 2020-07-06 PROCEDURE — 36000060 ZZH SURGERY LEVEL 3 W FLUORO 1ST 30 MIN: Performed by: OBSTETRICS & GYNECOLOGY

## 2020-07-06 PROCEDURE — 37000009 ZZH ANESTHESIA TECHNICAL FEE, EACH ADDTL 15 MIN: Performed by: OBSTETRICS & GYNECOLOGY

## 2020-07-06 PROCEDURE — 25000128 H RX IP 250 OP 636: Performed by: NURSE ANESTHETIST, CERTIFIED REGISTERED

## 2020-07-06 PROCEDURE — 87075 CULTR BACTERIA EXCEPT BLOOD: CPT | Performed by: OBSTETRICS & GYNECOLOGY

## 2020-07-06 RX ORDER — VANCOMYCIN HYDROCHLORIDE 1 G/20ML
INJECTION, POWDER, LYOPHILIZED, FOR SOLUTION INTRAVENOUS PRN
Status: DISCONTINUED | OUTPATIENT
Start: 2020-07-06 | End: 2020-07-06 | Stop reason: HOSPADM

## 2020-07-06 RX ORDER — ONDANSETRON 2 MG/ML
INJECTION INTRAMUSCULAR; INTRAVENOUS PRN
Status: DISCONTINUED | OUTPATIENT
Start: 2020-07-06 | End: 2020-07-06

## 2020-07-06 RX ORDER — LIDOCAINE 40 MG/G
CREAM TOPICAL
Status: DISCONTINUED | OUTPATIENT
Start: 2020-07-06 | End: 2020-07-06 | Stop reason: HOSPADM

## 2020-07-06 RX ORDER — KETAMINE HYDROCHLORIDE 10 MG/ML
INJECTION INTRAMUSCULAR; INTRAVENOUS PRN
Status: DISCONTINUED | OUTPATIENT
Start: 2020-07-06 | End: 2020-07-06

## 2020-07-06 RX ORDER — MEPERIDINE HYDROCHLORIDE 25 MG/ML
12.5 INJECTION INTRAMUSCULAR; INTRAVENOUS; SUBCUTANEOUS
Status: DISCONTINUED | OUTPATIENT
Start: 2020-07-06 | End: 2020-07-06

## 2020-07-06 RX ORDER — FENTANYL CITRATE 50 UG/ML
25-50 INJECTION, SOLUTION INTRAMUSCULAR; INTRAVENOUS
Status: DISCONTINUED | OUTPATIENT
Start: 2020-07-06 | End: 2020-07-06 | Stop reason: HOSPADM

## 2020-07-06 RX ORDER — HYDRALAZINE HYDROCHLORIDE 20 MG/ML
2.5-5 INJECTION INTRAMUSCULAR; INTRAVENOUS EVERY 10 MIN PRN
Status: DISCONTINUED | OUTPATIENT
Start: 2020-07-06 | End: 2020-07-06 | Stop reason: HOSPADM

## 2020-07-06 RX ORDER — SCOLOPAMINE TRANSDERMAL SYSTEM 1 MG/1
1 PATCH, EXTENDED RELEASE TRANSDERMAL
Status: DISCONTINUED | OUTPATIENT
Start: 2020-07-06 | End: 2020-07-06 | Stop reason: HOSPADM

## 2020-07-06 RX ORDER — LABETALOL HYDROCHLORIDE 5 MG/ML
10 INJECTION, SOLUTION INTRAVENOUS
Status: DISCONTINUED | OUTPATIENT
Start: 2020-07-06 | End: 2020-07-06 | Stop reason: HOSPADM

## 2020-07-06 RX ORDER — PROPOFOL 10 MG/ML
INJECTION, EMULSION INTRAVENOUS PRN
Status: DISCONTINUED | OUTPATIENT
Start: 2020-07-06 | End: 2020-07-06

## 2020-07-06 RX ORDER — NALOXONE HYDROCHLORIDE 0.4 MG/ML
.1-.4 INJECTION, SOLUTION INTRAMUSCULAR; INTRAVENOUS; SUBCUTANEOUS
Status: DISCONTINUED | OUTPATIENT
Start: 2020-07-06 | End: 2020-07-06 | Stop reason: HOSPADM

## 2020-07-06 RX ORDER — CEFAZOLIN SODIUM 2 G/100ML
2 INJECTION, SOLUTION INTRAVENOUS
Status: COMPLETED | OUTPATIENT
Start: 2020-07-06 | End: 2020-07-06

## 2020-07-06 RX ORDER — PROPOFOL 10 MG/ML
INJECTION, EMULSION INTRAVENOUS CONTINUOUS PRN
Status: DISCONTINUED | OUTPATIENT
Start: 2020-07-06 | End: 2020-07-06

## 2020-07-06 RX ORDER — CEFAZOLIN SODIUM 1 G/3ML
1 INJECTION, POWDER, FOR SOLUTION INTRAMUSCULAR; INTRAVENOUS SEE ADMIN INSTRUCTIONS
Status: DISCONTINUED | OUTPATIENT
Start: 2020-07-06 | End: 2020-07-06 | Stop reason: HOSPADM

## 2020-07-06 RX ORDER — ONDANSETRON 2 MG/ML
4 INJECTION INTRAMUSCULAR; INTRAVENOUS EVERY 30 MIN PRN
Status: DISCONTINUED | OUTPATIENT
Start: 2020-07-06 | End: 2020-07-06 | Stop reason: HOSPADM

## 2020-07-06 RX ORDER — FENTANYL CITRATE 50 UG/ML
INJECTION, SOLUTION INTRAMUSCULAR; INTRAVENOUS PRN
Status: DISCONTINUED | OUTPATIENT
Start: 2020-07-06 | End: 2020-07-06

## 2020-07-06 RX ORDER — SODIUM CHLORIDE, SODIUM LACTATE, POTASSIUM CHLORIDE, CALCIUM CHLORIDE 600; 310; 30; 20 MG/100ML; MG/100ML; MG/100ML; MG/100ML
INJECTION, SOLUTION INTRAVENOUS CONTINUOUS
Status: DISCONTINUED | OUTPATIENT
Start: 2020-07-06 | End: 2020-07-06 | Stop reason: HOSPADM

## 2020-07-06 RX ORDER — ONDANSETRON 4 MG/1
4 TABLET, ORALLY DISINTEGRATING ORAL EVERY 30 MIN PRN
Status: DISCONTINUED | OUTPATIENT
Start: 2020-07-06 | End: 2020-07-06 | Stop reason: HOSPADM

## 2020-07-06 RX ORDER — DEXAMETHASONE SODIUM PHOSPHATE 4 MG/ML
INJECTION, SOLUTION INTRA-ARTICULAR; INTRALESIONAL; INTRAMUSCULAR; INTRAVENOUS; SOFT TISSUE PRN
Status: DISCONTINUED | OUTPATIENT
Start: 2020-07-06 | End: 2020-07-06

## 2020-07-06 RX ADMIN — Medication 10 MG: at 15:29

## 2020-07-06 RX ADMIN — PROPOFOL 150 MCG/KG/MIN: 10 INJECTION, EMULSION INTRAVENOUS at 15:27

## 2020-07-06 RX ADMIN — PROPOFOL 20 MG: 10 INJECTION, EMULSION INTRAVENOUS at 15:25

## 2020-07-06 RX ADMIN — FENTANYL CITRATE 50 MCG: 50 INJECTION, SOLUTION INTRAMUSCULAR; INTRAVENOUS at 15:19

## 2020-07-06 RX ADMIN — SODIUM CHLORIDE, POTASSIUM CHLORIDE, SODIUM LACTATE AND CALCIUM CHLORIDE: 600; 310; 30; 20 INJECTION, SOLUTION INTRAVENOUS at 15:16

## 2020-07-06 RX ADMIN — MIDAZOLAM 2 MG: 1 INJECTION INTRAMUSCULAR; INTRAVENOUS at 15:16

## 2020-07-06 RX ADMIN — SCOPALAMINE 1 PATCH: 1 PATCH, EXTENDED RELEASE TRANSDERMAL at 15:06

## 2020-07-06 RX ADMIN — CEFAZOLIN SODIUM 2 G: 2 INJECTION, SOLUTION INTRAVENOUS at 15:16

## 2020-07-06 RX ADMIN — ONDANSETRON HYDROCHLORIDE 4 MG: 2 INJECTION, SOLUTION INTRAVENOUS at 15:42

## 2020-07-06 RX ADMIN — DEXAMETHASONE SODIUM PHOSPHATE 4 MG: 4 INJECTION, SOLUTION INTRA-ARTICULAR; INTRALESIONAL; INTRAMUSCULAR; INTRAVENOUS; SOFT TISSUE at 15:42

## 2020-07-06 ASSESSMENT — MIFFLIN-ST. JEOR: SCORE: 1381.71

## 2020-07-06 NOTE — BRIEF OP NOTE
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]  Post-operative diagnosis Same as pre-operative diagnosis    Procedure: Procedure(s):  EXCISE INTERSTEM SACRAL NERVE STIMULATOR BATTERY AND LEAD LINE  Surgeon: Surgeon(s) and Role:     * Pooja Lin MD - Primary  Anesthesia: General   Estimated blood loss: <1ML  Drains: None  Specimens:   ID Type Source Tests Collected by Time Destination   1 : back wound  Tissue Back ANAEROBIC BACTERIAL CULTURE, GRAM STAIN, TISSUE CULTURE AEROBIC BACTERIAL Pooja Lin MD 7/6/2020  3:38 PM    2 : swab of interstem sacral nerve stimulator battery and lead line for cuture  Foreign Body Back ANAEROBIC BACTERIAL CULTURE, GRAM STAIN, MISCELLANEOUS CULTURE AEROBIC BACTERIAL Pooja Lin MD 7/6/2020  3:54 PM    A : interstem sacral nerve stimulator  battery and lead line from lower left back  Foreign Body Back SURGICAL PATHOLOGY EXAM Pooja Lin MD 7/6/2020  3:44 PM      Findings:   None.  Complications: None.  Implants: * No implants in log *

## 2020-07-06 NOTE — OR NURSING
interste sacral nerve stimulator battery and lead line explanted and sent to lab for gross exam during todays procedure

## 2020-07-06 NOTE — PRE-PROCEDURE
UROGYNECOLOGY  OFFICE H&P SCANNED TO CHART  PLAN DELINEATED:  SURGICAL REMOVAL OF INTERSTIM SACRAL NERVE IMPLANT PULSE GENERATOR AND LEAD WIRE.  WRITTEN AND VERBAL INFORMED CONSENT OBTAINED.    JUVENCIO TAM MD  UROGYNECOLOGY

## 2020-07-06 NOTE — ANESTHESIA CARE TRANSFER NOTE
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    Anesthesia Type:   MAC     Note:  Airway :Room Air  Patient transferred to:Phase II  Comments: Patients meets criteria for phase 2 recovery. VSS. Report to RN      Vitals: (Last set prior to Anesthesia Care Transfer)    CRNA VITALS  7/6/2020 1536 - 7/6/2020 1611      7/6/2020             SpO2:  100 %                Electronically Signed By: RYAN Tucker CRNA  July 6, 2020  4:11 PM

## 2020-07-06 NOTE — ANESTHESIA PREPROCEDURE EVALUATION
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) [T85.732A]    Procedure(s):  EXCISE INTERSTEM SACRAL NERVE STIMULATOR BATTERY AND LEAD LINE    Past Medical History:   Diagnosis Date     Allergic rhinitis      Anxiety      Asthma      Cyclic vomiting syndrome      Depressive disorder      Dysmenorrhea      Pneumothorax     from bad coughing     Past Surgical History:   Procedure Laterality Date     ARTHROSCOPY SHOULDER Bilateral ,     GENITOURINARY SURGERY  2019    Interstim placement     GYN SURGERY  17     Laparoscopy to look for endometriosis  2004     LAPAROTOMY MINI, TUBAL LIGATION (POST PARTUM), COMBINED Bilateral 2016    Procedure: COMBINED LAPAROTOMY MINI, TUBAL LIGATION (POST PARTUM);  Surgeon: Nannette Shane DO;  Location: [facility] OR     MYRINGOTOMY, INSERT TUBE BILATERAL, COMBINED  as a child     ORTHOPEDIC SURGERY      Shoulder     Septoplasty for deviated septum  2005     SINUS SURGERY       TONSILLECTOMY  2006     Anesthesia Evaluation     . Pt has had prior anesthetic.            ROS/MED HX    ENT/Pulmonary:     (+)asthma , . Other pulmonary disease h/o pneumothorax.    Neurologic:     (+)other neuro     Cardiovascular:         METS/Exercise Tolerance:     Hematologic:         Musculoskeletal:         GI/Hepatic:         Renal/Genitourinary:     (+) Other Renal/ Genitourinary,       Endo:     (+) type II DM Normal glucose range: h/o gest DM .      Psychiatric:     (+) psychiatric history anxiety and depression      Infectious Disease:         Malignancy:         Other:                          Physical Exam      Airway   Mallampati: II  TM distance: >3 FB  Neck ROM: full    Dental     Cardiovascular       Pulmonary             Lab Results   Component Value Date    WBC 6.9  "07/13/2018    HGB 12.6 07/13/2018    HCT 38.5 07/13/2018     07/13/2018    SED 32 (H) 06/12/2013     05/03/2018    POTASSIUM 3.6 05/03/2018    CHLORIDE 111 (H) 05/03/2018    CO2 23 05/03/2018    BUN 7 05/03/2018    CR 0.58 05/03/2018     (H) 05/03/2018    MURIEL 8.8 05/03/2018    ALBUMIN 4.0 01/20/2018    PROTTOTAL 8.4 01/20/2018    ALT 32 01/20/2018    AST 46 (H) 01/20/2018    ALKPHOS 50 01/20/2018    BILITOTAL 0.7 01/20/2018    LIPASE 121 09/16/2015    AMYLASE 52 08/18/2009    TSH 1.02 03/01/2019    HCG Negative 05/03/2018    HCGS Negative 01/20/2018       Preop Vitals  BP Readings from Last 3 Encounters:   07/06/20 125/73   06/19/19 120/68   04/17/19 110/76    Pulse Readings from Last 3 Encounters:   07/06/20 109   06/19/19 95   04/17/19 92      Resp Readings from Last 3 Encounters:   07/06/20 14   06/19/19 16   04/17/19 16    SpO2 Readings from Last 3 Encounters:   07/06/20 98%   06/19/19 96%   04/17/19 97%      Temp Readings from Last 1 Encounters:   07/06/20 98.6  F (37  C) (Temporal)    Ht Readings from Last 1 Encounters:   07/06/20 1.549 m (5' 1\")      Wt Readings from Last 1 Encounters:   07/06/20 75.4 kg (166 lb 4.8 oz)    Estimated body mass index is 31.42 kg/m  as calculated from the following:    Height as of this encounter: 1.549 m (5' 1\").    Weight as of this encounter: 75.4 kg (166 lb 4.8 oz).       Anesthesia Plan      History & Physical Review  History and physical reviewed and following examination; no interval change.    ASA Status:  2 .    NPO Status:  > 8 hours    Plan for MAC Reason for MAC:  Deep or markedly invasive procedure (G8)  PONV prophylaxis:  Scopolamine patch         Postoperative Care  Postoperative pain management:  IV analgesics.      Consents  Anesthetic plan, risks, benefits and alternatives discussed with:  Patient..                 Torres White MD                    .  "

## 2020-07-06 NOTE — DISCHARGE INSTRUCTIONS
UROGYNECOLOGY POST OPERATIVE PATIENT INSTRUCTIONS:  1. Wound care instructions reviewed with patient and . Simple dressing, keep dry and clean. Showers ok, no bathes.  2. Finish oral antibiotic course (Sima has medication at home)  2. Return to office Thursday June 9 at 3:30 at [facility] for post-op exam.  3. Call with any questions or concerns:  Office [phone]  Cell [phone]    Pooaj Lin MD    You had a scope patch placed on 7/6/2020 at 3pm.      GENERAL ANESTHESIA OR SEDATION ADULT DISCHARGE INSTRUCTIONS   SPECIAL PRECAUTIONS FOR 24 HOURS AFTER SURGERY    IT IS NOT UNUSUAL TO FEEL LIGHT-HEADED OR FAINT, UP TO 24 HOURS AFTER SURGERY OR WHILE TAKING PAIN MEDICATION.  IF YOU HAVE THESE SYMPTOMS; SIT FOR A FEW MINUTES BEFORE STANDING AND HAVE SOMEONE ASSIST YOU WHEN YOU GET UP TO WALK OR USE THE BATHROOM.    YOU SHOULD REST AND RELAX FOR THE NEXT 24 HOURS AND YOU MUST MAKE ARRANGEMENTS TO HAVE SOMEONE STAY WITH YOU FOR AT LEAST 24 HOURS AFTER YOUR DISCHARGE.  AVOID HAZARDOUS AND STRENUOUS ACTIVITIES.  DO NOT MAKE IMPORTANT DECISIONS FOR 24 HOURS.    DO NOT DRIVE ANY VEHICLE OR OPERATE MECHANICAL EQUIPMENT FOR 24 HOURS FOLLOWING THE END OF YOUR SURGERY.  EVEN THOUGH YOU MAY FEEL NORMAL, YOUR REACTIONS MAY BE AFFECTED BY THE MEDICATION YOU HAVE RECEIVED.    DO NOT DRINK ALCOHOLIC BEVERAGES FOR 24 HOURS FOLLOWING YOUR SURGERY.    DRINK CLEAR LIQUIDS (APPLE JUICE, GINGER ALE, 7-UP, BROTH, ETC.).  PROGRESS TO YOUR REGULAR DIET AS YOU FEEL ABLE.    YOU MAY HAVE A DRY MOUTH, A SORE THROAT, MUSCLES ACHES OR TROUBLE SLEEPING.  THESE SHOULD GO AWAY AFTER 24 HOURS.    CALL YOUR DOCTOR FOR ANY OF THE FOLLOWING:  SIGNS OF INFECTION (FEVER, GROWING TENDERNESS AT THE SURGERY SITE, A LARGE AMOUNT OF DRAINAGE OR BLEEDING, SEVERE PAIN, FOUL-SMELLING DRAINAGE, REDNESS OR SWELLING.    IT HAS BEEN OVER 8 TO 10 HOURS SINCE SURGERY AND YOU ARE STILL NOT ABLE TO URINATE (PASS WATER).

## 2020-07-07 LAB
BACTERIA SPEC CULT: NORMAL
COPATH REPORT: NORMAL
SPECIMEN SOURCE: NORMAL

## 2020-07-07 NOTE — ANESTHESIA POSTPROCEDURE EVALUATION
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    Anesthesia Type:  MAC    Note:  Anesthesia Post Evaluation    Patient location during evaluation: PACU  Patient participation: Able to fully participate in evaluation  Level of consciousness: awake  Pain management: adequate  Airway patency: patent  Cardiovascular status: acceptable  Respiratory status: acceptable  Hydration status: acceptable  PONV: none             Last vitals:  Vitals:    07/06/20 1409 07/06/20 1608 07/06/20 1651   BP: 125/73 116/79 123/77   Pulse: 109     Resp: 14 12 14   Temp: 98.6  F (37  C) 97.1  F (36.2  C)    SpO2: 98% 96% 97%         Electronically Signed By: Torres White MD  July 7, 2020  9:30 AM

## 2020-07-10 LAB
BACTERIA SPEC CULT: ABNORMAL
Lab: ABNORMAL
Lab: ABNORMAL
SPECIMEN SOURCE: ABNORMAL
SPECIMEN SOURCE: ABNORMAL

## 2020-07-13 LAB
BACTERIA SPEC CULT: NORMAL
BACTERIA SPEC CULT: NORMAL
Lab: NORMAL
Lab: NORMAL
SPECIMEN SOURCE: NORMAL
SPECIMEN SOURCE: NORMAL

## 2020-07-13 NOTE — OP NOTE
Procedure Date: 07/06/2020      PREOPERATIVE DIAGNOSIS:  Suspected infected sacral nerve stimulator implant.      POSTOPERATIVE DIAGNOSIS:  Suspected infected sacral nerve stimulator implant.      PROCEDURES:   1.  Excision of Medtronic InterStim pulse generator (located in the left upper gluteal region).   2.  Excision of a permanent quadripolar lead that was implanted in the S3 foramen of the sacrum.   3.  Fluoroscopic guidance.      SURGEON:  Pooja Lin MD      ANESTHESIA:  MAC.      ESTIMATED BLOOD LOSS:  Less than 5 mL.        IV FLUIDS DURING THE CASE:  Documented by Anesthesia.      DRAINS:  None.      PACKING:  None.      COMPLICATIONS:  None.      FINDINGS:     1.  Upon incision overlying the pulse generator, there was approximately 2-3 mL of fluid, initially straw-colored, but there was also some cloudy fluid terminally.   2.  The tissue was of vital.  There was no evidence of necrosis or other abnormality.   3.  The pulse generator and quadripolar lead were removed in their entirety and documented via fluoroscopic evidence.        INDICATIONS:  This is a 35-year-old female with a history of pelvic floor disorder with chronic urinary urgency and frequency and urethral pain.  In 05/2019, she underwent implantation of the InterStim central sacral nerve stimulator, pulse generator and permanent quadripolar lead.  There were no complications at the time of implantation and she has done well with the therapy for the past 14 months.  She recently returned to the clinic, as she had developed tenderness and erythema overlying the pulse generator pocket.  She was followed carefully; however, given continued symptomatology with strong suspicion for infection surrounding the pulse generator, the decision was made to proceed with surgical excision.  Prior to the procedure, the risks, benefits, indications and alternatives were discussed in detail.  Written and verbal consent were obtained.      PROCEDURE IN  DETAIL:  The patient was transferred to the operating suite.  She was placed in the prone position and administered sedation.  She was now sterilely prepped and draped in the usual fashion.  She had received IV antibiotics and had been on preoperative oral antibiotics.  She had sequential compression devices present on her lower extremities.      Fluoroscopy was used to guide the anatomic landmarks of the implanted devices with the pulse generator on the upper left gluteal area and the quadripolar lead implanted in the S3 foramen of the sacrum on the left side.      A transverse incision was created over the pulse generator pocket.  The above-described fluid was sampled.  The pulse generator was then removed from the pocket and excised.  The pocket was carefully debrided of the typical cystic cavity.  The quadripolar lead was now grasped with an Allis clamp.      A 5 mm incision was created transversely overlying the midline where the quadripolar lead dipped down into the S3 foramen with gentle traction after grasping the wire through that incision.  The wire was removed intact.  The 2 wound sites were now serially irrigated.  Both incisions as well as the subcutaneous tissue were healthy and viable appearing.  Approximately 70 grams of vancomycin powder was distributed between the two incision sites and the skin was then closed with interrupted sutures of 2-0 Ethilon.  Gentle dressings were applied.  This marked the conclusion of the procedure.  Sponge, lap and needle counts were found to be correct.  There were no complications from the procedure.        The patient was awoken from anesthesia and brought to the recovery room in excellent condition.         JUVENCIO TAM MD             D: 2020   T: 2020   MT: CHAVO      Name:     CHANDLER GONGORA   MRN:      [id number]        Account:        [id number]   :      1985           Procedure Date: 2020      Document: [id number]

## 2020-07-20 ENCOUNTER — TELEPHONE (OUTPATIENT)
Dept: FAMILY MEDICINE | Facility: CLINIC | Age: 35
End: 2020-07-20

## 2020-07-20 ENCOUNTER — MYC MEDICAL ADVICE (OUTPATIENT)
Dept: PEDIATRICS | Facility: CLINIC | Age: 35
End: 2020-07-20

## 2020-07-20 DIAGNOSIS — F32.A DEPRESSION WITH SUICIDAL IDEATION: Primary | ICD-10-CM

## 2020-07-20 DIAGNOSIS — R45.851 DEPRESSION WITH SUICIDAL IDEATION: Primary | ICD-10-CM

## 2020-07-20 NOTE — TELEPHONE ENCOUNTER
Pt is asking to talk with provider or RN about meds she is currently taking. She had a ct scan done and was dx with hepatic steatosis, and was informed that a couple meds she is currently taking can either cause that or aggravate it, so she is asking about whether to continue on meds or stop them. Additionally, she is wanting to get an updated lab order put in to get her liver functions checked. She is now scheduled for a video visit with Adeola Reardon for transfer care, etc. But not until 8/3/20, and she is pretty concerned now.  Please call pt to discuss. [phone]

## 2020-07-20 NOTE — TELEPHONE ENCOUNTER
She should be seen before stopping medications as would need to gradually taper. Okay to wait until appointment but can reschedule earlier (with me or someone else with availability) if she would like. Please call and see what she would like to do. Will place lab orders - please help schedule appointment for fasting labs.    Adeola Reardon PA-C

## 2020-07-20 NOTE — TELEPHONE ENCOUNTER
Called patient and relayed message below. Patient scheduled lab appt. No openings before August. Patient stated she will be ok with waiting and did not want to be scheduled with another provider at this time.     Breanna Reynolds RN on 7/20/2020 at 1:23 PM

## 2020-07-20 NOTE — TELEPHONE ENCOUNTER
Please see below- ok to wait for appointment 8/3/20, or should she be seen this week to discuss medications?    Breanna Reynolds RN on 7/20/2020 at 10:28 AM

## 2020-07-28 DIAGNOSIS — F32.A DEPRESSION WITH SUICIDAL IDEATION: ICD-10-CM

## 2020-07-28 DIAGNOSIS — R45.851 DEPRESSION WITH SUICIDAL IDEATION: ICD-10-CM

## 2020-07-28 LAB
ALBUMIN SERPL-MCNC: 3.8 G/DL (ref 3.4–5)
ALP SERPL-CCNC: 56 U/L (ref 40–150)
ALT SERPL W P-5'-P-CCNC: 22 U/L (ref 0–50)
ANION GAP SERPL CALCULATED.3IONS-SCNC: 7 MMOL/L (ref 3–14)
AST SERPL W P-5'-P-CCNC: 18 U/L (ref 0–45)
BILIRUB SERPL-MCNC: 0.8 MG/DL (ref 0.2–1.3)
BUN SERPL-MCNC: 10 MG/DL (ref 7–30)
CALCIUM SERPL-MCNC: 8.9 MG/DL (ref 8.5–10.1)
CHLORIDE SERPL-SCNC: 107 MMOL/L (ref 94–109)
CHOLEST SERPL-MCNC: 183 MG/DL
CO2 SERPL-SCNC: 22 MMOL/L (ref 20–32)
CREAT SERPL-MCNC: 0.67 MG/DL (ref 0.52–1.04)
ERYTHROCYTE [DISTWIDTH] IN BLOOD BY AUTOMATED COUNT: 13.3 % (ref 10–15)
GFR SERPL CREATININE-BSD FRML MDRD: >90 ML/MIN/{1.73_M2}
GLUCOSE SERPL-MCNC: 97 MG/DL (ref 70–99)
HBA1C MFR BLD: 5.2 % (ref 0–5.6)
HCT VFR BLD AUTO: 41.5 % (ref 35–47)
HDLC SERPL-MCNC: 36 MG/DL
HGB BLD-MCNC: 13.7 G/DL (ref 11.7–15.7)
LDLC SERPL CALC-MCNC: 106 MG/DL
MCH RBC QN AUTO: 28.7 PG (ref 26.5–33)
MCHC RBC AUTO-ENTMCNC: 33 G/DL (ref 31.5–36.5)
MCV RBC AUTO: 87 FL (ref 78–100)
NONHDLC SERPL-MCNC: 147 MG/DL
PLATELET # BLD AUTO: 259 10E9/L (ref 150–450)
POTASSIUM SERPL-SCNC: 4 MMOL/L (ref 3.4–5.3)
PROT SERPL-MCNC: 8.4 G/DL (ref 6.8–8.8)
RBC # BLD AUTO: 4.78 10E12/L (ref 3.8–5.2)
SODIUM SERPL-SCNC: 136 MMOL/L (ref 133–144)
TRIGL SERPL-MCNC: 206 MG/DL
WBC # BLD AUTO: 6.4 10E9/L (ref 4–11)

## 2020-07-28 PROCEDURE — 80061 LIPID PANEL: CPT | Performed by: PHYSICIAN ASSISTANT

## 2020-07-28 PROCEDURE — 83036 HEMOGLOBIN GLYCOSYLATED A1C: CPT | Performed by: PHYSICIAN ASSISTANT

## 2020-07-28 PROCEDURE — 80053 COMPREHEN METABOLIC PANEL: CPT | Performed by: PHYSICIAN ASSISTANT

## 2020-07-28 PROCEDURE — 36415 COLL VENOUS BLD VENIPUNCTURE: CPT | Performed by: PHYSICIAN ASSISTANT

## 2020-07-28 PROCEDURE — 85027 COMPLETE CBC AUTOMATED: CPT | Performed by: PHYSICIAN ASSISTANT

## 2020-07-31 ENCOUNTER — MYC MEDICAL ADVICE (OUTPATIENT)
Dept: FAMILY MEDICINE | Facility: CLINIC | Age: 35
End: 2020-07-31

## 2020-08-03 ENCOUNTER — TELEPHONE (OUTPATIENT)
Dept: FAMILY MEDICINE | Facility: CLINIC | Age: 35
End: 2020-08-03

## 2020-08-03 ENCOUNTER — MYC MEDICAL ADVICE (OUTPATIENT)
Dept: FAMILY MEDICINE | Facility: CLINIC | Age: 35
End: 2020-08-03

## 2020-08-03 ENCOUNTER — VIRTUAL VISIT (OUTPATIENT)
Dept: FAMILY MEDICINE | Facility: CLINIC | Age: 35
End: 2020-08-03
Payer: COMMERCIAL

## 2020-08-03 DIAGNOSIS — K76.0 HEPATIC STEATOSIS: ICD-10-CM

## 2020-08-03 DIAGNOSIS — R45.851 DEPRESSION WITH SUICIDAL IDEATION: Primary | ICD-10-CM

## 2020-08-03 DIAGNOSIS — F32.A DEPRESSION WITH SUICIDAL IDEATION: Primary | ICD-10-CM

## 2020-08-03 DIAGNOSIS — F41.9 ANXIETY: ICD-10-CM

## 2020-08-03 PROCEDURE — 99213 OFFICE O/P EST LOW 20 MIN: CPT | Mod: 95 | Performed by: PHYSICIAN ASSISTANT

## 2020-08-03 RX ORDER — QUETIAPINE FUMARATE 25 MG/1
TABLET, FILM COATED ORAL
Qty: 90 TABLET | Refills: 0 | Status: CANCELLED | OUTPATIENT
Start: 2020-08-03

## 2020-08-03 ASSESSMENT — ANXIETY QUESTIONNAIRES
5. BEING SO RESTLESS THAT IT IS HARD TO SIT STILL: SEVERAL DAYS
GAD7 TOTAL SCORE: 7
3. WORRYING TOO MUCH ABOUT DIFFERENT THINGS: SEVERAL DAYS
4. TROUBLE RELAXING: SEVERAL DAYS
5. BEING SO RESTLESS THAT IT IS HARD TO SIT STILL: SEVERAL DAYS
3. WORRYING TOO MUCH ABOUT DIFFERENT THINGS: SEVERAL DAYS
GAD7 TOTAL SCORE: 6
1. FEELING NERVOUS, ANXIOUS, OR ON EDGE: MORE THAN HALF THE DAYS
6. BECOMING EASILY ANNOYED OR IRRITABLE: NOT AT ALL
1. FEELING NERVOUS, ANXIOUS, OR ON EDGE: SEVERAL DAYS
7. FEELING AFRAID AS IF SOMETHING AWFUL MIGHT HAPPEN: SEVERAL DAYS
GAD7 TOTAL SCORE: 7
6. BECOMING EASILY ANNOYED OR IRRITABLE: NOT AT ALL
GAD7 TOTAL SCORE: 7
7. FEELING AFRAID AS IF SOMETHING AWFUL MIGHT HAPPEN: SEVERAL DAYS
IF YOU CHECKED OFF ANY PROBLEMS ON THIS QUESTIONNAIRE, HOW DIFFICULT HAVE THESE PROBLEMS MADE IT FOR YOU TO DO YOUR WORK, TAKE CARE OF THINGS AT HOME, OR GET ALONG WITH OTHER PEOPLE: SOMEWHAT DIFFICULT
2. NOT BEING ABLE TO STOP OR CONTROL WORRYING: SEVERAL DAYS
7. FEELING AFRAID AS IF SOMETHING AWFUL MIGHT HAPPEN: SEVERAL DAYS
2. NOT BEING ABLE TO STOP OR CONTROL WORRYING: SEVERAL DAYS

## 2020-08-03 ASSESSMENT — PATIENT HEALTH QUESTIONNAIRE - PHQ9
SUM OF ALL RESPONSES TO PHQ QUESTIONS 1-9: 8
SUM OF ALL RESPONSES TO PHQ QUESTIONS 1-9: 8
5. POOR APPETITE OR OVEREATING: SEVERAL DAYS
SUM OF ALL RESPONSES TO PHQ QUESTIONS 1-9: 8
10. IF YOU CHECKED OFF ANY PROBLEMS, HOW DIFFICULT HAVE THESE PROBLEMS MADE IT FOR YOU TO DO YOUR WORK, TAKE CARE OF THINGS AT HOME, OR GET ALONG WITH OTHER PEOPLE: SOMEWHAT DIFFICULT

## 2020-08-03 NOTE — PATIENT INSTRUCTIONS
Follow-up with mental health for therapy, they will call you to schedule this. Monitor your mood and follow-up urgently if any worsening mood or concerns for harming yourself. Follow-up in clinic for labs - I will let you know when we have results.

## 2020-08-03 NOTE — PROGRESS NOTES
"Ashlie Matthews is a 35 year old female who is being evaluated via a billable video visit.      The patient has been notified of following:     \"This video visit will be conducted via a call between you and your physician/provider. We have found that certain health care needs can be provided without the need for an in-person physical exam.  This service lets us provide the care you need with a video conversation.  If a prescription is necessary we can send it directly to your pharmacy.  If lab work is needed we can place an order for that and you can then stop by our lab to have the test done at a later time.    Video visits are billed at different rates depending on your insurance coverage.  Please reach out to your insurance provider with any questions.    If during the course of the call the physician/provider feels a video visit is not appropriate, you will not be charged for this service.\"    Patient has given verbal consent for Video visit? Yes  How would you like to obtain your AVS? MyChart  If you are dropped from the video visit, the video invite should be resent to: Other e-mail: [employer] - /l?k â€¢ splik/  Will anyone else be joining your video visit? No    Subjective     Ashlie Matthews is a 35 year old female who presents today via video visit for the following health issues:    HPI    1. Hospital Follow-up Visit:    Hospital/Nursing Home/IP Rehab Facility: [facility]  Date of Admission: 7/6/2020  Date of Discharge: 7/6/2020  Reason(s) for Admission: Surgery: EXCISE INTERSTEM SACRAL NERVE STIMULATOR BATTERY AND LEAD LINE     Was your hospitalization related to COVID-19? No   Problems taking medications regularly:  None  Medication changes since discharge: None  Problems adhering to non-medication therapy:  None    Summary of hospitalization:  [facility] discharge summary reviewed  Diagnostic Tests/Treatments reviewed.  Follow up needed: Has routine follow-up with OB/GYN scheduled in 1 month  Other " Healthcare Providers Involved in Patient s Care:         Surgical follow-up appointment - scheduled in 1 month  Update since discharge: improved.   Post Discharge Medication Reconciliation: patient was not discharged from an inpatient facility.  Plan of care communicated with patient       2. Depression/Anxiety Followup    How are you doing with your depression since your last visit (last discussed over one year ago)? No change  How are you doing with your anxiety since your last visit (last discussed over one year ago)? Stable, slightly worsened anxiety    Are you having other symptoms that might be associated with depression/anxiety? Yes:  trouble sleeping     Have you had a significant life event?  No     Are you feeling anxious or having panic attacks?   Yes:  anxious    Do you have any concerns with your use of alcohol or other drugs? No    Social History     Tobacco Use     Smoking status: Never Smoker     Smokeless tobacco: Never Used   Substance Use Topics     Alcohol use: No     Drug use: No     PHQ 6/19/2019 8/3/2020 8/3/2020   PHQ-9 Total Score 6 8 8   Q9: Thoughts of better off dead/self-harm past 2 weeks Several days Several days Several days   F/U: Thoughts of suicide or self-harm - No -   F/U: Safety concerns - No -     SLIM-7 SCORE 6/19/2019 8/3/2020 8/3/2020   Total Score - - 7 (mild anxiety)   Total Score 5 7 6     In the past two weeks have you had thoughts of suicide or self-harm?  Yes  In the past two weeks have you thought of a plan or intent to harm yourself? No.  Do you have concerns about your personal safety or the safety of others?   No      How many servings of fruits and vegetables do you eat daily?  4 or more    On average, how many sweetened beverages do you drink each day (Examples: soda, juice, sweet tea, etc.  Do NOT count diet or artificially sweetened beverages)?   1    How many days per week do you exercise enough to make your heart beat faster? 3 or less    How many minutes a day  "do you exercise enough to make your heart beat faster? 20 - 29    How many days per week do you miss taking your medication? 0    History of depression with suicidal ideation, anxiety, post partum depression. Reports she had a \"break down\" about 2 years ago and was hospitalized for mental health. Started on Zyprexa and Seroquel at that time. Was stable on these medications but stopped them both on her own 2 weeks ago. She had an abdominal CT about 1.5 months ago for pelvic pain which showed hepatic steatosis (see CT scan impression below). She researched this and found that her medications could be causing this so she stopped taking them. She did not wean off of them. Had some nausea and \"felt sick\" for the first week after she stopped them but has been feeling well since then, denies other withdrawal symptoms.    Reports her mood feels about the same since stopping the medications, although does feel a little more anxious - thinks this is related to the COVID-19 pandemic. Does report some fleeting thoughts of suicide or that \"life would be easier\" if she was gone, but reports that these are brief thoughts and she does not focus on them, states she \"feels like I'm always going to have those thoughts\". She does not have a plan (did have a plan 2 years ago) and has no concerns that she would act on these thoughts. She has a great support system and is open with her , sister, and family about how she is feeling and they frequently touch base with her regarding her mood. Her  has not expressed any concerns about her mood since she stopped the medications. She would prefer to remain off of these medications and does not want to start other mental health medications at this time. She had gained weight (about 20 pounds) while on these medications and had a lot of difficulty losing weight - since stopping she states she has lost 5 pounds. She and her  feel comfortable monitoring her mood and will " "follow-up if there are any changes. She is open to therapy, has not done this in the past.     She feels like her biggest issue that led to her \"break down\" 2 years ago was lack of sleep and as long as she can maintain her sleep she will be okay. She has not had any significant changes to her sleep since she stopped her medications. She did try melatonin 5 mg a few nights ago and is unsure if it made a difference. She does not want to take anything else for sleep at this time.    She does have concerns about a CT scan showing hepatic steatosis.   Patient had CT done on 6/17/2020:  IMPRESSION:  1. No acute pathology in the abdomen or pelvis.  2. Hepatic steatosis. No focal hepatic mass.    She does not use any alcohol.     Video Start Time: 9:38 AM    Patient Active Problem List   Diagnosis     Allergic state     Chronic maxillary sinusitis     Chronic tonsillitis     Mild intermittent asthma     CARDIOVASCULAR SCREENING; LDL GOAL LESS THAN 160     Perforated tympanic membrane     AD (atopic dermatitis)     Diet controlled gestational diabetes mellitus in third trimester     Depression with suicidal ideation     Anxiety     Past Surgical History:   Procedure Laterality Date     ARTHROSCOPY SHOULDER Bilateral 2008,2009     GENITOURINARY SURGERY  05/2019    Interstim placement     GYN SURGERY  7/16/18     Laparoscopy to look for endometriosis  8/2004     LAPAROTOMY MINI, TUBAL LIGATION (POST PARTUM), COMBINED Bilateral 5/11/2016    Procedure: COMBINED LAPAROTOMY MINI, TUBAL LIGATION (POST PARTUM);  Surgeon: Nannette Shane DO;  Location: [facility] OR     MYRINGOTOMY, INSERT TUBE BILATERAL, COMBINED  as a child     ORTHOPEDIC SURGERY  2008/2009    Shoulder     REMOVE STIMULATOR SACRAL NERVE Left 7/6/2020    Procedure: EXCISE INTERSTEM SACRAL NERVE STIMULATOR BATTERY AND LEAD LINE;  Surgeon: Pooja Lin MD;  Location: [facility] OR     Septoplasty for deviated septum  7/2005     SINUS SURGERY  2007     TONSILLECTOMY  " 2006       Social History     Tobacco Use     Smoking status: Never Smoker     Smokeless tobacco: Never Used   Substance Use Topics     Alcohol use: No     Family History   Problem Relation Age of Onset     Diabetes Maternal Grandfather         insulin     Hypertension Maternal Grandfather      Obesity Maternal Grandfather      Respiratory Maternal Grandfather         asthma     Obesity Maternal Grandmother      Hypertension Mother      Gastrointestinal Disease Mother         Pancreatitis-flare ups often     Lipids Mother         high     Blood Disease Mother      Diabetes Mother      Heart Disease Father      Coronary Artery Disease Father      Allergies Son         Allergic to peanuts, different foods, Amoxicillin         Current Outpatient Medications   Medication Sig Dispense Refill     albuterol (2.5 MG/3ML) 0.083% nebulizer solution Take 1 vial (2.5 mg) by nebulization every 6 hours as needed for shortness of breath / dyspnea or wheezing 30 vial 3     albuterol (PROAIR HFA, PROVENTIL HFA, VENTOLIN HFA) 108 (90 BASE) MCG/ACT inhaler Inhale 2 puffs into the lungs every 6 hours as needed for shortness of breath / dyspnea or wheezing 1 Inhaler 11     cetirizine (ZYRTEC ALLERGY) 10 MG tablet Take 10 mg by mouth every morning       ondansetron (ZOFRAN) 4 MG tablet Take 1 tablet (4 mg) by mouth every 6 hours as needed for nausea 30 tablet 0     Allergies   Allergen Reactions     Moxifloxacin Hydrochloride Nausea and Vomiting     Avelox-vomiting     Cats Itching     Codeine GI Disturbance     Darvocet [Propoxyphene N-Apap] Hives     PN: LW Reaction: Rash, Generalized     Demerol Hives     Dog Hair [Dogs] Unknown     Dust Mites Itching and Swelling     Meperidine      PN: LW Reaction: Rash, Generalized     Oxycodone-Acetaminophen      PN: LW Reaction: SHORTNESS OF BREATH     Pollen Extract Itching     Vicodin [Hydrocodone-Acetaminophen] GI Disturbance     PN: LW Reaction: Rash, Generalized     Latex Itching, Swelling  "and Rash     \"sensitivity\"       Reviewed and updated as needed this visit by Provider  Tobacco  Allergies  Meds  Problems  Med Hx  Surg Hx  Fam Hx         Review of Systems   Constitutional, HEENT, cardiovascular, pulmonary, gi and gu systems are negative, except as otherwise noted.      Objective             Physical Exam     GENERAL: Healthy, alert and no distress  EYES: Eyes grossly normal to inspection.  No discharge or erythema, or obvious scleral/conjunctival abnormalities.  RESP: No audible wheeze, cough, or visible cyanosis.  No visible retractions or increased work of breathing.    SKIN: Visible skin clear. No significant rash, abnormal pigmentation or lesions.  NEURO: Cranial nerves grossly intact.  Mentation and speech appropriate for age.  PSYCH: Mentation appears normal, affect normal/bright, judgement and insight intact, normal speech and appearance well-groomed.      Diagnostic Test Results:  Labs reviewed in Epic        Assessment & Plan     1. Depression with suicidal ideation  Chronic issue, was previously managed with Seroquel and Zyprexa but stopped these on her own 2 weeks ago. Reports nausea and overall not feeling well for the first week after stopping but that has resolved and she otherwise denies withdrawal symptoms. Does report a little increased anxiety but otherwise fairly stable mood since stopping the medications. No panic attacks. Has ongoing passive thoughts of suicide but states she does not focus on these and she has no plan and feels safe that she would not act on these thoughts. She would like to stay off the medication for now, has good support with  and family and they are comfortable with this. Will closely monitor mood and follow-up if any changes. Has had problems with sleep in the past but wants to try melatonin for now, will follow-up if she would like to discuss further options for sleep in the future. Recommended therapy, she is open to this, referral " "placed.  - MENTAL HEALTH REFERRAL  - Adult; Outpatient Treatment; Individual/Couples/Family/Group Therapy/Health Psychology; [facility] [place] [phone]; We will contact you to schedule the appointment or please call with any questions    2. Anxiety  See above  - MENTAL HEALTH REFERRAL  - Adult; Outpatient Treatment; Individual/Couples/Family/Group Therapy/Health Psychology; [facility] [place] [phone]; We will contact you to schedule the appointment or please call with any questions    3. Hepatic steatosis  New problem, noted on CT abdomen about 1 month ago. LFTs checked on 7/28/20 and are normal. Will check additional labs. Discussed monitoring. Recommend weight loss. She does not use alcohol. Will check labs below.  - Hepatitis Antibody A IgG; Future  - Hepatitis B Surface Antibody; Future  - Hepatitis B core antibody; Future  - Hepatitis B surface antigen; Future  - Hepatitis C antibody; Future  - **Iron and iron binding capacity FUTURE anytime; Future  - Ferritin; Future     BMI:   Estimated body mass index is 31.42 kg/m  as calculated from the following:    Height as of 7/6/20: 1.549 m (5' 1\").    Weight as of 7/6/20: 75.4 kg (166 lb 4.8 oz).   Weight management plan: Discussed healthy diet and exercise guidelines      Depression Screening Follow Up    PHQ 8/3/2020   PHQ-9 Total Score 8   Q9: Thoughts of better off dead/self-harm past 2 weeks Several days   F/U: Thoughts of suicide or self-harm -   F/U: Safety concerns -     Last PHQ-9 8/3/2020   1.  Little interest or pleasure in doing things 1   2.  Feeling down, depressed, or hopeless 1   3.  Trouble falling or staying asleep, or sleeping too much 2   4.  Feeling tired or having little energy 0   5.  Poor appetite or overeating 1   6.  Feeling bad about yourself 1   7.  Trouble concentrating 1   8.  Moving slowly or restless 0   Q9: Thoughts of better off dead/self-harm past 2 weeks 1   PHQ-9 Total Score 8 "   Difficulty at work, home, or with people Somewhat difficult   In the past two weeks have you had thoughts of suicide or self harm? -   Do you have concerns about your personal safety or the safety of others? -       Follow Up  Follow Up Actions Taken  Mental Health Referral placed      Return in about 3 months (around 11/3/2020) for Preventive Physical Exam.    Adeola Reardon PA-C  [facility]      Video-Visit Details    Type of service:  Video Visit    Video End Time: 10:00 AM    Originating Location (pt. Location): Home    [facility]     Platform used for Video Visit: AmWell    Return in about 3 months (around 11/3/2020) for Preventive Physical Exam.       Adeola Reardon PA-C

## 2020-08-03 NOTE — TELEPHONE ENCOUNTER
Patient has updated PHQ/SLIM symptoms, will ask about ACT during rooming.  Emely Lezama CMA ([facility])

## 2020-08-03 NOTE — TELEPHONE ENCOUNTER
Type of outreach:  Sent [employer] message.  Health Maintenance Due   Topic Date Due     PREVENTIVE CARE VISIT  06/28/2017     ASTHMA CONTROL TEST  10/17/2019     ASTHMA ACTION PLAN  11/07/2019     PHQ-9  12/19/2019     SLIM ASSESSMENT  06/19/2020     Needs to complete questionnaires for today's video visit with ANA, sending via [employer].    Emely Lezama CMA ([facility])

## 2020-08-03 NOTE — TELEPHONE ENCOUNTER
Patient has updated PHQ/SLIM symptoms, will ask about ACT during rooming.  Emely Lezama CMA ([facility])

## 2020-08-04 ASSESSMENT — ANXIETY QUESTIONNAIRES
GAD7 TOTAL SCORE: 7
GAD7 TOTAL SCORE: 6

## 2020-08-06 DIAGNOSIS — K76.0 HEPATIC STEATOSIS: ICD-10-CM

## 2020-08-06 LAB
FERRITIN SERPL-MCNC: 44 NG/ML (ref 12–150)
IRON SATN MFR SERPL: 30 % (ref 15–46)
IRON SERPL-MCNC: 71 UG/DL (ref 35–180)
TIBC SERPL-MCNC: 238 UG/DL (ref 240–430)

## 2020-08-06 PROCEDURE — 86706 HEP B SURFACE ANTIBODY: CPT | Performed by: PHYSICIAN ASSISTANT

## 2020-08-06 PROCEDURE — 82728 ASSAY OF FERRITIN: CPT | Performed by: PHYSICIAN ASSISTANT

## 2020-08-06 PROCEDURE — 86708 HEPATITIS A ANTIBODY: CPT | Performed by: PHYSICIAN ASSISTANT

## 2020-08-06 PROCEDURE — 83540 ASSAY OF IRON: CPT | Performed by: PHYSICIAN ASSISTANT

## 2020-08-06 PROCEDURE — 86704 HEP B CORE ANTIBODY TOTAL: CPT | Performed by: PHYSICIAN ASSISTANT

## 2020-08-06 PROCEDURE — 87340 HEPATITIS B SURFACE AG IA: CPT | Performed by: PHYSICIAN ASSISTANT

## 2020-08-06 PROCEDURE — 86803 HEPATITIS C AB TEST: CPT | Performed by: PHYSICIAN ASSISTANT

## 2020-08-06 PROCEDURE — 36415 COLL VENOUS BLD VENIPUNCTURE: CPT | Performed by: PHYSICIAN ASSISTANT

## 2020-08-06 PROCEDURE — 83550 IRON BINDING TEST: CPT | Performed by: PHYSICIAN ASSISTANT

## 2020-08-07 ENCOUNTER — MYC MEDICAL ADVICE (OUTPATIENT)
Dept: FAMILY MEDICINE | Facility: CLINIC | Age: 35
End: 2020-08-07

## 2020-08-07 LAB
HAV IGG SER QL IA: NONREACTIVE
HBV CORE AB SERPL QL IA: NONREACTIVE
HBV SURFACE AB SERPL IA-ACNC: 0 M[IU]/ML
HBV SURFACE AG SERPL QL IA: NONREACTIVE
HCV AB SERPL QL IA: NONREACTIVE

## 2020-08-10 ENCOUNTER — MYC MEDICAL ADVICE (OUTPATIENT)
Dept: FAMILY MEDICINE | Facility: CLINIC | Age: 35
End: 2020-08-10

## 2020-11-20 ENCOUNTER — OFFICE VISIT (OUTPATIENT)
Dept: FAMILY MEDICINE | Facility: CLINIC | Age: 35
End: 2020-11-20
Payer: COMMERCIAL

## 2020-11-20 VITALS
RESPIRATION RATE: 16 BRPM | HEART RATE: 103 BPM | HEIGHT: 61 IN | SYSTOLIC BLOOD PRESSURE: 121 MMHG | TEMPERATURE: 98.1 F | DIASTOLIC BLOOD PRESSURE: 80 MMHG | WEIGHT: 153.3 LBS | BODY MASS INDEX: 28.94 KG/M2 | OXYGEN SATURATION: 98 %

## 2020-11-20 DIAGNOSIS — Z01.818 PREOP GENERAL PHYSICAL EXAM: Primary | ICD-10-CM

## 2020-11-20 DIAGNOSIS — N32.81 OAB (OVERACTIVE BLADDER): ICD-10-CM

## 2020-11-20 PROCEDURE — 99214 OFFICE O/P EST MOD 30 MIN: CPT | Performed by: PHYSICIAN ASSISTANT

## 2020-11-20 RX ORDER — MONTELUKAST SODIUM 10 MG/1
1 TABLET ORAL DAILY
COMMUNITY
Start: 2020-11-02 | End: 2020-11-20

## 2020-11-20 RX ORDER — LORATADINE 10 MG/1
10 CAPSULE, LIQUID FILLED ORAL DAILY
COMMUNITY
End: 2021-10-05

## 2020-11-20 ASSESSMENT — MIFFLIN-ST. JEOR: SCORE: 1322.74

## 2020-11-20 NOTE — PROGRESS NOTES
[facility]  [address] [facility] [address]  Phone: [phone]  Primary Provider: Adeola Reardon  Pre-op Performing Provider: ADEOLA REARDON    PREOPERATIVE EVALUATION:  Today's date: 11/20/2020    Ashlie Matthews is a 35 year old female who presents for a preoperative evaluation.    Surgical Information:  Surgery/Procedure: Nerve Implant   Surgery Location: [facility]   Surgeon: Dr. Lin   Surgery Date: 11/23/2020  Time of Surgery: 12:30pm  Where patient plans to recover: At home with family  Fax number for surgical facility: [phone]    Type of Anesthesia Anticipated: to be determined    Subjective     HPI related to upcoming procedure: History of overactive bladder. Had interstim implant placed in 2019 and this worked well for her symptoms. However, she developed an infection and implant was removed in 7/2020. Plan to place it again.    Preop Questions 11/10/2020   1. Have you ever had a heart attack or stroke? No   3. Do you have chest pain with activity? No   4. Do you have a history of  heart failure? No   5. Do you currently have a cold, bronchitis or symptoms of other infection? No   6. Do you have a cough, shortness of breath, or wheezing? No   7. Do you or anyone in your family have previous history of blood clots? No   8. Do you or does anyone in your family have a serious bleeding problem such as prolonged bleeding following surgeries or cuts? No   9. Have you ever had problems with anemia or been told to take iron pills? No   10. Have you had any abnormal blood loss such as black, tarry or bloody stools, or abnormal vaginal bleeding? No   11. Have you ever had a blood transfusion? No   12. Are you willing to have a blood transfusion if it is medically needed before, during, or after your surgery? YES   13. Have you or any of your relatives ever had problems with anesthesia? YES - dad has trouble urinating after surgery and requires  catheterization   14. Do you have sleep apnea, excessive snoring or daytime drowsiness? No   15. Do you have any artifical heart valves or other implanted medical devices like a pacemaker, defibrillator, or continuous glucose monitor? No   16. Do you have artificial joints? No   18. Is there any chance that you may be pregnant? No     Health Care Directive:  Patient does not have a Health Care Directive or Living Will: Discussed advance care planning with patient; information given to patient to review.    Preoperative Review of :   reviewed - no record of controlled substances prescribed.      Status of Chronic Conditions:  See problem list for active medical problems.  Problems all longstanding and stable, except as noted/documented.  See ROS for pertinent symptoms related to these conditions.      Review of Systems  CONSTITUTIONAL: NEGATIVE for fever, chills, change in weight  INTEGUMENTARY/SKIN: NEGATIVE for worrisome rashes, moles or lesions  EYES: NEGATIVE for vision changes or irritation  ENT/MOUTH: NEGATIVE for ear, mouth and throat problems  RESP: NEGATIVE for significant cough or SOB  BREAST: NEGATIVE for masses, tenderness or discharge  CV: NEGATIVE for chest pain, palpitations or peripheral edema  GI: NEGATIVE for nausea, abdominal pain, heartburn, or change in bowel habits   female: frequency, incontinence-urge and incontinence-stress  MUSCULOSKELETAL: NEGATIVE for significant arthralgias or myalgia  NEURO: NEGATIVE for weakness, dizziness or paresthesias  ENDOCRINE: NEGATIVE for temperature intolerance, skin/hair changes  HEME: NEGATIVE for bleeding problems  PSYCHIATRIC: NEGATIVE for changes in mood or affect    Patient Active Problem List    Diagnosis Date Noted     Anxiety 11/07/2018     Priority: Medium     Depression with suicidal ideation 05/03/2018     Priority: Medium     Diet controlled gestational diabetes mellitus in third trimester 03/05/2016     Priority: Medium     AD (atopic  dermatitis) 10/16/2013     Priority: Medium     Perforated tympanic membrane 01/26/2011     Priority: Medium     left       CARDIOVASCULAR SCREENING; LDL GOAL LESS THAN 160 10/31/2010     Priority: Medium     Mild intermittent asthma 04/11/2006     Priority: Medium     Chronic tonsillitis 02/21/2006     Priority: Medium     Chronic maxillary sinusitis 03/28/2005     Priority: Medium     Allergic state 02/26/2004     Priority: Medium     Problem list name updated by automated process. Provider to review        Past Medical History:   Diagnosis Date     Allergic rhinitis      Anxiety      Asthma      Cyclic vomiting syndrome      Depressive disorder      Dysmenorrhea      Pneumothorax 2004    from bad coughing     Past Surgical History:   Procedure Laterality Date     ARTHROSCOPY SHOULDER Bilateral 2008,2009     GENITOURINARY SURGERY  05/2019    Interstim placement     GYN SURGERY  7/16/18     Laparoscopy to look for endometriosis  8/2004     LAPAROTOMY MINI, TUBAL LIGATION (POST PARTUM), COMBINED Bilateral 5/11/2016    Procedure: COMBINED LAPAROTOMY MINI, TUBAL LIGATION (POST PARTUM);  Surgeon: Nannette Shane DO;  Location: [facility] OR     MYRINGOTOMY, INSERT TUBE BILATERAL, COMBINED  as a child     ORTHOPEDIC SURGERY  2008/2009    Shoulder     REMOVE STIMULATOR SACRAL NERVE Left 7/6/2020    Procedure: EXCISE INTERSTEM SACRAL NERVE STIMULATOR BATTERY AND LEAD LINE;  Surgeon: Pooja Lin MD;  Location: [facility] OR     Septoplasty for deviated septum  7/2005     SINUS SURGERY  2007     TONSILLECTOMY  2006     Current Outpatient Medications   Medication Sig Dispense Refill     albuterol (2.5 MG/3ML) 0.083% nebulizer solution Take 1 vial (2.5 mg) by nebulization every 6 hours as needed for shortness of breath / dyspnea or wheezing 30 vial 3     albuterol (PROAIR HFA, PROVENTIL HFA, VENTOLIN HFA) 108 (90 BASE) MCG/ACT inhaler Inhale 2 puffs into the lungs every 6 hours as needed for shortness of breath / dyspnea or  "wheezing 1 Inhaler 11     cetirizine (ZYRTEC ALLERGY) 10 MG tablet Take 10 mg by mouth 2 times daily        loratadine (CLARITIN) 10 MG capsule Take 10 mg by mouth daily       ondansetron (ZOFRAN) 4 MG tablet Take 1 tablet (4 mg) by mouth every 6 hours as needed for nausea 30 tablet 0       Allergies   Allergen Reactions     Moxifloxacin Hydrochloride Nausea and Vomiting     Avelox-vomiting     Cats Itching     Codeine GI Disturbance     Darvocet [Propoxyphene N-Apap] Hives     PN: LW Reaction: Rash, Generalized     Demerol Hives     Dog Hair [Dogs] Unknown     Dust Mites Itching and Swelling     Meperidine      PN: LW Reaction: Rash, Generalized     Oxycodone-Acetaminophen      PN: LW Reaction: SHORTNESS OF BREATH     Pollen Extract Itching     Vicodin [Hydrocodone-Acetaminophen] GI Disturbance     PN: LW Reaction: Rash, Generalized     Latex Itching, Swelling and Rash     \"sensitivity\"        Social History     Tobacco Use     Smoking status: Never Smoker     Smokeless tobacco: Never Used   Substance Use Topics     Alcohol use: No     Family History   Problem Relation Age of Onset     Diabetes Maternal Grandfather         insulin     Hypertension Maternal Grandfather      Obesity Maternal Grandfather      Respiratory Maternal Grandfather         asthma     Obesity Maternal Grandmother      Hypertension Mother      Gastrointestinal Disease Mother         Pancreatitis-flare ups often     Lipids Mother         high     Blood Disease Mother      Diabetes Mother      Heart Disease Father      Coronary Artery Disease Father      Allergies Son         Allergic to peanuts, different foods, Amoxicillin     History   Drug Use No         Objective     /80   Pulse 103   Temp 98.1  F (36.7  C) (Tympanic)   Resp 16   Ht 1.549 m (5' 1\")   Wt 69.5 kg (153 lb 4.8 oz)   LMP 06/27/2018 (Exact Date)   SpO2 98%   BMI 28.97 kg/m      Physical Exam    GENERAL APPEARANCE: healthy, alert and no distress     EYES: EOMI, " PERRL     HENT: ear canals and TM's normal and nose and mouth without ulcers or lesions     NECK: no adenopathy, no asymmetry, masses, or scars and thyroid normal to palpation     RESP: lungs clear to auscultation - no rales, rhonchi or wheezes     CV: regular rates and rhythm, normal S1 S2, no S3 or S4 and no murmur, click or rub     ABDOMEN:  soft, nontender, no HSM or masses and bowel sounds normal     MS: extremities normal- no gross deformities noted, no evidence of inflammation in joints, FROM in all extremities.     SKIN: no suspicious lesions or rashes     NEURO: Normal strength and tone, sensory exam grossly normal, mentation intact and speech normal     PSYCH: mentation appears normal. and affect normal/bright     LYMPHATICS: No cervical adenopathy    Recent Labs   Lab Test 07/28/20  0943 03/01/19  0924   HGB 13.7  --      --      --    POTASSIUM 4.0  --    CR 0.67  --    A1C 5.2 5.4        Diagnostics:  No labs were ordered during this visit.   No EKG required, no history of coronary heart disease, significant arrhythmia, peripheral arterial disease or other structural heart disease.    Revised Cardiac Risk Index (RCRI):  The patient has the following serious cardiovascular risks for perioperative complications:   - No serious cardiac risks = 0 points     RCRI Interpretation: 0 points: Class I (very low risk - 0.4% complication rate)       Assessment & Plan   The proposed surgical procedure is considered INTERMEDIATE risk.    Preop general physical exam  --Advised to avoid NSAIDS (Motrin, Ibuprofen, Aleve, Naprosyn) for one week prior to surgery. If needed, Tylenol or Acetaminophen are okay to use.  --Advised to avoid supplements for one week prior to surgery.  --Knows where to be and when to be there for surgery. Knows when to be NPO.  --Pain medications, time off from work and FMLA following surgery deferred to surgeon.     OAB (overactive bladder)      Risks and Recommendations:  The  patient has the following additional risks and recommendations for perioperative complications:   - No identified additional risk factors other than previously addressed    Medication Instructions:  Patient is to take all scheduled medications on the day of surgery    RECOMMENDATION:  APPROVAL GIVEN to proceed with proposed procedure, without further diagnostic evaluation.    Signed Electronically by: Adeola Reardon PA-C    Copy of this evaluation report is provided to requesting physician.    Preop Counts include 234 beds at the [facility] Preop Guidelines    Revised Cardiac Risk Index

## 2020-11-20 NOTE — PATIENT INSTRUCTIONS

## 2020-11-20 NOTE — LETTER
My Asthma Action Plan    Name: Ashlie Matthews   YOB: 1985  Date: 11/20/2020   My doctor: Adeola Reardon PA-C   My clinic: [facility]        My Rescue Medicine:   Albuterol inhaler (Proair/Ventolin/Proventil HFA)  2-4 puffs EVERY 4 HOURS as needed. Use a spacer if recommended by your provider.   My Asthma Severity:   Intermittent / Exercise Induced  Know your asthma triggers: upper respiratory infections and exercise or sports             GREEN ZONE   Good Control    I feel good    No cough or wheeze    Can work, sleep and play without asthma symptoms       Take your asthma control medicine every day.     1. If exercise triggers your asthma, take your rescue medication    15 minutes before exercise or sports, and    During exercise if you have asthma symptoms  2. Spacer to use with inhaler: If you have a spacer, make sure to use it with your inhaler             YELLOW ZONE Getting Worse  I have ANY of these:    I do not feel good    Cough or wheeze    Chest feels tight    Wake up at night   1. Keep taking your Green Zone medications  2. Start taking your rescue medicine:    every 20 minutes for up to 1 hour. Then every 4 hours for 24-48 hours.  3. If you stay in the Yellow Zone for more than 12-24 hours, contact your doctor.  4. If you do not return to the Green Zone in 12-24 hours or you get worse, start taking your oral steroid medicine if prescribed by your provider.           RED ZONE Medical Alert - Get Help  I have ANY of these:    I feel awful    Medicine is not helping    Breathing getting harder    Trouble walking or talking    Nose opens wide to breathe       1. Take your rescue medicine NOW  2. If your provider has prescribed an oral steroid medicine, start taking it NOW  3. Call your doctor NOW  4. If you are still in the Red Zone after 20 minutes and you have not reached your doctor:    Take your rescue medicine again and    Call 911 or go to the emergency  room right away    See your regular doctor within 2 weeks of an Emergency Room or Urgent Care visit for follow-up treatment.          Annual Reminders:  Meet with Asthma Educator,  Flu Shot in the Fall, consider Pneumonia Vaccination for patients with asthma (aged 19 and older).    Pharmacy: [facility] [id number] [place] [facility] [id number]  [address]    Electronically signed by Adeola Reardon PA-C   Date: 11/20/20                    Asthma Triggers  How To Control Things That Make Your Asthma Worse    Triggers are things that make your asthma worse.  Look at the list below to help you find your triggers and   what you can do about them. You can help prevent asthma flare-ups by staying away from your triggers.      Trigger                                                          What you can do   Cigarette Smoke  Tobacco smoke can make asthma worse. Do not allow smoking in your home, car or around you.  Be sure no one smokes at a child s day care or school.  If you smoke, ask your health care provider for ways to help you quit.  Ask family members to quit too.  Ask your health care provider for a referral to Quit Plan to help you quit smoking, or call 9-068-035-PLAN.     Colds, Flu, Bronchitis  These are common triggers of asthma. Wash your hands often.  Don t touch your eyes, nose or mouth.  Get a flu shot every year.     Dust Mites  These are tiny bugs that live in cloth or carpet. They are too small to see. Wash sheets and blankets in hot water every week.   Encase pillows and mattress in dust mite proof covers.  Avoid having carpet if you can. If you have carpet, vacuum weekly.   Use a dust mask and HEPA vacuum.   Pollen and Outdoor Mold  Some people are allergic to trees, grass, or weed pollen, or molds. Try to keep your windows closed.  Limit time out doors when pollen count is high.   Ask you health care provider about taking medicine during allergy season.     Animal Dander  Some people are allergic to  skin flakes, urine or saliva from pets with fur or feathers. Keep pets with fur or feathers out of your home.    If you can t keep the pet outdoors, then keep the pet out of your bedroom.  Keep the bedroom door closed.  Keep pets off cloth furniture and away from stuffed toys.     Mice, Rats, and Cockroaches  Some people are allergic to the waste from these pests.   Cover food and garbage.  Clean up spills and food crumbs.  Store grease in the refrigerator.   Keep food out of the bedroom.   Indoor Mold  This can be a trigger if your home has high moisture. Fix leaking faucets, pipes, or other sources of water.   Clean moldy surfaces.  Dehumidify basement if it is damp and smelly.   Smoke, Strong Odors, and Sprays  These can reduce air quality. Stay away from strong odors and sprays, such as perfume, powder, hair spray, paints, smoke incense, paint, cleaning products, candles and new carpet.   Exercise or Sports  Some people with asthma have this trigger. Be active!  Ask your doctor about taking medicine before sports or exercise to prevent symptoms.    Warm up for 5-10 minutes before and after sports or exercise.     Other Triggers of Asthma  Cold air:  Cover your nose and mouth with a scarf.  Sometimes laughing or crying can be a trigger.  Some medicines and food can trigger asthma.

## 2020-11-21 ASSESSMENT — ASTHMA QUESTIONNAIRES: ACT_TOTALSCORE: 23

## 2021-01-05 ENCOUNTER — MYC REFILL (OUTPATIENT)
Dept: FAMILY MEDICINE | Facility: CLINIC | Age: 36
End: 2021-01-05

## 2021-01-05 DIAGNOSIS — F41.9 ANXIETY: ICD-10-CM

## 2021-01-05 DIAGNOSIS — F32.A DEPRESSION WITH SUICIDAL IDEATION: ICD-10-CM

## 2021-01-05 DIAGNOSIS — R45.851 DEPRESSION WITH SUICIDAL IDEATION: ICD-10-CM

## 2021-01-08 ENCOUNTER — VIRTUAL VISIT (OUTPATIENT)
Dept: FAMILY MEDICINE | Facility: CLINIC | Age: 36
End: 2021-01-08
Payer: COMMERCIAL

## 2021-01-08 DIAGNOSIS — R45.851 DEPRESSION WITH SUICIDAL IDEATION: Primary | ICD-10-CM

## 2021-01-08 DIAGNOSIS — F41.9 ANXIETY: ICD-10-CM

## 2021-01-08 DIAGNOSIS — G47.00 INSOMNIA, UNSPECIFIED TYPE: ICD-10-CM

## 2021-01-08 DIAGNOSIS — F32.A DEPRESSION WITH SUICIDAL IDEATION: Primary | ICD-10-CM

## 2021-01-08 PROCEDURE — 99214 OFFICE O/P EST MOD 30 MIN: CPT | Mod: 95 | Performed by: PHYSICIAN ASSISTANT

## 2021-01-08 RX ORDER — ONDANSETRON 4 MG/1
4 TABLET, FILM COATED ORAL EVERY 6 HOURS PRN
Qty: 30 TABLET | Refills: 0 | Status: SHIPPED | OUTPATIENT
Start: 2021-01-08

## 2021-01-08 RX ORDER — HYDROXYZINE PAMOATE 25 MG/1
CAPSULE ORAL
COMMUNITY
Start: 2021-01-02 | End: 2021-02-04

## 2021-01-08 RX ORDER — ZOLPIDEM TARTRATE 5 MG/1
5 TABLET ORAL
Qty: 20 TABLET | Refills: 0 | Status: SHIPPED | OUTPATIENT
Start: 2021-01-08 | End: 2021-02-04

## 2021-01-08 RX ORDER — PREDNISONE 10 MG/1
TABLET ORAL
COMMUNITY
Start: 2020-12-30 | End: 2021-02-04

## 2021-01-08 ASSESSMENT — COLUMBIA-SUICIDE SEVERITY RATING SCALE - C-SSRS
1. WITHIN THE PAST MONTH, HAVE YOU WISHED YOU WERE DEAD OR WISHED YOU COULD GO TO SLEEP AND NOT WAKE UP?: YES
2. IN THE PAST MONTH, HAVE YOU ACTUALLY HAD ANY THOUGHTS OF KILLING YOURSELF?: NO
6. HAVE YOU EVER DONE ANYTHING, STARTED TO DO ANYTHING, OR PREPARED TO DO ANYTHING TO END YOUR LIFE?: NO

## 2021-01-08 ASSESSMENT — PATIENT HEALTH QUESTIONNAIRE - PHQ9
SUM OF ALL RESPONSES TO PHQ QUESTIONS 1-9: 16
5. POOR APPETITE OR OVEREATING: MORE THAN HALF THE DAYS

## 2021-01-08 ASSESSMENT — ANXIETY QUESTIONNAIRES
2. NOT BEING ABLE TO STOP OR CONTROL WORRYING: MORE THAN HALF THE DAYS
3. WORRYING TOO MUCH ABOUT DIFFERENT THINGS: MORE THAN HALF THE DAYS
GAD7 TOTAL SCORE: 13
7. FEELING AFRAID AS IF SOMETHING AWFUL MIGHT HAPPEN: MORE THAN HALF THE DAYS
6. BECOMING EASILY ANNOYED OR IRRITABLE: SEVERAL DAYS
IF YOU CHECKED OFF ANY PROBLEMS ON THIS QUESTIONNAIRE, HOW DIFFICULT HAVE THESE PROBLEMS MADE IT FOR YOU TO DO YOUR WORK, TAKE CARE OF THINGS AT HOME, OR GET ALONG WITH OTHER PEOPLE: SOMEWHAT DIFFICULT
5. BEING SO RESTLESS THAT IT IS HARD TO SIT STILL: MORE THAN HALF THE DAYS
1. FEELING NERVOUS, ANXIOUS, OR ON EDGE: MORE THAN HALF THE DAYS

## 2021-01-08 NOTE — PATIENT INSTRUCTIONS
"  Patient Education     Recognizing Suicide Warning Signs in Yourself    People who are thinking about suicide may not know they are depressed. Certain thoughts, feelings, and actions can be signals that let you know you may need help. The best thing you can do is watch for signs that you may be at risk. Then, ask for help. You can talk to your regular healthcare provider or seek help from a mental health provider.  Depression  Depression is a treatable illness. To know if depression is causing you to feel like ending your life, ask yourself:    Do I feel worthless, guilty, helpless, or hopeless?    Have I been feeling sad, down, or blue on most days?    Have I lost interest in my work or people I used to enjoy?    Do I have trouble sleeping or do I sleep too much?    Do I eat more or less than usual?    Do I feel tired, weak, and low on energy?    Do I feel restless and unable to sit still?    Do I have trouble thinking or making choices?    Do I cry more than usual?    Do I feel life isn't worth living?  Warning signs for suicide    Thinking often about taking your life    Planning how you may attempt it    Talking or writing about committing suicide    Feeling that death is the only solution to your problems    Feeling a pressing need to make out your will or arrange your     Giving away things you own    Participating in risky behaviors, such as sex with someone you don't know or drinking and driving    Buying a lethal weapon, such as a gun, or hoarding medicines that could be used in an over dose  If you notice any of these warning signs, call for help right away or go to your closest hospital emergency department. You can also call a mental health clinic or a 24-hour suicide crisis hotline for help and support. Search for local suicide prevention resources on your computer or look for the number in the white pages of your phone book under \"Suicide.\" In an emergency, if you are in immediate risk of " harming yourself, call 911. For more information about depression:    National [place] of Mental Nwqkhz345-665-8412quc.[facility].nih.gov    National Suicide Prevention Xkbwfxof453-074-0922 (190-697-KISY)www.suicidepreventionlifeline.org    National [place] on Mental Wyybqon177-485-4563bim.maryanne.org    Mental Health Ldxxnvn447-100-9917vqw.[facility].org    National Suicide Ephokwc680-137-8387 (800-SUICIDE)   Larry last reviewed this educational content on 1/1/2017 2000-2020 [employer]. [address] [place] [address], [place] [address]. All rights reserved. This information is not intended as a substitute for professional medical care. Always follow your healthcare professional's instructions.

## 2021-01-08 NOTE — PROGRESS NOTES
Sima is a 35 year old who is being evaluated via a billable video visit.      How would you like to obtain your AVS? MyChart  If the video visit is dropped, the invitation should be resent by: Send to e-mail at: [email or web address]  Will anyone else be joining your video visit? No    Video Start Time: 11:17 AM     Assessment & Plan     Depression with suicidal ideation  Anxiety  Ongoing issues, has previously tried zyprexa, seroquel, viibryd, celexa. Recommend consult with psychiatry for further follow-up and evaluation. Does report more persistent thoughts of suicide but feels safe and is confident she would not act on these thoughts. She has a strong support system with  and family and they are aware of her thoughts. She will continue to be open with them and agrees to go to ER or call 911 with worsening suicidal thoughts or concerns for her safety.  - MENTAL HEALTH REFERRAL  - Adult; Psychiatry; Psychiatry; G: Collaborative Care Psychiatry Service/Bridge to Long-Term Psychiatry as indicated ([phone]); Yes; Several Medications tried and failed; Yes; We will contact you to schedule the a...    Insomnia, unspecified type  New problem, suspect related to prednisone. She is tapering off of the prednisone but having a lot of trouble sleeping. Has tried melatonin, benadryl, unisom, vistaril without relief. Insomnia is contributing to worsening mood and suicidal ideation. Discussed options, plan to trial ambien. Discussed no alcohol or driving while taking this. She has children at home but her youngest is 4 years old and they do not get up at night - her  is home with her at night if kids do get up. We discussed this is not intended to be a long term solution for sleep but can hopefully get her through this current episode of insomnia which seems to be related to the prednisone.  - zolpidem (AMBIEN) 5 MG tablet; Take 1 tablet (5 mg) by mouth nightly as needed for sleep    Risks and benefits  of treatment plan discussed. Patient and/or parent acknowledges and agrees with plan of care, all questions answered.      Depression Screening Follow Up    PHQ 1/8/2021   PHQ-9 Total Score 16   Q9: Thoughts of better off dead/self-harm past 2 weeks More than half the days   F/U: Thoughts of suicide or self-harm -   F/U: Safety concerns -     Last PHQ-9 1/8/2021   1.  Little interest or pleasure in doing things 1   2.  Feeling down, depressed, or hopeless 2   3.  Trouble falling or staying asleep, or sleeping too much 2   4.  Feeling tired or having little energy 2   5.  Poor appetite or overeating 2   6.  Feeling bad about yourself 2   7.  Trouble concentrating 2   8.  Moving slowly or restless 1   Q9: Thoughts of better off dead/self-harm past 2 weeks 2   PHQ-9 Total Score 16   Difficulty at work, home, or with people -   In the past two weeks have you had thoughts of suicide or self harm? -   Do you have concerns about your personal safety or the safety of others? -     [id number]}  C-SSRS (Brief [place]) 1/8/2021   Within the last month, have you wished you were dead or wished you could go to sleep and not wake up? Yes   Within the last month, have you had any actual thoughts of killing yourself? No   Within the last month, have you ever done anything, started to do anything, or prepared to do anything to end your life? No         Follow Up     Follow Up Actions Taken  Crisis resource information provided in the After Visit Summary  Mental Health Referral placed    Discussed the following ways the patient can remain in a safe environment:  be around others and continue to be open with , call 911 or go to ER with worsening thoughts or concerns for safety      Return in about 1 week (around 1/15/2021) for If worsening or no improvement, psychiatry when able.    Adeola Reardon PA-C  [facility] KATHRYN Gao is a 35 year old who presents to clinic today for the following  "health issues     HPI   Anxiety and Insomnia    PHQ 8/3/2020 8/3/2020 1/8/2021   PHQ-9 Total Score 8 8 16   Q9: Thoughts of better off dead/self-harm past 2 weeks Several days Several days More than half the days   F/U: Thoughts of suicide or self-harm No - -   F/U: Safety concerns No - -     SLIM-7 SCORE 8/3/2020 8/3/2020 1/8/2021   Total Score - 7 (mild anxiety) -   Total Score 7 6 13     History of depression with suicidal ideation, anxiety, post partum depression. Reports she had a \"break down\" about 2 years ago and was hospitalized for mental health. Started on Zyprexa and Seroquel at that time. Was stable on these medications but stopped them both on her own in August 2020 because she was worried about effects on her liver. Mood was stable after stopping and she has not been on any medications for mood since then.     She was overall doing well until she was put on prednisone by dermatology for an ongoing rash for several years. The rash cleared up with the prednisone but her she was having a really hard time sleeping while taking it. She was first put on the prednisone about 1 month ago but the rash returned so she started again about 2 weeks ago. Is on a taper, started at 60 mg and now down to 20 mg. Taking in mornings. Having significant trouble sleeping. Has tried benadryl, unisom, vistaril prescribed by dermatologist but nothing is helping with sleep.    In general, she feels like her biggest issue that led to her \"break down\" 2 years ago was lack of sleep and as long as she can maintain her sleep she will be okay. Feeling worried that she is having so much trouble sleeping and mood has been a lot worse because of this. Overall mood has become very anxious, crying a lot. She does continue to report some thoughts of suicide or that \"life would be easier\" if she was gone. She previously reported that she \"feels like I'm always going to have those thoughts\". However, thoughts are becoming more persistent over " the past few weeks with lack of sleep. She still feels safe and has no concerns that she would act on these thoughts, no plan (did have a plan 2 years ago). She has a great support system and is open with her , sister, and family about how she is feeling and they frequently touch base with her regarding her mood. She knows that she would talk to her  and go to the ER if thoughts worsened and she had concerns about her safety.    No concerns with alcohol or drugs.    Review of Systems   Constitutional, HEENT, cardiovascular, pulmonary, gi and gu systems are negative, except as otherwise noted.      Objective           Vitals:  No vitals were obtained today due to virtual visit.    Physical Exam   GENERAL: Healthy, alert and no distress  EYES: Eyes grossly normal to inspection.  No discharge or erythema, or obvious scleral/conjunctival abnormalities.  RESP: No audible wheeze, cough, or visible cyanosis.  No visible retractions or increased work of breathing.    SKIN: Visible skin clear. No significant rash, abnormal pigmentation or lesions.  NEURO: Cranial nerves grossly intact.  Mentation and speech appropriate for age.  PSYCH: Mentation appears normal, affect normal/bright, judgement and insight intact, normal speech and appearance well-groomed.      Video-Visit Details    Type of service:  Video Visit    Video End Time: 11:29 AM     Originating Location (pt. Location): Home    Distant Location (provider location):  [facility] [employer]     Platform used for Video Visit: [employer]

## 2021-01-09 ENCOUNTER — HEALTH MAINTENANCE LETTER (OUTPATIENT)
Age: 36
End: 2021-01-09

## 2021-01-09 ASSESSMENT — ANXIETY QUESTIONNAIRES: GAD7 TOTAL SCORE: 13

## 2021-01-10 ENCOUNTER — OFFICE VISIT (OUTPATIENT)
Dept: URGENT CARE | Facility: URGENT CARE | Age: 36
End: 2021-01-10
Payer: COMMERCIAL

## 2021-01-10 VITALS
TEMPERATURE: 98.4 F | DIASTOLIC BLOOD PRESSURE: 88 MMHG | HEART RATE: 127 BPM | RESPIRATION RATE: 16 BRPM | OXYGEN SATURATION: 97 % | SYSTOLIC BLOOD PRESSURE: 130 MMHG

## 2021-01-10 DIAGNOSIS — L03.031 PARONYCHIA OF TOE, RIGHT: Primary | ICD-10-CM

## 2021-01-10 PROCEDURE — 99214 OFFICE O/P EST MOD 30 MIN: CPT | Performed by: PHYSICIAN ASSISTANT

## 2021-01-10 RX ORDER — CEPHALEXIN 500 MG/1
500 CAPSULE ORAL 3 TIMES DAILY
Qty: 21 CAPSULE | Refills: 0 | Status: SHIPPED | OUTPATIENT
Start: 2021-01-10 | End: 2021-01-17

## 2021-01-10 RX ORDER — MUPIROCIN 20 MG/G
OINTMENT TOPICAL 3 TIMES DAILY
Qty: 30 G | Refills: 0 | Status: SHIPPED | OUTPATIENT
Start: 2021-01-10 | End: 2021-01-17

## 2021-01-10 ASSESSMENT — ENCOUNTER SYMPTOMS
FEVER: 0
CHILLS: 0
WOUND: 0

## 2021-01-10 NOTE — PROGRESS NOTES
SUBJECTIVE:   Ashlie Matthews is a 35 year old female presenting with a chief complaint of   Chief Complaint   Patient presents with     Urgent Care     Toe Injury     Toe injury 2wks ago-Now red, warm, drainage and pain        She is an established patient of [facility].    Toe problem    Onset of symptoms was 2 week(s) ago.  Course of illness is worsening.    Severity moderate  Current and Associated symptoms: redness, swelling, drainage and pain right medial aspect of great toe  Treatment measures tried include: bacitracin, warm epsom salt water soaks, hydrogen peroxide.    Predisposing factors include: inadvertently stubbed right great toe 2 weeks ago, and then again 5 days ago .  No fever or chills.       Review of Systems   Constitutional: Negative for chills and fever.   Skin: Negative for wound.        Right great toe swelling, redness and pain       Past Medical History:   Diagnosis Date     Allergic rhinitis      Anxiety      Asthma      Cyclic vomiting syndrome      Depressive disorder      Dysmenorrhea      Pneumothorax 2004    from bad coughing     Family History   Problem Relation Age of Onset     Diabetes Maternal Grandfather         insulin     Hypertension Maternal Grandfather      Obesity Maternal Grandfather      Respiratory Maternal Grandfather         asthma     Obesity Maternal Grandmother      Hypertension Mother      Gastrointestinal Disease Mother         Pancreatitis-flare ups often     Lipids Mother         high     Blood Disease Mother      Diabetes Mother      Heart Disease Father      Coronary Artery Disease Father      Allergies Son         Allergic to peanuts, different foods, Amoxicillin     Current Outpatient Medications   Medication Sig Dispense Refill     albuterol (2.5 MG/3ML) 0.083% nebulizer solution Take 1 vial (2.5 mg) by nebulization every 6 hours as needed for shortness of breath / dyspnea or wheezing 30 vial 3     albuterol (PROAIR HFA, PROVENTIL HFA, VENTOLIN HFA) 108 (90  BASE) MCG/ACT inhaler Inhale 2 puffs into the lungs every 6 hours as needed for shortness of breath / dyspnea or wheezing 1 Inhaler 11     cephALEXin (KEFLEX) 500 MG capsule Take 1 capsule (500 mg) by mouth 3 times daily for 7 days 21 capsule 0     cetirizine (ZYRTEC ALLERGY) 10 MG tablet Take 10 mg by mouth 2 times daily        hydrOXYzine (VISTARIL) 25 MG capsule TAKE 1 2 CAPSULE BY MOUTH AT BEDTIME.       loratadine (CLARITIN) 10 MG capsule Take 10 mg by mouth daily       mupirocin (BACTROBAN) 2 % external ointment Apply topically 3 times daily for 7 days 30 g 0     ondansetron (ZOFRAN) 4 MG tablet Take 1 tablet (4 mg) by mouth every 6 hours as needed for nausea 30 tablet 0     zolpidem (AMBIEN) 5 MG tablet Take 1 tablet (5 mg) by mouth nightly as needed for sleep 20 tablet 0     predniSONE (DELTASONE) 10 MG tablet TAKE 6TABS X 3 DAYS, 5TABS X 3 DAYS, 4TABS X 3 DAYS, 3TABS X 3 DAYS 2TABS X 3 DAYS AND 1TAB X 3 DAYS       Social History     Tobacco Use     Smoking status: Never Smoker     Smokeless tobacco: Never Used   Substance Use Topics     Alcohol use: No       OBJECTIVE  /88   Pulse 127   Temp 98.4  F (36.9  C) (Tympanic)   Resp 16   LMP 06/27/2018 (Exact Date)   SpO2 97%   Breastfeeding No     Physical Exam  Constitutional:       General: She is not in acute distress.     Appearance: She is well-developed.   HENT:      Head: Normocephalic and atraumatic.   Pulmonary:      Effort: Pulmonary effort is normal.   Skin:     General: Skin is warm and dry.      Findings: Erythema present.      Comments: Right great toe exam: there is erythema, swelling noted medial periungual aspect of the toe. Mild drainage noted. Tenderness to palpation.    Neurological:      Mental Status: She is alert.         Labs:  No results found for this or any previous visit (from the past 24 hour(s)).        ASSESSMENT:      ICD-10-CM    1. Paronychia of toe, right  L03.031 cephALEXin (KEFLEX) 500 MG capsule     mupirocin  (BACTROBAN) 2 % external ointment          PLAN:    Paronychia, right great toe: Keflex is prescribed today.  Mupirocin ointment also prescribed.  Keep affected area clean.  Keep monitoring symptoms.  Follow-up if any worsening symptoms.  Patient agrees with the plan    Followup:    If not improving or if condition worsens, follow up with your Primary Care Provider    Patient Instructions     Patient Education     Paronychia of the Toe  Paronychia is an infection near a fingernail or toenail. It usually occurs when an opening in the cuticle or an ingrown toenail lets bacteria under the skin.   The infection will need to be drained if pus is present. If the infection has been caught early, you may need only antibiotic treatment. Healing will take about 1 to 2 weeks.   Home care  Follow these guidelines when caring for yourself at home:     Clean and soak the toe or finger. Do this 2 times a day for the first 3 days. To do so:  ? Soak your foot or hand in a tub of warm water for 5 minutes. Or hold your toe or finger under a faucet of warm running water for 5 minutes.  ? Clean any crust away with soap and water using a cotton swab.  ? Put antibiotic ointment on the infected area.    Change the dressing daily or any time it gets dirty.    If you were given antibiotics, take them as directed until they are all gone.    If your infection is on a toe, wear comfortable shoes with a lot of toe room. You can also wear open-toed sandals while your toe heals.    You may use over-the-counter medicine (acetaminophen or ibuprofen) to help with pain, unless another medicine was prescribed. If you have chronic liver or kidney disease, talk with your healthcare provider before using these medicines. Also talk with your provider if you've had a stomach ulcer or gastrointestinal bleeding.  Prevention  The following can prevent paronychia:     Don't cut or play with your cuticles at home. A healthy cuticle maintains a seal between your  skin and nail and keeps out infection.    Don't bite your nails.    Don't suck on your thumbs or fingers.  Follow-up care  Follow up with your healthcare provider, or as advised.   When to seek medical advice  Call your healthcare provider right away if any of these occur:     Redness, pain, or swelling of the finger or toe gets worse    You have trouble moving or bending the finger or toe    Red streaks in the skin leading away from the wound    Pus or fluid draining from the nail area    Fever of 100.4 F (38 C) or higher, or as directed by your provider  StayWell last reviewed this educational content on 7/1/2019 2000-2020 [employer]. [address] [place] [address], [place] [address]. All rights reserved. This information is not intended as a substitute for professional medical care. Always follow your healthcare professional's instructions.

## 2021-01-10 NOTE — PATIENT INSTRUCTIONS
Patient Education     Paronychia of the Toe  Paronychia is an infection near a fingernail or toenail. It usually occurs when an opening in the cuticle or an ingrown toenail lets bacteria under the skin.   The infection will need to be drained if pus is present. If the infection has been caught early, you may need only antibiotic treatment. Healing will take about 1 to 2 weeks.   Home care  Follow these guidelines when caring for yourself at home:     Clean and soak the toe or finger. Do this 2 times a day for the first 3 days. To do so:  ? Soak your foot or hand in a tub of warm water for 5 minutes. Or hold your toe or finger under a faucet of warm running water for 5 minutes.  ? Clean any crust away with soap and water using a cotton swab.  ? Put antibiotic ointment on the infected area.    Change the dressing daily or any time it gets dirty.    If you were given antibiotics, take them as directed until they are all gone.    If your infection is on a toe, wear comfortable shoes with a lot of toe room. You can also wear open-toed sandals while your toe heals.    You may use over-the-counter medicine (acetaminophen or ibuprofen) to help with pain, unless another medicine was prescribed. If you have chronic liver or kidney disease, talk with your healthcare provider before using these medicines. Also talk with your provider if you've had a stomach ulcer or gastrointestinal bleeding.  Prevention  The following can prevent paronychia:     Don't cut or play with your cuticles at home. A healthy cuticle maintains a seal between your skin and nail and keeps out infection.    Don't bite your nails.    Don't suck on your thumbs or fingers.  Follow-up care  Follow up with your healthcare provider, or as advised.   When to seek medical advice  Call your healthcare provider right away if any of these occur:     Redness, pain, or swelling of the finger or toe gets worse    You have trouble moving or bending the finger or  toe    Red streaks in the skin leading away from the wound    Pus or fluid draining from the nail area    Fever of 100.4 F (38 C) or higher, or as directed by your provider  Larry last reviewed this educational content on 7/1/2019 2000-2020 [employer]. [address] [place] [address]. All rights reserved. This information is not intended as a substitute for professional medical care. Always follow your healthcare professional's instructions.

## 2021-02-04 ENCOUNTER — VIRTUAL VISIT (OUTPATIENT)
Dept: PSYCHOLOGY | Facility: CLINIC | Age: 36
End: 2021-02-04
Attending: PHYSICIAN ASSISTANT
Payer: COMMERCIAL

## 2021-02-04 DIAGNOSIS — F51.04 PSYCHOPHYSIOLOGICAL INSOMNIA: Primary | ICD-10-CM

## 2021-02-04 DIAGNOSIS — F41.9 ANXIETY: ICD-10-CM

## 2021-02-04 PROCEDURE — 99204 OFFICE O/P NEW MOD 45 MIN: CPT | Mod: 95 | Performed by: NURSE PRACTITIONER

## 2021-02-04 RX ORDER — TRAZODONE HYDROCHLORIDE 50 MG/1
TABLET, FILM COATED ORAL
Qty: 90 TABLET | Refills: 0 | Status: SHIPPED | OUTPATIENT
Start: 2021-02-04 | End: 2021-02-24

## 2021-02-04 RX ORDER — OLANZAPINE 5 MG/1
5 TABLET ORAL
COMMUNITY
Start: 2020-05-07 | End: 2021-02-11

## 2021-02-04 ASSESSMENT — ANXIETY QUESTIONNAIRES
1. FEELING NERVOUS, ANXIOUS, OR ON EDGE: SEVERAL DAYS
4. TROUBLE RELAXING: MORE THAN HALF THE DAYS
GAD7 TOTAL SCORE: 8
5. BEING SO RESTLESS THAT IT IS HARD TO SIT STILL: SEVERAL DAYS
GAD7 TOTAL SCORE: 8
2. NOT BEING ABLE TO STOP OR CONTROL WORRYING: SEVERAL DAYS
7. FEELING AFRAID AS IF SOMETHING AWFUL MIGHT HAPPEN: SEVERAL DAYS
3. WORRYING TOO MUCH ABOUT DIFFERENT THINGS: SEVERAL DAYS
7. FEELING AFRAID AS IF SOMETHING AWFUL MIGHT HAPPEN: SEVERAL DAYS
6. BECOMING EASILY ANNOYED OR IRRITABLE: SEVERAL DAYS

## 2021-02-04 ASSESSMENT — PATIENT HEALTH QUESTIONNAIRE - PHQ9
SUM OF ALL RESPONSES TO PHQ QUESTIONS 1-9: 11
SUM OF ALL RESPONSES TO PHQ QUESTIONS 1-9: 11
10. IF YOU CHECKED OFF ANY PROBLEMS, HOW DIFFICULT HAVE THESE PROBLEMS MADE IT FOR YOU TO DO YOUR WORK, TAKE CARE OF THINGS AT HOME, OR GET ALONG WITH OTHER PEOPLE: SOMEWHAT DIFFICULT

## 2021-02-04 NOTE — Clinical Note
Thank you for the Psychiatry referral to the [facility] ([facility]). Our psychiatry providers act as a specialty service for Primary Care Providers in the [facility] who seek to optimize medications for unstable patients.  Once medications have been optimized, our providers discharge the patient back to the referring Primary Care Provider for ongoing medication management.  This type of system allows our providers to serve a high volume of patients.     Please see my Impression and Plan. I will continue to work with them in stabilizing their mood.  If you have any questions or concerns, please let me know. Thank you again!  - Alissa

## 2021-02-04 NOTE — PATIENT INSTRUCTIONS
1. MEDICATIONS:   Discontinue olanzapine  Start trazodone titrated between 50 and 100 to affect as needed    Drug Monitoring:  [place] Prescription Monitoring Program evaluating controlled substances in the last year in [place], [place], [place], [place], or [place]:  [place] Prescription Monitoring Program [] review was not needed today..     2. CONTINGENCY PLAN:  Consider Gabapentin targeting sleep if trazodone not effective    3. CONSULTS/REFERRALS:   None at this time  Coordinate care with therapist as needed    4. MEDICAL:   None at this time  Imaging/studies: none  Coordinate care with PCP (Adeola Reardon) as needed    5. COMMUNITY/PSYCHOSOCIAL INTERVENTIONS/OTHER:   Coordinate care with other community providers as needed    6. OTHER RECOMMENDED ASSESSMENTS:   - None    7. FOLLOW UP: Schedule an appointment with me in three weeks or sooner as needed. Call [phone] to schedule  Follow up with primary care provider as planned or for acute medical concerns.  Call the psychiatric nurse line with medication questions or concerns at [phone].    8. PSYCHOEDUCATION:  Medication side effects and alternatives reviewed. Health promotion activities recommended and reviewed today. All questions addressed. Education and counseling completed regarding risks and benefits of medications and psychotherapy options.  Call the psychiatric nurse line with medication questions or concerns at [phone].  momondot may be used to communicate with your provider, but this is not intended to be used for emergencies.    COMMUNITY RESOURCES:    CRISIS NUMBERS: Provided in [facility] 2/4/2021  Crisis Connection - 532.474.7355  CRISIS TEXT LINE: Text 546258 from anywhere in [place], anytime, any crisis 24/7;  OR SEE www.crisistextline.org  National Suicide Prevention Lifeline: 825.969.3190 (TTY: 919.988.8906). Call anytime for help.  (www.suicidepreventionlifeline.org)  National [employer]ness (www.maryanne.org): 218.811.5368 or 567-902-1482.   Mental  Health Association (www.mentalhealth.org): 302-995-2773 or 607-909-5523.  [place] Crisis Text Line: Text [place] to 944499  Suicide LifeLine Chat: suicidepreventionlifeline.org/chat

## 2021-02-04 NOTE — PROGRESS NOTES
"     PSYCHIATRIC DIAGNOSTIC ASSESSMENT ADULT     Name:  Ashlie Matthews  : 1985    Ashlie Matthews is a 36 year old female who is being evaluated via a billable video visit.      How would you like to obtain your AVS? MyChart  If the video visit is dropped, the invitation should be resent by: Text to cell phone: [phone]  Will anyone else be joining your video visit? No  Location of patient:     [address] [place] [id number]    Telemedicine Visit: The patient's condition can be safely assessed and treated via synchronous audio and visual telemedicine encounter.      Reason for Telemedicine Visit: COVID 19 pandemic and the social and physical recommendations by the CDC and MD.      Originating Site (Patient Location): Patient's home    Distant Site (Provider Location): Provider Remote Setting    Consent:  The patient/guardian has verbally consented to: the potential risks and benefits of telemedicine (video visit or phone) versus in person care; bill my insurance or make self-payment for services provided; and responsibility for payment of non-covered services.     Mode of Communication:  Video Conference via Cash[employer]    As the provider I attest to compliance with applicable laws and regulations related to telemedicine.    IDENTIFICATION   Ashlie Matthews is a 36 year old female who prefers to be called: \"Sima\"  Therapist: None at present  PCP: Adeola Reardon  Other Providers: None      History was provided by patient who was good historian(s).    Patient is a 36 year old,  White American female  who presents for initial psychiatric evaluation. Referred by depression and insomnia their Primary Care Provider: Adeola Reardon PA-C to the [facility] ([facility]) for evaluation of insomnia.  Our psychiatry providers act as a specialty service for Primary Care Providers in the [facility] who seek to optimize medications for unstable patients.  " "Once medications have been optimized, our providers discharge the patient back to the referring Primary Care Provider for ongoing medication management.  This type of system allows our providers to serve a high volume of patients.     Patient attended the session alone.     RECORDS AVAILABLE FOR REVIEW: EHR records through [facility]  and Care Everywhere accessed                                                 CHIEF COMPLAINT   Referral for psychiatric medication consult in the context of insomnia per patient report.      HISTORY OF PRESENT ILLNESS   Reports past reported diagnosis of Anxiety and Postpartum depression    History of depression with suicidal ideation, anxiety, post partum depression. Reports she had a \"break down\" after the birth of her daughter in 2018.  The pregnancy was very challenging including bedrest, hyperemesis and PICC line placement.  She was hospitalized for suicidal ideation.  She has always had a low level thoughts of passive thoughts of being better off dead.  She had never experienced the intensity of having a plan in place.  Her and her  had called the crisis center and she was directed to be seen in the ER where she was discharged home.  2 weeks later the intensity of the thoughts with a plan became increasingly distressing.  She reached out to her  and was then brought to the ER and hospitalized at [facility]. Started on Zyprexa and Seroquel at that time. Was stable on these medications but stopped them both on her own in August 2020 because she was worried about effects on her liver After being diagnosed with hepatic steatosis. She had never been 1 to drink alcohol and was concerned the olanzapine and quetiapine may be contributing to that.  Mood was stable after stopping and she has not been on any medications for mood since then.      She was overall doing well until she was put on prednisone by dermatology for an ongoing rash for several years. The rash " "cleared up with the prednisone but her she was having a really hard time sleeping while taking it. She was first put on the prednisone In December 2020 but the rash returned so she started again January, 2021. She was having significant trouble sleeping. Has tried benadryl, unisom, vistaril prescribed by dermatologist but nothing is helping with sleep.     In general, she feels like her biggest issue that led to her \"break down\" 2 years ago was lack of sleep and as long as she can maintain her sleep her mood is stable and she is able to use nonpharmacological strategies for her anxiety.  When she does not sleep her mood becomes very anxious, crying a lot.  Then the suicidal thoughts or thoughts that \"life would be easier\" if she was gone return more intensely.     Reports a ongoing rash for 8 years and unknown caused, itching fas also impacted sleep.  Primary care provider prescribed zolpidem in January whichworked for one night.  Has been taking  5 mg of olanzapine for the last couple of weeks and now sleeping.  She had been using some of her old prescription.  Now that she is sleeping she feels more like herself. When she sleeps  feels she is stable.  She is concerned about the side effects of the olanzapine including a 30 pound weight gain when she was on it previously    No sleep study, only dealing with this after having daughter.      PSYCHIATRIC HISTORY:   Previous psychiatry: Historically with a provider in [place], [place] briefly who started Viibryd  Dr. Nathan [place]  As soon as started the olanzapine and quetiapine gained 30 lbs.  When stopped in July lost     Current Outpatient Supports:   - Therapist/Psychologist: denies  - : denies  - Community Resources: denies    History of Interventions:  inpatient mental health services and medication(s) from physician / PCP     Cyclic vomiting syndrome since 2nd grade, happens with increase anxious or get a cold, now transitioned into " migraines. Less intense as she has aged.    History of Psychiatric Hospitalizations:   - Inpatient: [facility]     - IOP/PHP/Day treatment: denies  History of Suicidal Ideation: feels she has always had passive SI, never felt she would act on them until she had her daughter.  In the two week period before hospitalization she felt she would act on them, multiple plans.  Hadn't slept for two weeks.  History of Suicide Attempts:  No    Self-injurious Behavior: Denies a history of SIB  PharmacogenomicTesting (such as GeneSight): No     PSYCHIATRIC REVIEW OF SYSTEMS:   Sleep: Adequate, feels rested.  Getting 10 hours a night on average While on the olanzapine           Depression:  Denies vegetative symptoms of depression  PHQ-9 SCORE 8/3/2020 1/8/2021 2/4/2021   PHQ-9 Total Score - - -   PHQ-9 Total Score MyChart 8 (Mild depression) - 11 (Moderate depression)   PHQ-9 Total Score 8 16 11     PHQ9 score is 11 indicating moderate depression.  Suicidal ideation:  No SI currently  Anxiety: Feels the anxiety is manageable with nonpharmacological interventions when her sleep is adequate.  She is an introvert at core and prefers to not be around a lot of people  SLIM-7 SCORE 8/3/2020 1/8/2021 2/4/2021   Total Score 7 (mild anxiety) - 8 (mild anxiety)   Total Score 6 13 8   GAD7 score is is 8 indicating mild anxiety.   Panic: Endorses history of panic attacks   When not getting sleep and would get panic with 's without trigger.     Social anxiety: Endorses the following:  After having daughter, would avoid going to family functions at in laws, not trusting, hard time to passing off to him because she was very protective. On Monday was with his family and didn't feel anxious.  PTSD: Denies experiencing or witnessing an event considered traumatic.    OCD: Denies hx of obsessions or compulsions irresistible urges to do things repeatedly such as counting, washing hands, checking, etc. Denies hoarding.  No  current symptoms     She used to worry she didn't blow out a candle or shut the garage, will take a picture to remind herself she did them.  10 years ago, it did effect her life, but as soon as she started taking the picture and relieved this  Specific fears: heights, something happening to her and her kids  Mood lability:  Could not elicit true manic symptoms, extended periods of decreased need for sleep, extreme high level of energy, or grandiosity. Denies any symptoms consistent with hypomania.  Denies any manic symptoms during the episodes of insomnia.    Psychosis: Denies thought disturbance symptoms or hx of AH, VH, TH, or OH. and Denies having periods of feeling others were plotting to harm them, people reading their mind, reading others mind, receiving special messages from TV, computer, etc.   ADD / ADHD: Denies previous dx of ADHD prior to age 11.   No issues overall, grades were good  Eating Disorder:  Restriction Purging in high school and then up until first pregnancy.  Every doctor appts  Would turn and weigh self backwards because this was egodystonic and creates stress    All other ROS negative.     A 12-item WHODAS 2.0 assessment was completed by the patient today and recorded in [facility].  No flowsheet data found.      FAMILY, MEDICAL, SURGICAL HISTORY REVIEWED.  MEDICATION HAVE BEEN REVIEWED AND ARE CURRENT TO THE BEST OF MY KNOWLEDGE AND ABILITY.  Relationship status: .   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   Current living situation: with family including  and 3 children*     MEDICATIONS                                                                                              Per EHR:   Current Outpatient Medications   Medication Sig     albuterol (2.5 MG/3ML) 0.083% nebulizer solution Take 1 vial (2.5 mg) by nebulization every 6 hours as needed for shortness of breath / dyspnea or  "wheezing     albuterol (PROAIR HFA, PROVENTIL HFA, VENTOLIN HFA) 108 (90 BASE) MCG/ACT inhaler Inhale 2 puffs into the lungs every 6 hours as needed for shortness of breath / dyspnea or wheezing     cetirizine (ZYRTEC ALLERGY) 10 MG tablet Take 10 mg by mouth 2 times daily      loratadine (CLARITIN) 10 MG capsule Take 10 mg by mouth daily     OLANZapine (ZYPREXA) 5 MG tablet Take 5 mg by mouth      ondansetron (ZOFRAN) 4 MG tablet Take 1 tablet (4 mg) by mouth every 6 hours as needed for nausea     No current facility-administered medications for this visit.      I have reviewed this patient's current medications    CURRENT MEDICATION SIDE EFFECTS REPORTED:  Denies     NOTES ABOUT CURRENT PSYCHOTROPIC MEDICATIONS:   Olanzapine 5 mg for the past two weeks.    Currently taking any prescribed or over-the-counter medications/health supplements, particularly those with CV effects?   Yes MV and probiotic    PAST PSYCHOTROPIC MEDICATIONS:  Olanzapine  Diphenhydramine, sleep not effective  Hydroxyzine, not effective for sleep  citalopram    VITALS   LMP 2018 (Exact Date)    LAST DOCUMENTED VITAL SIGN:  DATE:  January 10, 2021, /88 and a heart rate of 26     DEVELOPMENTAL / BIRTH HISTORY:   Pregnancy & Delivery: Full Term, traumatic birth for her mother, emergency , no complications reported with her  Exposure to substances: none  Developmental Milestones: no reported delays    MEDICAL / SURGICAL HISTORY    Past Medical Hx:  Past Medical History:   Diagnosis Date     Allergic rhinitis      Anxiety      Asthma      Cyclic vomiting syndrome      Depressive disorder      Dysmenorrhea      Pneumothorax 2004    from bad coughing       HEIGHT/WEIGHT:  Ht Readings from Last 2 Encounters:   20 1.549 m (5' 1\")   20 1.549 m (5' 1\")      Wt Readings from Last 2 Encounters:   20 69.5 kg (153 lb 4.8 oz)   20 75.4 kg (166 lb 4.8 oz)    Estimated body mass index is 28.97 kg/m  as calculated from " "the following:    Height as of 11/20/20: 1.549 m (5' 1\").    Weight as of 11/20/20: 69.5 kg (153 lb 4.8 oz).     Seizures or Head Injury: Denies history of head injury. Denies history of seizures.  History of cardiac disease, rheumatic fever, fainting or dizziness, especially with exercise, seizures, chest pain or shortness of breath with exercise, unexplained change in exercise tolerance, palpitations, high blood pressure, or heart murmur?   Yes out grew a heart murmur    Past Surgical Hx:  Past Surgical History:   Procedure Laterality Date     ARTHROSCOPY SHOULDER Bilateral 2008,2009     GENITOURINARY SURGERY  05/2019    Interstim placement     GYN SURGERY  7/16/18     Laparoscopy to look for endometriosis  8/2004     LAPAROTOMY MINI, TUBAL LIGATION (POST PARTUM), COMBINED Bilateral 5/11/2016    Procedure: COMBINED LAPAROTOMY MINI, TUBAL LIGATION (POST PARTUM);  Surgeon: Nannette Shane DO;  Location: [facility] OR     MYRINGOTOMY, INSERT TUBE BILATERAL, COMBINED  as a child     ORTHOPEDIC SURGERY  2008/2009    Shoulder     REMOVE STIMULATOR SACRAL NERVE Left 7/6/2020    Procedure: EXCISE INTERSTEM SACRAL NERVE STIMULATOR BATTERY AND LEAD LINE;  Surgeon: Pooja Lin MD;  Location: [facility] OR     Septoplasty for deviated septum  7/2005     SINUS SURGERY  2007     TONSILLECTOMY  2006      Surgery:   Past Surgical History:   Procedure Laterality Date     ARTHROSCOPY SHOULDER Bilateral 2008,2009     GENITOURINARY SURGERY  05/2019    Interstim placement     GYN SURGERY  7/16/18     Laparoscopy to look for endometriosis  8/2004     LAPAROTOMY MINI, TUBAL LIGATION (POST PARTUM), COMBINED Bilateral 5/11/2016    Procedure: COMBINED LAPAROTOMY MINI, TUBAL LIGATION (POST PARTUM);  Surgeon: Nannette Shane DO;  Location: [facility] OR     MYRINGOTOMY, INSERT TUBE BILATERAL, COMBINED  as a child     ORTHOPEDIC SURGERY  2008/2009    Shoulder     REMOVE STIMULATOR SACRAL NERVE Left 7/6/2020    Procedure: EXCISE INTERSTEM " "SACRAL NERVE STIMULATOR BATTERY AND LEAD LINE;  Surgeon: Pooja Lin MD;  Location: [facility] OR     Septoplasty for deviated septum  7/2005     SINUS SURGERY  2007     TONSILLECTOMY  2006       Medical Hospitalizations: once for the flu  Serious Medical Illnesses: None    Diet: Gluten free and dairy free for two months.   Exercise: adequate  Supplements: Reviewed per Electronic Medical Record Today    LABS & IMAGING                                                                                                                  Recent Labs   Lab Test 07/28/20  0943 01/20/18  1047 01/20/18  1047   WBC 6.4   < > 4.0   HGB 13.7   < > 15.0   HCT 41.5   < > 44.5   MCV 87   < > 88      < > 141*   ANEU  --   --  3.2    < > = values in this interval not displayed.     Recent Labs   Lab Test 07/28/20  0943      POTASSIUM 4.0   CHLORIDE 107   CO2 22   GLC 97   MURIEL 8.9   BUN 10   CR 0.67   GFRESTIMATED >90   ALBUMIN 3.8   PROTTOTAL 8.4   AST 18   ALT 22   ALKPHOS 56   BILITOTAL 0.8     Recent Labs   Lab Test 07/28/20  0943   CHOL 183   *   HDL 36*   TRIG 206*   A1C 5.2     Recent Labs   Lab Test 03/01/19  0924   TSH 1.02       ALLERGY & IMMUNIZATIONS       Allergies   Allergen Reactions     Moxifloxacin Hydrochloride Nausea and Vomiting     Avelox-vomiting     Cats Itching     Codeine GI Disturbance     Darvocet [Propoxyphene N-Apap] Hives     PN: LW Reaction: Rash, Generalized     Demerol Hives     Dog Hair [Dogs] Unknown     Dust Mites Itching and Swelling     Meperidine      PN: LW Reaction: Rash, Generalized     Oxycodone-Acetaminophen      PN: LW Reaction: SHORTNESS OF BREATH     Pollen Extract Itching     Vicodin [Hydrocodone-Acetaminophen] GI Disturbance     PN: LW Reaction: Rash, Generalized     Latex Itching, Swelling and Rash     \"sensitivity\"         FAMILY MEDICAL HISTORY:     Family History   Problem Relation Age of Onset     Diabetes Maternal Grandfather         insulin     Hypertension " Maternal Grandfather      Obesity Maternal Grandfather      Respiratory Maternal Grandfather         asthma     Obesity Maternal Grandmother      Hypertension Mother      Gastrointestinal Disease Mother         Pancreatitis-flare ups often     Lipids Mother         high     Blood Disease Mother      Diabetes Mother      Heart Disease Father      Coronary Artery Disease Father      Allergies Son         Allergic to peanuts, different foods, Amoxicillin     Family history of sudden or unexplained death or an event requiring resuscitation in children or young adults, cardiac arrhythmias (eg, Alvaro-Parkinson-White syndrome), long QT syndrome, catecholaminergic paroxysmal ventricular tachycardia, Brugada syndrome, arrhythmogenic right ventricular dysplasia, hypertrophic cardiomyopathy, dilated cardiomyopathy, or Marfan syndrome?  Yes Dad an MI in mid 50's    FAMILY PSYCHIATRIC HISTORY:   Maternal: First Generation:  Denies, Second Generation:  Denies and Third Generation:  Unknown  Paternal: First Generation:  Yes: dad with anxiety, no medications, Second Generation:  Unknown and Third Generation:  Unknown  Siblings: Denies  Substance use history in family:  Denies  Family suicide history: Denies  Medications family responded to: Unknown     SIGNIFICANT SOCIAL/FAMILY HISTORY:                                           Born in [place], [place] and raised in [place], [place].  Parents   Siblings:  2 sisters  Childhood: Yes intact home with all current basic needs being met.  Highest education level was high school graduate.    Service: No  Relationship status: .   Children: three children, 10, 8, 4    Trauma history: Denies}  ACES (Adverse Childhood Experiences):  None.  Grew up in an intact home with all basic needs being met  ACES score is 0  Issues of possible neglect are not present.     Current stressors include: sleep and COVID 19 Pandemic and the social and physical distancing recommendations by the  [facility] and [facility]  Supports: Family,  and sister, feels good support system  Coping mechanisms and supports include: essential oils, walking, hot shower or both, distract  Enjoys:  Family time, used to do dinners every Sunday with family.      STRENGTHS AND OPPORTUNITIES:   Ashlie Matthews identified the following strengths or resources that may help she succeed in counseling: commitment to health and well being, community involvement, exercise routine, friends / good social support, family support, insight, intelligence, motivation and strong social skills. Things that may interfere with the patient's success include:  none noted at this time.    LEGAL:  Denies       SUBSTANCE USE HISTORY    Tobacco use:   History   Smoking Status     Never Smoker   Smokeless Tobacco     Never Used   Current alcohol:   reports no history of alcohol use.   Current substance use: Denies   Past use alcohol/substance use: No     Chemical dependency history: Patient has not received chemical dependency treatment in the past  Recovery Programming Involvement: Not Applicable    Based on the clinical interview, there  are not indications of drug or alcohol abuse.    MEDICAL REVIEW OF SYSTEMS:   Ten system review was completed with pertinent positives noted above      MENTAL STATUS EXAM:   General/Constitutional:  Appearance:  awake, alert, adequately groomed, appeared stated age and no apparent distress  Attitude:   cooperative   Eye Contact:  good  Musculoskeletal:  Psychomotor Behavior:  no evidence of tardive dyskinesia, dystonia, or tics from the head up  Psychiatric:  Speech:  clear, coherent, regular rate, rhythm, and volume,  No pressure speech noted.  Associations:  no loose associations  Thought Process:  logical, linear and goal oriented  Thought Content:   No evidence of active suicidal ideation or homicidal ideation, no evidence of psychotic thought, no auditory hallucinations present and no visual hallucinations  present  Mood:  better  Affect:  appropriate and in normal range  Insight:  good  Judgment:  intact, adequate for safety  Impulse Control:  intact  Neurological:  Oriented to:  person, place, time, and situation  Attention Span and Concentration:  normal   Language: intact    Recent and Remote Memory:  Intact to interview. Not formally assessed. No amnesia.   Fund of Knowledge: appropriate          SAFETY   Feels safe in home: Yes   Suicidal ideation: No SI currently  History of suicide attempts:  No   Hx of impulsivity: Yes     Hope for the future: present   Hx of Command hallucinations or current psychosis: No  History of Self-injurious behaviors: No Current:  No  Family member  by suicide:  No      SAFETY ASSESSMENT:   Based on all available evidence including the factors cited above, overall Risk for harm is low and is appropriate for outpatient level of care.   Recommended that patient call 911 or go to the local ED should there be a change in any of these risk factors.     LANGUAGE OR COMMUNICATION BARRIERS   Are there language or communication issues or need for modification in treatment? No   Are there ethnic, cultural or Episcopalian factors that may be relevant for therapy? No  Client identified their preferred language to be fluent English in conversational context  Does the client need the assistance of an  or other support involved in therapy? No    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 sleep for extended period of time (Medical Condition)  780.52 (G47.00) Insomnia Disorder   With non-sleep disorder mental comorbidity  Episodic      MEDICAL COMORBIDITY IMPACTING CLINICAL PICTURE: rash which impacts her sleep when it has active itching and distress    ASSESSMENT    Ashlie Matthews is a 36 year old White American female presenting for psychiatric evaluation and medication management through the [facility].  Information is  obtained from patient and available records.  Denies prior psychiatric hospitalizations. Hx of suicidal ideation, no suicide attempts. No history of self-injurious behaviors. Genetically loaded for  Anxiety prevalent. Grew up in an intact home with all basic needs being met.     Patient referred to psychiatry consult due to episodes of prolonged insomnia which then impact her mood.  These are also impacted by ongoing rash that she is working with a dermatologist to identify causes.  Itching will keep her awake, most recently prescribed prednisone which also induced insomnia.  She had been prescribed olanzapine nightly, Seroquel, and Viibryd historically when she was hospitalized 2 years after the birth of her third child.  She stopped on her own when she no longer had insurance and cost was a barrier.  In addition she had gained 30 pounds which was ego-dystonic despite the fact that she was eating healthy and exercising.  Once she stopped the olanzapine she lost 17 pounds without effort.  She had olanzapine at home from her previous get prescription and started this again 2 weeks ago due to the distress of not being able to sleep.  She is now sleeping on this.  Her anxiety is manageable when she gets enough sleep.  She has never trialed other sleep medications with the exception of zolpidem recently which worked for 1 night and then was no longer helpful.  She has also trialed multiple over-the-counter treatments.  These have been with little effect.  She has never trialed trazodone and will use this as needed.  She can titrate from 50 to 150 mg to effect.  If this is not effective she can message through [facility] or call and we will look at other options prior to her next visit which will be in 3 weeks.  TREATMENT PLAN                                                                                                  1. MEDICATIONS:   Discontinue olanzapine  Start trazodone titrated between 50 and 100 to affect as  needed    Drug Monitoring:  [place] Prescription Monitoring Program evaluating controlled substances in the last year in [place], [place], [place], [place], or [place]:  [place] Prescription Monitoring Program [] review was not needed today..     2. CONTINGENCY PLAN:  Consider Gabapentin targeting sleep if trazodone not effective    3. CONSULTS/REFERRALS:   None at this time  Coordinate care with therapist as needed    4. MEDICAL:   None at this time  Imaging/studies: none  Coordinate care with PCP (Adeola Reardon) as needed    5. COMMUNITY/PSYCHOSOCIAL INTERVENTIONS/OTHER:   Coordinate care with other community providers as needed    6. OTHER RECOMMENDED ASSESSMENTS:   - None    7. FOLLOW UP: Schedule an appointment with me in three weeks or sooner as needed. Call [phone] to schedule  Follow up with primary care provider as planned or for acute medical concerns.  Call the psychiatric nurse line with medication questions or concerns at [phone].    8. PSYCHOEDUCATION:  Medication side effects and alternatives reviewed. Health promotion activities recommended and reviewed today. All questions addressed. Education and counseling completed regarding risks and benefits of medications and psychotherapy options.  Call the psychiatric nurse line with medication questions or concerns at [phone].  Offline Mediat may be used to communicate with your provider, but this is not intended to be used for emergencies.    COMMUNITY RESOURCES:    CRISIS NUMBERS: Provided in [facility] 2/4/2021  Crisis Connection - 111.318.4808  CRISIS TEXT LINE: Text 286280 from anywhere in [place], anytime, any crisis 24/7;  OR SEE www.crisistextline.org  National Suicide Prevention Lifeline: 623.754.6004 (TTY: 178.935.5940). Call anytime for help.  (www.suicidepreventionlifeline.org)  National [place] on Mental Illness (www.maryanne.org): 518.138.1740 or 564-934-4644.   Mental Health Association (www.mentalhealth.org): 182.526.9926 or 808-383-6008.  [place] Crisis Text [place]:  Text [place] to 980236  Suicide LifeLine Chat: suicidepreventionlifeline.org/chat    ADMINISTRATIVE BILLIN min spent interviewing patient, reviewing referral documents, obtaining and reviewing outside records, communication with other health specialists, and preparing this report.    Start: 2021 08:49 am  Stop: 2021 09:38 am    Greater than 50% of time was spent in counseling and coordination of care regarding above diagnoses and treatment plan.     Patient Status:  Our psychiatry providers act as a specialty service for Primary Care Providers in the [facility] that seek to optimize medications for unstable patients.  Once medications have been optimized, our providers discharge the patient back to the referring Primary Care Provider for ongoing medication management.  This type of system allows our providers to serve a high volume of patients. At this time  Patient will continue to be seen for ongoing consultation and stabilization.    Signed:   Alissa Bowman, MSN, APRN, [facility] ([facility])   Chart documentation done in part with Dragon Voice Recognition software.  Although reviewed after completion, some word and grammatical errors may remain.

## 2021-02-05 ASSESSMENT — PATIENT HEALTH QUESTIONNAIRE - PHQ9: SUM OF ALL RESPONSES TO PHQ QUESTIONS 1-9: 11

## 2021-02-05 ASSESSMENT — ANXIETY QUESTIONNAIRES: GAD7 TOTAL SCORE: 8

## 2021-02-09 ENCOUNTER — MYC MEDICAL ADVICE (OUTPATIENT)
Dept: PSYCHOLOGY | Facility: CLINIC | Age: 36
End: 2021-02-09

## 2021-02-09 DIAGNOSIS — F51.04 PSYCHOPHYSIOLOGICAL INSOMNIA: Primary | ICD-10-CM

## 2021-02-09 RX ORDER — GABAPENTIN 300 MG/1
CAPSULE ORAL
Qty: 60 CAPSULE | Refills: 0 | Status: SHIPPED | OUTPATIENT
Start: 2021-02-09 | End: 2021-02-24

## 2021-02-09 NOTE — TELEPHONE ENCOUNTER
Patient had appointment with provider on 2/4/2021. Please review and completed patient PHQ9. Please see patient mychart message.

## 2021-02-09 NOTE — TELEPHONE ENCOUNTER
Called and left a message with patient to discontinue trazodone and initiate gabapentin 300 to 600 mg at night targeting insomnia.  Prescription sent to her pharmacy    Alissa Bowman, MSN, APRN, CNP, FMHNP-BC,   [facility]

## 2021-02-11 ENCOUNTER — TELEPHONE (OUTPATIENT)
Dept: PSYCHIATRY | Facility: CLINIC | Age: 36
End: 2021-02-11

## 2021-02-11 DIAGNOSIS — F51.02 INSOMNIA DUE TO PSYCHOLOGICAL STRESS: Primary | ICD-10-CM

## 2021-02-11 DIAGNOSIS — Z79.899 HIGH RISK MEDICATION USE: ICD-10-CM

## 2021-02-11 DIAGNOSIS — F31.0 BIPOLAR AFFECTIVE DISORDER, CURRENT EPISODE HYPOMANIC (H): ICD-10-CM

## 2021-02-11 RX ORDER — OLANZAPINE 5 MG/1
5 TABLET ORAL AT BEDTIME
Qty: 30 TABLET | Refills: 0 | Status: SHIPPED | OUTPATIENT
Start: 2021-02-11 | End: 2021-02-24

## 2021-02-11 NOTE — TELEPHONE ENCOUNTER
Telephone call with patient.  She would prefer to continue with olanzapine as she knows she tolerates it without feeling groggy or dizzy the following day.   she is concerned about weight gain.  Will augment with metformin 250 mg twice a day at this time and follow-up at her appointment on February 24, 2021

## 2021-02-24 ENCOUNTER — VIRTUAL VISIT (OUTPATIENT)
Dept: PSYCHIATRY | Facility: CLINIC | Age: 36
End: 2021-02-24
Payer: COMMERCIAL

## 2021-02-24 DIAGNOSIS — F39 MOOD DISORDER (H): ICD-10-CM

## 2021-02-24 DIAGNOSIS — F51.02 INSOMNIA DUE TO PSYCHOLOGICAL STRESS: Primary | ICD-10-CM

## 2021-02-24 DIAGNOSIS — F06.4 ANXIETY DISORDER DUE TO KNOWN PHYSIOLOGICAL CONDITION: ICD-10-CM

## 2021-02-24 PROCEDURE — 99213 OFFICE O/P EST LOW 20 MIN: CPT | Mod: 95 | Performed by: NURSE PRACTITIONER

## 2021-02-24 RX ORDER — OLANZAPINE 5 MG/1
5 TABLET ORAL AT BEDTIME
Qty: 30 TABLET | Refills: 2 | Status: SHIPPED | OUTPATIENT
Start: 2021-02-24 | End: 2021-04-21

## 2021-02-24 ASSESSMENT — ANXIETY QUESTIONNAIRES
6. BECOMING EASILY ANNOYED OR IRRITABLE: NOT AT ALL
3. WORRYING TOO MUCH ABOUT DIFFERENT THINGS: NOT AT ALL
1. FEELING NERVOUS, ANXIOUS, OR ON EDGE: NOT AT ALL
7. FEELING AFRAID AS IF SOMETHING AWFUL MIGHT HAPPEN: NOT AT ALL
GAD7 TOTAL SCORE: 0
5. BEING SO RESTLESS THAT IT IS HARD TO SIT STILL: NOT AT ALL
2. NOT BEING ABLE TO STOP OR CONTROL WORRYING: NOT AT ALL

## 2021-02-24 ASSESSMENT — PATIENT HEALTH QUESTIONNAIRE - PHQ9
SUM OF ALL RESPONSES TO PHQ QUESTIONS 1-9: 1
5. POOR APPETITE OR OVEREATING: NOT AT ALL

## 2021-02-24 NOTE — PATIENT INSTRUCTIONS
ASSESSMENT    Patient is now back on the olanzapine 5 mg nightly and sleeping well.  She has gained 3 pounds.  Currently on metformin 250 mg twice a day.  She is eating healthy and utilizing intermittent fasting.  She would like to continue at the 250 twice daily of the Metformin.  Can increase to 500 mg twice a day if needed.  Discussed bipolar.  She does not appear to have a bipolar trajectory as when her sleep cycle is targeted and maintained she does not have any type of manic symptoms.  Have left the mood disorder diagnosis to support the use of second-generation antipsychotic.    TREATMENT PLAN                                                                                                  1. MEDICATIONS:   Continue the olanzapine and metformin at current doses.  If she would like to increase the metformin to 500 mg twice a day before the next appointment she can call or send a [facility] message.       2. CONTINGENCY PLAN:  Once the sleep-wake cycle is stabilized may be able to utilize olanzapine as needed instead of scheduled nightly     3. CONSULTS/REFERRALS:   None at this time  Coordinate care with therapist as needed     4. MEDICAL:   Metabolic labs completed July 2020   Coordinate care with PCP (Adeola Reardon) as needed     5. COMMUNITY/PSYCHOSOCIAL INTERVENTIONS/OTHER:   Coordinate care with other community providers as needed     6. OTHER RECOMMENDED ASSESSMENTS:   - None     7. FOLLOW UP: Schedule an appointment with me in two months or sooner as needed. Call [phone] to schedule  Follow up with primary care provider as planned or for acute medical concerns.  Call the psychiatric nurse line with medication questions or concerns at [phone].     8. PSYCHOEDUCATION:  Medication side effects and alternatives reviewed. Health promotion activities recommended and reviewed today. All questions addressed. Education and counseling completed regarding risks and benefits of medications and  psychotherapy options.  Call the psychiatric nurse line with medication questions or concerns at [phone].  MyChart may be used to communicate with your provider, but this is not intended to be used for emergencies.     COMMUNITY RESOURCES:    CRISIS NUMBERS: Provided in S 2/4/2021  Crisis Connection - 233.111.7567  CRISIS TEXT LINE: Text 630234 from anywhere in [place], anytime, any crisis 24/7;  OR SEE www.crisistextline.org  National Suicide Prevention Lifeline: 562.952.9196 (TTY: 556.976.5032). Call anytime for help.  (www.suicidepreventionlifeline.org)  National [place] on Mental Illness (www.maryanne.org): 326.519.9090 or 207-069-1941.   Mental Health Association (www.mentalhealth.org): 859.647.1457 or 727-707-6237.  [place] Crisis Text [place]: Text [place] to 084799  Suicide LifeLine Chat: suicidepreventionlifeline.org/chat

## 2021-02-24 NOTE — PROGRESS NOTES
"Sima is a 36 year old who is being evaluated via a billable video visit.      How would you like to obtain your AVS? MyChart  If the video visit is dropped, the invitation should be resent by: Text to cell phone: [phone]  Will anyone else be joining your video visit? No     PSYCHIATRIC MEDICATION FOLLOW UP APPT: ADULT     Name:  Ashlie Matthews  : 1985    Ashlie Matthews is a 36 year old female who is being evaluated via a billable Video visit.      Telemedicine Visit: The patient's condition can be safely assessed and treated via synchronous audio and visual telemedicine encounter.      Reason for Telemedicine Visit: COVID 19 pandemic and the social and physical recommendations by the CDC and MD.      Originating Site (Patient Location): Patient's home    Distant Site (Provider Location): Provider Remote Setting    Consent:  The patient/guardian has verbally consented to: the potential risks and benefits of telemedicine (video visit or phone) versus in person care; bill my insurance or make self-payment for services provided; and responsibility for payment of non-covered services.     Mode of Communication:  Video Conference via [employer]    As the provider I attest to compliance with applicable laws and regulations related to telemedicine.    IDENTIFICATION   Ashlie Matthews is a 36 year old female who prefers to be called: \"Sima\"  Therapist: None at present  PCP: Adeola Reardon  Other Providers: None        Last seen for outpatient psychiatry Consultation on 2021.      FOLLOWING PLAN PUT INTO PLACE: Discontinue olanzapine  Start trazodone titrated between 50 and 100 to affect as needed    INTERIM HISTORY     COMMUNICATIONS FROM PATIENT VIA:  [employer] and telephone, did not tolerate the trazodone and nervous to trial recommended gabapentin.  Olanzapine restarted at patient request and after discussion on the phone, added metformin to target weight gain.       RECORDS AVAILABLE FOR " "REVIEW: EHR records through [facility]     HISTORY OF PRESENT ILLNESS   [facility] referral for psychiatric medication consult in February 2021. Reports past reported diagnosis of Anxiety and Postpartum depression.  One psychiatric hospitalization in 2018 where olanzapine, Viibryd and quetiapine were initiated.   Hx of suicidal ideation, no suicide attempts. No history of self-injurious behaviors. Genetically loaded for anxiety prevalent. Grew up in an intact home with all basic needs being met.    Reports she had a \"break down\" after the birth of her daughter in 2018.  The pregnancy was very challenging including bedrest, hyperemesis and PICC line placement.  She was hospitalized for suicidal ideation.  She has always had a low level thoughts of passive thoughts of being better off dead.  She had never experienced the intensity of having a plan in place.  Her and her  had called the crisis center and she was directed to be seen in the ER where she was discharged home.  2 weeks later the intensity of the thoughts with a plan became increasingly distressing.  She reached out to her  and was then brought to the ER and hospitalized at [facility]. Started on Zyprexa and Seroquel at that time. Was stable on these medications but stopped them both on her own in August 2020 because she was worried about effects on her liver After being diagnosed with hepatic steatosis. She had never been 1 to drink alcohol and was concerned the olanzapine and quetiapine may be contributing to that.  Mood was stable after stopping and she has not been on any medications for mood since then.      She was overall doing well until she was put on prednisone by dermatology for an ongoing rash for 8 years and unknown cause.  The itching has also impacted sleep.The rash cleared up with the prednisone but she was having a really hard time sleeping while taking it. She was first put on the prednisone In December 2020 but the rash " "returned so she started again January, 2021. She was having significant trouble sleeping. Has tried benadryl, unisom, vistaril prescribed by dermatologist but nothing is helping with sleep.     In general, she feels like her biggest issue that led to her decompensation 2 years ago was lack of sleep and as long as she can maintain her sleep her mood is stable and she is able to use nonpharmacological strategies for her anxiety.  When she does not sleep her mood becomes very anxious, crying a lot.  Then the suicidal thoughts or thoughts that \"life would be easier\" if she was gone return more intensely.      She stopped the combination of olanzapine, Viibryd and quetiapine on her own when she no longer had insurance and cost was a barrier.  In addition she had gained 30 pounds which was ego-dystonic despite the fact that she was eating healthy and exercising.  When she started having difficulty sleeping again in early 2021, she restarted the olanzapine from a left over script.  The olanzapine has been the most effective in targeting the sleep wake cycle.         FAMILY, MEDICAL, SURGICAL HISTORY REVIEWED.  MEDICATION HAVE BEEN REVIEWED AND ARE CURRENT TO THE BEST OF MY KNOWLEDGE AND ABILITY.  Relationship status: .   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   Current living situation: with family including  and 3 children*       MEDICATIONS                                                                                              I have reviewed this patient's current medications  Per EHR:    Current Outpatient Medications:      albuterol (2.5 MG/3ML) 0.083% nebulizer solution, Take 1 vial (2.5 mg) by nebulization every 6 hours as needed for shortness of breath / dyspnea or wheezing, Disp: 30 vial, Rfl: 3     albuterol (PROAIR HFA, PROVENTIL HFA, VENTOLIN HFA) 108 (90 BASE) MCG/ACT inhaler, Inhale 2 puffs into " the lungs every 6 hours as needed for shortness of breath / dyspnea or wheezing, Disp: 1 Inhaler, Rfl: 11     cetirizine (ZYRTEC ALLERGY) 10 MG tablet, Take 10 mg by mouth 2 times daily , Disp: , Rfl:      loratadine (CLARITIN) 10 MG capsule, Take 10 mg by mouth daily, Disp: , Rfl:      metFORMIN (GLUCOPHAGE) 500 MG tablet, Take 0.5 tablets (250 mg) by mouth 2 times daily (with meals), Disp: 30 tablet, Rfl: 0     OLANZapine (ZYPREXA) 5 MG tablet, Take 1 tablet (5 mg) by mouth At Bedtime, Disp: 30 tablet, Rfl: 0     ondansetron (ZOFRAN) 4 MG tablet, Take 1 tablet (4 mg) by mouth every 6 hours as needed for nausea, Disp: 30 tablet, Rfl: 0    NOTES ABOUT CURRENT PSYCHOTROPIC MEDICATIONS:   Olanzapine 5 mg, this is best medication that has targeted insomnia and subsequently mood and would like to continue  Metformin 250 mg twice a day targeting metabolic side effects of olanzapine      PAST PSYCHOTROPIC MEDICATIONS:  Diphenhydramine, sleep not effective  Hydroxyzine, not effective for sleep  Citalopram  Trazodone, dizziness throughout the day  Zolpidem 5 mg, worked for one night only      TODAY PATIENT REPORTS THE FOLLOWING PSYCHIATRIC ROS:   Mood is stable now that she is getting sleep.  Denies any intense mood lability or anxiety episodes    PROBLEM: DEPRESSION: Feels the current medication regimen is effective.     PHQ-9 SCORE 1/8/2021 2/4/2021 2/24/2021   PHQ-9 Total Score - - -   PHQ-9 Total Score MyChart - 11 (Moderate depression) -   PHQ-9 Total Score 16 11 1     PHQ9 score is 1 indicating minimal depression.  Suicidal ideation:  No     PROBLEM: ANXIETY: Feels the current medication regimen is effective.   SLIM-7 scores:   SLIM-7 SCORE 1/8/2021 2/4/2021 2/24/2021   Total Score - 8 (mild anxiety) -   Total Score 13 8 0     PROBLEM: SLEEP/INSOMNIA: Improving On the olanzapine nightly 5 mg    CURRENT STRESSORS: None endorsed  COPING MECHANISMS AND SUPPORTS: Family, and Friends  DIET:  Attempting to eat healthy And  "using intermittent fasting  EXERCISE: Adequate  SIDE EFFECTS: Denies   SUBSTANCE USE:  Denies   COMPLIANCE:  states Adherent to medication regimen  HIGH RISK MEDICATION:  Yes Olanzapine and metabolic labs were completed July 2020  REPORTS THE FOLLOWING NEW MEDICAL ISSUES:  none    PERTINENT PAST MEDICAL AND SURGICAL HISTORY     Past Medical History:   Diagnosis Date     Allergic rhinitis      Anxiety      Asthma      Cyclic vomiting syndrome      Depressive disorder      Dysmenorrhea      Pneumothorax 2004    from bad coughing       VITALS   LMP 06/27/2018 (Exact Date)    LAST DOCUMENTED VITAL SIGN:  DATE:  January 10, 2021, /88 and a heart rate of 26        HEIGHT/WEIGHT:  Ht Readings from Last 2 Encounters:   11/20/20 1.549 m (5' 1\")   07/06/20 1.549 m (5' 1\")      Wt Readings from Last 2 Encounters:   11/20/20 69.5 kg (153 lb 4.8 oz)   07/06/20 75.4 kg (166 lb 4.8 oz)    Estimated body mass index is 28.97 kg/m  as calculated from the following:    Height as of 11/20/20: 1.549 m (5' 1\").    Weight as of 11/20/20: 69.5 kg (153 lb 4.8 oz).     LABS & IMAGING                                                                                                                    Recent Labs   Lab Test 07/28/20  0943      POTASSIUM 4.0   CHLORIDE 107   CO2 22   GLC 97   MURIEL 8.9   BUN 10   CR 0.67   GFRESTIMATED >90   ALBUMIN 3.8   PROTTOTAL 8.4   AST 18   ALT 22   ALKPHOS 56   BILITOTAL 0.8     Recent Labs   Lab Test 07/28/20  0943   CHOL 183   *   HDL 36*   TRIG 206*   A1C 5.2     Recent Labs   Lab Test 03/01/19  0924   TSH 1.02       ALLERGY & IMMUNIZATIONS       Allergies   Allergen Reactions     Moxifloxacin Hydrochloride Nausea and Vomiting     Avelox-vomiting     Cats Itching     Codeine GI Disturbance     Darvocet [Propoxyphene N-Apap] Hives     PN: LW Reaction: Rash, Generalized     Demerol Hives     Dog Hair [Dogs] Unknown     Dust Mites Itching and Swelling     Meperidine      PN: LW Reaction: " "Rash, Generalized     Oxycodone-Acetaminophen      PN: LW Reaction: SHORTNESS OF BREATH     Pollen Extract Itching     Vicodin [Hydrocodone-Acetaminophen] GI Disturbance     PN: LW Reaction: Rash, Generalized     Latex Itching, Swelling and Rash     \"sensitivity\"       MEDICAL REVIEW OF SYSTEMS:   Ten system review was completed with pertinent positives noted     MENTAL STATUS EXAM:   General/Constitutional:  Appearance:  awake, alert, adequately groomed, appeared stated age and no apparent distress  Attitude:   cooperative   Eye Contact:  good  Musculoskeletal:  Psychomotor Behavior:  no evidence of tardive dyskinesia, dystonia, or tics from the head up  Psychiatric:  Speech:  clear, coherent, regular rate, rhythm, and volume,  No pressure speech noted.  Associations:  no loose associations  Thought Process:  logical, linear and goal oriented  Thought Content:   No evidence of active suicidal ideation or homicidal ideation, no evidence of psychotic thought, no auditory hallucinations present and no visual hallucinations present  Mood:  good  Affect:  appropriate and in normal range  Insight:  good  Judgment:  intact, adequate for safety  Impulse Control:  intact  Neurological:  Oriented to:  person, place, time, and situation  Attention Span and Concentration:  normal   Language: intact    Recent and Remote Memory:  Intact to interview. Not formally assessed. No amnesia.   Fund of Knowledge: appropriate      SAFETY   Feels safe in home: Yes   Suicidal ideation: No SI currently  History of suicide attempts:  No   Hx of impulsivity: Yes     Hope for the future: present   Hx of Command hallucinations or current psychosis: No  History of Self-injurious behaviors: No Current:  No  Family member  by suicide:  No        SAFETY ASSESSMENT:   Based on all available evidence including the factors cited above, overall Risk for harm is low and is appropriate for outpatient level of care.   Recommended that patient call 911 " or go to the local ED should there be a change in any of these risk factors.      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  She has gained 3 pounds.  Currently on metformin 250 mg twice a day.  She is eating healthy and utilizing intermittent fasting.  She would like to continue at the 250 twice daily of the Metformin.  Can increase to 500 mg twice a day if needed.  Discussed bipolar.  She does not appear to have a bipolar trajectory as when her sleep cycle is targeted and maintained she does not have any type of manic symptoms.  Have left the mood disorder diagnosis to support the use of second-generation antipsychotic.    TREATMENT PLAN                                                                                                  1. MEDICATIONS:   Continue the olanzapine and metformin at current doses.  If she would like to increase the metformin to 500 mg twice a day before the next appointment she can call or send a [employer] message.       2. CONTINGENCY PLAN:  Once the sleep-wake cycle is stabilized may be able to utilize olanzapine as needed instead of scheduled nightly     3. CONSULTS/REFERRALS:   None at this time  Coordinate care with therapist as needed     4. MEDICAL:   Metabolic labs completed July 2020   Coordinate care with PCP (Adeola Reardon) as needed     5. COMMUNITY/PSYCHOSOCIAL INTERVENTIONS/OTHER:   Coordinate care with other community providers as needed     6. OTHER RECOMMENDED ASSESSMENTS:   - None     7. FOLLOW UP: Schedule an appointment with me in two months or sooner as needed. Call [phone] to  schedule  Follow up with primary care provider as planned or for acute medical concerns.  Call the psychiatric nurse line with medication questions or concerns at [phone].     8. PSYCHOEDUCATION:  Medication side effects and alternatives reviewed. Health promotion activities recommended and reviewed today. All questions addressed. Education and counseling completed regarding risks and benefits of medications and psychotherapy options.  Call the psychiatric nurse line with medication questions or concerns at [phone].  [employer] may be used to communicate with your provider, but this is not intended to be used for emergencies.     COMMUNITY RESOURCES:    CRISIS NUMBERS: Provided in AVS 2021  Crisis Connection - 451.340.8595  CRISIS TEXT LINE: Text 956804 from anywhere in [place], anytime, any crisis ;  OR SEE www.crisistextline.org  National Suicide Prevention Lifeline: 191.288.5785 (TTY: 699.997.2525). Call anytime for help.  (www.suicidepreventionlifeline.org)  National [place] on Mental Illness (www.maryanne.org): 200.464.6825 or 221-964-2931.   Mental Health Association (www.mentalhealth.org): 643.792.3167 or 662-297-6956.  [place] Crisis Text Line: Text [place] to 456587  Suicide LifeLine Chat: suicidepreQewzline.org/chat      ADMINISTRATIVE BILLIN min spent interviewing patient, reviewing referral documents, preparing this report.    Video/Phone Start Time: 2021 1015  Video/Phone End Time: 2021 1040    Greater than 50% of time was spent in counseling and coordination of care regarding above diagnoses and treatment plan.    Patient Status:  Patient will continue to be seen for ongoing consultation and stabilization.    Signed:   Alissa Bowman, MSN, APRN, FMHNP-[facility] ([facility])   Chart documentation done in part with Dragon Voice Recognition software.  Although reviewed after completion, some word and grammatical errors may  remain.

## 2021-02-25 ASSESSMENT — ANXIETY QUESTIONNAIRES: GAD7 TOTAL SCORE: 0

## 2021-03-16 DIAGNOSIS — L20.9 ATOPIC ECZEMA: Primary | ICD-10-CM

## 2021-03-16 PROCEDURE — 83516 IMMUNOASSAY NONANTIBODY: CPT | Performed by: PHYSICIAN ASSISTANT

## 2021-03-16 PROCEDURE — 86256 FLUORESCENT ANTIBODY TITER: CPT | Mod: 90 | Performed by: PHYSICIAN ASSISTANT

## 2021-03-16 PROCEDURE — 36415 COLL VENOUS BLD VENIPUNCTURE: CPT | Performed by: PHYSICIAN ASSISTANT

## 2021-03-16 PROCEDURE — 83516 IMMUNOASSAY NONANTIBODY: CPT | Mod: 59 | Performed by: PHYSICIAN ASSISTANT

## 2021-03-16 PROCEDURE — 99000 SPECIMEN HANDLING OFFICE-LAB: CPT | Performed by: PHYSICIAN ASSISTANT

## 2021-03-18 LAB
ENDOMYSIUM IGA TITR SER IF: NORMAL {TITER}
TTG IGA SER-ACNC: <1 U/ML
TTG IGG SER-ACNC: <1 U/ML

## 2021-03-20 ENCOUNTER — OFFICE VISIT (OUTPATIENT)
Dept: URGENT CARE | Facility: URGENT CARE | Age: 36
End: 2021-03-20
Payer: COMMERCIAL

## 2021-03-20 ENCOUNTER — ANCILLARY PROCEDURE (OUTPATIENT)
Dept: GENERAL RADIOLOGY | Facility: CLINIC | Age: 36
End: 2021-03-20
Attending: FAMILY MEDICINE
Payer: COMMERCIAL

## 2021-03-20 VITALS
TEMPERATURE: 98 F | OXYGEN SATURATION: 96 % | HEART RATE: 68 BPM | SYSTOLIC BLOOD PRESSURE: 114 MMHG | DIASTOLIC BLOOD PRESSURE: 74 MMHG

## 2021-03-20 DIAGNOSIS — L08.9 INFECTED BLISTER OF GREAT TOE OF RIGHT FOOT, INITIAL ENCOUNTER: ICD-10-CM

## 2021-03-20 DIAGNOSIS — S90.421A INFECTED BLISTER OF GREAT TOE OF RIGHT FOOT, INITIAL ENCOUNTER: ICD-10-CM

## 2021-03-20 DIAGNOSIS — S97.111A CRUSHING INJURY OF RIGHT GREAT TOE, INITIAL ENCOUNTER: Primary | ICD-10-CM

## 2021-03-20 PROCEDURE — 73660 X-RAY EXAM OF TOE(S): CPT | Mod: RT | Performed by: RADIOLOGY

## 2021-03-20 PROCEDURE — 99213 OFFICE O/P EST LOW 20 MIN: CPT | Performed by: FAMILY MEDICINE

## 2021-03-20 RX ORDER — CEPHALEXIN 500 MG/1
500 CAPSULE ORAL 3 TIMES DAILY
Qty: 30 CAPSULE | Refills: 0 | Status: SHIPPED | OUTPATIENT
Start: 2021-03-20 | End: 2021-03-30

## 2021-03-20 NOTE — PROGRESS NOTES
SUBJECTIVE:  Ashlie Matthews, a 36 year old female scheduled an appointment to discuss the following issues:     Crushing injury of right great toe, initial encounter  Infected blister of great toe of right foot, initial encounter    Medical, social, surgical, and family histories reviewed.     Urgent Care   Infection (Rt big toe infection x6 days- visible redness, mattery discharge, painful, swollen. )  6 days ago a stainless steel frying pain (not hot) fell from a 3 feet high counter and hit her right great toe.  Pt was limping for a few days then ambulating fine after that.  In the last 2 days however,she has developed some redness and swelling of the soft tissue proximal to her right great toe nail, with pain on walking. The area was draining some serous discharge lately. Pt is not pregnant, has had hysterectomy.  No hx of MRSA.  No dogs/cats at home.  Pt states she did have an abrasion at the area of swelling at her right great toe right after the accident, which could be the source of infection.  Tetanus UTD.      ROS:  See HPI.  No nausea/vomiting.  No fever/chills.  No chest pain/SOB.  No BM/urine problems.  No dizziness or syncope.      OBJECTIVE:  /74 (BP Location: Right arm, Patient Position: Chair, Cuff Size: Adult Regular)   Pulse 68   Temp 98  F (36.7  C) (Oral)   LMP 06/27/2018 (Exact Date)   SpO2 96%   EXAM:  GENERAL APPEARANCE:  alert and no distress, afebrile  HENT: normal  NECK: no adenopathy, no asymmetry, masses, or scars and neck supple  RESP: lungs clear to auscultation - no rales, rhonchi or wheezes  CV: regular rates and rhythm, normal S1 S2, no S3 or S4 and no murmur, click or rub  MS: extremities normal- no gross deformities noted; see skin exam; moving all toes; normal bilateral feet and ankles exam otherwise  SKIN: soft tissue swelling with blister appearance with redness and tenderness at proximal aspect of right great toe; no ascending lymphangitis  NEURO: Normal strength  and tone, mentation intact and speech normal    ASSESSMENT/PLAN:  (S97.111A) Crushing injury of right great toe, initial encounter  (primary encounter diagnosis)  Comment: xray right great toe showed:  No fractures are evident. Normal joint spacing and  alignment. Diffuse soft tissue swelling of the great toe.  Plan: XR Toe Right G/E 2 Views         (S90.421A,  L08.9) Infected blister of great toe of right foot, initial encounter  Plan: cephALEXin (KEFLEX) 500 MG capsule     Care instructions given.      Pt to f/up PCP within 1 week if no improvement or worsening.  Warning signs and symptoms explained.

## 2021-03-20 NOTE — PATIENT INSTRUCTIONS
Patient Education     Paronychia of the Finger or Toe  Paronychia is an infection near a fingernail or toenail. It usually occurs when an opening in the cuticle or an ingrown toenail lets bacteria under the skin.   The infection will need to be drained if pus is present. If the infection has been caught early, you may need only antibiotic treatment. Healing will take about 1 to 2 weeks.   Home care  Follow these guidelines when caring for yourself at home:     Clean and soak the toe or finger. Do this 2 times a day for the first 3 days. To do so:  ? Soak your foot or hand in a tub of warm water for 5 minutes. Or hold your toe or finger under a faucet of warm running water for 5 minutes.  ? Clean any crust away with soap and water using a cotton swab.  ? Put antibiotic ointment on the infected area.    Change the dressing daily or any time it gets dirty.    If you were given antibiotics, take them as directed until they are all gone.    If your infection is on a toe, wear comfortable shoes with a lot of toe room. You can also wear open-toed sandals while your toe heals.    You may use over-the-counter medicine (acetaminophen or ibuprofen) to help with pain, unless another medicine was prescribed. If you have chronic liver or kidney disease, talk with your healthcare provider before using these medicines. Also talk with your provider if you've had a stomach ulcer or gastrointestinal bleeding.  Prevention  The following can prevent paronychia:     Don't cut or play with your cuticles at home. A healthy cuticle maintains a seal between your skin and nail and keeps out infection.    Don't bite your nails.    Don't suck on your thumbs or fingers.  Follow-up care  Follow up with your healthcare provider, or as advised.   When to seek medical advice  Call your healthcare provider right away if any of these occur:     Redness, pain, or swelling of the finger or toe gets worse    You have trouble moving or bending the  finger or toe    Red streaks in the skin leading away from the wound    Pus or fluid draining from the nail area    Fever of 100.4 F (38 C) or higher, or as directed by your provider  Larry last reviewed this educational content on 7/1/2019 2000-2020 [employer]. All rights reserved. This information is not intended as a substitute for professional medical care. Always follow your healthcare professional's instructions.

## 2021-04-20 ASSESSMENT — ANXIETY QUESTIONNAIRES
GAD7 TOTAL SCORE: 6
7. FEELING AFRAID AS IF SOMETHING AWFUL MIGHT HAPPEN: NOT AT ALL
1. FEELING NERVOUS, ANXIOUS, OR ON EDGE: MORE THAN HALF THE DAYS
7. FEELING AFRAID AS IF SOMETHING AWFUL MIGHT HAPPEN: NOT AT ALL
6. BECOMING EASILY ANNOYED OR IRRITABLE: SEVERAL DAYS
GAD7 TOTAL SCORE: 6
5. BEING SO RESTLESS THAT IT IS HARD TO SIT STILL: NOT AT ALL
3. WORRYING TOO MUCH ABOUT DIFFERENT THINGS: SEVERAL DAYS
4. TROUBLE RELAXING: SEVERAL DAYS
2. NOT BEING ABLE TO STOP OR CONTROL WORRYING: SEVERAL DAYS
GAD7 TOTAL SCORE: 6

## 2021-04-20 ASSESSMENT — PATIENT HEALTH QUESTIONNAIRE - PHQ9
SUM OF ALL RESPONSES TO PHQ QUESTIONS 1-9: 9
SUM OF ALL RESPONSES TO PHQ QUESTIONS 1-9: 9
10. IF YOU CHECKED OFF ANY PROBLEMS, HOW DIFFICULT HAVE THESE PROBLEMS MADE IT FOR YOU TO DO YOUR WORK, TAKE CARE OF THINGS AT HOME, OR GET ALONG WITH OTHER PEOPLE: SOMEWHAT DIFFICULT

## 2021-04-21 ENCOUNTER — VIRTUAL VISIT (OUTPATIENT)
Dept: PSYCHIATRY | Facility: CLINIC | Age: 36
End: 2021-04-21
Payer: COMMERCIAL

## 2021-04-21 DIAGNOSIS — Z79.899 HIGH RISK MEDICATION USE: ICD-10-CM

## 2021-04-21 DIAGNOSIS — F06.4 ANXIETY DISORDER DUE TO KNOWN PHYSIOLOGICAL CONDITION: Primary | ICD-10-CM

## 2021-04-21 DIAGNOSIS — F31.0 BIPOLAR AFFECTIVE DISORDER, CURRENT EPISODE HYPOMANIC (H): ICD-10-CM

## 2021-04-21 DIAGNOSIS — F39 MOOD DISORDER (H): ICD-10-CM

## 2021-04-21 PROBLEM — F41.9 ANXIETY: Status: RESOLVED | Noted: 2018-11-07 | Resolved: 2021-04-21

## 2021-04-21 PROCEDURE — 99213 OFFICE O/P EST LOW 20 MIN: CPT | Mod: 95 | Performed by: NURSE PRACTITIONER

## 2021-04-21 RX ORDER — HYDROXYZINE PAMOATE 25 MG/1
25 CAPSULE ORAL 3 TIMES DAILY PRN
Qty: 90 CAPSULE | Refills: 0 | Status: SHIPPED | OUTPATIENT
Start: 2021-04-21 | End: 2021-08-03

## 2021-04-21 RX ORDER — OLANZAPINE 5 MG/1
5 TABLET ORAL AT BEDTIME
Qty: 30 TABLET | Refills: 2 | Status: SHIPPED | OUTPATIENT
Start: 2021-04-21 | End: 2021-06-21

## 2021-04-21 RX ORDER — TOPIRAMATE 25 MG/1
TABLET, FILM COATED ORAL
Qty: 60 TABLET | Refills: 1 | Status: SHIPPED | OUTPATIENT
Start: 2021-04-21 | End: 2021-10-05

## 2021-04-21 ASSESSMENT — ANXIETY QUESTIONNAIRES: GAD7 TOTAL SCORE: 6

## 2021-04-21 ASSESSMENT — PATIENT HEALTH QUESTIONNAIRE - PHQ9: SUM OF ALL RESPONSES TO PHQ QUESTIONS 1-9: 9

## 2021-04-21 NOTE — ASSESSMENT & PLAN NOTE
Overall insomnia and mood were stabilizing.  She had reemergence of the rash that is causing distress related to the itching and subsequently impacting her sleep.  She will be talking with a dermatologist today.  She had been using old prescription of quetiapine as needed on 2-3 occasions in the last 6 weeks.  However she is on olanzapine and both of these medications have a high side effect profile with weight gain.  Therefore will transition to hydroxyzine that may be taken as needed anxiety.  It also may have a benefit of targeting the itching.  The Metformin has not had an impact on losing weight it may be mitigating further weight gain.  Will discontinue this and trial topiramate titrated to 25 mg a day

## 2021-04-21 NOTE — PROGRESS NOTES
"Sima is a 36 year old who is being evaluated via a billable video visit.      How would you like to obtain your AVS? MyChart  If the video visit is dropped, the invitation should be resent by: Text to cell phone: [phone]  Will anyone else be joining your video visit? No  {If patient encounters technical issues they should call [phone] :025586}    Video Start Time: {video visit start/end time for provider to select:461711}    {PROVIDER CHARTING PREFERENCE:488360}    Subjective   Sima is a 36 year old who presents for the following health issues {ACCOMPANIED BY STATEMENT (Optional):416604}    HPI   {ALERT  Recent PHQ-9 score indicates suicidal ideations :173170}  ***    Review of Systems   {ROS COMP (Optional):552450}      Objective           Vitals:  No vitals were obtained today due to virtual visit.    Physical Exam   {video visit exam brief selected:414346::\"GENERAL: Healthy, alert and no distress\",\"EYES: Eyes grossly normal to inspection.  No discharge or erythema, or obvious scleral/conjunctival abnormalities.\",\"RESP: No audible wheeze, cough, or visible cyanosis.  No visible retractions or increased work of breathing.  \",\"SKIN: Visible skin clear. No significant rash, abnormal pigmentation or lesions.\",\"NEURO: Cranial nerves grossly intact.  Mentation and speech appropriate for age.\",\"PSYCH: Mentation appears normal, affect normal/bright, judgement and insight intact, normal speech and appearance well-groomed.\"}    {Diagnostic Test Results (Optional):674954}    {AMBULATORY ATTESTATION (Optional):482116}        Video-Visit Details    Type of service:  Video Visit    Video End Time:{video visit start/end time for provider to select:270159}    Originating Location (pt. Location): {video visit patient location:402220::\"Home\"}    Distant Location (provider location):  [facility] MENTAL [facility] & ADDICTION [facility]     Platform used for Video Visit: {Virtual Visit " "Platforms:000532::\"[facility]\"}  Answers for HPI/ROS submitted by the patient on 4/20/2021   If you checked off any problems, how difficult have these problems made it for you to do your work, take care of things at home, or get along with other people?: Somewhat difficult  PHQ9 TOTAL SCORE: 9  SLIM 7 TOTAL SCORE: 6    "

## 2021-04-21 NOTE — PATIENT INSTRUCTIONS
"Continue olanzapine 5 mg nightly.  Discontinue Metformin and replace with topiramate titrated to 50 mg twice a day  Discontinue quetiapine and replace with hydroxyzine 25 mg 3 times a day as needed anxiety/itching       Negative thoughts invade your mind like ants at a picnic.\" The nine different ways your thoughts lie to you and make situations seem worse than they are, are listed below. Think of these nine ways as nine different species of ANTs. When you can, identify the type of ANT and you can take away the power they have over you.          How to Exterminate Your ANTs         When you notice an ANT entering your mind, recognize it, write it down, talk back to it.         When you hear yourself say an ANT, STOP and correct them.         Identify your favorite ANTs and use the following process to kill these ANTs             All or nothing - These are the ANTs that infest your brain when you think everything is good or all bad. It is the same as black or white thinking. If you stick to your exercise plan for a month, you think you think you are the most disciplined person on the planet. If you miss a day at the gym, you think you have no discipline and give up and go back to being a  potato. A better approach is to acknowledge that you didn t do your daily workout and then get back on track the following day. One slip up doesn t mean you should give up entirely.      Always thinking - This is when you think in words that over generalise, such as always, never, every time or everyone. Consider some of the thoughts such as  I will never lose weight,   I have always had a sweet tooth - I will never be able to stop eating chocolate,  This kind of thinking makes you feel like you are doomed to fail at eating right and staying healthy. It is as if you have no control over your actions or behaviours.      Focusing on the negative - This ANT makes you see only the negative aspects of situations even when there are " plenty of positives.  I know I lost 10 pounds, but I wanted to lose 15, so I m a failure  is an example of this type of thinking. Focusing on the negative makes you more inclined to give up on your efforts. Putting a positive spin on this same thought -  wow!  I lost 10 pounds. I m on my way to my goal of 15 pounds  - encourages you to keep up the good work and makes you feel pretty good about yourself.      Thinking with your feelings -  I feel like my skin is never going to clear up.  Thoughts like this occur when you have a feeling about something and you assume it is correct, so you never question it. Feelings can lie too. Look for evidence. In this example, schedule an appointment with a dermatologist to find out if there s anything you can do to improve your skin.      Guilt beating - Thinking in words like  should ,  must ,  ought to , and  have to  are typical with this type of ANT, which involves using excessive guilt to control behaviour. When we feel pushed to do things, our natural tendency is to push back. That doesn t mean that guilt is all bad. There are certainly things in life that we should and shouldn t do if we want to have the best body possible:  I want to eat the chips and guacamole at the party, but I should have the raw carrots instead  or  I feel like staying in bed, but I should do my workout.  Don t mistake these for guilt beating ANTs.      Labelling - When you call yourself or someone else names or use negative terms to describe them, you have a labelling ANT in your brain. A lot of us do this on a regular basis. You may have said one of the following at some point in your life;  I m a loser ;  I m a failure ; or  I m lazy.  The problem with calling yourself names is that it takes away your actions and behaviours. If you are a loser, a failure, or lazy, then why bother trying to change your behaviour? It is as if you have given up before you have even tried. This defeatist attitude can  be ruinous for your body.      Beware of the red ANTs     These last three ANTs are the worst of the bunch. I call them the red ANTs because they can really sting.      Fortune-telling - Predicting the worst even though you don t know what will happen is the hallmark of the fortune telling ANT. Examples include:  I just had a biopsy I am sure it is cancer . Nobody is safe from fortune telling ANTs .      Mind reading - When you think that you know what somebody else is thinking even though they have not told you, and you have not asked them, it is called mind-reading. Listen carefully to the other person before trying to predict what they have to say.      Blame - Of all the ANTs, this one is the worst. Blaming others for your problems and taking no responsibility for your own successes and failures is toxic thinking. For example:  It is your fault I m out of shape because you will not go with me to exercise.  Whenever you begin a sentence with  it is your fault   it ruins your life. These ANTs make you a victim. When you are a victim, you are powerless to change your behaviour. Quit blaming others and take responsibility for your actions.     Resource:  https://www.[employer]/post/2015/12/18/9-types-of-ants-automatic-negative-thoughts-that-invade-our-relationships-and-how-to-exte

## 2021-04-21 NOTE — PROGRESS NOTES
"   PSYCHIATRIC MEDICATION FOLLOW UP APPT: ADULT     Name:  Ashlie Matthews  : 1985    Ashlie Matthews is a 36 year old female who is being evaluated via a billable Video visit.      How would you like to obtain your AVS? [employer]  If the video visit is dropped, the invitation should be resent by: Text to cell phone: [phone]  Will anyone else be joining your video visit? No    Telemedicine Visit: The patient's condition can be safely assessed and treated via synchronous audio and visual telemedicine encounter.      Reason for Telemedicine Visit: COVID 19 pandemic and the social and physical recommendations by the CDC and MD.      Originating Site (Patient Location): Patient's home    Distant Site (Provider Location): Provider Remote Setting    Consent:  The patient/guardian has verbally consented to: the potential risks and benefits of telemedicine (video visit or phone) versus in person care; bill my insurance or make self-payment for services provided; and responsibility for payment of non-covered services.     Mode of Communication:  Video Conference via [employer]    As the provider I attest to compliance with applicable laws and regulations related to telemedicine.    IDENTIFICATION   Ashlie Matthews is a 36 year old female who prefers to be called: \"Sima\"  Therapist: None at present  PCP: Adeola Reardon  Other Providers: None      Last seen for outpatient psychiatry Return Visit on 2021.      FOLLOWING PLAN PUT INTO PLACE: Continue the olanzapine and metformin at current doses.  If she would like to increase the metformin to 500 mg twice a day before the next appointment she can call or send a [employer] message.    INTERIM HISTORY     COMMUNICATIONS FROM PATIENT VIA:  K[employer] and telephone, did not tolerate the trazodone and nervous to trial recommended gabapentin.  Olanzapine restarted at patient request and after discussion on the phone, added metformin to target weight " "gain.       RECORDS AVAILABLE FOR REVIEW: EHR records through [facility]     HISTORY OF PRESENT ILLNESS   [facility]PS referral for psychiatric medication consult in February 2021. Reports past reported diagnosis of Anxiety and Postpartum depression.  One psychiatric hospitalization in 2018 where olanzapine, Viibryd and quetiapine were initiated.   Hx of suicidal ideation, no suicide attempts. No history of self-injurious behaviors. Genetically loaded for anxiety prevalent. Grew up in an intact home with all basic needs being met.    Reports she had a \"break down\" after the birth of her daughter in 2018.  The pregnancy was very challenging including bedrest, hyperemesis and PICC line placement.  She was hospitalized for suicidal ideation.  She has always had a low level thoughts of passive thoughts of being better off dead.  She had never experienced the intensity of having a plan in place.  Her and her  had called the crisis center and she was directed to be seen in the ER where she was discharged home.  2 weeks later the intensity of the thoughts with a plan became increasingly distressing.  She reached out to her  and was then brought to the ER and hospitalized at [facility]. Started on Zyprexa and Seroquel at that time. Was stable on these medications but stopped them both on her own in August 2020 because she was worried about effects on her liver After being diagnosed with hepatic steatosis. She had never been 1 to drink alcohol and was concerned the olanzapine and quetiapine may be contributing to that.  Mood was stable after stopping and she has not been on any medications for mood since then.      She was overall doing well until she was put on prednisone by dermatology for an ongoing rash for 8 years and unknown cause.  The itching has also impacted sleep.The rash cleared up with the prednisone but she was having a really hard time sleeping while taking it. She was first put on the " "prednisone In December 2020 but the rash returned so she started again January, 2021. She was having significant trouble sleeping. Has tried benadryl, unisom, vistaril prescribed by dermatologist but nothing is helping with sleep.     In general, she feels like her biggest issue that led to her decompensation 2 years ago was lack of sleep and as long as she can maintain her sleep her mood is stable and she is able to use nonpharmacological strategies for her anxiety.  When she does not sleep her mood becomes very anxious, crying a lot.  Then the suicidal thoughts or thoughts that \"life would be easier\" if she was gone return more intensely.      She stopped the combination of olanzapine, Viibryd and quetiapine on her own when she no longer had insurance and cost was a barrier.  In addition she had gained 30 pounds which was ego-dystonic despite the fact that she was eating healthy and exercising.  When she started having difficulty sleeping again in early 2021, she restarted the olanzapine from a left over script.  The olanzapine has been the most effective in targeting the sleep wake cycle historically.  Further trials of trazodone, hydroxyzine, gabapentin and zolpidem all were not effective in targeting insomnia.  Restarted the olanzapine 5 mg nightly and overall sleeping well.  However she experiences weight gain.  Trial of metformin not effective.      She does not appear to have a bipolar trajectory as when her sleep cycle is targeted and maintained she does not have any type of manic symptoms.  Have left the mood disorder diagnosis to support the use of second-generation antipsychotic    FAMILY, MEDICAL, SURGICAL HISTORY REVIEWED.  MEDICATION HAVE BEEN REVIEWED AND ARE CURRENT TO THE BEST OF MY KNOWLEDGE AND ABILITY.  Relationship status: .   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   Current living situation: " with family including  and 3 children*     MEDICATIONS                                                                                              I have reviewed this patient's current medications  Per EHR:    Current Outpatient Medications:      albuterol (2.5 MG/3ML) 0.083% nebulizer solution, Take 1 vial (2.5 mg) by nebulization every 6 hours as needed for shortness of breath / dyspnea or wheezing, Disp: 30 vial, Rfl: 3     albuterol (PROAIR HFA, PROVENTIL HFA, VENTOLIN HFA) 108 (90 BASE) MCG/ACT inhaler, Inhale 2 puffs into the lungs every 6 hours as needed for shortness of breath / dyspnea or wheezing, Disp: 1 Inhaler, Rfl: 11     cetirizine (ZYRTEC ALLERGY) 10 MG tablet, Take 10 mg by mouth 2 times daily , Disp: , Rfl:      loratadine (CLARITIN) 10 MG capsule, Take 10 mg by mouth daily, Disp: , Rfl:      metFORMIN (GLUCOPHAGE) 500 MG tablet, Take 0.5 tablets (250 mg) by mouth 2 times daily (with meals) (Patient not taking: Reported on 3/20/2021), Disp: 30 tablet, Rfl: 2     OLANZapine (ZYPREXA) 5 MG tablet, Take 1 tablet (5 mg) by mouth At Bedtime, Disp: 30 tablet, Rfl: 2     ondansetron (ZOFRAN) 4 MG tablet, Take 1 tablet (4 mg) by mouth every 6 hours as needed for nausea, Disp: 30 tablet, Rfl: 0    NOTES ABOUT CURRENT PSYCHOTROPIC MEDICATIONS:   Olanzapine 5 mg, this is best medication that has targeted insomnia and subsequently mood and would like to continue  Metformin 500 mg twice a day targeting metabolic side effects of olanzapine   Has been using quetiapine due to anxiety from the rash and not sleeping (taking it two or three times since last assessment)    PAST PSYCHOTROPIC MEDICATIONS:  Diphenhydramine, sleep not effective  Hydroxyzine, not effective for sleep  Citalopram  Trazodone, dizziness throughout the day  Zolpidem 5 mg, worked for one night only      TODAY PATIENT REPORTS THE FOLLOWING PSYCHIATRIC ROS:   Mood is stable now that she is getting sleep. However rash has reappeared and  causing distress and disrupting sleep.  Has an appointment with dermatologist later this AM. Denies any intense mood lability or anxiety episodes    PROBLEM: DEPRESSION: Feels the current medication regimen is effective.     Last PHQ-9 4/20/2021   1.  Little interest or pleasure in doing things 1   2.  Feeling down, depressed, or hopeless 1   3.  Trouble falling or staying asleep, or sleeping too much 2   4.  Feeling tired or having little energy 1   5.  Poor appetite or overeating 0   6.  Feeling bad about yourself 2   7.  Trouble concentrating 1   8.  Moving slowly or restless 0   Q9: Thoughts of better off dead/self-harm past 2 weeks 1   PHQ-9 Total Score 9   Difficulty at work, home, or with people -   In the past two weeks have you had thoughts of suicide or self harm? Yes   Do you have concerns about your personal safety or the safety of others? No   In the past 2 weeks have you thought about a plan or had intention to harm yourself? No   In the past 2 weeks have you acted on these thoughts in any way? No     PHQ-9 SCORE 2/4/2021 2/24/2021 4/20/2021   PHQ-9 Total Score - - -   PHQ-9 Total Score MyChart 11 (Moderate depression) - 9 (Mild depression)   PHQ-9 Total Score 11 1 9     Suicidal ideation:  Yes distractions and fleeting.  Recently in the context of multiple health issues including teeth financial barriers and starts feeling like a burden. PHQ9 score is 1 indicating minimal depression.  Suicidal ideation:  No     PROBLEM: ANXIETY: Feels the current medication regimen is effective.   SLIM-7 scores:   SLIM-7 SCORE 2/4/2021 2/24/2021 4/20/2021   Total Score 8 (mild anxiety) - 6 (mild anxiety)   Total Score 8 0 6     PROBLEM: SLEEP/INSOMNIA: Improving On the olanzapine nightly 5 mg    PROBLEM: SIDE EFFECTS (weight gain): No change.  Currently on 500 mg twice a day of the metformin and has gained a total of 7 pounds despite exercising and no change in eating habits since starting the olanzapine    CURRENT  "STRESSORS: None endorsed  COPING MECHANISMS AND SUPPORTS: Family, and Friends  DIET:  Attempting to eat healthy And using intermittent fasting  EXERCISE: Adequate  SIDE EFFECTS: Denies   SUBSTANCE USE:  Denies   COMPLIANCE:  states Adherent to medication regimen  HIGH RISK MEDICATION:  Yes Olanzapine and metabolic labs were completed July 2020  REPORTS THE FOLLOWING NEW MEDICAL ISSUES:  none    PERTINENT PAST MEDICAL AND SURGICAL HISTORY     Past Medical History:   Diagnosis Date     Allergic rhinitis      Anxiety      Asthma      Cyclic vomiting syndrome      Depressive disorder      Dysmenorrhea      Pneumothorax 2004    from bad coughing       VITALS   LMP 06/27/2018 (Exact Date)    LAST DOCUMENTED VITAL SIGN:  DATE:  January 10, 2021, /88 and a heart rate of 26        HEIGHT/WEIGHT:  Ht Readings from Last 2 Encounters:   11/20/20 1.549 m (5' 1\")   07/06/20 1.549 m (5' 1\")      Wt Readings from Last 2 Encounters:   11/20/20 69.5 kg (153 lb 4.8 oz)   07/06/20 75.4 kg (166 lb 4.8 oz)    Estimated body mass index is 28.97 kg/m  as calculated from the following:    Height as of 11/20/20: 1.549 m (5' 1\").    Weight as of 11/20/20: 69.5 kg (153 lb 4.8 oz).     LABS & IMAGING                                                                                                                    Recent Labs   Lab Test 07/28/20  0943      POTASSIUM 4.0   CHLORIDE 107   CO2 22   GLC 97   MURIEL 8.9   BUN 10   CR 0.67   GFRESTIMATED >90   ALBUMIN 3.8   PROTTOTAL 8.4   AST 18   ALT 22   ALKPHOS 56   BILITOTAL 0.8     Recent Labs   Lab Test 07/28/20  0943   CHOL 183   *   HDL 36*   TRIG 206*   A1C 5.2     Recent Labs   Lab Test 03/01/19  0924   TSH 1.02       ALLERGY & IMMUNIZATIONS       Allergies   Allergen Reactions     Moxifloxacin Hydrochloride Nausea and Vomiting     Avelox-vomiting     Cats Itching     Codeine GI Disturbance     Darvocet [Propoxyphene N-Apap] Hives     PN: LW Reaction: Rash, Generalized     " "Demerol Hives     Dog Hair [Dogs] Unknown     Dust Mites Itching and Swelling     Meperidine      PN: LW Reaction: Rash, Generalized     Oxycodone-Acetaminophen      PN: LW Reaction: SHORTNESS OF BREATH     Pollen Extract Itching     Vicodin [Hydrocodone-Acetaminophen] GI Disturbance     PN: LW Reaction: Rash, Generalized     Latex Itching, Swelling and Rash     \"sensitivity\"       MEDICAL REVIEW OF SYSTEMS:   Ten system review was completed with pertinent positives noted     MENTAL STATUS EXAM:   General/Constitutional:  Appearance:  awake, alert, adequately groomed, appeared stated age and no apparent distress  Attitude:   cooperative   Eye Contact:  good  Musculoskeletal:  Psychomotor Behavior:  no evidence of tardive dyskinesia, dystonia, or tics from the head up  Psychiatric:  Speech:  clear, coherent, regular rate, rhythm, and volume,  No pressure speech noted.  Associations:  no loose associations  Thought Process:  logical, linear and goal oriented  Thought Content:   No evidence of active suicidal ideation or homicidal ideation, no evidence of psychotic thought, no auditory hallucinations present and no visual hallucinations present  Mood:  good, anxiety  Increase related to the reemergence of the rash  Affect:  appropriate and in normal range  Insight:  good  Judgment:  intact, adequate for safety  Impulse Control:  intact  Neurological:  Oriented to:  person, place, time, and situation  Attention Span and Concentration:  normal   Language: intact    Recent and Remote Memory:  Intact to interview. Not formally assessed. No amnesia.   Fund of Knowledge: appropriate      SAFETY   Feels safe in home: Yes   Suicidal ideation: Endorses passive SI, no intent or plan.   History of suicide attempts:  No   Hx of impulsivity: Yes     Hope for the future: present   Hx of Command hallucinations or current psychosis: No  History of Self-injurious behaviors: No Current:  No  Family member  by suicide:  No      "   SAFETY ASSESSMENT:   Based on all available evidence including the factors cited above, overall Risk for harm is low and is appropriate for outpatient level of care.   Recommended that patient call 911 or go to the local ED should there be a change in any of these risk factors.    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 sleep for extended period of time (Medical Condition)  780.52 (G47.00) Insomnia Disorder   With non-sleep disorder mental comorbidity  Episodic       MEDICAL COMORBIDITY IMPACTING CLINICAL PICTURE: rash which impacts her sleep when it has active itching and distress    ASSESSMENT AND PLAN      Problem List Items Addressed This Visit        Nervous and Auditory    Anxiety disorder due  Insomnia and lack of sleep for extended period of time  - Primary    Relevant Medications    hydrOXYzine (VISTARIL) 25 MG capsule    topiramate (TOPAMAX) 25 MG tablet       Behavioral     Overall insomnia and mood were stabilizing.  She had reemergence of the rash that is causing distress related to the itching and subsequently impacting her sleep.  She will be talking with a dermatologist today.  She had been using old prescription of quetiapine as needed on 2-3 occasions in the last 6 weeks.  However she is on olanzapine and both of these medications have a high side effect profile with weight gain.  Therefore will transition to hydroxyzine that may be taken as needed anxiety.  It also may have a benefit of targeting the itching.  The Metformin has not had an impact on losing weight it may be mitigating further weight gain.  Will discontinue this and trial topiramate titrated to 25 mg a day         Mood disorder, unspecified.  No clear bipolar trijectory    Relevant Medications    OLANZapine (ZYPREXA) 5 MG tablet    hydrOXYzine (VISTARIL) 25 MG capsule       Other    High risk medication use     Metabolic labs completed July 2020         Relevant Medications    topiramate  (TOPAMAX) 25 MG tablet      Other Visit Diagnoses     Mood disorder (H)        Ruling out bipolar, however, likely not a bipolar trajectory.    Relevant Medications    OLANZapine (ZYPREXA) 5 MG tablet    hydrOXYzine (VISTARIL) 25 MG capsule           ORDERS PLACED TODAY:  - OLANZapine (ZYPREXA) 5 MG tablet; Take 1 tablet (5 mg) by mouth At Bedtime  Dispense: 30 tablet; Refill: 2  - hydrOXYzine (VISTARIL) 25 MG capsule; Take 1 capsule (25 mg) by mouth 3 times daily as needed for itching or anxiety  Dispense: 90 capsule; Refill: 0  - topiramate (TOPAMAX) 25 MG tablet; Start with 25 mg daily for one week then increase to 25 mg twice a day thereafter  Dispense: 60 tablet; Refill: 1     CONTINGENCY PLAN:  Once the sleep-wake cycle is stabilized may be able to utilize olanzapine as needed instead of scheduled nightly     CONSULTS/REFERRALS:   None at this time  Coordinate care with therapist as needed     MEDICAL:   Metabolic labs completed July 2020   Coordinate care with PCP (Adeola Reardon) as needed     COMMUNITY/PSYCHOSOCIAL INTERVENTIONS/OTHER:   Coordinate care with other community providers as needed     OTHER RECOMMENDED ASSESSMENTS:   - None     FOLLOW UP: Schedule an appointment with me in two months or sooner as needed. Call [phone] to schedule  Follow up with primary care provider as planned or for acute medical concerns.  Call the psychiatric nurse line with medication questions or concerns at [phone].     PSYCHOEDUCATION:  Medication side effects and alternatives reviewed. Health promotion activities recommended and reviewed today. All questions addressed. Education and counseling completed regarding risks and benefits of medications and psychotherapy options.  Call the psychiatric nurse line with medication questions or concerns at [phone].  [employer] may be used to communicate with your provider, but this is not intended to be used for emergencies.     COMMUNITY RESOURCES:    CRISIS  NUMBERS: Provided in AVS 2021  Crisis Connection - 150-466-3909  CRISIS TEXT LINE: Text 300451 from anywhere in [place], anytime, any crisis ;  OR SEE www.crisistextline.org  National Suicide Prevention Lifeline: 662.301.9983 (TTY: 508.517.9957). Call anytime for help.  (www.suicidepreventionlifeline.org)  National [place] on Mental Illness (www.maryanne.org): 197.968.7402 or 075-161-7774.   Mental Health Association (www.mentalhealth.org): 214.380.7076 or 181-004-2989.  [place] Crisis Text Line: Text [place] to 727397  Suicide LifeLine Chat: suicide[place].org/chat        ADMINISTRATIVE BILLIN min spent interviewing patient, reviewing referral documents, preparing this report.    Video/Phone Start Time: 746  Video/Phone End Time: 812    Greater than 50% of time was spent in counseling and coordination of care regarding above diagnoses and treatment plan.    Patient Status:  Patient will continue to be seen for ongoing consultation and stabilization.    Signed:   Alissa Bowman, MSN, APRN, FMHNP-[facility] ([facility])   Chart documentation done in part with Dragon Voice Recognition software.  Although reviewed after completion, some word and grammatical errors may remain.

## 2021-06-20 ASSESSMENT — PATIENT HEALTH QUESTIONNAIRE - PHQ9
10. IF YOU CHECKED OFF ANY PROBLEMS, HOW DIFFICULT HAVE THESE PROBLEMS MADE IT FOR YOU TO DO YOUR WORK, TAKE CARE OF THINGS AT HOME, OR GET ALONG WITH OTHER PEOPLE: SOMEWHAT DIFFICULT
SUM OF ALL RESPONSES TO PHQ QUESTIONS 1-9: 8
SUM OF ALL RESPONSES TO PHQ QUESTIONS 1-9: 8

## 2021-06-20 ASSESSMENT — ANXIETY QUESTIONNAIRES
GAD7 TOTAL SCORE: 17
GAD7 TOTAL SCORE: 17
7. FEELING AFRAID AS IF SOMETHING AWFUL MIGHT HAPPEN: MORE THAN HALF THE DAYS
1. FEELING NERVOUS, ANXIOUS, OR ON EDGE: NEARLY EVERY DAY
3. WORRYING TOO MUCH ABOUT DIFFERENT THINGS: NEARLY EVERY DAY
6. BECOMING EASILY ANNOYED OR IRRITABLE: MORE THAN HALF THE DAYS
7. FEELING AFRAID AS IF SOMETHING AWFUL MIGHT HAPPEN: MORE THAN HALF THE DAYS
2. NOT BEING ABLE TO STOP OR CONTROL WORRYING: NEARLY EVERY DAY
GAD7 TOTAL SCORE: 17
4. TROUBLE RELAXING: MORE THAN HALF THE DAYS
5. BEING SO RESTLESS THAT IT IS HARD TO SIT STILL: MORE THAN HALF THE DAYS

## 2021-06-21 ENCOUNTER — VIRTUAL VISIT (OUTPATIENT)
Dept: PSYCHIATRY | Facility: CLINIC | Age: 36
End: 2021-06-21
Payer: COMMERCIAL

## 2021-06-21 DIAGNOSIS — F31.0 BIPOLAR AFFECTIVE DISORDER, CURRENT EPISODE HYPOMANIC (H): ICD-10-CM

## 2021-06-21 DIAGNOSIS — F41.0 PANIC ATTACK: Primary | ICD-10-CM

## 2021-06-21 DIAGNOSIS — F39 MOOD DISORDER (H): ICD-10-CM

## 2021-06-21 PROCEDURE — 99213 OFFICE O/P EST LOW 20 MIN: CPT | Mod: 95 | Performed by: NURSE PRACTITIONER

## 2021-06-21 ASSESSMENT — ANXIETY QUESTIONNAIRES: GAD7 TOTAL SCORE: 17

## 2021-06-21 ASSESSMENT — PATIENT HEALTH QUESTIONNAIRE - PHQ9: SUM OF ALL RESPONSES TO PHQ QUESTIONS 1-9: 8

## 2021-06-21 NOTE — PROGRESS NOTES
"  PSYCHIATRIC MEDICATION FOLLOW UP APPT: ADULT     Name:  Ashlie Matthews  : 1985    Ashlie Matthews is a 36 year old female who is being evaluated via a billable video visit.      How would you like to obtain your AVS? MyChart  If the video visit is dropped, the invitation should be resent by: Text to cell phone: [phone]  Will anyone else be joining your video visit? No     Location of patient:    [address] [place] [id number]    Telemedicine Visit: The patient's condition can be safely assessed and treated via synchronous audio and visual telemedicine encounter.      Reason for Telemedicine Visit: COVID 19 pandemic and the social and physical recommendations by the CDC and MD.      Originating Site (Patient Location): Patient's home    Distant Site (Provider Location): Provider Remote Setting    Consent:  The patient/guardian has verbally consented to: the potential risks and benefits of telemedicine (video visit or phone) versus in person care; bill my insurance or make self-payment for services provided; and responsibility for payment of non-covered services.     Mode of Communication:  Video Conference via [employer]    As the provider I attest to compliance with applicable laws and regulations related to telemedicine.    IDENTIFICATION   Ashlie Matthews is a 36 year old female who prefers to be called: \"Sima\"  Therapist: None at present  PCP: Adeola Reardon      Last seen for outpatient psychiatry Return Visit on 2021.      FOLLOWING PLAN PUT INTO PLACE: Overall insomnia and mood were stabilizing.  She had reemergence of the rash that is causing distress related to the itching and subsequently impacting her sleep.  She will be talking with a dermatologist today.  She had been using old prescription of quetiapine as needed on 2-3 occasions in the last 6 weeks.  However she is on olanzapine and both of these medications have a high side effect profile with weight gain. " " Therefore will transition to hydroxyzine that may be taken as needed anxiety.  It also may have a benefit of targeting the itching.  The Metformin has not had an impact on losing weight it may be mitigating further weight gain.  Will discontinue this and trial topiramate titrated to 25 mg a day    INTERIM HISTORY     COMMUNICATIONS FROM PATIENT VIA:  MyChart and telephone, did not tolerate the trazodone and nervous to trial recommended gabapentin.  Olanzapine restarted at patient request and after discussion on the phone, added metformin to target weight gain.       RECORDS AVAILABLE FOR REVIEW: EHR records through [facility]     HISTORY OF PRESENT ILLNESS   [facility] referral for psychiatric medication consult in February 2021. Reports past reported diagnosis of Anxiety and Postpartum depression.  One psychiatric hospitalization in 2018 where olanzapine, Viibryd and quetiapine were initiated.   Hx of suicidal ideation, no suicide attempts. No history of self-injurious behaviors. Genetically loaded for anxiety prevalent. Grew up in an intact home with all basic needs being met.    Reports she had a \"break down\" after the birth of her daughter in 2018.  The pregnancy was very challenging including bedrest, hyperemesis and PICC line placement.  She was hospitalized for suicidal ideation.  She has always had a low level thoughts of passive thoughts of being better off dead.  She had never experienced the intensity of having a plan in place.  Her and her  had called the crisis center and she was directed to be seen in the ER where she was discharged home.  2 weeks later the intensity of the thoughts with a plan became increasingly distressing.  She reached out to her  and was then brought to the ER and hospitalized at [facility]. Started on Zyprexa and Seroquel at that time. Was stable on these medications but stopped them both on her own in August 2020 because she was worried about effects on her " "liver After being diagnosed with hepatic steatosis. She had never been 1 to drink alcohol and was concerned the olanzapine and quetiapine may be contributing to that.  Mood was stable after stopping and she has not been on any medications for mood since then.      She was overall doing well until she was put on prednisone by dermatology for an ongoing rash for 8 years and unknown cause.  The itching has also impacted sleep.The rash cleared up with the prednisone but she was having a really hard time sleeping while taking it. She was first put on the prednisone In December 2020 but the rash returned so she started again January, 2021. She was having significant trouble sleeping. Has tried benadryl, unisom, vistaril prescribed by dermatologist but nothing is helping with sleep.     In general, she feels like her biggest issue that led to her decompensation 2 years ago was lack of sleep and as long as she can maintain her sleep her mood is stable and she is able to use nonpharmacological strategies for her anxiety.  When she does not sleep her mood becomes very anxious, crying a lot.  Then the suicidal thoughts or thoughts that \"life would be easier\" if she was gone return more intensely.      She stopped the combination of olanzapine, Viibryd and quetiapine on her own when she no longer had insurance and cost was a barrier.  In addition she had gained 30 pounds which was ego-dystonic despite the fact that she was eating healthy and exercising.  When she started having difficulty sleeping again in early 2021, she restarted the olanzapine from a left over script.  The olanzapine has been the most effective in targeting the sleep wake cycle historically.  Further trials of trazodone, hydroxyzine, gabapentin and zolpidem all were not effective in targeting insomnia.  Restarted the olanzapine 5 mg nightly and overall sleeping well.  However she experiences weight gain.  Trial of metformin not effective.      She does not " appear to have a bipolar trajectory as when her sleep cycle is targeted and maintained she does not have any type of manic symptoms.  Have left the mood disorder diagnosis to support the use of second-generation antipsychotic    FAMILY, MEDICAL, SURGICAL HISTORY REVIEWED.  MEDICATION HAVE BEEN REVIEWED AND ARE CURRENT TO THE BEST OF MY KNOWLEDGE AND ABILITY.  Relationship status: .   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   Current living situation: with family including  and 3 children*     MEDICATIONS                                                                                              I have reviewed this patient's current medications  Per EHR:    Current Outpatient Medications:      albuterol (2.5 MG/3ML) 0.083% nebulizer solution, Take 1 vial (2.5 mg) by nebulization every 6 hours as needed for shortness of breath / dyspnea or wheezing, Disp: 30 vial, Rfl: 3     albuterol (PROAIR HFA, PROVENTIL HFA, VENTOLIN HFA) 108 (90 BASE) MCG/ACT inhaler, Inhale 2 puffs into the lungs every 6 hours as needed for shortness of breath / dyspnea or wheezing, Disp: 1 Inhaler, Rfl: 11     cetirizine (ZYRTEC ALLERGY) 10 MG tablet, Take 10 mg by mouth 2 times daily , Disp: , Rfl:      hydrOXYzine (VISTARIL) 25 MG capsule, Take 1 capsule (25 mg) by mouth 3 times daily as needed for itching or anxiety, Disp: 90 capsule, Rfl: 0     loratadine (CLARITIN) 10 MG capsule, Take 10 mg by mouth daily, Disp: , Rfl:      OLANZapine (ZYPREXA) 5 MG tablet, Take 1 tablet (5 mg) by mouth At Bedtime, Disp: 30 tablet, Rfl: 2     ondansetron (ZOFRAN) 4 MG tablet, Take 1 tablet (4 mg) by mouth every 6 hours as needed for nausea, Disp: 30 tablet, Rfl: 0     topiramate (TOPAMAX) 25 MG tablet, Start with 25 mg daily for one week then increase to 25 mg twice a day thereafter, Disp: 60 tablet, Rfl: 1    NOTES ABOUT CURRENT PSYCHOTROPIC  MEDICATIONS:   Olanzapine 5 mg, this is best medication that has targeted insomnia and subsequently mood and would like to continue  Topiramate 25 mg twice a day, not change in weight. Eating the same, gluten free, fasting in the am.   Quetiapine as needed anxiety, 25 mg left over from previous script    PAST PSYCHOTROPIC MEDICATIONS:  Diphenhydramine, sleep not effective  Hydroxyzine as needed, not effective in targeting itching, anxiety or sleep  Citalopram  Trazodone, dizziness throughout the day  Zolpidem 5 mg, worked for one night only   Quetiapine   Metformin 500 mg twice a day targeting metabolic side effects of olanzapine, not effective     TODAY PATIENT REPORTS THE FOLLOWING PSYCHIATRIC ROS:   Mood is stable now that she is getting sleep. However rash has reappeared and causing distress and disrupting sleep.  Has an appointment with dermatologist later this AM. Denies any intense mood lability or anxiety episodes    PROBLEM: DEPRESSION: Feels the current medication regimen is effective.     Last PHQ-9 6/20/2021   1.  Little interest or pleasure in doing things 1   2.  Feeling down, depressed, or hopeless 1   3.  Trouble falling or staying asleep, or sleeping too much 1   4.  Feeling tired or having little energy 1   5.  Poor appetite or overeating 0   6.  Feeling bad about yourself 1   7.  Trouble concentrating 1   8.  Moving slowly or restless 1   Q9: Thoughts of better off dead/self-harm past 2 weeks 1   PHQ-9 Total Score 8   Difficulty at work, home, or with people -   In the past two weeks have you had thoughts of suicide or self harm? Yes   Do you have concerns about your personal safety or the safety of others? No   In the past 2 weeks have you thought about a plan or had intention to harm yourself? No   In the past 2 weeks have you acted on these thoughts in any way? No     PHQ-9 SCORE 2/24/2021 4/20/2021 6/20/2021   PHQ-9 Total Score - - -   PHQ-9 Total Score MyChart - 9 (Mild depression) 8 (Mild  "depression)   PHQ-9 Total Score 1 9 8     Suicidal ideation:  Yes distractions and fleeting.  Recently in the context of multiple health issues including teeth financial barriers and starts feeling like a burden. PHQ9 score is 1 indicating minimal depression.  Suicidal ideation:  No     PROBLEM: ANXIETY: exacerbated approx 2-3 times a week, pacing, crying, pit in stomach, exacerbated with dental work.   SLIM-7 scores:   SLIM-7 SCORE 2/24/2021 4/20/2021 6/20/2021   Total Score - 6 (mild anxiety) 17 (severe anxiety)   Total Score 0 6 17     PROBLEM: SLEEP/INSOMNIA: Improving On the olanzapine nightly 5 mg    PROBLEM: SIDE EFFECTS (weight gain): No change.  Currently on 500 mg twice a day of the metformin and has gained a total of 7 pounds despite exercising and no change in eating habits since starting the olanzapine    CURRENT STRESSORS: None endorsed  COPING MECHANISMS AND SUPPORTS: Family, and Friends  DIET:  Attempting to eat healthy And using intermittent fasting  EXERCISE: Adequate  SIDE EFFECTS: Denies   SUBSTANCE USE:  Denies   COMPLIANCE:  states Adherent to medication regimen  HIGH RISK MEDICATION:  Yes Olanzapine and metabolic labs were completed July 2020  REPORTS THE FOLLOWING NEW MEDICAL ISSUES:  none    PERTINENT PAST MEDICAL AND SURGICAL HISTORY     Past Medical History:   Diagnosis Date     Allergic rhinitis      Anxiety      Asthma      Cyclic vomiting syndrome      Depressive disorder      Dysmenorrhea      Pneumothorax 2004    from bad coughing       VITALS   LMP 06/27/2018 (Exact Date)    LAST DOCUMENTED VITAL SIGN:  DATE:  January 10, 2021, /88 and a heart rate of 26        HEIGHT/WEIGHT:  Ht Readings from Last 2 Encounters:   11/20/20 1.549 m (5' 1\")   07/06/20 1.549 m (5' 1\")      Wt Readings from Last 2 Encounters:   11/20/20 69.5 kg (153 lb 4.8 oz)   07/06/20 75.4 kg (166 lb 4.8 oz)    Estimated body mass index is 28.97 kg/m  as calculated from the following:    Height as of 11/20/20: " "1.549 m (5' 1\").    Weight as of 11/20/20: 69.5 kg (153 lb 4.8 oz).     LABS & IMAGING                                                                                                                    Recent Labs   Lab Test 07/28/20  0943      POTASSIUM 4.0   CHLORIDE 107   CO2 22   GLC 97   MURIEL 8.9   BUN 10   CR 0.67   GFRESTIMATED >90   ALBUMIN 3.8   PROTTOTAL 8.4   AST 18   ALT 22   ALKPHOS 56   BILITOTAL 0.8     Recent Labs   Lab Test 07/28/20  0943   CHOL 183   *   HDL 36*   TRIG 206*   A1C 5.2     Recent Labs   Lab Test 03/01/19  0924   TSH 1.02       ALLERGY & IMMUNIZATIONS       Allergies   Allergen Reactions     Moxifloxacin Hydrochloride Nausea and Vomiting     Avelox-vomiting     Cats Itching     Codeine GI Disturbance     Darvocet [Propoxyphene N-Apap] Hives     PN: LW Reaction: Rash, Generalized     Demerol Hives     Dog Hair [Dogs] Unknown     Dust Mites Itching and Swelling     Meperidine      PN: LW Reaction: Rash, Generalized     Oxycodone-Acetaminophen      PN: LW Reaction: SHORTNESS OF BREATH     Pollen Extract Itching     Vicodin [Hydrocodone-Acetaminophen] GI Disturbance     PN: LW Reaction: Rash, Generalized     Latex Itching, Swelling and Rash     \"sensitivity\"       MEDICAL REVIEW OF SYSTEMS:   Ten system review was completed with pertinent positives noted     MENTAL STATUS EXAM:   General/Constitutional:  Appearance:  awake, alert, adequately groomed, appeared stated age and no apparent distress  Attitude:   cooperative   Eye Contact:  good  Musculoskeletal:  Psychomotor Behavior:  no evidence of tardive dyskinesia, dystonia, or tics from the head up  Psychiatric:  Speech:  clear, coherent, regular rate, rhythm, and volume,  No pressure speech noted.  Associations:  no loose associations  Thought Process:  logical, linear and goal oriented  Thought Content:   No evidence of active suicidal ideation or homicidal ideation, no evidence of psychotic thought, no auditory " hallucinations present and no visual hallucinations present  Mood:  increase in anxiety  Affect:  appropriate and in normal range  Insight:  good  Judgment:  intact, adequate for safety  Impulse Control:  intact  Neurological:  Oriented to:  person, place, time, and situation  Attention Span and Concentration:  normal   Language: intact    Recent and Remote Memory:  Intact to interview. Not formally assessed. No amnesia.   Fund of Knowledge: appropriate      SAFETY   Feels safe in home: Yes   Suicidal ideation: Endorses passive SI, no intent or plan.   History of suicide attempts:  No   Hx of impulsivity: Yes     Hope for the future: present   Hx of Command hallucinations or current psychosis: No  History of Self-injurious behaviors: No Current:  No  Family member  by suicide:  No        SAFETY ASSESSMENT:   Based on all available evidence including the factors cited above, overall Risk for harm is low and is appropriate for outpatient level of care.   Recommended that patient call 911 or go to the local ED should there be a change in any of these risk factors.    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 sleep for extended period of time (Medical Condition)  780.52 (G47.00) Insomnia Disorder   With non-sleep disorder mental comorbidity  Episodic       MEDICAL COMORBIDITY IMPACTING CLINICAL PICTURE: rash which impacts her sleep when it has active itching and distress    ASSESSMENT AND PLAN      Problem List Items Addressed This Visit        Behavioral     Overall depression is stable, continues with panic/emotional lability 2-3 times a week.  Will monitor to evaluate if an selective serotonin reuptake inhibitor or SNRI is indicated and in the interim, will utilize as needed lorazepam for those occasions, small quantity provided.  Tolerating the topirimate with minimal effect on Second Generation Antipsychotic weight gain.  Will further increase to to 50 mg twice a day.   Follow-up 4 weeks          Mood disorder, unspecified.  No clear bipolar trijectory    Relevant Medications    topiramate (TOPAMAX) 50 MG tablet    LORazepam (ATIVAN) 0.5 MG tablet    OLANZapine (ZYPREXA) 5 MG tablet      Other Visit Diagnoses     Panic attack    -  Primary    Relevant Medications    topiramate (TOPAMAX) 50 MG tablet    LORazepam (ATIVAN) 0.5 MG tablet    Mood disorder (H)        Ruling out bipolar, however, likely not a bipolar trajectory.    Relevant Medications    topiramate (TOPAMAX) 50 MG tablet    LORazepam (ATIVAN) 0.5 MG tablet    OLANZapine (ZYPREXA) 5 MG tablet            CONSULTS/REFERRALS:   None at this time  Coordinate care with therapist as needed     MEDICAL:   Metabolic labs completed July 2020   Coordinate care with PCP (Adeola Reardon) as needed     COMMUNITY/PSYCHOSOCIAL INTERVENTIONS/OTHER:   Coordinate care with other community providers as needed     OTHER RECOMMENDED ASSESSMENTS:   - None     FOLLOW UP: Schedule an appointment with me in two months or sooner as needed. Call [phone] to schedule  Follow up with primary care provider as planned or for acute medical concerns.  Call the psychiatric nurse line with medication questions or concerns at [phone].     PSYCHOEDUCATION:  Medication side effects and alternatives reviewed. Health promotion activities recommended and reviewed today. All questions addressed. Education and counseling completed regarding risks and benefits of medications and psychotherapy options.  Call the psychiatric nurse line with medication questions or concerns at [phone].  MyChart may be used to communicate with your provider, but this is not intended to be used for emergencies.     COMMUNITY RESOURCES:    CRISIS NUMBERS: Provided in [facility] 2/4/2021  Crisis Connection - 163.238.5405  CRISIS TEXT LINE: Text 738510 from anywhere in [place], anytime, any crisis 24/7;  OR SEE www.crisistextline.org  National Suicide Prevention Lifeline:  691.254.7352 (TTY: 301.759.4277). Call anytime for help.  (www.suicidepreventionlifeline.org)  National [place] on Mental Illness (www.maryanne.org): 674.953.5013 or 498-291-1209.   Mental Health Association (www.mentalhealth.org): 785.522.8524 or 040-480-8812.  [place] Crisis Text Line: Text [place] to 377570  Suicide LifeLine Chat: suicidePopUp.org/chat      ADMINISTRATIVE BILLIN min spent interviewing patient, reviewing referral documents, obtaining and reviewing outside records, communication with other health specialists, and preparing this report.    Video/Phone Start Time:  10:19 AM  Video/Phone End Time:  1038    Greater than 50% of time was spent in counseling and coordination of care regarding above diagnoses and treatment plan.  Patient Status:  Patient will continue to be seen for ongoing consultation and stabilization.    Signed:   Alissa Bowman, MSN, APRN, FMHNP-[facility] ([facility])   Chart documentation done in part with Dragon Voice Recognition software.  Although reviewed after completion, some word and grammatical errors may remain.

## 2021-06-22 PROBLEM — F33.9 MAJOR DEPRESSION, RECURRENT (H): Status: RESOLVED | Noted: 2018-05-03 | Resolved: 2021-06-22

## 2021-06-22 PROBLEM — F33.9 MAJOR DEPRESSION, RECURRENT (H): Status: ACTIVE | Noted: 2018-05-03

## 2021-06-22 RX ORDER — OLANZAPINE 5 MG/1
5 TABLET ORAL AT BEDTIME
Qty: 30 TABLET | Refills: 1 | Status: SHIPPED | OUTPATIENT
Start: 2021-06-22 | End: 2021-08-03

## 2021-06-22 RX ORDER — LORAZEPAM 0.5 MG/1
0.5 TABLET ORAL EVERY 6 HOURS PRN
Qty: 20 TABLET | Refills: 0 | Status: SHIPPED | OUTPATIENT
Start: 2021-06-22 | End: 2021-08-03

## 2021-06-22 RX ORDER — TOPIRAMATE 50 MG/1
50 TABLET, FILM COATED ORAL 2 TIMES DAILY
Qty: 60 TABLET | Refills: 1 | Status: SHIPPED | OUTPATIENT
Start: 2021-06-22 | End: 2021-08-31

## 2021-06-22 NOTE — ASSESSMENT & PLAN NOTE
Overall depression is stable, continues with panic/emotional lability 2-3 times a week.  Will monitor to evaluate if an selective serotonin reuptake inhibitor or SNRI is indicated and in the interim, will utilize as needed lorazepam for those occasions, small quantity provided.  Tolerating the topirimate with minimal effect on Second Generation Antipsychotic weight gain.  Will further increase to to 50 mg twice a day.  Follow-up 4 weeks

## 2021-07-12 ENCOUNTER — MYC MEDICAL ADVICE (OUTPATIENT)
Dept: PSYCHIATRY | Facility: CLINIC | Age: 36
End: 2021-07-12

## 2021-07-12 DIAGNOSIS — F06.4 ANXIETY DISORDER DUE TO KNOWN PHYSIOLOGICAL CONDITION: Primary | ICD-10-CM

## 2021-07-13 RX ORDER — SERTRALINE HYDROCHLORIDE 100 MG/1
50 TABLET, FILM COATED ORAL DAILY
Qty: 30 TABLET | Refills: 0 | Status: SHIPPED | OUTPATIENT
Start: 2021-07-13 | End: 2021-08-03

## 2021-07-30 NOTE — PROGRESS NOTES
[facility] ([facility])  Aug 3, 2021    Behavioral Health Clinician Progress Note    Patient Name: Ashlie Matthews      Telemedicine Visit: The patient's condition can be safely assessed and treated via synchronous audio and visual telemedicine encounter.      Reason for Telemedicine Visit: Services only offered telehealth    Originating Site (Patient Location): Patient's home    Distant Site (Provider Location): Provider Remote Setting- Home Office    Consent:  The patient/guardian has verbally consented to: the potential risks and benefits of telemedicine (video visit) versus in person care; bill my insurance or make self-payment for services provided; and responsibility for payment of non-covered services.     Mode of Communication:  Video Conference via [employer]    As the provider I attest to compliance with applicable laws and regulations related to telemedicine.         Service Type:  Individual      Service Location:   [employer] / Email (patient reached)     Session Start Time: 100pm  Session End Time: 116pm      Session Length: 16 - 37      Attendees: Patient    Visit Activities (Refresh list every visit): [facility] Only    Diagnostic Assessment Date: 02/04/2021  See Flowsheets for today's PHQ-9 and SLIM-7 results  Previous PHQ-9:   PHQ-9 SCORE 4/20/2021 6/20/2021 8/3/2021   PHQ-9 Total Score - - -   [facility] 8 (Mild depression) -   PHQ-9 Total Score 9 8 3       Previous SLIM-7:   SLIM-7 SCORE 4/20/2021 6/20/2021 8/3/2021   Total Score 6 (mild anxiety) 17 (severe anxiety) -   Total Score 6 17 13       WHODAS  No flowsheet data found.     AUDIT  No flowsheet data found.    DATA  Extended Session (60+ minutes): No  Interactive Complexity: No  Crisis: No    Medication Compliance:  Yes      Chemical Use Review:   Substance Use: Chemical use reviewed, no active concerns identified      Tobacco Use: No current tobacco use.      Current Stressors / Issues:  MH  update: Started zoloft and reports ongoing anxiety but no SI. Trigger may be considering getting a part time job after being out of the job market for about 10 years. Been trying not to use ativan in case she needs it in the future. Still struggling with covid especially as children will be returning to in person, masks not mandatory. Her father is at high risk so she worries they could expose him. Explored how she can advocate for her children with the school board who is meeting this week.  Not currently seeing a therapist. Some of her anxiety is caused by a medical condition. Encouraged to consider therapy and explained how it may be helpful.   RIANA: No  Preg: No  Most important: Check in with anxiety. How long does it take for zoloft to start working? Should she expect more improvement?       Progress on Treatment Objective(s) / Homework:  Satisfactory progress - ACTION (Actively working towards change); Intervened by reinforcing change plan / affirming steps taken    Motivational Interviewing    MI Intervention: Co-Developed Goal: encouraged to consider therapy to help improve anxiety, explored how changing thoughts can be useful. , Expressed Empathy/Understanding, Supported Autonomy, Collaboration, Evocation, Permission to raise concern or advise, Open-ended questions, Reflections: simple and complex, Change talk (evoked) and Reframe     Change Talk Expressed by the Patient: Desire to change Ability to change Reasons to change Need to change Committment to change Activation Taking steps    Provider Response to Change Talk: E - Evoked more info from patient about behavior change, A - Affirmed patient's thoughts, decisions, or attempts at behavior change, R - Reflected patient's change talk and S - Summarized patient's change talk statements        Review of Symptoms per patient report:  Depression: No symptoms  Tiesha:  No Symptoms  Psychosis: No Symptoms  Anxiety: Excessive worry, Nervousness and  Ruminations  Panic:  No symptoms  Post Traumatic Stress Disorder:  No Symptoms   Eating Disorder: No Symptoms  ADD / ADHD:  No symptoms  Conduct Disorder: No symptoms  Autism Spectrum Disorder: No symptoms  Obsessive Compulsive Disorder: No Symptoms      Changes in Health Issues:   None reported    Assessment: Current Emotional / Mental Status (status of significant symptoms):  Risk status (Self / Other harm or suicidal ideation)  Patient has had a history of suicidal ideation: post partum depression in 2018 with SI, hospitalized  Patient denies current fears or concerns for personal safety.  Patient denies current or recent suicidal ideation or behaviors.  Patient denies current or recent homicidal ideation or behaviors.  Patient denies current or recent self injurious behavior or ideation.  Patient denies other safety concerns.  A safety and risk management plan has not been developed at this time, however patient was encouraged to call [place] [phone] should there be a change in any of these risk factors.    Appearance:   Appropriate   Eye Contact:   Good   Psychomotor Behavior: Normal   Attitude:   Cooperative   Orientation:   All  Speech   Rate / Production: Normal    Volume:  Normal   Mood:    Anxious   Affect:    Appropriate   Thought Content:  Clear   Thought Form:  Coherent  Logical   Insight:    Good     Diagnoses:  1. Generalized anxiety disorder    2. Depression, major, recurrent, moderate (H)        Collateral Reports Completed:  communicated with Alissa Bowman, MSN, APRN, CNP, FMHNP-BC, [place] [facility]    Plan: (Homework, other):  Patient was given information about behavioral services and encouraged to schedule a follow up appointment with the clinic [facility] in conjunction with next [facility] appointment.  She was also given information about mental health symptoms and treatment options .  CD Recommendations: No indications of CD issues.  Irina RICHMOND [facility]      MARTIN Evans  Aug 3,  2021

## 2021-08-03 ENCOUNTER — VIRTUAL VISIT (OUTPATIENT)
Dept: BEHAVIORAL HEALTH | Facility: CLINIC | Age: 36
End: 2021-08-03
Payer: COMMERCIAL

## 2021-08-03 ENCOUNTER — VIRTUAL VISIT (OUTPATIENT)
Dept: PSYCHIATRY | Facility: CLINIC | Age: 36
End: 2021-08-03
Payer: COMMERCIAL

## 2021-08-03 DIAGNOSIS — F39 MOOD DISORDER (H): ICD-10-CM

## 2021-08-03 DIAGNOSIS — F33.1 DEPRESSION, MAJOR, RECURRENT, MODERATE (H): ICD-10-CM

## 2021-08-03 DIAGNOSIS — F41.1 GENERALIZED ANXIETY DISORDER: Primary | ICD-10-CM

## 2021-08-03 DIAGNOSIS — F06.4 ANXIETY DISORDER DUE TO KNOWN PHYSIOLOGICAL CONDITION: ICD-10-CM

## 2021-08-03 PROCEDURE — 90832 PSYTX W PT 30 MINUTES: CPT | Mod: 95 | Performed by: SOCIAL WORKER

## 2021-08-03 PROCEDURE — 99214 OFFICE O/P EST MOD 30 MIN: CPT | Mod: 95 | Performed by: NURSE PRACTITIONER

## 2021-08-03 RX ORDER — SERTRALINE HYDROCHLORIDE 100 MG/1
50 TABLET, FILM COATED ORAL DAILY
Qty: 90 TABLET | Refills: 0 | Status: SHIPPED | OUTPATIENT
Start: 2021-08-03 | End: 2021-08-28

## 2021-08-03 RX ORDER — OLANZAPINE 5 MG/1
5 TABLET ORAL AT BEDTIME
Qty: 90 TABLET | Refills: 0 | Status: SHIPPED | OUTPATIENT
Start: 2021-08-03 | End: 2021-10-05

## 2021-08-03 ASSESSMENT — PATIENT HEALTH QUESTIONNAIRE - PHQ9
5. POOR APPETITE OR OVEREATING: MORE THAN HALF THE DAYS
SUM OF ALL RESPONSES TO PHQ QUESTIONS 1-9: 3

## 2021-08-03 ASSESSMENT — ANXIETY QUESTIONNAIRES
2. NOT BEING ABLE TO STOP OR CONTROL WORRYING: MORE THAN HALF THE DAYS
GAD7 TOTAL SCORE: 13
6. BECOMING EASILY ANNOYED OR IRRITABLE: SEVERAL DAYS
3. WORRYING TOO MUCH ABOUT DIFFERENT THINGS: MORE THAN HALF THE DAYS
IF YOU CHECKED OFF ANY PROBLEMS ON THIS QUESTIONNAIRE, HOW DIFFICULT HAVE THESE PROBLEMS MADE IT FOR YOU TO DO YOUR WORK, TAKE CARE OF THINGS AT HOME, OR GET ALONG WITH OTHER PEOPLE: SOMEWHAT DIFFICULT
1. FEELING NERVOUS, ANXIOUS, OR ON EDGE: MORE THAN HALF THE DAYS
7. FEELING AFRAID AS IF SOMETHING AWFUL MIGHT HAPPEN: MORE THAN HALF THE DAYS
5. BEING SO RESTLESS THAT IT IS HARD TO SIT STILL: MORE THAN HALF THE DAYS

## 2021-08-03 NOTE — PROGRESS NOTES
"  PSYCHIATRIC MEDICATION FOLLOW UP APPT: ADULT     Name:  Ashlie Matthews  : 1985    Ashlie Matthews is a 36 year old female who is being evaluated via a billable video visit.      How would you like to obtain your AVS? MyChart  If the video visit is dropped, the invitation should be resent by: Text to cell phone: [phone]  Will anyone else be joining your video visit? No    Location [place] If not at [place] address below, please ask where they are in case of an emergency situation arises during the appointment.  [address] [facility] [address]    Telemedicine Visit: The patient's condition can be safely assessed and treated via synchronous audio and visual telemedicine encounter.      Reason for Telemedicine Visit: COVID 19 pandemic and the social and physical recommendations by the CDC and [facility].      Originating Site (Patient Location): Patient's home    Distant Site (Provider Location): Provider Remote Setting    Consent:  The patient/guardian has verbally consented to: the potential risks and benefits of telemedicine (video visit or phone) versus in person care; bill my insurance or make self-payment for services provided; and responsibility for payment of non-covered services.     Mode of Communication:  Video Conference via Faveous    As the provider I attest to compliance with applicable laws and regulations related to telemedicine.    IDENTIFICATION   Ashlie Matthews is a 36 year old female who prefers to be called: \"Sima\"  Therapist: None at present  PCP: Adeola Reardon      Last seen for outpatient psychiatry Return Visit on 2021.      FOLLOWING PLAN PUT INTO PLACE: Overall depression is stable, continues with panic/emotional lability 2-3 times a week.  Will monitor to evaluate if an selective serotonin reuptake inhibitor or SNRI is indicated and in the interim, will utilize as needed lorazepam for those occasions, small quantity provided.  Tolerating the " "topirimate with minimal effect on Second Generation Antipsychotic weight gain.  Will further increase to to 50 mg twice a day.  Follow-up 4 weeks     INTERIM HISTORY     COMMUNICATIONS FROM PATIENT VIA:  Entrenarme message from patient regarding ongoing anxiety and sertraline initiated      RECORDS AVAILABLE FOR REVIEW: EHR records through [facility]     HISTORY OF PRESENT ILLNESS   [facility] referral for psychiatric medication consult in February 2021. Reports past reported diagnosis of Anxiety and Postpartum depression.  One psychiatric hospitalization in 2018 where olanzapine, Viibryd and quetiapine were initiated.   Hx of suicidal ideation, no suicide attempts. No history of self-injurious behaviors. Genetically loaded for anxiety prevalent. Grew up in an intact home with all basic needs being met.    Reports she had a \"break down\" after the birth of her daughter in 2018.  The pregnancy was very challenging including bedrest, hyperemesis and PICC line placement.  She was hospitalized for suicidal ideation.  She has always had a low level thoughts of passive thoughts of being better off dead.  She had never experienced the intensity of having a plan in place.  Her and her  had called the crisis center and she was directed to be seen in the ER where she was discharged home.  2 weeks later the intensity of the thoughts with a plan became increasingly distressing.  She reached out to her  and was then brought to the ER and hospitalized at [facility]. Started on Zyprexa and Seroquel at that time. Was stable on these medications but stopped them both on her own in August 2020 because she was worried about effects on her liver After being diagnosed with hepatic steatosis. She had never been 1 to drink alcohol and was concerned the olanzapine and quetiapine may be contributing to that.  Mood was stable after stopping and she has not been on any medications for mood since then.      She was overall doing " "well until she was put on prednisone by dermatology for an ongoing rash for 8 years and unknown cause.  The itching has also impacted sleep.The rash cleared up with the prednisone but she was having a really hard time sleeping while taking it. She was first put on the prednisone In December 2020 but the rash returned so she started again January, 2021. She was having significant trouble sleeping. Has tried benadryl, unisom, vistaril prescribed by dermatologist but nothing is helping with sleep.     In general, she feels like her biggest issue that led to her decompensation 2 years ago was lack of sleep and as long as she can maintain her sleep her mood is stable and she is able to use nonpharmacological strategies for her anxiety.  When she does not sleep her mood becomes very anxious, crying a lot.  Then the suicidal thoughts or thoughts that \"life would be easier\" if she was gone return more intensely.      She stopped the combination of olanzapine, Viibryd and quetiapine on her own when she no longer had insurance and cost was a barrier.  In addition she had gained 30 pounds which was ego-dystonic despite the fact that she was eating healthy and exercising.  When she started having difficulty sleeping again in early 2021, she restarted the olanzapine from a left over script.  The olanzapine has been the most effective in targeting the sleep wake cycle historically.  Further trials of trazodone, hydroxyzine, gabapentin and zolpidem all were not effective in targeting insomnia.  Restarted the olanzapine 5 mg nightly and overall sleeping well.  However she experiences weight gain.  Trial of metformin not effective.      She does not appear to have a bipolar trajectory as when her sleep cycle is targeted and maintained she does not have any type of manic symptoms.  Have left the mood disorder diagnosis to support the use of second-generation antipsychotic    FAMILY, MEDICAL, SURGICAL HISTORY REVIEWED.  MEDICATION " HAVE BEEN REVIEWED AND ARE CURRENT TO THE BEST OF MY KNOWLEDGE AND ABILITY.  Relationship status: .   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   Current living situation: with family including  and 3 children*     MEDICATIONS                                                                                              I have reviewed this patient's current medications  Per EHR:    Current Outpatient Medications:      albuterol (2.5 MG/3ML) 0.083% nebulizer solution, Take 1 vial (2.5 mg) by nebulization every 6 hours as needed for shortness of breath / dyspnea or wheezing, Disp: 30 vial, Rfl: 3     albuterol (PROAIR HFA, PROVENTIL HFA, VENTOLIN HFA) 108 (90 BASE) MCG/ACT inhaler, Inhale 2 puffs into the lungs every 6 hours as needed for shortness of breath / dyspnea or wheezing, Disp: 1 Inhaler, Rfl: 11     cetirizine (ZYRTEC ALLERGY) 10 MG tablet, Take 10 mg by mouth 2 times daily , Disp: , Rfl:      loratadine (CLARITIN) 10 MG capsule, Take 10 mg by mouth daily, Disp: , Rfl:      OLANZapine (ZYPREXA) 5 MG tablet, Take 1 tablet (5 mg) by mouth At Bedtime, Disp: 30 tablet, Rfl: 1     ondansetron (ZOFRAN) 4 MG tablet, Take 1 tablet (4 mg) by mouth every 6 hours as needed for nausea, Disp: 30 tablet, Rfl: 0     sertraline (ZOLOFT) 100 MG tablet, Take 0.5 tablets (50 mg) by mouth daily 1/2 tab daily for one week then increase to 1 tablet thereafter, Disp: 30 tablet, Rfl: 0     topiramate (TOPAMAX) 25 MG tablet, Start with 25 mg daily for one week then increase to 25 mg twice a day thereafter, Disp: 60 tablet, Rfl: 1     topiramate (TOPAMAX) 50 MG tablet, Take 1 tablet (50 mg) by mouth 2 times daily, Disp: 60 tablet, Rfl: 1     hydrOXYzine (VISTARIL) 25 MG capsule, Take 1 capsule (25 mg) by mouth 3 times daily as needed for itching or anxiety (Patient not taking: Reported on 8/3/2021), Disp: 90 capsule, Rfl: 0      "LORazepam (ATIVAN) 0.5 MG tablet, Take 1 tablet (0.5 mg) by mouth every 6 hours as needed for anxiety Goal not daily (Patient not taking: Reported on 8/3/2021), Disp: 20 tablet, Rfl: 0    NOTES ABOUT CURRENT PSYCHOTROPIC MEDICATIONS:   Olanzapine 5 mg, this is best medication that has targeted insomnia and subsequently mood and would like to continue  Topiramate 25 mg twice a day, not change in weight. Eating the same, gluten free, fasting in the am.   Quetiapine as needed anxiety, 25 mg left over from previous script  Sertraline 100 mg    PAST PSYCHOTROPIC MEDICATIONS:  Diphenhydramine, sleep not effective  Hydroxyzine as needed, not effective in targeting itching, anxiety or sleep  Citalopram  Trazodone, dizziness throughout the day  Zolpidem 5 mg, worked for one night only   Quetiapine   Metformin 500 mg twice a day targeting metabolic side effects of olanzapine, not effective     TODAY PATIENT REPORTS THE FOLLOWING PSYCHIATRIC ROS:   Per [facility], Irina Curran, during today's team-based visit:  \"Started zoloft and reports ongoing anxiety but no SI. Trigger may be considering getting a part time job after being out of the job market for about 10 years. Been trying not to use ativan in case she needs it in the future. Still struggling with covid especially as children will be returning to in person, masks not mandatory. Her father is at high risk so she worries they could expose him. Explored how she can advocate for her children with the school board who is meeting this week. Not currently seeing a therapist. Some of her anxiety is caused by a medical condition. Encouraged to consider therapy and explained how it may be helpful.\"    PROBLEM: DEPRESSION: Feels the current medication regimen is effective.     Last PHQ-9 8/3/2021   1.  Little interest or pleasure in doing things 0   2.  Feeling down, depressed, or hopeless 0   3.  Trouble falling or staying asleep, or sleeping too much 0   4.  Feeling tired or " having little energy 0   5.  Poor appetite or overeating 0   6.  Feeling bad about yourself 1   7.  Trouble concentrating 1   8.  Moving slowly or restless 1   Q9: Thoughts of better off dead/self-harm past 2 weeks 0   PHQ-9 Total Score 3   Difficulty at work, home, or with people Somewhat difficult   In the past two weeks have you had thoughts of suicide or self harm? -   Do you have concerns about your personal safety or the safety of others? -   In the past 2 weeks have you thought about a plan or had intention to harm yourself? -   In the past 2 weeks have you acted on these thoughts in any way? -     PHQ-9 SCORE 4/20/2021 6/20/2021 8/3/2021   PHQ-9 Total Score - - -   PHQ-9 Total Score MyChart 9 (Mild depression) 8 (Mild depression) -   PHQ-9 Total Score 9 8 3     Suicidal ideation:  No PHQ9 score is 3 indicating minimal depression.    PROBLEM: ANXIETY: endorses psychosocial stressors that are increasing anxiety, specifically related to parenting and the potential increase in COVID 19 pandemic cases  SLIM-7 scores:   SLIM-7 SCORE 4/20/2021 6/20/2021 8/3/2021   Total Score 6 (mild anxiety) 17 (severe anxiety) -   Total Score 6 17 13     PROBLEM: SLEEP/INSOMNIA: Improving On the olanzapine nightly 5 mg    PROBLEM: SIDE EFFECTS (weight gain): No change.  hasn't gained weight but Reports she has not lost weight either.  Tolerating the topiramate    CURRENT STRESSORS: Occupational and COVID 19 Pandemic and the social and physical distancing recommendations by the CDC and [place]  COPING MECHANISMS AND SUPPORTS: Family, and Friends  DIET:  Attempting to eat healthy And using intermittent fasting  EXERCISE: Adequate  SIDE EFFECTS: Denies   SUBSTANCE USE:  Denies   COMPLIANCE:  states Adherent to medication regimen  HIGH RISK MEDICATION:  Yes Olanzapine and metabolic labs were completed July 2020  REPORTS THE FOLLOWING NEW MEDICAL ISSUES:  none    PERTINENT PAST MEDICAL AND SURGICAL HISTORY     Past Medical History:  "  Diagnosis Date     Allergic rhinitis      Anxiety      Asthma      Cyclic vomiting syndrome      Depressive disorder      Dysmenorrhea      Pneumothorax 2004    from bad coughing       VITALS     BP Readings from Last 1 Encounters:   03/20/21 114/74     Pulse Readings from Last 1 Encounters:   03/20/21 68     Wt Readings from Last 1 Encounters:   11/20/20 69.5 kg (153 lb 4.8 oz)     Ht Readings from Last 1 Encounters:   11/20/20 1.549 m (5' 1\")     Estimated body mass index is 28.97 kg/m  as calculated from the following:    Height as of 11/20/20: 1.549 m (5' 1\").    Weight as of 11/20/20: 69.5 kg (153 lb 4.8 oz).    Temp Readings from Last 1 Encounters:   03/20/21 98  F (36.7  C) (Oral)       HEIGHT/WEIGHT:  Ht Readings from Last 2 Encounters:   11/20/20 1.549 m (5' 1\")   07/06/20 1.549 m (5' 1\")      Wt Readings from Last 2 Encounters:   11/20/20 69.5 kg (153 lb 4.8 oz)   07/06/20 75.4 kg (166 lb 4.8 oz)    Estimated body mass index is 28.97 kg/m  as calculated from the following:    Height as of 11/20/20: 1.549 m (5' 1\").    Weight as of 11/20/20: 69.5 kg (153 lb 4.8 oz).     LABS & IMAGING                                                                                                                    Recent Labs   Lab Test 07/28/20  0943      POTASSIUM 4.0   CHLORIDE 107   CO2 22   GLC 97   MURIEL 8.9   BUN 10   CR 0.67   GFRESTIMATED >90   ALBUMIN 3.8   PROTTOTAL 8.4   AST 18   ALT 22   ALKPHOS 56   BILITOTAL 0.8     Recent Labs   Lab Test 07/28/20  0943   CHOL 183   *   HDL 36*   TRIG 206*   A1C 5.2     Recent Labs   Lab Test 03/01/19  0924   TSH 1.02       ALLERGY & IMMUNIZATIONS       Allergies   Allergen Reactions     Moxifloxacin Hydrochloride Nausea and Vomiting     Avelox-vomiting     Cats Itching     Codeine GI Disturbance     Darvocet [Propoxyphene N-Apap] Hives     PN: LW Reaction: Rash, Generalized     Demerol Hives     Dog Hair [Dogs] Unknown     Dust Mites Itching and Swelling     " "Meperidine      PN: LW Reaction: Rash, Generalized     Oxycodone-Acetaminophen      PN: LW Reaction: SHORTNESS OF BREATH     Pollen Extract Itching     Vicodin [Hydrocodone-Acetaminophen] GI Disturbance     PN: LW Reaction: Rash, Generalized     Latex Itching, Swelling and Rash     \"sensitivity\"       MEDICAL REVIEW OF SYSTEMS:   Ten system review was completed with pertinent positives noted     MENTAL STATUS EXAM:   General/Constitutional:  Appearance:  awake, alert, adequately groomed, appeared stated age and no apparent distress  Attitude:   cooperative   Eye Contact:  good  Musculoskeletal:  Psychomotor Behavior:  no evidence of tardive dyskinesia, dystonia, or tics from the head up  Psychiatric:  Speech:  clear, coherent, regular rate, rhythm, and volume,  No pressure speech noted.  Associations:  no loose associations  Thought Process:  logical, linear and goal oriented  Thought Content:   No evidence of active suicidal ideation or homicidal ideation, no evidence of psychotic thought, no auditory hallucinations present and no visual hallucinations present  Mood:  increase in anxiety related to psychosocial stressors   Affect:  appropriate and in normal range  Insight:  good  Judgment:  intact, adequate for safety  Impulse Control:  intact  Neurological:  Oriented to:  person, place, time, and situation  Attention Span and Concentration:  normal   Language: intact    Recent and Remote Memory:  Intact to interview. Not formally assessed. No amnesia.   Fund of Knowledge: appropriate      SAFETY   Feels safe in home: Yes   Suicidal ideation: none endorses today  History of suicide attempts:  No   Hx of impulsivity: Yes     Hope for the future: present   Hx of Command hallucinations or current psychosis: No  History of Self-injurious behaviors: No Current:  No  Family member  by suicide:  No        SAFETY ASSESSMENT:   Based on all available evidence including the factors cited above, overall Risk for harm is " low and is appropriate for outpatient level of care.   Recommended that patient call 911 or go to the local ED should there be a change in any of these risk factors.    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  Does note psychosocial stressors and she is using can use nonpharmacological interventions for residual symptoms.  Will continue the sertraline at current dose and allow to become more therapeutic.  Continue remaining medications at current dosages.  No longer taking the lorazepam and will discontinue.  Follow-up two months            Other Visit Diagnoses     Mood disorder (H)        Ruling out bipolar, however, likely not a bipolar trajectory.    Relevant Medications    OLANZapine (ZYPREXA) 5 MG tablet    sertraline (ZOLOFT) 100 MG tablet            CONSULTS/REFERRALS:   None at this time, recommend therapy support  Coordinate care with therapist as needed     MEDICAL:   Metabolic labs completed July 2020   Coordinate care with PCP (Adeola Reardon) as needed     COMMUNITY/PSYCHOSOCIAL INTERVENTIONS/OTHER:   Coordinate care with other community providers as needed     OTHER RECOMMENDED ASSESSMENTS:   - None     FOLLOW UP: Schedule an appointment with me in two months or sooner as needed. Call [phone] to schedule  Follow up with primary care provider as planned or for acute medical  concerns.  Call the psychiatric nurse line with medication questions or concerns at [phone].     PSYCHOEDUCATION:  Medication side effects and alternatives reviewed. Health promotion activities recommended and reviewed today. All questions addressed. Education and counseling completed regarding risks and benefits of medications and psychotherapy options.  Call the psychiatric nurse line with medication questions or concerns at [phone].  MyChart may be used to communicate with your provider, but this is not intended to be used for emergencies.     COMMUNITY RESOURCES:    CRISIS NUMBERS: Provided in AVS 2021  Crisis Connection - 657.628.3889  CRISIS TEXT LINE: Text 588444 from anywhere in [place], anytime, any crisis ;  OR SEE www.crisistextline.org  National Suicide Prevention Lifeline: 238.335.9785 (TTY: 438.328.4261). Call anytime for help.  (www.suicidepreventionlifeline.org)  National [place] on Mental Illness (www.maryanne.org): 779.628.9064 or 947-735-3092.   Mental Health Association (www.mentalhealth.org): 497.382.2004 or 382-877-3742.  [place] Crisis Text Line: Text [place] to 822785  Suicide LifeLine Chat: suicidepreaConline.org/chat    ADMINISTRATIVE BILLIN min spent interviewing patient, reviewing referral documents, obtaining and reviewing outside records, communication with other health specialists, and preparing this report.    Video/Phone Start Time:  1:28 PM  Video/Phone End Time:  1:44 pm    Greater than 50% of time was spent in counseling and coordination of care regarding above diagnoses and treatment plan.  Patient Status:  Patient will continue to be seen for ongoing consultation and stabilization.    Signed:   Alissa Bowman, MSN, APRN, FMHNP-[facility] ([facility])   Chart documentation done in part with Dragon Voice Recognition software.  Although reviewed after completion, some word and grammatical errors may remain.

## 2021-08-04 ASSESSMENT — ANXIETY QUESTIONNAIRES: GAD7 TOTAL SCORE: 13

## 2021-08-28 RX ORDER — SERTRALINE HYDROCHLORIDE 100 MG/1
100 TABLET, FILM COATED ORAL DAILY
Qty: 90 TABLET | Refills: 0 | Status: SHIPPED | OUTPATIENT
Start: 2021-08-28 | End: 2021-10-05

## 2021-08-29 NOTE — ASSESSMENT & PLAN NOTE
Tolerating the addition of sertraline with positive effect.  Does note psychosocial stressors and she is using can use nonpharmacological interventions for residual symptoms.  Will continue the sertraline at current dose and allow to become more therapeutic.  Continue remaining medications at current dosages.  No longer taking the lorazepam and will discontinue.  Follow-up two months

## 2021-08-31 DIAGNOSIS — F39 MOOD DISORDER (H): ICD-10-CM

## 2021-08-31 RX ORDER — TOPIRAMATE 50 MG/1
50 TABLET, FILM COATED ORAL 2 TIMES DAILY
Qty: 60 TABLET | Refills: 1 | Status: SHIPPED | OUTPATIENT
Start: 2021-08-31 | End: 2021-10-05

## 2021-08-31 NOTE — TELEPHONE ENCOUNTER
Date of Last Office Visit: 08/03/2021  Date of Next Office Visit: 10/05/2021  No shows since last visit: 0  Cancellations since last visit: 0    Medication requested: Topiramate 50mg Date last ordered: 06/22/2021 Qty: 60 Refills: 1   Please discontinue prev dose 25mg from med list    Review of [place] ?: n/a    Lapse in medication adherence greater than 5 days?: no - old dose on med list  If yes, call patient and gather details: pt reports taking meds as prescribed, is at 25mg BID dosing.  Medication refill request verified as identical to current order?: adjusted sig to reflect correct dose  Result of Last DAM, VPA, Li+ Level, CBC, or Carbamazepine Level (at or since last visit): N/A    []Medication refilled per  Medication Refill in Ambulatory Care  policy.  [x]Medication unable to be refilled by RN due to criteria not met as indicated below:    []Eligibility - not seen in the last year   []Supervision - no future appointment   []Compliance - no shows, cancellations or lapse in therapy   []Verification - order discrepancy   []Controlled medication   [x]Medication not included in policy   []90-day supply request   []Other

## 2021-09-15 ENCOUNTER — MYC MEDICAL ADVICE (OUTPATIENT)
Dept: PSYCHIATRY | Facility: CLINIC | Age: 36
End: 2021-09-15

## 2021-09-15 DIAGNOSIS — F06.4 ANXIETY DISORDER DUE TO KNOWN PHYSIOLOGICAL CONDITION: Primary | ICD-10-CM

## 2021-09-16 RX ORDER — BUSPIRONE HYDROCHLORIDE 15 MG/1
15 TABLET ORAL
Qty: 60 TABLET | Refills: 1 | Status: SHIPPED | OUTPATIENT
Start: 2021-09-16 | End: 2021-10-05 | Stop reason: SINTOL

## 2021-09-16 NOTE — TELEPHONE ENCOUNTER
See MyChart message from patient regarding sexual side effects.  Will augment with buspirone twice a day 15 mg.    Alissa Bowman, MSN, APRN, CNP, FMHNP-BC,

## 2021-10-01 NOTE — PROGRESS NOTES
[facility] ([facility])  Oct 5, 2021    Behavioral Health Clinician Progress Note    Patient Name: Ashlie Matthews      Telemedicine Visit: The patient's condition can be safely assessed and treated via synchronous audio and visual telemedicine encounter.      Reason for Telemedicine Visit: Services only offered telehealth    Originating Site (Patient Location): Patient's home    Distant Site (Provider Location): Provider Remote Setting- Home Office    Consent:  The patient/guardian has verbally consented to: the potential risks and benefits of telemedicine (video visit) versus in person care; bill my insurance or make self-payment for services provided; and responsibility for payment of non-covered services.     Mode of Communication:  Video Conference via [employer]    As the provider I attest to compliance with applicable laws and regulations related to telemedicine.         Service Type:  Individual      Service Location:   [employer] [facility] / Email (patient reached)     Session Start Time: 700am  Session End Time: 717am      Session Length: 16 - 37      Attendees: Patient    Visit Activities (Refresh list every visit): [facility] Only    Diagnostic Assessment Date: 02/04/2021  See Flowsheets for today's PHQ-9 and SLIM-7 results  Previous PHQ-9:   PHQ-9 SCORE 6/20/2021 8/3/2021 10/5/2021   PHQ-9 Total Score - - -   [facility] (M[facility]) - 8 (Mild depression)   PHQ-9 Total Score 8 3 8       Previous SLIM-7:   SLIM-7 SCORE 6/20/2021 8/3/2021 10/5/2021   Total Score 17 (severe anxiety) - 2 (minimal anxiety)   Total Score 17 13 2       WHODAS  No flowsheet data found.     AUDIT  No flowsheet data found.    DATA  Extended Session (60+ minutes): No  Interactive Complexity: No  Crisis: No    Medication Compliance:  Yes      Chemical Use Review:   Substance Use: Chemical use reviewed, no active concerns identified      Tobacco Use: No current tobacco use.      Current Stressors / Issues:  MH  update: Reports that she has noticed that it's easier to manage her stresses. Stopped that buspirone was causing a lot of fatigue and headaches. She hasn't noticed any effects from this. She decided to not re-enter the job market but they decided to do distance learning due to ongoing pandemic. 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 This decision has significantly reduced her stress and anxiety. Still apprehensive about starting therapy because of time commitment. Informed her that telemed will continue to be available. I gave her the phone number to intake if she should change her mind.   RIANA: No  Preg: No  Most important: needs refills of zoloft but bottle said there were refills but when she called to get it refilled she was told there weren't any. She wants to continue with zoloft.            Progress on Treatment Objective(s) / Homework:  Satisfactory progress - ACTION (Actively working towards change); Intervened by reinforcing change plan / affirming steps taken    Motivational Interviewing    MI Intervention: Co-Developed Goal: encouraged to consider therapy to help improve anxiety, explored how changing thoughts can be useful. , Expressed Empathy/Understanding, Supported Autonomy, Collaboration, Evocation, Permission to raise concern or advise, Open-ended questions, Reflections: simple and complex, Change talk (evoked) and Reframe     Change Talk Expressed by the Patient: Desire to change Ability to change Reasons to change Need to change Committment to change Activation Taking steps    Provider Response to Change Talk: E - Evoked more info from patient about behavior change, A - Affirmed patient's thoughts, decisions, or attempts at behavior change, R - Reflected patient's change talk and S - Summarized patient's change talk statements        Review of Symptoms per patient report:  Depression: No symptoms  Tiesha:  No Symptoms  Psychosis: No  Symptoms  Anxiety: Excessive worry, Nervousness and Ruminations  Panic:  No symptoms  Post Traumatic Stress Disorder:  No Symptoms   Eating Disorder: No Symptoms  ADD / ADHD:  No symptoms  Conduct Disorder: No symptoms  Autism Spectrum Disorder: No symptoms  Obsessive Compulsive Disorder: No Symptoms      Changes in Health Issues:   None reported    Assessment: Current Emotional / Mental Status (status of significant symptoms):  Risk status (Self / Other harm or suicidal ideation)  Patient has had a history of suicidal ideation: post partum depression in 2018 with SI, hospitalized  Patient denies current fears or concerns for personal safety.  Patient denies current or recent suicidal ideation or behaviors.  Patient denies current or recent homicidal ideation or behaviors.  Patient denies current or recent self injurious behavior or ideation.  Patient denies other safety concerns.  A safety and risk management plan has not been developed at this time, however patient was encouraged to call [place] [phone] should there be a change in any of these risk factors.    Appearance:   Appropriate   Eye Contact:   Good   Psychomotor Behavior: Normal   Attitude:   Cooperative   Orientation:   All  Speech   Rate / Production: Normal    Volume:  Normal   Mood:    Anxious   Affect:    Appropriate   Thought Content:  Clear   Thought Form:  Coherent  Logical   Insight:    Good     Diagnoses:  1. Generalized anxiety disorder        Collateral Reports Completed:  communicated with Alissa Bowman, MSN, APRN, CNP, FMHNP-BC, [place] [facility]    Plan: (Homework, other):  Patient was given information about behavioral services and encouraged to schedule a follow up appointment with the clinic [facility] in conjunction with next [facility] appointment.  She was also given information about mental health symptoms and treatment options .  CD Recommendations: No indications of CD issues.  Irina RICHMOND [facility]      MARTIN Evans  Oct  5, 2021

## 2021-10-05 ENCOUNTER — VIRTUAL VISIT (OUTPATIENT)
Dept: BEHAVIORAL HEALTH | Facility: CLINIC | Age: 36
End: 2021-10-05
Payer: COMMERCIAL

## 2021-10-05 ENCOUNTER — VIRTUAL VISIT (OUTPATIENT)
Dept: PSYCHIATRY | Facility: CLINIC | Age: 36
End: 2021-10-05
Payer: COMMERCIAL

## 2021-10-05 DIAGNOSIS — F39 MOOD DISORDER (H): ICD-10-CM

## 2021-10-05 DIAGNOSIS — F06.4 ANXIETY DISORDER DUE TO KNOWN PHYSIOLOGICAL CONDITION: ICD-10-CM

## 2021-10-05 DIAGNOSIS — F41.1 GENERALIZED ANXIETY DISORDER: Primary | ICD-10-CM

## 2021-10-05 PROCEDURE — 90832 PSYTX W PT 30 MINUTES: CPT | Mod: 95 | Performed by: SOCIAL WORKER

## 2021-10-05 PROCEDURE — 99213 OFFICE O/P EST LOW 20 MIN: CPT | Mod: 95 | Performed by: NURSE PRACTITIONER

## 2021-10-05 RX ORDER — OLANZAPINE 5 MG/1
5 TABLET ORAL AT BEDTIME
Qty: 90 TABLET | Refills: 0 | Status: SHIPPED | OUTPATIENT
Start: 2021-10-05 | End: 2021-12-07

## 2021-10-05 RX ORDER — TOPIRAMATE 50 MG/1
50 TABLET, FILM COATED ORAL 2 TIMES DAILY
Qty: 180 TABLET | Refills: 0 | Status: SHIPPED | OUTPATIENT
Start: 2021-10-05 | End: 2021-12-07

## 2021-10-05 RX ORDER — BUPROPION HYDROCHLORIDE 150 MG/1
150 TABLET ORAL EVERY MORNING
Qty: 30 TABLET | Refills: 1 | Status: SHIPPED | OUTPATIENT
Start: 2021-10-05 | End: 2021-12-07

## 2021-10-05 RX ORDER — SERTRALINE HYDROCHLORIDE 100 MG/1
100 TABLET, FILM COATED ORAL DAILY
Qty: 90 TABLET | Refills: 0 | Status: SHIPPED | OUTPATIENT
Start: 2021-10-05 | End: 2021-12-09

## 2021-10-05 ASSESSMENT — ANXIETY QUESTIONNAIRES
6. BECOMING EASILY ANNOYED OR IRRITABLE: NOT AT ALL
GAD7 TOTAL SCORE: 2
2. NOT BEING ABLE TO STOP OR CONTROL WORRYING: NOT AT ALL
8. IF YOU CHECKED OFF ANY PROBLEMS, HOW DIFFICULT HAVE THESE MADE IT FOR YOU TO DO YOUR WORK, TAKE CARE OF THINGS AT HOME, OR GET ALONG WITH OTHER PEOPLE?: NOT DIFFICULT AT ALL
3. WORRYING TOO MUCH ABOUT DIFFERENT THINGS: NOT AT ALL
7. FEELING AFRAID AS IF SOMETHING AWFUL MIGHT HAPPEN: NOT AT ALL
GAD7 TOTAL SCORE: 2
4. TROUBLE RELAXING: SEVERAL DAYS
GAD7 TOTAL SCORE: 2
1. FEELING NERVOUS, ANXIOUS, OR ON EDGE: NOT AT ALL
5. BEING SO RESTLESS THAT IT IS HARD TO SIT STILL: SEVERAL DAYS
7. FEELING AFRAID AS IF SOMETHING AWFUL MIGHT HAPPEN: NOT AT ALL

## 2021-10-05 ASSESSMENT — PATIENT HEALTH QUESTIONNAIRE - PHQ9
SUM OF ALL RESPONSES TO PHQ QUESTIONS 1-9: 8
SUM OF ALL RESPONSES TO PHQ QUESTIONS 1-9: 8
10. IF YOU CHECKED OFF ANY PROBLEMS, HOW DIFFICULT HAVE THESE PROBLEMS MADE IT FOR YOU TO DO YOUR WORK, TAKE CARE OF THINGS AT HOME, OR GET ALONG WITH OTHER PEOPLE: NOT DIFFICULT AT ALL

## 2021-10-05 NOTE — ASSESSMENT & PLAN NOTE
Feels the current regimen is effective in targeting anxiety and mood.  Ongoing sexual side effects.  She did not tolerate the buspirone due to sedation and headache and will formally discontinue.  Add bupropion  mg to target the sexual side effects.  Continue remaining medications at current dosages.  Follow-up 2 months

## 2021-10-05 NOTE — PROGRESS NOTES
"  PSYCHIATRIC MEDICATION FOLLOW UP APPT: ADULT     Name:  Ashlie Matthews  : 1985    Ashlei Matthews is a 36 year old female who is being evaluated via a billable video visit.      How would you like to obtain your AVS? MyChart  If the video visit is dropped, the invitation should be resent by: Text to cell phone: [phone]  Will anyone else be joining your video visit? No    Location [place] If not at [place] address below, please ask where they are in case of an emergency situation arises during the appointment.  [id number] [facility] [address]    Telemedicine Visit: The patient's condition can be safely assessed and treated via synchronous audio and visual telemedicine encounter.      Reason for Telemedicine Visit: COVID 19 pandemic and the social and physical recommendations by the CDC and MDH.      Originating Site (Patient Location): Patient's home    Distant Site (Provider Location): Provider Remote Setting    Consent:  The patient/guardian has verbally consented to: the potential risks and benefits of telemedicine (video visit or phone) versus in person care; bill my insurance or make self-payment for services provided; and responsibility for payment of non-covered services.     Mode of Communication:  Video Conference via [employer]    As the provider I attest to compliance with applicable laws and regulations related to telemedicine.    IDENTIFICATION   Ashlie Matthews is a 36 year old female who prefers to be called: \"Sima\"  Therapist: None at present  PCP: Adeola Reardon      Last seen for outpatient psychiatry Return Visit on 2021.      FOLLOWING PLAN PUT INTO PLACE: Overall depression is stable, continues with panic/emotional lability 2-3 times a week.  Will monitor to evaluate if an selective serotonin reuptake inhibitor or SNRI is indicated and in the interim, will utilize as needed lorazepam for those occasions, small quantity provided.  Tolerating the " "topirimate with minimal effect on Second Generation Antipsychotic weight gain.  Will further increase to to 50 mg twice a day.  Follow-up 4 weeks     INTERIM HISTORY     COMMUNICATIONS FROM PATIENT VIA:  Etubics message regarding sexual side effects and buspirone added to address      RECORDS AVAILABLE FOR REVIEW: EHR records through [facility]     HISTORY OF PRESENT ILLNESS   [facility]PS referral for psychiatric medication consult in February 2021. Reports past reported diagnosis of Anxiety and Postpartum depression.  One psychiatric hospitalization in 2018 where olanzapine, Viibryd and quetiapine were initiated.   Hx of suicidal ideation, no suicide attempts. No history of self-injurious behaviors. Genetically loaded for anxiety prevalent. Grew up in an intact home with all basic needs being met.    Reports she had a \"break down\" after the birth of her daughter in 2018.  The pregnancy was very challenging including bedrest, hyperemesis and PICC line placement.  She was hospitalized for suicidal ideation.  She has always had a low level thoughts of passive thoughts of being better off dead.  She had never experienced the intensity of having a plan in place.  Her and her  had called the crisis center and she was directed to be seen in the ER where she was discharged home.  2 weeks later the intensity of the thoughts with a plan became increasingly distressing.  She reached out to her  and was then brought to the ER and hospitalized at [facility]. Started on Zyprexa and Seroquel at that time. Was stable on these medications but stopped them both on her own in August 2020 because she was worried about effects on her liver After being diagnosed with hepatic steatosis. She had never been 1 to drink alcohol and was concerned the olanzapine and quetiapine may be contributing to that.  Mood was stable after stopping and she has not been on any medications for mood since then.      She was overall doing " "well until she was put on prednisone by dermatology for an ongoing rash for 8 years and unknown cause.  The itching has also impacted sleep.The rash cleared up with the prednisone but she was having a really hard time sleeping while taking it. She was first put on the prednisone In December 2020 but the rash returned so she started again January, 2021. She was having significant trouble sleeping. Has tried benadryl, unisom, vistaril prescribed by dermatologist but nothing is helping with sleep.     In general, she feels like her biggest issue that led to her decompensation 2 years ago was lack of sleep and as long as she can maintain her sleep her mood is stable and she is able to use nonpharmacological strategies for her anxiety.  When she does not sleep her mood becomes very anxious, crying a lot.  Then the suicidal thoughts or thoughts that \"life would be easier\" if she was gone return more intensely.      She stopped the combination of olanzapine, Viibryd and quetiapine on her own when she no longer had insurance and cost was a barrier.  In addition she had gained 30 pounds which was ego-dystonic despite the fact that she was eating healthy and exercising.  When she started having difficulty sleeping again in early 2021, she restarted the olanzapine from a left over script.  The olanzapine has been the most effective in targeting the sleep wake cycle historically.  Further trials of trazodone, hydroxyzine, gabapentin and zolpidem all were not effective in targeting insomnia.  Restarted the olanzapine 5 mg nightly and overall sleeping well.  However she experiences weight gain.  Trial of metformin not effective.      She does not appear to have a bipolar trajectory as when her sleep cycle is targeted and maintained she does not have any type of manic symptoms.  Have left the mood disorder diagnosis to support the use of second-generation antipsychotic    FAMILY, MEDICAL, SURGICAL HISTORY REVIEWED.  MEDICATION " HAVE BEEN REVIEWED AND ARE CURRENT TO THE BEST OF MY KNOWLEDGE AND ABILITY.  Relationship status: .   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   Current living situation: with family including  and 3 children*     MEDICATIONS                                                                                              I have reviewed this patient's current medications  Per EHR:    Current Outpatient Medications:      albuterol (2.5 MG/3ML) 0.083% nebulizer solution, Take 1 vial (2.5 mg) by nebulization every 6 hours as needed for shortness of breath / dyspnea or wheezing, Disp: 30 vial, Rfl: 3     albuterol (PROAIR HFA, PROVENTIL HFA, VENTOLIN HFA) 108 (90 BASE) MCG/ACT inhaler, Inhale 2 puffs into the lungs every 6 hours as needed for shortness of breath / dyspnea or wheezing, Disp: 1 Inhaler, Rfl: 11     buPROPion (WELLBUTRIN XL) 150 MG 24 hr tablet, Take 1 tablet (150 mg) by mouth every morning, Disp: 30 tablet, Rfl: 1     cetirizine (ZYRTEC ALLERGY) 10 MG tablet, Take 10 mg by mouth 2 times daily , Disp: , Rfl:      OLANZapine (ZYPREXA) 5 MG tablet, Take 1 tablet (5 mg) by mouth At Bedtime, Disp: 90 tablet, Rfl: 0     ondansetron (ZOFRAN) 4 MG tablet, Take 1 tablet (4 mg) by mouth every 6 hours as needed for nausea, Disp: 30 tablet, Rfl: 0     sertraline (ZOLOFT) 100 MG tablet, Take 1 tablet (100 mg) by mouth daily, Disp: 90 tablet, Rfl: 0     topiramate (TOPAMAX) 50 MG tablet, Take 1 tablet (50 mg) by mouth 2 times daily, Disp: 180 tablet, Rfl: 0    NOTES ABOUT CURRENT PSYCHOTROPIC MEDICATIONS:   Olanzapine 5 mg, this is best medication that has targeted insomnia and subsequently mood and would like to continue  Topiramate 50 mg twice a day, not change in weight. Eating the same, gluten free, fasting in the am.   Quetiapine as needed anxiety, 25 mg left over from previous script, not using  "consistently  Sertraline 100 mg  Buspirone 15 mg, did not tolerate due to headache and sedation.  Started to target sexual side effects     PAST PSYCHOTROPIC MEDICATIONS:  Diphenhydramine, sleep not effective  Hydroxyzine as needed, not effective in targeting itching, anxiety or sleep  Citalopram  Trazodone, dizziness throughout the day  Zolpidem 5 mg, worked for one night only   Quetiapine   Metformin 500 mg twice a day targeting metabolic side effects of olanzapine, not effective  Buspirone to target sexual side effects      TODAY PATIENT REPORTS THE FOLLOWING PSYCHIATRIC ROS:   Per [facility] [place], Irina Curran, during today's team-based visit:  \"Reports that she has noticed that it's easier to manage her stresses. Stopped that buspirone because it was causing a lot of fatigue and headaches. She hasn't noticed any effects from this. She decided to not re-enter the job market since they decided to do distance learning due to ongoing pandemic. 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 This decision has significantly reduced her stress and anxiety. Still apprehensive about starting therapy because of time commitment. Informed her that telemed will continue to be available. I gave her the phone number to intake if she should change her mind.  Most important: needs refills of zoloft but bottle said there were refills but when she called to get it refilled she was told there weren't any. She wants to continue with zoloft..\"    PROBLEM: DEPRESSION: Feels the current medication regimen is effective.     Last PHQ-9 10/5/2021   1.  Little interest or pleasure in doing things 0   2.  Feeling down, depressed, or hopeless 1   3.  Trouble falling or staying asleep, or sleeping too much 1   4.  Feeling tired or having little energy 1   5.  Poor appetite or overeating 2   6.  Feeling bad about yourself 1   7.  Trouble concentrating 0   8.  Moving slowly or restless 1   Q9: Thoughts " of better off dead/self-harm past 2 weeks 1   PHQ-9 Total Score 8   Difficulty at work, home, or with people -   In the past two weeks have you had thoughts of suicide or self harm? No   Do you have concerns about your personal safety or the safety of others? No   In the past 2 weeks have you thought about a plan or had intention to harm yourself? -   In the past 2 weeks have you acted on these thoughts in any way? -     PHQ-9 SCORE 6/20/2021 8/3/2021 10/5/2021   PHQ-9 Total Score - - -   PHQ-9 Total Score MyChart 8 (Mild depression) - 8 (Mild depression)   PHQ-9 Total Score 8 3 8     Suicidal ideation:  No PHQ9 score is 8 indicating mild depression.     PROBLEM: ANXIETY: endorses psychosocial stressors that are increasing anxiety, specifically related to parenting and the potential increase in COVID 19 pandemic cases  SLIM-7 scores:   SLIM-7 SCORE 6/20/2021 8/3/2021 10/5/2021   Total Score 17 (severe anxiety) - 2 (minimal anxiety)   Total Score 17 13 2     PROBLEM: SLEEP/INSOMNIA: Improving On the olanzapine nightly 5 mg    PROBLEM: SIDE EFFECTS (weight gain): No change.  hasn't gained weight but Reports she has not lost weight either.  Tolerating the topiramate    CURRENT STRESSORS: Occupational and COVID 19 Pandemic and the social and physical distancing recommendations by the CDC and MDH  COPING MECHANISMS AND SUPPORTS: Family, and Friends  DIET:  Attempting to eat healthy And using intermittent fasting  EXERCISE: Adequate  SIDE EFFECTS: Denies   SUBSTANCE USE:  Denies   COMPLIANCE:  states Adherent to medication regimen  HIGH RISK MEDICATION:  Yes Olanzapine and metabolic labs were completed July 2020  REPORTS THE FOLLOWING NEW MEDICAL ISSUES:  none    PERTINENT PAST MEDICAL AND SURGICAL HISTORY     Past Medical History:   Diagnosis Date     Allergic rhinitis      Anxiety      Asthma      Cyclic vomiting syndrome      Depressive disorder      Dysmenorrhea      Pneumothorax 2004    from bad coughing       VITALS  "    BP Readings from Last 1 Encounters:   03/20/21 114/74     Pulse Readings from Last 1 Encounters:   03/20/21 68     Wt Readings from Last 1 Encounters:   11/20/20 69.5 kg (153 lb 4.8 oz)     Ht Readings from Last 1 Encounters:   11/20/20 1.549 m (5' 1\")     Estimated body mass index is 28.97 kg/m  as calculated from the following:    Height as of 11/20/20: 1.549 m (5' 1\").    Weight as of 11/20/20: 69.5 kg (153 lb 4.8 oz).    Temp Readings from Last 1 Encounters:   03/20/21 98  F (36.7  C) (Oral)       HEIGHT/WEIGHT:  Ht Readings from Last 2 Encounters:   11/20/20 1.549 m (5' 1\")   07/06/20 1.549 m (5' 1\")      Wt Readings from Last 2 Encounters:   11/20/20 69.5 kg (153 lb 4.8 oz)   07/06/20 75.4 kg (166 lb 4.8 oz)    Estimated body mass index is 28.97 kg/m  as calculated from the following:    Height as of 11/20/20: 1.549 m (5' 1\").    Weight as of 11/20/20: 69.5 kg (153 lb 4.8 oz).     LABS & IMAGING                                                                                                                    Recent Labs   Lab Test 07/28/20  0943      POTASSIUM 4.0   CHLORIDE 107   CO2 22   GLC 97   MURIEL 8.9   BUN 10   CR 0.67   GFRESTIMATED >90   ALBUMIN 3.8   PROTTOTAL 8.4   AST 18   ALT 22   ALKPHOS 56   BILITOTAL 0.8     Recent Labs   Lab Test 07/28/20  0943   CHOL 183   *   HDL 36*   TRIG 206*   A1C 5.2     Recent Labs   Lab Test 03/01/19  0924   TSH 1.02       ALLERGY & IMMUNIZATIONS       Allergies   Allergen Reactions     Moxifloxacin Hydrochloride Nausea and Vomiting     Avelox-vomiting     Cats Itching     Codeine GI Disturbance     Darvocet [Propoxyphene N-Apap] Hives     PN: LW Reaction: Rash, Generalized     Demerol Hives     Dog Hair [Dogs] Unknown     Dust Mites Itching and Swelling     Meperidine      PN: LW Reaction: Rash, Generalized     Oxycodone-Acetaminophen      PN: LW Reaction: SHORTNESS OF BREATH     Pollen Extract Itching     Vicodin [Hydrocodone-Acetaminophen] GI " "Disturbance     PN: LW Reaction: Rash, Generalized     Latex Itching, Swelling and Rash     \"sensitivity\"       MEDICAL REVIEW OF SYSTEMS:   Ten system review was completed with pertinent positives noted     MENTAL STATUS EXAM:   General/Constitutional:  Appearance:  awake, alert, adequately groomed, appeared stated age and no apparent distress  Attitude:   cooperative   Eye Contact:  good  Musculoskeletal:  Psychomotor Behavior:  no evidence of tardive dyskinesia, dystonia, or tics from the head up  Psychiatric:  Speech:  clear, coherent, regular rate, rhythm, and volume,  No pressure speech noted.  Associations:  no loose associations  Thought Process:  logical, linear and goal oriented  Thought Content:   No evidence of active suicidal ideation or homicidal ideation, no evidence of psychotic thought, no auditory hallucinations present and no visual hallucinations present  Mood:  good  Affect:  appropriate and in normal range  Insight:  good  Judgment:  intact, adequate for safety  Impulse Control:  intact  Neurological:  Oriented to:  person, place, time, and situation  Attention Span and Concentration:  normal   Language: intact    Recent and Remote Memory:  Intact to interview. Not formally assessed. No amnesia.   Fund of Knowledge: appropriate      SAFETY   Feels safe in home: Yes   Suicidal ideation: none endorses today  History of suicide attempts:  No   Hx of impulsivity: Yes     Hope for the future: present   Hx of Command hallucinations or current psychosis: No  History of Self-injurious behaviors: No Current:  No  Family member  by suicide:  No        SAFETY ASSESSMENT:   Based on all available evidence including the factors cited above, overall Risk for harm is low and is appropriate for outpatient level of care.   Recommended that patient call 911 or go to the local ED should there be a change in any of these risk factors.    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 " lack of sleep for extended period of time (Medical Condition)  780.52 (G47.00) Insomnia Disorder   With non-sleep disorder mental comorbidity  Episodic       MEDICAL COMORBIDITY IMPACTING CLINICAL PICTURE: rash which impacts her sleep when it has active itching and distress    ASSESSMENT AND PLAN      Problem List Items Addressed This Visit        Nervous and Auditory    Anxiety disorder due  Insomnia and lack of sleep for extended period of time     Relevant Medications    topiramate (TOPAMAX) 50 MG tablet    sertraline (ZOLOFT) 100 MG tablet    buPROPion (WELLBUTRIN XL) 150 MG 24 hr tablet       Behavioral     Feels the current regimen is effective in targeting anxiety and mood.  Ongoing sexual side effects.  She did not tolerate the buspirone due to sedation and headache and will formally discontinue.  Add bupropion  mg to target the sexual side effects.  Continue remaining medications at current dosages.  Follow-up 2 months           Other Visit Diagnoses     Mood disorder (H)        Ruling out bipolar, however, likely not a bipolar trajectory.    Relevant Medications    topiramate (TOPAMAX) 50 MG tablet    OLANZapine (ZYPREXA) 5 MG tablet    sertraline (ZOLOFT) 100 MG tablet    buPROPion (WELLBUTRIN XL) 150 MG 24 hr tablet            CONSULTS/REFERRALS:   None at this time, recommend therapy support  Coordinate care with therapist as needed     MEDICAL:   Metabolic labs completed July 2020   Coordinate care with PCP (Adeola Reardon) as needed     COMMUNITY/PSYCHOSOCIAL INTERVENTIONS/OTHER:   Coordinate care with other community providers as needed     OTHER RECOMMENDED ASSESSMENTS:   - None     FOLLOW UP: 2 months with this provider  Follow up with primary care provider as planned or for acute medical concerns.  Call the psychiatric nurse line with medication questions or concerns at [phone].     PSYCHOEDUCATION:  Medication side effects and alternatives reviewed. Health promotion activities  recommended and reviewed today. All questions addressed. Education and counseling completed regarding risks and benefits of medications and psychotherapy options.  Call the psychiatric nurse line with medication questions or concerns at [phone].  [employer] Express[employer] may be used to communicate with your provider, but this is not intended to be used for emergencies.     COMMUNITY RESOURCES:    CRISIS NUMBERS: Provided in AVS 2021  Crisis Connection - 513.565.9557  CRISIS TEXT LINE: Text 120804 from anywhere in [place], anytime, any crisis ;  OR SEE www.crisistextline.org  National Suicide Prevention Lifeline: 589.121.6159 (TTY: 668.631.7516). Call anytime for help.  (www.suicidepreventionlifeline.org)  National [place] on Mental Illness (www.maryanne.org): 842.475.2996 or 485-503-5952.   Mental Health Association (www.mentalhealth.org): 128.540.2914 or 490-902-1145.  [place] Crisis Text Line: Text [place] to 534424  Suicide LifeLine Chat: suicidepreSpreadShoutline.org/chat    ADMINISTRATIVE BILLIN min spent interviewing patient, reviewing referral documents, obtaining and reviewing outside records, communication with other health specialists, and preparing this report.    Video/Phone Start Time:  7:22 am  Video/Phone End Time:  7:45 am    Greater than 50% of time was spent in counseling and coordination of care regarding above diagnoses and treatment plan.    Patient Status:  Patient will continue to be seen for ongoing consultation and stabilization.    Signed:   Alissa Bowman, MSN, APRN, FMHNP-[facility] Care Psychiatry Service ([facility]PS)   Chart documentation done in part with Dragon Voice Recognition software.  Although reviewed after completion, some word and grammatical errors may remain.

## 2021-10-06 ASSESSMENT — PATIENT HEALTH QUESTIONNAIRE - PHQ9: SUM OF ALL RESPONSES TO PHQ QUESTIONS 1-9: 8

## 2021-10-06 ASSESSMENT — ANXIETY QUESTIONNAIRES: GAD7 TOTAL SCORE: 2

## 2021-10-23 ENCOUNTER — HEALTH MAINTENANCE LETTER (OUTPATIENT)
Age: 36
End: 2021-10-23

## 2021-11-09 ENCOUNTER — E-VISIT (OUTPATIENT)
Dept: FAMILY MEDICINE | Facility: CLINIC | Age: 36
End: 2021-11-09

## 2021-11-09 ENCOUNTER — E-VISIT (OUTPATIENT)
Dept: FAMILY MEDICINE | Facility: CLINIC | Age: 36
End: 2021-11-09
Payer: COMMERCIAL

## 2021-11-09 DIAGNOSIS — K43.9 VENTRAL HERNIA WITHOUT OBSTRUCTION OR GANGRENE: Primary | ICD-10-CM

## 2021-11-09 PROCEDURE — 99421 OL DIG E/M SVC 5-10 MIN: CPT | Performed by: PHYSICIAN ASSISTANT

## 2021-11-09 PROCEDURE — 99421 OL DIG E/M SVC 5-10 MIN: CPT | Performed by: NURSE PRACTITIONER

## 2021-11-16 ENCOUNTER — OFFICE VISIT (OUTPATIENT)
Dept: SURGERY | Facility: CLINIC | Age: 36
End: 2021-11-16
Payer: COMMERCIAL

## 2021-11-16 VITALS
BODY MASS INDEX: 28.89 KG/M2 | OXYGEN SATURATION: 99 % | DIASTOLIC BLOOD PRESSURE: 76 MMHG | WEIGHT: 153 LBS | RESPIRATION RATE: 16 BRPM | SYSTOLIC BLOOD PRESSURE: 115 MMHG | HEIGHT: 61 IN | HEART RATE: 70 BPM

## 2021-11-16 DIAGNOSIS — R10.31 GROIN PAIN, RIGHT: Primary | ICD-10-CM

## 2021-11-16 PROCEDURE — 99203 OFFICE O/P NEW LOW 30 MIN: CPT | Performed by: SURGERY

## 2021-11-16 ASSESSMENT — MIFFLIN-ST. JEOR: SCORE: 1316.38

## 2021-11-16 NOTE — PROGRESS NOTES
[facility]  New Patient Office Visit      Assessment:    Ashlie Matthews is a 36 year old female with right lower quadrant/groin pain and right lateral abdominal pain. No obvious hernia on exam.  She does have a umbilical hernia that is not bothersome and has been present without change for at least 5 years.    Plan:    We will proceed with CT scan abdomen pelvis with contrast specifically to assess for spigelian or groin hernia.  Umbilical hernia is without symptoms and has not changed greater than 5 years would not recommend proceeding with repair of that alone.    Ashlie Matthews is seen in consultation for a hernia, at the request of Adeola Reardon PA-C.                    HPI:  Ashlie Matthews is a 36 year old female who presents for evaluation of pain in the right lateral abdomen and right lower quadrant.  She describes an inciting event:  Yes - after her second pregnancy she started noticing pain in this region and then during her third pregnancy, which was 5 years ago.  She describes exacerbating factors including coughing, sneezing.  She has never noticed a bulge or mass.  She has a hernia at the umbilicus which has been present since before her pregnancies and has not changed, does not cause pain    Previous herniorrhaphy:  No     Constipation: Yes  Cough: no  Diabetes: No  Current Smoker: No      Past Medical History:  Past Medical History:   Diagnosis Date     Allergic rhinitis      Anxiety      Asthma      Cyclic vomiting syndrome      Depressive disorder      Dysmenorrhea      Pneumothorax 2004    from bad coughing       Past Surgical History:  Past Surgical History:   Procedure Laterality Date     ARTHROSCOPY SHOULDER Bilateral 2008,2009     GENITOURINARY SURGERY  05/2019    Interstim placement     GYN SURGERY  7/16/18     Laparoscopy to look for endometriosis  8/2004     LAPAROTOMY MINI, TUBAL LIGATION (POST PARTUM), COMBINED Bilateral 5/11/2016    Procedure: COMBINED LAPAROTOMY  "MINI, TUBAL LIGATION (POST PARTUM);  Surgeon: Nannette Shane DO;  Location: [facility] OR     MYRINGOTOMY, INSERT TUBE BILATERAL, COMBINED  as a child     ORTHOPEDIC SURGERY  2008/2009    Shoulder     REMOVE STIMULATOR SACRAL NERVE Left 7/6/2020    Procedure: EXCISE INTERSTEM SACRAL NERVE STIMULATOR BATTERY AND LEAD LINE;  Surgeon: Pooja Lin MD;  Location: [facility] OR     Septoplasty for deviated septum  7/2005     SINUS SURGERY  2007     TONSILLECTOMY  2006        Social History:  Social History     Tobacco Use     Smoking status: Never Smoker     Smokeless tobacco: Never Used   Substance Use Topics     Alcohol use: No     Drug use: No    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    Family History:  Family History   Problem Relation Age of Onset     Diabetes Maternal Grandfather         insulin     Hypertension Maternal Grandfather      Obesity Maternal Grandfather      Respiratory Maternal Grandfather         asthma     Obesity Maternal Grandmother      Hypertension Mother      Gastrointestinal Disease Mother         Pancreatitis-flare ups often     Lipids Mother         high     Blood Disease Mother      Diabetes Mother      Heart Disease Father      Coronary Artery Disease Father      Allergies Son         Allergic to peanuts, different foods, Amoxicillin     ROS:  The 10 point review of systems is negative other than noted in the HPI and/or below.    PE:    Vitals: /76   Pulse 70   Resp 16   Ht 1.549 m (5' 1\")   Wt 69.4 kg (153 lb)   LMP 06/27/2018 (Exact Date)   SpO2 99%   BMI 28.91 kg/m    BMI= Body mass index is 28.91 kg/m .    General: Generally appears well.  Psych: Alert and Oriented.  Normal affect  Neurological: non-focal, moves extremities symmetrically, grossly normal strength and sensation  Eyes: Sclera clear  Respiratory:Breathing comfortably on room air  GI: Abdomen Non Distended Soft Hernia:  umbilical, 2 cm, partially reducible, non-tender  : No bulge in either groin with Valsalva  MSK: " extremities without edema  Lymphatic/Hematologic/Immune:  No femoral lymphadenopathy.  Integumentary:  No rashes    Data CT 6/2020: shows an umbilical hernia <2cm with associated diastasis recti of 4cm from xiphoid to below umbilicus. No lateral abdominal wall or groin hernia obvious    30 minutes spent on the date of the encounter doing chart review, history and exam, documentation and further activities as noted above      Fabiola Gore MD  11/16/21 9:10 AM     Please route or send letter to:  Referring Provider

## 2021-11-17 ENCOUNTER — MYC MEDICAL ADVICE (OUTPATIENT)
Dept: PSYCHIATRY | Facility: CLINIC | Age: 36
End: 2021-11-17
Payer: COMMERCIAL

## 2021-11-17 DIAGNOSIS — F39 MOOD DISORDER (H): Primary | ICD-10-CM

## 2021-11-17 RX ORDER — BUPROPION HYDROCHLORIDE 300 MG/1
300 TABLET ORAL EVERY MORNING
Qty: 30 TABLET | Refills: 0 | Status: SHIPPED | OUTPATIENT
Start: 2021-11-17 | End: 2021-12-07

## 2021-11-17 NOTE — TELEPHONE ENCOUNTER
See MyChart note regarding bupropion.  Will increase to 300 mg XL and reassess on 12/7/ when scheduled for follow-up    Alissa Bowman, MSN, APRN, CNP, FMHNP-BC,

## 2021-11-26 ENCOUNTER — HOSPITAL ENCOUNTER (OUTPATIENT)
Dept: CT IMAGING | Facility: CLINIC | Age: 36
Discharge: HOME | End: 2021-11-26
Attending: SURGERY | Admitting: SURGERY
Payer: COMMERCIAL

## 2021-11-26 DIAGNOSIS — R10.31 GROIN PAIN, RIGHT: ICD-10-CM

## 2021-11-26 PROCEDURE — 250N000009 HC RX 250: Performed by: SURGERY

## 2021-11-26 PROCEDURE — 74177 CT ABD & PELVIS W/CONTRAST: CPT

## 2021-11-26 PROCEDURE — 250N000011 HC RX IP 250 OP 636: Performed by: SURGERY

## 2021-11-26 RX ORDER — IOPAMIDOL 755 MG/ML
500 INJECTION, SOLUTION INTRAVASCULAR ONCE
Status: COMPLETED | OUTPATIENT
Start: 2021-11-26 | End: 2021-11-26

## 2021-11-26 RX ADMIN — IOPAMIDOL 76 ML: 755 INJECTION, SOLUTION INTRAVENOUS at 07:46

## 2021-11-26 RX ADMIN — SODIUM CHLORIDE 49 ML: 9 INJECTION, SOLUTION INTRAVENOUS at 07:46

## 2021-11-29 ENCOUNTER — MYC MEDICAL ADVICE (OUTPATIENT)
Dept: FAMILY MEDICINE | Facility: CLINIC | Age: 36
End: 2021-11-29
Payer: COMMERCIAL

## 2021-12-03 NOTE — PROGRESS NOTES
[facility] ([facility])  Dec 7, 2021    Behavioral Health Clinician Progress Note    Patient Name: Ashlie Matthews      Telemedicine Visit: The patient's condition can be safely assessed and treated via synchronous audio and visual telemedicine encounter.      Reason for Telemedicine Visit: Services only offered telehealth    Originating Site (Patient Location): Patient's home    Distant Site (Provider Location): Provider Remote Setting- Home Office    Consent:  The patient/guardian has verbally consented to: the potential risks and benefits of telemedicine (video visit) versus in person care; bill my insurance or make self-payment for services provided; and responsibility for payment of non-covered services.     Mode of Communication:  Video Conference via Fleep    As the provider I attest to compliance with applicable laws and regulations related to telemedicine.         Service Type:  Individual      Service Location:   MyChart / Email (patient reached)     Session Start Time: 500pm  Session End Time: 507pm      Session Length: 16 - 37      Attendees: Patient    Visit Activities (Refresh list every visit): [facility] Only    Diagnostic Assessment Date: 02/04/2021  See Flowsheets for today's PHQ-9 and SLIM-7 results  Previous PHQ-9:   PHQ-9 SCORE 8/3/2021 10/5/2021 12/6/2021   PHQ-9 Total Score - - -   PHQ-9 Total Score MyChart - 8 (Mild depression) 8 (Mild depression)   PHQ-9 Total Score 3 8 8       Previous SLIM-7:   SLIM-7 SCORE 8/3/2021 10/5/2021 12/6/2021   Total Score - 2 (minimal anxiety) 4 (minimal anxiety)   Total Score 13 2 4       WHODAS  No flowsheet data found.     AUDIT  No flowsheet data found.    DATA  Extended Session (60+ minutes): No  Interactive Complexity: No  Crisis: No    Medication Compliance:  Yes      Chemical Use Review:   Substance Use: Chemical use reviewed, no active concerns identified      Tobacco Use: No current tobacco use.      Current Stressors / Issues:  MH  update: Reports that she feels like she felt prior to her last pregnancy (good). Infrequent anxious moments of short duration and lower intensity. She's happy with her decision to use online learning with her kids.   Stresses: No  Appetite: her appetite has returned to normal after in decreased when she started the wellbutrin  Sleep: good, taking olanzapine otherwise she has difficulty falling asleep  Therapy: has decided to wait until after the holidays   RIANA: No  Preg: No  Interventions: encouraged to pursue therapy when the time is right for her  Most important: no improvement in sex drive with med change.     Progress on Treatment Objective(s) / Homework:  Satisfactory progress - ACTION (Actively working towards change); Intervened by reinforcing change plan / affirming steps taken    Motivational Interviewing    MI Intervention: Co-Developed Goal: encouraged to consider therapy to help improve anxiety, explored how changing thoughts can be useful. , Expressed Empathy/Understanding, Supported Autonomy, Collaboration, Evocation, Permission to raise concern or advise, Open-ended questions, Reflections: simple and complex, Change talk (evoked) and Reframe     Change Talk Expressed by the Patient: Desire to change Ability to change Reasons to change Need to change Committment to change Activation Taking steps    Provider Response to Change Talk: E - Evoked more info from patient about behavior change, A - Affirmed patient's thoughts, decisions, or attempts at behavior change, R - Reflected patient's change talk and S - Summarized patient's change talk statements        Review of Symptoms per patient report:  Depression: No symptoms  Tiesha:  No Symptoms  Psychosis: No Symptoms  Anxiety: Excessive worry, Nervousness and Ruminations  Panic:  No symptoms  Post Traumatic Stress Disorder:  No Symptoms   Eating Disorder: No Symptoms  ADD / ADHD:  No symptoms  Conduct Disorder: No symptoms  Autism Spectrum Disorder: No  symptoms  Obsessive Compulsive Disorder: No Symptoms      Changes in Health Issues:   None reported    Assessment: Current Emotional / Mental Status (status of significant symptoms):  Risk status (Self / Other harm or suicidal ideation)  Patient has had a history of suicidal ideation: post partum depression in 2018 with SI, hospitalized  Patient denies current fears or concerns for personal safety.  Patient denies current or recent suicidal ideation or behaviors.  Patient denies current or recent homicidal ideation or behaviors.  Patient denies current or recent self injurious behavior or ideation.  Patient denies other safety concerns.  A safety and risk management plan has not been developed at this time, however patient was encouraged to call [place] [phone] should there be a change in any of these risk factors.    Appearance:   Appropriate   Eye Contact:   Good   Psychomotor Behavior: Normal   Attitude:   Cooperative   Orientation:   All  Speech   Rate / Production: Normal    Volume:  Normal   Mood:    Anxious   Affect:    Appropriate   Thought Content:  Clear   Thought Form:  Coherent  Logical   Insight:    Good     Diagnoses:  1. Generalized anxiety disorder    2. Depression, major, recurrent, moderate (H)        Collateral Reports Completed:  communicated with Alissa Bowman, MSN, APRN, CNP, FMHNP-BC, [facility]    Plan: (Homework, other):  Patient was given information about behavioral services and encouraged to schedule a follow up appointment with the clinic [facility] in conjunction with next [facility] appointment.  She was also given information about mental health symptoms and treatment options .  CD Recommendations: No indications of CD issues.  Irina RICHMOND [facility]      MARTIN Evans  Dec 7, 2021

## 2021-12-06 ASSESSMENT — ANXIETY QUESTIONNAIRES
6. BECOMING EASILY ANNOYED OR IRRITABLE: SEVERAL DAYS
8. IF YOU CHECKED OFF ANY PROBLEMS, HOW DIFFICULT HAVE THESE MADE IT FOR YOU TO DO YOUR WORK, TAKE CARE OF THINGS AT HOME, OR GET ALONG WITH OTHER PEOPLE?: NOT DIFFICULT AT ALL
2. NOT BEING ABLE TO STOP OR CONTROL WORRYING: NOT AT ALL
7. FEELING AFRAID AS IF SOMETHING AWFUL MIGHT HAPPEN: NOT AT ALL
3. WORRYING TOO MUCH ABOUT DIFFERENT THINGS: NOT AT ALL
7. FEELING AFRAID AS IF SOMETHING AWFUL MIGHT HAPPEN: NOT AT ALL
GAD7 TOTAL SCORE: 4
5. BEING SO RESTLESS THAT IT IS HARD TO SIT STILL: SEVERAL DAYS
4. TROUBLE RELAXING: SEVERAL DAYS
GAD7 TOTAL SCORE: 4
1. FEELING NERVOUS, ANXIOUS, OR ON EDGE: SEVERAL DAYS
GAD7 TOTAL SCORE: 4

## 2021-12-06 ASSESSMENT — PATIENT HEALTH QUESTIONNAIRE - PHQ9
SUM OF ALL RESPONSES TO PHQ QUESTIONS 1-9: 8
10. IF YOU CHECKED OFF ANY PROBLEMS, HOW DIFFICULT HAVE THESE PROBLEMS MADE IT FOR YOU TO DO YOUR WORK, TAKE CARE OF THINGS AT HOME, OR GET ALONG WITH OTHER PEOPLE: NOT DIFFICULT AT ALL
SUM OF ALL RESPONSES TO PHQ QUESTIONS 1-9: 8

## 2021-12-07 ENCOUNTER — VIRTUAL VISIT (OUTPATIENT)
Dept: BEHAVIORAL HEALTH | Facility: CLINIC | Age: 36
End: 2021-12-07
Payer: COMMERCIAL

## 2021-12-07 ENCOUNTER — VIRTUAL VISIT (OUTPATIENT)
Dept: PSYCHIATRY | Facility: CLINIC | Age: 36
End: 2021-12-07
Payer: COMMERCIAL

## 2021-12-07 DIAGNOSIS — F06.4 ANXIETY DISORDER DUE TO KNOWN PHYSIOLOGICAL CONDITION: Primary | ICD-10-CM

## 2021-12-07 DIAGNOSIS — F33.1 DEPRESSION, MAJOR, RECURRENT, MODERATE (H): ICD-10-CM

## 2021-12-07 DIAGNOSIS — F39 MOOD DISORDER (H): ICD-10-CM

## 2021-12-07 DIAGNOSIS — F41.1 GENERALIZED ANXIETY DISORDER: Primary | ICD-10-CM

## 2021-12-07 PROCEDURE — 99207 PR NON-BILLABLE SERV PER CHARTING: CPT | Performed by: SOCIAL WORKER

## 2021-12-07 PROCEDURE — 99214 OFFICE O/P EST MOD 30 MIN: CPT | Mod: 95 | Performed by: NURSE PRACTITIONER

## 2021-12-07 RX ORDER — TOPIRAMATE 50 MG/1
50 TABLET, FILM COATED ORAL 2 TIMES DAILY
Qty: 180 TABLET | Refills: 0 | Status: SHIPPED | OUTPATIENT
Start: 2021-12-07 | End: 2022-02-08

## 2021-12-07 RX ORDER — BUPROPION HYDROCHLORIDE 300 MG/1
300 TABLET ORAL EVERY MORNING
Qty: 90 TABLET | Refills: 0 | Status: SHIPPED | OUTPATIENT
Start: 2021-12-07 | End: 2022-01-19

## 2021-12-07 RX ORDER — ESCITALOPRAM OXALATE 10 MG/1
10 TABLET ORAL DAILY
Qty: 30 TABLET | Refills: 1 | Status: SHIPPED | OUTPATIENT
Start: 2021-12-07 | End: 2022-01-19

## 2021-12-07 RX ORDER — OLANZAPINE 5 MG/1
5 TABLET ORAL AT BEDTIME
Qty: 90 TABLET | Refills: 0 | Status: SHIPPED | OUTPATIENT
Start: 2021-12-07 | End: 2022-02-08

## 2021-12-07 ASSESSMENT — ANXIETY QUESTIONNAIRES: GAD7 TOTAL SCORE: 4

## 2021-12-07 ASSESSMENT — PATIENT HEALTH QUESTIONNAIRE - PHQ9: SUM OF ALL RESPONSES TO PHQ QUESTIONS 1-9: 8

## 2021-12-07 NOTE — PROGRESS NOTES
"  PSYCHIATRIC MEDICATION FOLLOW UP APPT: ADULT     Name:  Ashlie Matthews  : 1985    Ashlie Matthews is a 36 year old female who is being evaluated via a billable video visit.      How would you like to obtain your AVS? MyChart  If the video visit is dropped, the invitation should be resent by: Text to cell phone: [phone]  Will anyone else be joining your video visit? No    Location [place] If not at [place] address below, please ask where they are in case of an emergency situation arises during the appointment.  [address]  [place] [facility] [address]    Telemedicine Visit: The patient's condition can be safely assessed and treated via synchronous audio and visual telemedicine encounter.      Reason for Telemedicine Visit: COVID 19 pandemic and the social and physical recommendations by the CDC and MD.      Originating Site (Patient Location): Patient's home    Distant Site (Provider Location): Provider Remote Setting    Consent:  The patient/guardian has verbally consented to: the potential risks and benefits of telemedicine (video visit or phone) versus in person care; bill my insurance or make self-payment for services provided; and responsibility for payment of non-covered services.     Mode of Communication:  Video Conference via [employer]    As the provider I attest to compliance with applicable laws and regulations related to telemedicine.    IDENTIFICATION   Ashlie Matthews is a 36 year old female who prefers to be called: \"Sima\"  Therapist: None at present  PCP: Adeola Reardon      Last seen for outpatient psychiatry Return Visit on 10/5/2021.      FOLLOWING PLAN PUT INTO PLACE:Feels the current regimen is effective in targeting anxiety and mood. Ongoing sexual side effects. She did not tolerate the buspirone due to sedation and headache and will formally discontinue. Add bupropion  mg to target the sexual side effects. Continue remaining medications at current " "dosages. Follow-up 2 months       INTERIM HISTORY     COMMUNICATIONS FROM PATIENT VIA:  Increase in the bupropion  mg    RECORDS AVAILABLE FOR REVIEW: EHR records through Epic     HISTORY OF PRESENT ILLNESS   [facility]PS referral for psychiatric medication consult in February 2021. Reports past reported diagnosis of Anxiety and Postpartum depression.  One psychiatric hospitalization in 2018 where olanzapine, Viibryd and quetiapine were initiated.   Hx of suicidal ideation, no suicide attempts. No history of self-injurious behaviors. Genetically loaded for anxiety prevalent. Grew up in an intact home with all basic needs being met.    Reports she had a \"break down\" after the birth of her daughter in 2018.  The pregnancy was very challenging including bedrest, hyperemesis and PICC line placement.  She was hospitalized for suicidal ideation.  She has always had a low level thoughts of passive thoughts of being better off dead.  She had never experienced the intensity of having a plan in place.  Her and her  had called the crisis center and she was directed to be seen in the ER where she was discharged home.  2 weeks later the intensity of the thoughts with a plan became increasingly distressing.  She reached out to her  and was then brought to the ER and hospitalized at [facility]. Started on Zyprexa and Seroquel at that time. Was stable on these medications but stopped them both on her own in August 2020 because she was worried about effects on her liver After being diagnosed with hepatic steatosis. She had never been 1 to drink alcohol and was concerned the olanzapine and quetiapine may be contributing to that.  Mood was stable after stopping and she has not been on any medications for mood since then.      She was overall doing well until she was put on prednisone by dermatology for an ongoing rash for 8 years and unknown cause.  The itching has also impacted sleep.The rash cleared up " "with the prednisone but she was having a really hard time sleeping while taking it. She was first put on the prednisone In December 2020 but the rash returned so she started again January, 2021. She was having significant trouble sleeping. Has tried benadryl, unisom, vistaril prescribed by dermatologist but nothing is helping with sleep.     In general, she feels like her biggest issue that led to her decompensation 2 years ago was lack of sleep and as long as she can maintain her sleep her mood is stable and she is able to use nonpharmacological strategies for her anxiety.  When she does not sleep her mood becomes very anxious, crying a lot.  Then the suicidal thoughts or thoughts that \"life would be easier\" if she was gone return more intensely.      She stopped the combination of olanzapine, Viibryd and quetiapine on her own when she no longer had insurance and cost was a barrier.  In addition she had gained 30 pounds which was ego-dystonic despite the fact that she was eating healthy and exercising.  When she started having difficulty sleeping again in early 2021, she restarted the olanzapine from a left over script.  The olanzapine has been the most effective in targeting the sleep wake cycle historically.  Further trials of trazodone, hydroxyzine, gabapentin and zolpidem all were not effective in targeting insomnia.  Restarted the olanzapine 5 mg nightly and overall sleeping well.  However she experiences weight gain.  Trial of metformin not effective.      She does not appear to have a bipolar trajectory as when her sleep cycle is targeted and maintained she does not have any type of manic symptoms.  Have left the mood disorder diagnosis to support the use of second-generation antipsychotic    FAMILY, MEDICAL, SURGICAL HISTORY REVIEWED.  MEDICATION HAVE BEEN REVIEWED AND ARE CURRENT TO THE BEST OF MY KNOWLEDGE AND ABILITY.  Relationship status: .   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   Current living situation: with family including  and 3 children*     MEDICATIONS                                                                                              I have reviewed this patient's current medications  Per EHR:    Current Outpatient Medications:      albuterol (2.5 MG/3ML) 0.083% nebulizer solution, Take 1 vial (2.5 mg) by nebulization every 6 hours as needed for shortness of breath / dyspnea or wheezing, Disp: 30 vial, Rfl: 3     albuterol (PROAIR HFA, PROVENTIL HFA, VENTOLIN HFA) 108 (90 BASE) MCG/ACT inhaler, Inhale 2 puffs into the lungs every 6 hours as needed for shortness of breath / dyspnea or wheezing, Disp: 1 Inhaler, Rfl: 11     buPROPion (WELLBUTRIN XL) 300 MG 24 hr tablet, Take 1 tablet (300 mg) by mouth every morning, Disp: 30 tablet, Rfl: 0     cetirizine (ZYRTEC ALLERGY) 10 MG tablet, Take 10 mg by mouth 2 times daily , Disp: , Rfl:      OLANZapine (ZYPREXA) 5 MG tablet, Take 1 tablet (5 mg) by mouth At Bedtime, Disp: 90 tablet, Rfl: 0     ondansetron (ZOFRAN) 4 MG tablet, Take 1 tablet (4 mg) by mouth every 6 hours as needed for nausea, Disp: 30 tablet, Rfl: 0     sertraline (ZOLOFT) 100 MG tablet, Take 1 tablet (100 mg) by mouth daily, Disp: 90 tablet, Rfl: 0     topiramate (TOPAMAX) 50 MG tablet, Take 1 tablet (50 mg) by mouth 2 times daily, Disp: 180 tablet, Rfl: 0    NOTES ABOUT CURRENT PSYCHOTROPIC MEDICATIONS:   Olanzapine 5 mg, this is best medication that has targeted insomnia and subsequently mood and would like to continue  Topiramate 50 mg twice a day, not change in weight. Eating the same, gluten free, fasting in the am.   Quetiapine as needed anxiety, 25 mg left over from previous script, not using consistently  Sertraline 100 mg  Bupropion  mg    PAST PSYCHOTROPIC MEDICATIONS:  Diphenhydramine, sleep not effective  Hydroxyzine as needed, not effective in targeting  "itching, anxiety or sleep  Citalopram  Trazodone, dizziness throughout the day  Zolpidem 5 mg, worked for one night only   Quetiapine   Metformin 500 mg twice a day targeting metabolic side effects of olanzapine, not effective  Buspirone 15 mg, did not tolerate due to headache and sedation.  Started to target sexual side effects      TODAY PATIENT REPORTS THE FOLLOWING PSYCHIATRIC ROS:   Per [facility], Irina Curran, during today's team-based visit:  \"[facility] update: Reports that she feels like she felt prior to her last pregnancy (good). Infrequent anxious moments of short duration and lower intensity. She's happy with her decision to use online learning with her kids.  Stresses: No  Appetite: her appetite has returned to normal after in decreased when she started the wellbutrin  Sleep: good, taking olanzapine otherwise she has difficulty falling asleep  Therapy: has decided to wait until after the holidays  Interventions: encouraged to pursue therapy when the time is right for her  Most important: no improvement in sex drive with med change..\"    PROBLEM: DEPRESSION: Feels the current medication regimen is effective.     Last PHQ-9 12/6/2021   1.  Little interest or pleasure in doing things 1   2.  Feeling down, depressed, or hopeless 0   3.  Trouble falling or staying asleep, or sleeping too much 0   4.  Feeling tired or having little energy 1   5.  Poor appetite or overeating 2   6.  Feeling bad about yourself 1   7.  Trouble concentrating 1   8.  Moving slowly or restless 2   Q9: Thoughts of better off dead/self-harm past 2 weeks 0   PHQ-9 Total Score 8   Difficulty at work, home, or with people -   In the past two weeks have you had thoughts of suicide or self harm? -   Do you have concerns about your personal safety or the safety of others? -   In the past 2 weeks have you thought about a plan or had intention to harm yourself? -   In the past 2 weeks have you acted on these thoughts in any way? -     PHQ-9 SCORE " "8/3/2021 10/5/2021 12/6/2021   PHQ-9 Total Score - - -   PHQ-9 Total Score MyChart - 8 (Mild depression) 8 (Mild depression)   PHQ-9 Total Score 3 8 8     Suicidal ideation:  No   PHQ9 score is 8 indicating mild depression.     PROBLEM: ANXIETY: endorses psychosocial stressors that are increasing anxiety, specifically related to parenting and the potential increase in COVID 19 pandemic cases  SLIM-7 scores:   SLIM-7 SCORE 8/3/2021 10/5/2021 12/6/2021   Total Score - 2 (minimal anxiety) 4 (minimal anxiety)   Total Score 13 2 4     PROBLEM: SLEEP/INSOMNIA: Improving On the olanzapine nightly 5 mg    PROBLEM: SIDE EFFECTS (weight gain): No change.  hasn't gained weight but Reports she has not lost weight either.  Tolerating the topiramate    EXERCISE: Adequate  SIDE EFFECTS: sexual side effects   COMPLIANCE:  states Adherent to medication regimen  HIGH RISK MEDICATION:  Yes Olanzapine and metabolic labs were completed July 2020  REPORTS THE FOLLOWING NEW MEDICAL ISSUES:  none    PERTINENT PAST MEDICAL AND SURGICAL HISTORY     Past Medical History:   Diagnosis Date     Allergic rhinitis      Anxiety      Asthma      Cyclic vomiting syndrome      Depressive disorder      Dysmenorrhea      Pneumothorax 2004    from bad coughing       VITALS     BP Readings from Last 1 Encounters:   11/16/21 115/76     Pulse Readings from Last 1 Encounters:   11/16/21 70     Wt Readings from Last 1 Encounters:   11/16/21 69.4 kg (153 lb)     Ht Readings from Last 1 Encounters:   11/16/21 1.549 m (5' 1\")     Estimated body mass index is 28.91 kg/m  as calculated from the following:    Height as of 11/16/21: 1.549 m (5' 1\").    Weight as of 11/16/21: 69.4 kg (153 lb).    Temp Readings from Last 1 Encounters:   03/20/21 98  F (36.7  C) (Oral)       HEIGHT/WEIGHT:  Ht Readings from Last 2 Encounters:   11/16/21 1.549 m (5' 1\")   11/20/20 1.549 m (5' 1\")      Wt Readings from Last 2 Encounters:   11/16/21 69.4 kg (153 lb)   11/20/20 69.5 kg (153 " "lb 4.8 oz)    Estimated body mass index is 28.91 kg/m  as calculated from the following:    Height as of 11/16/21: 1.549 m (5' 1\").    Weight as of 11/16/21: 69.4 kg (153 lb).     LABS & IMAGING                                                                                                                    Recent Labs   Lab Test 07/28/20  0943      POTASSIUM 4.0   CHLORIDE 107   CO2 22   GLC 97   MURIEL 8.9   BUN 10   CR 0.67   GFRESTIMATED >90   ALBUMIN 3.8   PROTTOTAL 8.4   AST 18   ALT 22   ALKPHOS 56   BILITOTAL 0.8     Recent Labs   Lab Test 07/28/20  0943   CHOL 183   *   HDL 36*   TRIG 206*   A1C 5.2     Recent Labs   Lab Test 03/01/19  0924   TSH 1.02       ALLERGY & IMMUNIZATIONS       Allergies   Allergen Reactions     Moxifloxacin Hydrochloride Nausea and Vomiting     Avelox-vomiting     Cats Itching     Codeine GI Disturbance     Darvocet [Propoxyphene N-Apap] Hives     PN: LW Reaction: Rash, Generalized     Demerol Hives     Dog Hair [Dogs] Unknown     Dust Mites Itching and Swelling     Meperidine      PN: LW Reaction: Rash, Generalized     Oxycodone-Acetaminophen      PN: LW Reaction: SHORTNESS OF BREATH     Pollen Extract Itching     Vicodin [Hydrocodone-Acetaminophen] GI Disturbance     PN: LW Reaction: Rash, Generalized     Latex Itching, Swelling and Rash     \"sensitivity\"       MEDICAL REVIEW OF SYSTEMS:   Ten system review was completed with pertinent positives noted     MENTAL STATUS EXAM:   General/Constitutional:  Appearance:  awake, alert, adequately groomed, appeared stated age and no apparent distress  Attitude:   cooperative   Eye Contact:  good  Musculoskeletal:  Psychomotor Behavior:  no evidence of tardive dyskinesia, dystonia, or tics from the head up  Psychiatric:  Speech:  clear, coherent, regular rate, rhythm, and volume,  No pressure speech noted.  Associations:  no loose associations  Thought Process:  logical, linear and goal oriented  Thought Content:   No evidence " of suicidal ideation or homicidal ideation, no evidence of psychotic thought, no auditory hallucinations present and no visual hallucinations present  Mood:  good  Affect:  full range/stable (normal variation of emotions during exam) and was congruent to speech content.  Insight:  good  Judgment:  intact, adequate for safety  Impulse Control:  intact  Neurological:  Oriented to:  person, place, time, and situation  Attention Span and Concentration:  normal    Language: intact     Recent and Remote Memory:  Intact to interview. Not formally assessed. No amnesia.    Fund of Knowledge: appropriate       SAFETY   Feels safe in home: Yes   Suicidal ideation: none endorses today  History of suicide attempts:  No   Hx of impulsivity: Yes     Hope for the future: present   Hx of Command hallucinations or current psychosis: No  History of Self-injurious behaviors: No Current:  No  Family member  by suicide:  No        SAFETY ASSESSMENT:   Based on all available evidence including the factors cited above, overall Risk for harm is low and is appropriate for outpatient level of care.   Recommended that patient call 911 or go to the local ED should there be a change in any of these risk factors.    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 sleep for extended period of time (Medical Condition)  780.52 (G47.00) Insomnia Disorder   With non-sleep disorder mental comorbidity  Episodic       MEDICAL COMORBIDITY IMPACTING CLINICAL PICTURE: rash which impacts her sleep when it has active itching and distress    ASSESSMENT AND PLAN      Problem List Items Addressed This Visit        Nervous and Auditory    Anxiety disorder due  Insomnia and lack of sleep for extended period of time  - Primary    Relevant Medications    escitalopram (LEXAPRO) 10 MG tablet    topiramate (TOPAMAX) 50 MG tablet    buPROPion (WELLBUTRIN XL) 300 MG 24 hr tablet       Behavioral     The current medications are  stabilizing however, continues with sexual side effects.  Will transition the sertraline to escitalopram and increase the bupropion to 300 mg XL.  Continue remaining medications at current dosages.  Follow-up six weeks            Other Visit Diagnoses     Mood disorder (H)        Ruling out bipolar, however, likely not a bipolar trajectory.    Relevant Medications    escitalopram (LEXAPRO) 10 MG tablet    topiramate (TOPAMAX) 50 MG tablet    OLANZapine (ZYPREXA) 5 MG tablet    buPROPion (WELLBUTRIN XL) 300 MG 24 hr tablet    Mood disorder (H)        Relevant Medications    escitalopram (LEXAPRO) 10 MG tablet    topiramate (TOPAMAX) 50 MG tablet    OLANZapine (ZYPREXA) 5 MG tablet    buPROPion (WELLBUTRIN XL) 300 MG 24 hr tablet            CONSULTS/REFERRALS:   None at this time, recommend therapy support  Coordinate care with therapist as needed     MEDICAL:   Metabolic labs completed July 2020   Coordinate care with PCP (Adeola Reardon) as needed     COMMUNITY/PSYCHOSOCIAL INTERVENTIONS/OTHER:   Coordinate care with other community providers as needed     OTHER RECOMMENDED ASSESSMENTS:   - None     FOLLOW UP: 2 months with this provider  Follow up with primary care provider as planned or for acute medical concerns.  Call the psychiatric nurse line with medication questions or concerns at [phone].     PSYCHOEDUCATION:  Medication side effects and alternatives reviewed. Health promotion activities recommended and reviewed today. All questions addressed. Education and counseling completed regarding risks and benefits of medications and psychotherapy options.  Call the psychiatric nurse line with medication questions or concerns at [phone].  TeamStreamzhart may be used to communicate with your provider, but this is not intended to be used for emergencies.     COMMUNITY RESOURCES:    CRISIS NUMBERS: Provided in [facility] 2/4/2021  Crisis Connection - 299.393.6261  CRISIS TEXT LINE: Text 030393 from anywhere in [place],  anytime, any crisis ;  OR SEE www.crisistextline.org  National Suicide Prevention Lifeline: 515.688.9439 (TTY: 471.164.6673). Call anytime for help.  (www.suicidepreventionlifeline.org)  National [place] on Mental Illness (www.maryanne.org): 410-819-9202 or 755-415-8937.   Mental Health Association (www.mentalhealth.org): 159.347.8541 or 768-200-7802.  [place] Crisis Text Line: Text [place] to 880621  Suicide LifeLine Chat: suicidepreInterview.org/chat    ADMINISTRATIVE BILLIN min spent interviewing patient, reviewing referral documents, obtaining and reviewing outside records, communication with other health specialists, and preparing this report.    Video/Phone Start Time:  5:15 pm  Video/Phone End Time:  5:38 pm    Greater than 50% of time was spent in counseling and coordination of care regarding above diagnoses and treatment plan.    Patient Status:  Patient will continue to be seen for ongoing consultation and stabilization.    Signed:   Alissa Bowman, MSN, APRN, FMHNP-[facility] ([facility])   Chart documentation done in part with Dragon Voice Recognition software.  Although reviewed after completion, some word and grammatical errors may remain.

## 2021-12-10 NOTE — ASSESSMENT & PLAN NOTE
The current medications are stabilizing however, continues with sexual side effects.  Will transition the sertraline to escitalopram and increase the bupropion to 300 mg XL.  Continue remaining medications at current dosages.  Follow-up six weeks

## 2022-01-10 ENCOUNTER — MYC MEDICAL ADVICE (OUTPATIENT)
Dept: PSYCHIATRY | Facility: CLINIC | Age: 37
End: 2022-01-10
Payer: COMMERCIAL

## 2022-01-19 ENCOUNTER — TELEPHONE (OUTPATIENT)
Dept: PSYCHIATRY | Facility: CLINIC | Age: 37
End: 2022-01-19
Payer: COMMERCIAL

## 2022-01-19 DIAGNOSIS — F31.0 BIPOLAR AFFECTIVE DISORDER, CURRENT EPISODE HYPOMANIC (H): Primary | ICD-10-CM

## 2022-01-19 RX ORDER — BUPROPION HYDROCHLORIDE 150 MG/1
150 TABLET ORAL EVERY MORNING
Qty: 7 TABLET | Refills: 0 | Status: SHIPPED | OUTPATIENT
Start: 2022-01-19 | End: 2022-01-28

## 2022-01-19 NOTE — TELEPHONE ENCOUNTER
See [employer] messages.  Call patient to discuss headache and weight gain from the escitalopram.  1735 pm    Reports she started doing the 5 mg and headache immediately.  Will discontinue and see if the headache resolves.  In addition, the escitalopram had the sexual side effects.  Will taper off the bupropion as this was started to offset the sexual side effects.  Bupropion  mg for one week then discontinue.  Will reassess at her scheduled appointment in February.       Alissa Bowman, MSN, APRN, CNP, FMHNP-BC,

## 2022-01-28 ENCOUNTER — OFFICE VISIT (OUTPATIENT)
Dept: FAMILY MEDICINE | Facility: CLINIC | Age: 37
End: 2022-01-28
Payer: COMMERCIAL

## 2022-01-28 VITALS
WEIGHT: 163 LBS | OXYGEN SATURATION: 99 % | TEMPERATURE: 97.8 F | HEIGHT: 62 IN | SYSTOLIC BLOOD PRESSURE: 110 MMHG | RESPIRATION RATE: 16 BRPM | BODY MASS INDEX: 30 KG/M2 | HEART RATE: 99 BPM | DIASTOLIC BLOOD PRESSURE: 70 MMHG

## 2022-01-28 DIAGNOSIS — Z12.4 CERVICAL CANCER SCREENING: ICD-10-CM

## 2022-01-28 DIAGNOSIS — F31.0 BIPOLAR AFFECTIVE DISORDER, CURRENT EPISODE HYPOMANIC (H): ICD-10-CM

## 2022-01-28 DIAGNOSIS — J45.20 MILD INTERMITTENT ASTHMA WITHOUT COMPLICATION: ICD-10-CM

## 2022-01-28 DIAGNOSIS — Z00.00 ROUTINE GENERAL MEDICAL EXAMINATION AT A HEALTH CARE FACILITY: Primary | ICD-10-CM

## 2022-01-28 LAB
ALBUMIN SERPL-MCNC: 4.1 G/DL (ref 3.4–5)
ALP SERPL-CCNC: 55 U/L (ref 40–150)
ALT SERPL W P-5'-P-CCNC: 30 U/L (ref 0–50)
ANION GAP SERPL CALCULATED.3IONS-SCNC: 5 MMOL/L (ref 3–14)
AST SERPL W P-5'-P-CCNC: 15 U/L (ref 0–45)
BILIRUB SERPL-MCNC: 0.6 MG/DL (ref 0.2–1.3)
BUN SERPL-MCNC: 11 MG/DL (ref 7–30)
CALCIUM SERPL-MCNC: 8.9 MG/DL (ref 8.5–10.1)
CHLORIDE BLD-SCNC: 112 MMOL/L (ref 94–109)
CHOLEST SERPL-MCNC: 180 MG/DL
CO2 SERPL-SCNC: 22 MMOL/L (ref 20–32)
CREAT SERPL-MCNC: 0.91 MG/DL (ref 0.52–1.04)
FASTING STATUS PATIENT QL REPORTED: YES
GFR SERPL CREATININE-BSD FRML MDRD: 82 ML/MIN/1.73M2
GLUCOSE BLD-MCNC: 92 MG/DL (ref 70–99)
HBA1C MFR BLD: 5 % (ref 0–5.6)
HDLC SERPL-MCNC: 44 MG/DL
LDLC SERPL CALC-MCNC: 95 MG/DL
NONHDLC SERPL-MCNC: 136 MG/DL
POTASSIUM BLD-SCNC: 3.6 MMOL/L (ref 3.4–5.3)
PROT SERPL-MCNC: 8 G/DL (ref 6.8–8.8)
SODIUM SERPL-SCNC: 139 MMOL/L (ref 133–144)
TRIGL SERPL-MCNC: 205 MG/DL

## 2022-01-28 PROCEDURE — 99395 PREV VISIT EST AGE 18-39: CPT | Performed by: PHYSICIAN ASSISTANT

## 2022-01-28 PROCEDURE — 87624 HPV HI-RISK TYP POOLED RSLT: CPT | Performed by: PHYSICIAN ASSISTANT

## 2022-01-28 PROCEDURE — 36415 COLL VENOUS BLD VENIPUNCTURE: CPT | Performed by: PHYSICIAN ASSISTANT

## 2022-01-28 PROCEDURE — 80061 LIPID PANEL: CPT | Performed by: PHYSICIAN ASSISTANT

## 2022-01-28 PROCEDURE — 80053 COMPREHEN METABOLIC PANEL: CPT | Performed by: PHYSICIAN ASSISTANT

## 2022-01-28 PROCEDURE — G0145 SCR C/V CYTO,THINLAYER,RESCR: HCPCS | Performed by: PHYSICIAN ASSISTANT

## 2022-01-28 PROCEDURE — 83036 HEMOGLOBIN GLYCOSYLATED A1C: CPT | Performed by: PHYSICIAN ASSISTANT

## 2022-01-28 RX ORDER — ALBUTEROL SULFATE 90 UG/1
2 AEROSOL, METERED RESPIRATORY (INHALATION) EVERY 6 HOURS PRN
Qty: 18 G | Refills: 4 | Status: SHIPPED | OUTPATIENT
Start: 2022-01-28

## 2022-01-28 ASSESSMENT — ENCOUNTER SYMPTOMS
HEARTBURN: 0
PARESTHESIAS: 0
BREAST MASS: 0
DYSURIA: 0
SHORTNESS OF BREATH: 0
ABDOMINAL PAIN: 0
HEMATURIA: 0
SORE THROAT: 0
HEADACHES: 0
FREQUENCY: 1
CHILLS: 0
NAUSEA: 0
ARTHRALGIAS: 0
HEMATOCHEZIA: 0
DIARRHEA: 0
MYALGIAS: 0
COUGH: 0
DIZZINESS: 0
FEVER: 0
EYE PAIN: 0
WEAKNESS: 0
NERVOUS/ANXIOUS: 1
PALPITATIONS: 0
CONSTIPATION: 0
JOINT SWELLING: 0

## 2022-01-28 ASSESSMENT — MIFFLIN-ST. JEOR: SCORE: 1364.67

## 2022-01-28 ASSESSMENT — PAIN SCALES - GENERAL: PAINLEVEL: NO PAIN (0)

## 2022-01-28 ASSESSMENT — ASTHMA QUESTIONNAIRES: ACT_TOTALSCORE: 24

## 2022-01-28 NOTE — TELEPHONE ENCOUNTER
Issues have been addressed. See encounter from Alissa Bowman dated 1/19/22. No further action needed.    Toyin Johns RN on 1/28/2022 at 11:10 AM

## 2022-01-28 NOTE — PROGRESS NOTES
SUBJECTIVE:   CC: Ashlie Matthews is an 37 year old woman who presents for preventive health visit.       Patient has been advised of split billing requirements and indicates understanding: Yes  Healthy Habits:     Getting at least 3 servings of Calcium per day:  Yes    Bi-annual eye exam:  NO    Dental care twice a year:  Yes    Sleep apnea or symptoms of sleep apnea:  None    Diet:  Gluten-free/reduced    Frequency of exercise:  2-3 days/week    Duration of exercise:  Less than 15 minutes    Taking medications regularly:  Yes    Medication side effects:  None    PHQ-2 Total Score: 2    Additional concerns today:  No    Was dealing with persistent rash and saw a dermatologist who recommended she cut out gluten. Since she stopped eating gluten, rash has resolved. She also used to have headaches frequently and those have also resolved. She did have celiac testing that was negative, but she had not been eating gluten prior to the testing. Feels much better since she cut out gluten.    Working with psychiatrist for mood. Had been on zoloft and wellbutrin but noticed negative impact on her libido so recently stopped those. She is taking zyprexa. She will be meeting with psych in about 2 weeks to review medications. She feels like mood is okay right now. No SI.    Today's PHQ-2 Score:   PHQ-2 ( 1999 Pfizer) 1/28/2022   Q1: Little interest or pleasure in doing things 1   Q2: Feeling down, depressed or hopeless 1   PHQ-2 Score 2   Q1: Little interest or pleasure in doing things Several days   Q2: Feeling down, depressed or hopeless Several days   PHQ-2 Score 2       Abuse: Current or Past (Physical, Sexual or Emotional) - No  Do you feel safe in your environment? Yes    Have you ever done Advance Care Planning? (For example, a Health Directive, POLST, or a discussion with a medical provider or your loved ones about your wishes): No, advance care planning information given to patient to review.  Patient declined  advance care planning discussion at this time.    Social History     Tobacco Use     Smoking status: Never Smoker     Smokeless tobacco: Never Used   Substance Use Topics     Alcohol use: No     If you drink alcohol do you typically have >3 drinks per day or >7 drinks per week? No    Alcohol Use 1/28/2022   Prescreen: >3 drinks/day or >7 drinks/week? Not Applicable   Prescreen: >3 drinks/day or >7 drinks/week? -       Reviewed orders with patient.  Reviewed health maintenance and updated orders accordingly - Yes  Lab work is in process  Labs reviewed in EPIC  BP Readings from Last 3 Encounters:   01/28/22 110/70   11/16/21 115/76   03/20/21 114/74    Wt Readings from Last 3 Encounters:   01/28/22 73.9 kg (163 lb)   11/16/21 69.4 kg (153 lb)   11/20/20 69.5 kg (153 lb 4.8 oz)                  Patient Active Problem List   Diagnosis     Allergic state     Chronic maxillary sinusitis     Chronic tonsillitis     Mild intermittent asthma     CARDIOVASCULAR SCREENING; LDL GOAL LESS THAN 160     AD (atopic dermatitis)     Insomnia due to psychological stress     High risk medication use     Mood disorder, unspecified.  No clear bipolar trijectory     Anxiety disorder due  Insomnia and lack of sleep for extended period of time      Past Surgical History:   Procedure Laterality Date     ARTHROSCOPY SHOULDER Bilateral 2008,2009     GENITOURINARY SURGERY  05/2019    Interstim placement     LAPAROSCOPIC HYSTERECTOMY TOTAL, SALPINGECTOMY BILATERAL  07/2018     Laparoscopy to look for endometriosis  08/2004     LAPAROTOMY MINI, TUBAL LIGATION (POST PARTUM), COMBINED Bilateral 05/11/2016    Procedure: COMBINED LAPAROTOMY MINI, TUBAL LIGATION (POST PARTUM);  Surgeon: Nannette Shane DO;  Location: [facility] OR     MYRINGOTOMY, INSERT TUBE BILATERAL, COMBINED  as a child     ORTHOPEDIC SURGERY  2008/2009    Shoulder     REMOVE STIMULATOR SACRAL NERVE Left 07/06/2020    Procedure: EXCISE INTERSTEM SACRAL NERVE STIMULATOR BATTERY AND  LEAD LINE;  Surgeon: Pooja Lin MD;  Location: [facility] OR     Septoplasty for deviated septum  07/2005     SINUS SURGERY  2007     TONSILLECTOMY  2006       Social History     Tobacco Use     Smoking status: Never Smoker     Smokeless tobacco: Never Used   Substance Use Topics     Alcohol use: No     Family History   Problem Relation Age of Onset     Hypertension Mother      Gastrointestinal Disease Mother         Pancreatitis-flare ups often     Lipids Mother         high     Blood Disease Mother      Diabetes Mother      Heart Disease Father      Coronary Artery Disease Father      Obesity Maternal Grandmother      Diabetes Maternal Grandfather         insulin     Hypertension Maternal Grandfather      Obesity Maternal Grandfather      Respiratory Maternal Grandfather         asthma     Prostate Cancer Paternal Grandfather      Allergies Son         Allergic to peanuts, different foods, Amoxicillin     Leukemia Paternal Cousin          Current Outpatient Medications   Medication Sig Dispense Refill     albuterol (2.5 MG/3ML) 0.083% nebulizer solution Take 1 vial (2.5 mg) by nebulization every 6 hours as needed for shortness of breath / dyspnea or wheezing 30 vial 3     albuterol (PROAIR HFA/PROVENTIL HFA/VENTOLIN HFA) 108 (90 Base) MCG/ACT inhaler Inhale 2 puffs into the lungs every 6 hours as needed for shortness of breath / dyspnea or wheezing 18 g 4     cetirizine (ZYRTEC ALLERGY) 10 MG tablet Take 10 mg by mouth 2 times daily        OLANZapine (ZYPREXA) 5 MG tablet Take 1 tablet (5 mg) by mouth At Bedtime 90 tablet 0     ondansetron (ZOFRAN) 4 MG tablet Take 1 tablet (4 mg) by mouth every 6 hours as needed for nausea 30 tablet 0     topiramate (TOPAMAX) 50 MG tablet Take 1 tablet (50 mg) by mouth 2 times daily 180 tablet 0     Allergies   Allergen Reactions     Moxifloxacin Hydrochloride Nausea and Vomiting     Avelox-vomiting     Cats Itching     Codeine GI Disturbance     Darvocet [Propoxyphene  "N-Apap] Hives     PN: LW Reaction: Rash, Generalized     Demerol Hives     Dog Hair [Dogs] Unknown     Dust Mites Itching and Swelling     Meperidine      PN: LW Reaction: Rash, Generalized     Oxycodone-Acetaminophen      PN: LW Reaction: SHORTNESS OF BREATH     Pollen Extract Itching     Vicodin [Hydrocodone-Acetaminophen] GI Disturbance     PN: LW Reaction: Rash, Generalized     Latex Itching, Swelling and Rash     \"sensitivity\"     Recent Labs   Lab Test 01/28/22  0903 07/28/20  0943 03/01/19  0924 12/17/18  1113 05/03/18  1815 01/20/18  1047   A1C 5.0 5.2 5.4  --   --   --    LDL 95 106*  --  126*  --   --    HDL 44* 36*  --  59  --   --    TRIG 205* 206*  --  90  --   --    ALT 30 22  --   --   --  32   CR 0.91 0.67  --   --  0.58 0.62   GFRESTIMATED 82 >90  --   --  >90 >90   GFRESTBLACK  --  >90  --   --  >90 >90   POTASSIUM 3.6 4.0  --   --  3.6 3.6   TSH  --   --  1.02  --  2.24 1.66        Breast Cancer Screening:    Breast CA Risk Assessment (FHS-7) 1/28/2022   Do you have a family history of breast, colon, or ovarian cancer? No / Unknown     Patient under 40 years of age: Routine Mammogram Screening not recommended.   Pertinent mammograms are reviewed under the imaging tab.    History of abnormal Pap smear:  PAP / HPV Latest Ref Rng & Units 1/28/2022 11/4/2015 2/15/2013   PAP   Negative for Intraepithelial Lesion or Malignancy (NILM) - -   PAP (Historical) - - NIL NIL   HPV16 Negative Negative Negative -   HPV18 Negative Negative Negative -   HRHPV Negative Negative Negative -     Reviewed and updated as needed this visit by clinical staff  Tobacco  Allergies  Meds  Problems  Med Hx  Surg Hx  Fam Hx         Reviewed and updated as needed this visit by Provider  Tobacco  Allergies  Meds  Problems  Med Hx  Surg Hx  Fam Hx            Review of Systems   Constitutional: Negative for chills and fever.   HENT: Negative for congestion, ear pain, hearing loss and sore throat.    Eyes: Negative for " "pain and visual disturbance.   Respiratory: Negative for cough and shortness of breath.    Cardiovascular: Negative for chest pain, palpitations and peripheral edema.   Gastrointestinal: Negative for abdominal pain, constipation, diarrhea, heartburn, hematochezia and nausea.   Breasts:  Negative for tenderness, breast mass and discharge.   Genitourinary: Positive for frequency. Negative for dysuria, genital sores, hematuria, pelvic pain, urgency, vaginal bleeding and vaginal discharge.   Musculoskeletal: Negative for arthralgias, joint swelling and myalgias.   Skin: Negative for rash.   Neurological: Negative for dizziness, weakness, headaches and paresthesias.   Psychiatric/Behavioral: Negative for mood changes. The patient is nervous/anxious.         OBJECTIVE:   /70   Pulse 99   Temp 97.8  F (36.6  C) (Oral)   Resp 16   Ht 1.562 m (5' 1.5\")   Wt 73.9 kg (163 lb)   LMP 06/27/2018 (Exact Date)   SpO2 99%   BMI 30.30 kg/m    Physical Exam  GENERAL: healthy, alert and no distress  EYES: Eyes grossly normal to inspection, PERRL and conjunctivae and sclerae normal  HENT: ear canals and TM's normal, nose and mouth without ulcers or lesions  NECK: no adenopathy, no asymmetry, masses, or scars and thyroid normal to palpation  RESP: lungs clear to auscultation - no rales, rhonchi or wheezes  BREAST: normal without masses, tenderness or nipple discharge and no palpable axillary masses or adenopathy  CV: regular rate and rhythm, normal S1 S2, no S3 or S4, no murmur, click or rub, no peripheral edema and peripheral pulses strong  ABDOMEN: soft, nontender, no hepatosplenomegaly, no masses and bowel sounds normal   (female): normal female external genitalia, normal urethral meatus, vaginal mucosa pink, moist, well rugated, and s/p hysterectomy  MS: no gross musculoskeletal defects noted, no edema  SKIN: no suspicious lesions or rashes  NEURO: Normal strength and tone, mentation intact and speech normal  PSYCH: " mentation appears normal, affect normal/bright    Diagnostic Test Results:  Labs reviewed in Epic    ASSESSMENT/PLAN:   Routine general medical examination at a health care facility  Reviewed personal and family history. Reviewed age appropriate screenings. Reviewed healthy BP and BMI ranges. Counseled on lifestyle modifications for optimal mental and physical health.  Discussed age-appropriate health maintanence. Recommended any needed vaccinations. Continue to focus on well balanced diet and exercise. She will return next week for her COVID booster, flu shot, and pneumonia vaccine.  - Lipid panel reflex to direct LDL Fasting; Future  - Comprehensive metabolic panel (BMP + Alb, Alk Phos, ALT, AST, Total. Bili, TP); Future  - Hemoglobin A1c; Future  - Lipid panel reflex to direct LDL Fasting  - Comprehensive metabolic panel (BMP + Alb, Alk Phos, ALT, AST, Total. Bili, TP)  - Hemoglobin A1c    Cervical cancer screening  - Pap Screen with HPV - recommended age 30 - 65 years    Mild intermittent asthma without complication  Chronic, well controlled. She uses albuterol inhaler occasionally, has not needed neb for a long time but has it just in case.  ACT Total Scores 4/17/2019 11/20/2020 1/28/2022   ACT TOTAL SCORE - - -   ASTHMA ER VISITS - - -   ASTHMA HOSPITALIZATIONS - - -   ACT TOTAL SCORE (Goal Greater than or Equal to 20) 25 23 24   In the past 12 months, how many times did you visit the emergency room for your asthma without being admitted to the hospital? 0 0 0   In the past 12 months, how many times were you hospitalized overnight because of your asthma? 0 0 0   - albuterol (PROAIR HFA/PROVENTIL HFA/VENTOLIN HFA) 108 (90 Base) MCG/ACT inhaler; Inhale 2 puffs into the lungs every 6 hours as needed for shortness of breath / dyspnea or wheezing    Mood disorder, unspecified.  No clear bipolar trajectory  Following with psych. Making some medication changes currently but feels like she is doing well. No  "SI.      Patient has been advised of split billing requirements and indicates understanding: Yes    COUNSELING:  Reviewed preventive health counseling, as reflected in patient instructions    Estimated body mass index is 30.3 kg/m  as calculated from the following:    Height as of this encounter: 1.562 m (5' 1.5\").    Weight as of this encounter: 73.9 kg (163 lb).    Weight management plan: Discussed healthy diet and exercise guidelines    She reports that she has never smoked. She has never used smokeless tobacco.      Counseling Resources:  ATP IV Guidelines  Pooled Cohorts Equation Calculator  Breast Cancer Risk Calculator  BRCA-Related Cancer Risk Assessment: FHS-7 Tool  FRAX Risk Assessment  ICSI Preventive Guidelines  Dietary Guidelines for Americans, 2010  USDA's MyPlate  ASA Prophylaxis  Lung CA Screening    NICKIE Spears [facility]  "

## 2022-02-01 LAB
BKR LAB AP GYN ADEQUACY: NORMAL
BKR LAB AP GYN INTERPRETATION: NORMAL
BKR LAB AP HPV REFLEX: NORMAL
BKR LAB AP PREVIOUS ABNORMAL: NORMAL
PATH REPORT.COMMENTS IMP SPEC: NORMAL
PATH REPORT.COMMENTS IMP SPEC: NORMAL
PATH REPORT.RELEVANT HX SPEC: NORMAL

## 2022-02-03 LAB
HUMAN PAPILLOMA VIRUS 16 DNA: NEGATIVE
HUMAN PAPILLOMA VIRUS 18 DNA: NEGATIVE
HUMAN PAPILLOMA VIRUS FINAL DIAGNOSIS: NORMAL
HUMAN PAPILLOMA VIRUS OTHER HR: NEGATIVE

## 2022-02-04 NOTE — PROGRESS NOTES
[facility]  Integrated Behavioral Health Services   Diagnostic Assessment Update      PATIENT'S NAME: Ashlie Matthews  MRN:   [id number]  :   1985  DATE OF SERVICE: 2022  SERVICE LOCATION: [facility] / Email (patient reached)  Visit Activities: [facility] Only  Session start time: 430pm Session end time: 452pm Total time: 16-37 min  Identifying Information:  Patient is a 37 year old year old, ,  female.  Patient attended the session alone.        Updates on Presenting Concern(s):  Patient reports the following reason(s) for continuing to receive behavioral services: anxiety.  Patient reported that her symptoms and concerns are stable. States that she is no longer on anti-anxiety medication. Headaches due to lexapro so it was discontinued. She reports improved and stable mood, denies SI, feels less overwhelmed, less anxious. Appetite and sleep are normal. Patient attributed the status of her current symptoms to changes in their life stressors. Decreased due to changes in how they are educating their children. She is worried that if there were an increase in stress she wouldn't function well again like has happened in the past. She's not currently in counseling would need telehealth due to childcare needs. [facility] offered to send new referral. Receptive.      Most important: get back on medication?      Patient stated that her symptoms have resulted in the following functional impairments: none    Patient reports that other professional(s) are not involved in providing support / services.      Standard Screening tools completed, including PHQ9 and GAD7.  See Epic for today's results.  Historical PHQ9:  PHQ-9 SCORE 2021   PHQ-9 Total Score - - -   PHQ-9 Total Score [facility] 8 (Mild depression) - 2 (Minimal depression)   PHQ-9 Total Score 8 2 2     Historical GAD7:  SLIM-7 SCORE 8/3/2021 10/5/2021 2021   Total Score - 2 (minimal  anxiety) 4 (minimal anxiety)   Total Score 13 2 4       Review of Updates to Patient's Life Situation:  Patient reported no significant changes to their relationships and identified support(s).  Patient identified no changes in the stability of their social connections and identified supports. Patient noted that their living situation did not change within the last year.     Patient's employment status did not change within the past year and remains unemployed.     Patient reported no changes or new involvment with the legal system.  There are no ethnic, cultural or Jainism factors that may be relevant for therapy.       Mental Health History:  Patient is not currently receiving any mental health services. [facility] place referral.       Chemical Health History:   Patient is not currently receiving any chemical dependency treatment. Patient reports no problems as a result of their drinking / drug use.      Cage-AID score is: 0   Based on Cage-Aid score and clinical interview there  are not indications of drug or alcohol abuse.      Discussed the general effects of drugs and alcohol on health and well-being.      Significant Losses / Trauma / Abuse / Neglect Issues:  There are no no indications or report of: significant losses, trauma, abuse or neglect.    Issues of possible neglect are not present.      Medical History:   Patient Active Problem List   Diagnosis     Allergic state     Chronic maxillary sinusitis     Chronic tonsillitis     Mild intermittent asthma     CARDIOVASCULAR SCREENING; LDL GOAL LESS THAN 160     AD (atopic dermatitis)     Insomnia due to psychological stress     High risk medication use     Mood disorder, unspecified.  No clear bipolar trijectory     Anxiety disorder due  Insomnia and lack of sleep for extended period of time        Medication Review:  Current Outpatient Medications   Medication     albuterol (2.5 MG/3ML) 0.083% nebulizer solution     albuterol (PROAIR HFA/PROVENTIL HFA/VENTOLIN  HFA) 108 (90 Base) MCG/ACT inhaler     cetirizine (ZYRTEC ALLERGY) 10 MG tablet     OLANZapine (ZYPREXA) 5 MG tablet     ondansetron (ZOFRAN) 4 MG tablet     topiramate (TOPAMAX) 50 MG tablet     No current facility-administered medications for this visit.       Medication Compliance:  Yes    Patient was provided recommendation to follow-up with physician.      Mental Status Assessment:  Appearance:   Appropriate   Eye Contact:   Good   Psychomotor Behavior: Normal   Attitude:   Cooperative   Orientation:   All  Speech   Rate / Production: Normal    Volume:  Normal   Mood:    Normal  Affect:    Appropriate   Thought Content:  Clear   Thought Form:  Coherent  Logical   Insight:    Good       Safety Assessment:    Patient has had a history of suicidal ideation: postpartum depression 2018  Patient denies current or recent suicidal ideation or behaviors.  Patient denies current or recent homicidal ideation or behaviors.  Patient denies current or recent self injurious behavior or ideation.  Patient denies other safety concerns.  Patient reports there are no firearms in the house  Protective Factors Children in the home  and Sense of responsibility to family   Risk Factors Current high stress      Plan for Safety and Risk Management:  A safety and risk management plan has not been developed at this time, however patient was encouraged to call [place] [phone] should there be a change in any of these risk factors.      Patient's Strengths and Limitations:  Patient identified the following strengths or resources that will help her succeed in counseling: family support, insight, intelligence and motivation. Patient identified the following supports: family and friends. Things that may interfere with the patient's success in counseling include:none identified.    Diagnostic Criteria:  Major Depressive Disorder  CRITERIA (A-C) REPRESENT A MAJOR DEPRESSIVE EPISODE - SELECT THESE CRITERIA  A) Recurrent episode(s) - symptoms  have been present during the same 2-week period and represent a change from previous functioning 5 or more symptoms (required for diagnosis)   - Depressed mood. Note: In children and adolescents, can be irritable mood.     - Diminished interest or pleasure in all, or almost all, activities.    - Fatigue or loss of energy.    - Diminished ability to think or concentrate, or indecisiveness.   B) The symptoms cause clinically significant distress or impairment in social, occupational, or other important areas of functioning  C) The episode is not attributable to the physiological effects of a substance or to another medical condition  D) The occurence of major depressive episode is not better explained by other thought / psychotic disorders  E) There has never been a manic episode or hypomanic episode      Functional Status:  Patient's symptoms have caused reduced functional status in the following areas: Social / Relational - with family      DSM5 Diagnoses: (Sustained by DSM5 Criteria Listed Above)  Diagnoses: 300.02 (F41.1) Generalized Anxiety Disorder  Psychosocial & Contextual Factors: mpme  WHODAS Score: NA  See Media section of EPIC medical record for completed WHODAS    Preliminary Treatment Plan:    Treatment will focus on: Depressed Mood - mild.    The Following referrals were discussed and initiated: Outpatient Therapy    Collaborated with Alissa Bowman, MSN, APRN, CNP, FMHNP-BC, [place] [facility].    The following referral(s) will be initiated: [facility]    A Release of Information is not needed at this time.    Report to child or adult protection services was NA.    Irina Curran, [facility], Behavioral Health Clinician

## 2022-02-07 ASSESSMENT — PATIENT HEALTH QUESTIONNAIRE - PHQ9
SUM OF ALL RESPONSES TO PHQ QUESTIONS 1-9: 2
10. IF YOU CHECKED OFF ANY PROBLEMS, HOW DIFFICULT HAVE THESE PROBLEMS MADE IT FOR YOU TO DO YOUR WORK, TAKE CARE OF THINGS AT HOME, OR GET ALONG WITH OTHER PEOPLE: NOT DIFFICULT AT ALL
10. IF YOU CHECKED OFF ANY PROBLEMS, HOW DIFFICULT HAVE THESE PROBLEMS MADE IT FOR YOU TO DO YOUR WORK, TAKE CARE OF THINGS AT HOME, OR GET ALONG WITH OTHER PEOPLE: NOT DIFFICULT AT ALL

## 2022-02-08 ENCOUNTER — VIRTUAL VISIT (OUTPATIENT)
Dept: PSYCHIATRY | Facility: CLINIC | Age: 37
End: 2022-02-08
Payer: COMMERCIAL

## 2022-02-08 ENCOUNTER — VIRTUAL VISIT (OUTPATIENT)
Dept: BEHAVIORAL HEALTH | Facility: CLINIC | Age: 37
End: 2022-02-08
Payer: COMMERCIAL

## 2022-02-08 DIAGNOSIS — F41.1 GENERALIZED ANXIETY DISORDER: ICD-10-CM

## 2022-02-08 DIAGNOSIS — F33.1 DEPRESSION, MAJOR, RECURRENT, MODERATE (H): Primary | ICD-10-CM

## 2022-02-08 DIAGNOSIS — F39 MOOD DISORDER (H): ICD-10-CM

## 2022-02-08 PROCEDURE — 99214 OFFICE O/P EST MOD 30 MIN: CPT | Mod: 95 | Performed by: NURSE PRACTITIONER

## 2022-02-08 PROCEDURE — 90832 PSYTX W PT 30 MINUTES: CPT | Mod: 95 | Performed by: SOCIAL WORKER

## 2022-02-08 RX ORDER — OLANZAPINE 5 MG/1
5 TABLET ORAL AT BEDTIME
Qty: 90 TABLET | Refills: 0 | Status: SHIPPED | OUTPATIENT
Start: 2022-02-08

## 2022-02-08 RX ORDER — TOPIRAMATE 50 MG/1
50 TABLET, FILM COATED ORAL 2 TIMES DAILY
Qty: 180 TABLET | Refills: 0 | Status: SHIPPED | OUTPATIENT
Start: 2022-02-08 | End: 2022-08-25

## 2022-02-08 ASSESSMENT — PATIENT HEALTH QUESTIONNAIRE - PHQ9
SUM OF ALL RESPONSES TO PHQ QUESTIONS 1-9: 2
SUM OF ALL RESPONSES TO PHQ QUESTIONS 1-9: 2

## 2022-02-08 NOTE — PROGRESS NOTES
"  Any new over the counter medications: No  Have you weighed yourself lately: No  Recent BP/HR: Yes on file-normal  Are you taking  your medications every day: Yes      PSYCHIATRIC MEDICATION FOLLOW UP APPT: ADULT     Name:  Ashlie Matthews  : 1985    Ashlie Matthews is a 37 year old female who is being evaluated via a billable video visit.      How would you like to obtain your AVS? MyChart  If the video visit is dropped, the invitation should be resent by: Text to cell phone: [phone]  Will anyone else be joining your video visit? No      [place] If not [place] address below, please ask where they are in case of an emergency situation arises during the appointment.  [id number] [facility] [id number]    Telemedicine Visit: The patient's condition can be safely assessed and treated via synchronous audio and visual telemedicine encounter.      Reason for Telemedicine Visit: COVID 19 pandemic and the social and physical recommendations by the CDC and MD.      Originating Site (Patient Location): Patient's home    Distant Site (Provider Location): Provider Remote Setting    Consent:  The patient/guardian has verbally consented to: the potential risks and benefits of telemedicine (video visit or phone) versus in person care; bill my insurance or make self-payment for services provided; and responsibility for payment of non-covered services.     Mode of Communication:  Video Conference via [employer]    As the provider I attest to compliance with applicable laws and regulations related to telemedicine.    IDENTIFICATION   Ashlie Matthews is a 36 year old female who prefers to be called: \"Sima\"  Therapist: None at present  PCP: Adeola Reardon      Last seen for outpatient psychiatry Return Visit on 2021.      FOLLOWING PLAN PUT INTO PLACE: The current medications are stabilizing however, continues with sexual side effects. Will transition the sertraline to " "escitalopram and increase the bupropion to 300 mg XL. Continue remaining medications at current dosages. Follow-up six weeks         INTERIM HISTORY     COMMUNICATIONS FROM PATIENT VIA:  1/19/2021 See Collective IP messages.  Call patient to discuss headache and weight gain from the escitalopram.  1735 pm     Reports she started doing the 5 mg and headache immediately.  Will discontinue and see if the headache resolves.  In addition, the escitalopram had the sexual side effects.  Will taper off the bupropion as this was started to offset the sexual side effects.  Bupropion  mg for one week then discontinue.  Will reassess at her scheduled appointment in February.      RECORDS AVAILABLE FOR REVIEW: EHR records through [facility]     HISTORY OF PRESENT ILLNESS   [facility] referral for psychiatric medication consult in February 2021. Reports past reported diagnosis of Anxiety and Postpartum depression.  One psychiatric hospitalization in 2018 where olanzapine, Viibryd and quetiapine were initiated.   Hx of suicidal ideation, no suicide attempts. No history of self-injurious behaviors. Genetically loaded for anxiety prevalent. Grew up in an intact home with all basic needs being met.    Reports she had a \"break down\" after the birth of her daughter in 2018.  The pregnancy was very challenging including bedrest, hyperemesis and PICC line placement.  She was hospitalized for suicidal ideation.  She has always had a low level thoughts of passive thoughts of being better off dead.  She had never experienced the intensity of having a plan in place.  Her and her  had called the crisis center and she was directed to be seen in the ER where she was discharged home.  2 weeks later the intensity of the thoughts with a plan became increasingly distressing.  She reached out to her  and was then brought to the ER and hospitalized at [facility]. Started on Zyprexa and Seroquel at that time. Was stable on these " "medications but stopped them both on her own in August 2020 because she was worried about effects on her liver After being diagnosed with hepatic steatosis. She had never been 1 to drink alcohol and was concerned the olanzapine and quetiapine may be contributing to that.  Mood was stable after stopping and she has not been on any medications for mood since then.      She was overall doing well until she was put on prednisone by dermatology for an ongoing rash for 8 years and unknown cause.  The itching has also impacted sleep.The rash cleared up with the prednisone but she was having a really hard time sleeping while taking it. She was first put on the prednisone In December 2020 but the rash returned so she started again January, 2021. She was having significant trouble sleeping. Has tried benadryl, unisom, vistaril prescribed by dermatologist but nothing is helping with sleep.     In general, she feels like her biggest issue that led to her decompensation 2 years ago was lack of sleep and as long as she can maintain her sleep her mood is stable and she is able to use nonpharmacological strategies for her anxiety.  When she does not sleep her mood becomes very anxious, crying a lot.  Then the suicidal thoughts or thoughts that \"life would be easier\" if she was gone return more intensely.      She stopped the combination of olanzapine, Viibryd and quetiapine on her own when she no longer had insurance and cost was a barrier.  In addition she had gained 30 pounds which was ego-dystonic despite the fact that she was eating healthy and exercising.  When she started having difficulty sleeping again in early 2021, she restarted the olanzapine from a left over script.  The olanzapine has been the most effective in targeting the sleep wake cycle historically.  Further trials of trazodone, hydroxyzine, gabapentin and zolpidem all were not effective in targeting insomnia.  Restarted the olanzapine 5 mg nightly and overall " sleeping well.  However she experiences weight gain.  Trial of metformin not effective.      She does not appear to have a bipolar trajectory as when her sleep cycle is targeted and maintained she does not have any type of manic symptoms.  Have left the mood disorder diagnosis to support the use of second-generation antipsychotic    FAMILY, MEDICAL, SURGICAL HISTORY REVIEWED.  MEDICATION HAVE BEEN REVIEWED AND ARE CURRENT TO THE BEST OF MY KNOWLEDGE AND ABILITY.  Relationship status: .   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   Current living situation: with family including  and 3 children*     MEDICATIONS                                                                                              I have reviewed this patient's current medications  Per EHR:    Current Outpatient Medications:      albuterol (2.5 MG/3ML) 0.083% nebulizer solution, Take 1 vial (2.5 mg) by nebulization every 6 hours as needed for shortness of breath / dyspnea or wheezing, Disp: 30 vial, Rfl: 3     albuterol (PROAIR HFA/PROVENTIL HFA/VENTOLIN HFA) 108 (90 Base) MCG/ACT inhaler, Inhale 2 puffs into the lungs every 6 hours as needed for shortness of breath / dyspnea or wheezing, Disp: 18 g, Rfl: 4     cetirizine (ZYRTEC ALLERGY) 10 MG tablet, Take 10 mg by mouth 2 times daily , Disp: , Rfl:      OLANZapine (ZYPREXA) 5 MG tablet, Take 1 tablet (5 mg) by mouth At Bedtime, Disp: 90 tablet, Rfl: 0     ondansetron (ZOFRAN) 4 MG tablet, Take 1 tablet (4 mg) by mouth every 6 hours as needed for nausea, Disp: 30 tablet, Rfl: 0     topiramate (TOPAMAX) 50 MG tablet, Take 1 tablet (50 mg) by mouth 2 times daily, Disp: 180 tablet, Rfl: 0    NOTES ABOUT CURRENT PSYCHOTROPIC MEDICATIONS:   Olanzapine 5 mg, this is best medication that has targeted insomnia and subsequently mood and would like to continue  Topiramate 50 mg twice a day, not  "change in weight. Eating the same, gluten free, fasting in the am.   Quetiapine as needed anxiety, 25 mg left over from previous script, not using consistently      PAST PSYCHOTROPIC MEDICATIONS:  Diphenhydramine, sleep not effective  Hydroxyzine as needed, not effective in targeting itching, anxiety or sleep  Citalopram  Trazodone, dizziness throughout the day  Zolpidem 5 mg, worked for one night only   Quetiapine   Metformin 500 mg twice a day targeting metabolic side effects of olanzapine, not effective  Buspirone 15 mg, did not tolerate due to headache and sedation.  Started to target sexual side effects    Sertraline 100 mg, sexual side effects   Bupropion  mg, started to offset sexual side effects but not effective  Escitalopram, headache and sexual side effects     TODAY PATIENT REPORTS THE FOLLOWING PSYCHIATRIC ROS:   Per [facility], Irina Curran, during today's team-based visit:  \"Patient reports the following reason(s) for continuing to receive behavioral services: anxiety. Patient reported that her symptoms and concerns are stable. States that she is no longer on anti-anxiety medication. Headaches due to lexapro so it was discontinued. She reports improved and stable mood, denies SI, feels less overwhelmed, less anxious. Appetite and sleep are normal. Patient attributed the status of her current symptoms to changes in their life stressors. Decreased due to changes in how they are educating their children. She is worried that if there were an increase in stress she wouldn't function well again like has happened in the past. She's not currently in counseling would need telehealth due to childcare needs. [facility] offered to send new referral. Receptive.\"    PROBLEM: DEPRESSION: Feels the current medication regimen is effective. The headaches resolved and she feels her mood is stable.    Last PHQ-9 2/7/2022   1.  Little interest or pleasure in doing things 0   2.  Feeling down, depressed, or hopeless 0   3.  " Trouble falling or staying asleep, or sleeping too much 0   4.  Feeling tired or having little energy 1   5.  Poor appetite or overeating 0   6.  Feeling bad about yourself 1   7.  Trouble concentrating 0   8.  Moving slowly or restless 0   Q9: Thoughts of better off dead/self-harm past 2 weeks 0   PHQ-9 Total Score 2   Difficulty at work, home, or with people -   In the past two weeks have you had thoughts of suicide or self harm? -   Do you have concerns about your personal safety or the safety of others? -   In the past 2 weeks have you thought about a plan or had intention to harm yourself? -   In the past 2 weeks have you acted on these thoughts in any way? -     PHQ-9 SCORE 12/6/2021 2/7/2022 2/7/2022   PHQ-9 Total Score - - -   PHQ-9 Total Score MyChart 8 (Mild depression) - 2 (Minimal depression)   PHQ-9 Total Score 8 2 2     Suicidal ideation:  No   PHQ9 score is 2 indicating minimal depression.    PROBLEM: ANXIETY: endorses psychosocial stressors that are increasing anxiety, specifically related to parenting and the potential increase in COVID 19 pandemic cases.  States can use nonpharmacological interventions for residual symptoms   SLIM-7 scores:   SLIM-7 SCORE 8/3/2021 10/5/2021 12/6/2021   Total Score - 2 (minimal anxiety) 4 (minimal anxiety)   Total Score 13 2 4     PROBLEM: SLEEP/INSOMNIA: Stable On the olanzapine nightly 5 mg    PROBLEM: SIDE EFFECTS (weight gain): No change.  hasn't gained weight but Reports she has not lost weight either.  Tolerating the topiramate    EXERCISE: Adequate  SIDE EFFECTS: sexual side effects   COMPLIANCE:  states Adherent to medication regimen  HIGH RISK MEDICATION:  Yes Olanzapine and metabolic labs were completed July 2020  REPORTS THE FOLLOWING NEW MEDICAL ISSUES:  none    PERTINENT PAST MEDICAL AND SURGICAL HISTORY     Past Medical History:   Diagnosis Date     Allergic rhinitis      Anxiety      Asthma      Cyclic vomiting syndrome      Depressive disorder       "Diet controlled gestational diabetes mellitus in third trimester 3/5/2016     Dysmenorrhea      Perforated tympanic membrane 1/26/2011    left      Pneumothorax 2004    from bad coughing     Syncope        VITALS     BP Readings from Last 1 Encounters:   01/28/22 110/70     Pulse Readings from Last 1 Encounters:   01/28/22 99     Wt Readings from Last 1 Encounters:   01/28/22 73.9 kg (163 lb)     Ht Readings from Last 1 Encounters:   01/28/22 1.562 m (5' 1.5\")     Estimated body mass index is 30.3 kg/m  as calculated from the following:    Height as of 1/28/22: 1.562 m (5' 1.5\").    Weight as of 1/28/22: 73.9 kg (163 lb).    Temp Readings from Last 1 Encounters:   01/28/22 97.8  F (36.6  C) (Oral)       HEIGHT/WEIGHT:  Ht Readings from Last 2 Encounters:   01/28/22 1.562 m (5' 1.5\")   11/16/21 1.549 m (5' 1\")      Wt Readings from Last 2 Encounters:   01/28/22 73.9 kg (163 lb)   11/16/21 69.4 kg (153 lb)    Estimated body mass index is 30.3 kg/m  as calculated from the following:    Height as of 1/28/22: 1.562 m (5' 1.5\").    Weight as of 1/28/22: 73.9 kg (163 lb).     LABS & IMAGING                                                                                                                    Recent Labs   Lab Test 01/28/22  0903      POTASSIUM 3.6   CHLORIDE 112*   CO2 22   GLC 92   MURIEL 8.9   BUN 11   CR 0.91   GFRESTIMATED 82   ALBUMIN 4.1   PROTTOTAL 8.0   AST 15   ALT 30   ALKPHOS 55   BILITOTAL 0.6     Recent Labs   Lab Test 01/28/22  0903   CHOL 180   LDL 95   HDL 44*   TRIG 205*   A1C 5.0     Recent Labs   Lab Test 03/01/19  0924   TSH 1.02       ALLERGY & IMMUNIZATIONS       Allergies   Allergen Reactions     Moxifloxacin Hydrochloride Nausea and Vomiting     Avelox-vomiting     Cats Itching     Codeine GI Disturbance     Darvocet [Propoxyphene N-Apap] Hives     PN: LW Reaction: Rash, Generalized     Demerol Hives     Dog Hair [Dogs] Unknown     Dust Mites Itching and Swelling     Meperidine      " "PN: LW Reaction: Rash, Generalized     Oxycodone-Acetaminophen      PN: LW Reaction: SHORTNESS OF BREATH     Pollen Extract Itching     Vicodin [Hydrocodone-Acetaminophen] GI Disturbance     PN: LW Reaction: Rash, Generalized     Latex Itching, Swelling and Rash     \"sensitivity\"       MEDICAL REVIEW OF SYSTEMS:   Ten system review was completed with pertinent positives noted     MENTAL STATUS EXAM:   General/Constitutional:  Appearance:  awake, alert, adequately groomed, appeared stated age and no apparent distress  Attitude:   cooperative   Eye Contact:  good  Musculoskeletal:  Psychomotor Behavior:  no evidence of tardive dyskinesia, dystonia, or tics from the head up  Psychiatric:  Speech:  clear, coherent, regular rate, rhythm, and volume,  No pressure speech noted.  Associations:  no loose associations  Thought Process:  logical, linear and goal oriented  Thought Content:   No evidence of suicidal ideation or homicidal ideation, no evidence of psychotic thought, no auditory hallucinations present and no visual hallucinations present  Mood:  good  Affect:  full range/stable (normal variation of emotions during exam) and was congruent to speech content.  Insight:  good  Judgment:  intact, adequate for safety  Impulse Control:  intact  Neurological:  Oriented to:  person, place, time, and situation  Attention Span and Concentration:  normal    Language: intact     Recent and Remote Memory:  Intact to interview. Not formally assessed. No amnesia.    Fund of Knowledge: appropriate       SAFETY   Feels safe in home: Yes   Suicidal ideation: none endorses today  History of suicide attempts:  No   Hx of impulsivity: Yes     Hope for the future: present   Hx of Command hallucinations or current psychosis: No  History of Self-injurious behaviors: No Current:  No  Family member  by suicide:  No        SAFETY ASSESSMENT:   Based on all available evidence including the factors cited above, overall Risk for harm is low " and is appropriate for outpatient level of care.   Recommended that patient call 911 or go to the local ED should there be a change in any of these risk factors.    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 sleep for extended period of time (Medical Condition)  780.52 (G47.00) Insomnia Disorder   With non-sleep disorder mental comorbidity  Episodic       MEDICAL COMORBIDITY IMPACTING CLINICAL PICTURE: rash which impacts her sleep when it has active itching and distress    ASSESSMENT AND PLAN      Problem List Items Addressed This Visit        Behavioral     Essentially stable at present, but very worried she will decompensate to a presentation similar to January 2021.  She would benefit from having a long term psychiatry provider and a referral made.  The current medical regimen is effective; continue present plan and medications.          Mood disorder (H)    Relevant Medications    OLANZapine (ZYPREXA) 5 MG tablet    topiramate (TOPAMAX) 50 MG tablet    Other Relevant Orders    Adult Mental Health [facility] Referral            CONSULTS/REFERRALS:   None at this time, recommend therapy support  Coordinate care with therapist as needed     MEDICAL:   Metabolic labs completed July 2020   Coordinate care with PCP (Adeola Reardon) as needed     COMMUNITY/PSYCHOSOCIAL INTERVENTIONS/OTHER:   Coordinate care with other community providers as needed     OTHER RECOMMENDED ASSESSMENTS:   - None     FOLLOW UP: 2 months with this provider  Follow up with primary care provider as planned or for acute medical concerns.  Call the psychiatric nurse line with medication questions or concerns at [phone].     PSYCHOEDUCATION:  Medication side effects and alternatives reviewed. Health promotion activities recommended and reviewed today. All questions addressed. Education and counseling completed regarding risks and benefits of medications and psychotherapy options.  Call the psychiatric  nurse line with medication questions or concerns at [phone].  MyChart may be used to communicate with your provider, but this is not intended to be used for emergencies.     COMMUNITY RESOURCES:    CRISIS NUMBERS: Provided in S 2/4/2021  Crisis Connection - 789.588.8242  CRISIS TEXT LINE: Text 525478 from anywhere in [place], anytime, any crisis 24/7;  OR SEE www.crisistextline.org  National Suicide Prevention Lifeline: 372.393.2616 (TTY: 760.955.9578). Call anytime for help.  (www.suicidepreventionlifeline.org)  [employer] [place] on Mental Illness (www.maryanne.org): 810.520.2728 or 671-768-1131.   Mental Health Association (www.mentalhealth.org): 415.717.7148 or 145-731-9266.  [place] Crisis Text Line: Text [place] to 094353  Suicide LifeLine Chat: suicidepreTransactionTreeline.org/chat    ADMINISTRATIVE BILLING:     Time spent interviewing patient, reviewing referral documents, obtaining and reviewing outside records, communication with other health specialists, and preparing this report.    Video/Phone Start Time:  4:42  Video/Phone End Time:  5:10 pm    Greater than 50% of time was spent in counseling and coordination of care regarding above diagnoses and treatment plan.    Patient Status:  The patient is being referred to long term community psychiatry care and provider will provide bridging until patient is established with new community provider.     Signed:   Alissa Bowman, MSN, APRN, FMHNP-[facility] ([facility])   Chart documentation done in part with Dragon Voice Recognition software.  Although reviewed after completion, some word and grammatical errors may remain.

## 2022-02-16 PROBLEM — F39 MOOD DISORDER (H): Status: ACTIVE | Noted: 2021-02-11

## 2022-02-16 NOTE — ASSESSMENT & PLAN NOTE
Essentially stable at present, but very worried she will decompensate to a presentation similar to January 2021.  She would benefit from having a long term psychiatry provider and a referral made.  The current medical regimen is effective; continue present plan and medications.

## 2022-02-18 ENCOUNTER — VIRTUAL VISIT (OUTPATIENT)
Dept: FAMILY MEDICINE | Facility: CLINIC | Age: 37
End: 2022-02-18
Payer: COMMERCIAL

## 2022-02-18 DIAGNOSIS — J45.21 MILD INTERMITTENT ASTHMA WITH EXACERBATION: Primary | ICD-10-CM

## 2022-02-18 PROCEDURE — 99214 OFFICE O/P EST MOD 30 MIN: CPT | Mod: 95 | Performed by: PHYSICIAN ASSISTANT

## 2022-02-18 RX ORDER — PREDNISONE 20 MG/1
TABLET ORAL
Qty: 11 TABLET | Refills: 0 | Status: SHIPPED | OUTPATIENT
Start: 2022-02-18 | End: 2022-02-27

## 2022-02-18 RX ORDER — ALBUTEROL SULFATE 0.83 MG/ML
2.5 SOLUTION RESPIRATORY (INHALATION) EVERY 6 HOURS PRN
Qty: 90 ML | Refills: 3 | Status: SHIPPED | OUTPATIENT
Start: 2022-02-18

## 2022-03-01 ENCOUNTER — MYC MEDICAL ADVICE (OUTPATIENT)
Dept: FAMILY MEDICINE | Facility: CLINIC | Age: 37
End: 2022-03-01
Payer: COMMERCIAL

## 2022-03-01 NOTE — TELEPHONE ENCOUNTER
Olanzapine and topiramate    2/8/2022 from Alissa Bowman NP         Behavioral       Essentially stable at present, but very worried she will decompensate to a presentation similar to January 2021.  She would benefit from having a long term psychiatry provider and a referral made.  The current medical regimen is effective; continue present plan and medications.         Kizzy VANCE RN

## 2022-03-01 NOTE — TELEPHONE ENCOUNTER
It looks like Alissa recommended referral to long term community psychiatry care but that Alissa will provide bridging until patient is established with new community provider.     I would recommend establishing with long term psychiatry and reaching out to Alissa with mood concerns until she can establish with psych.    Adeola Reardon PA-C

## 2022-03-02 ENCOUNTER — VIRTUAL VISIT (OUTPATIENT)
Dept: FAMILY MEDICINE | Facility: CLINIC | Age: 37
End: 2022-03-02
Payer: COMMERCIAL

## 2022-03-02 ENCOUNTER — TELEPHONE (OUTPATIENT)
Dept: PSYCHIATRY | Facility: CLINIC | Age: 37
End: 2022-03-02

## 2022-03-02 ENCOUNTER — MYC MEDICAL ADVICE (OUTPATIENT)
Dept: FAMILY MEDICINE | Facility: CLINIC | Age: 37
End: 2022-03-02

## 2022-03-02 DIAGNOSIS — U07.1 COVID-19: ICD-10-CM

## 2022-03-02 DIAGNOSIS — F39 MOOD DISORDER (H): Primary | ICD-10-CM

## 2022-03-02 DIAGNOSIS — J45.21 MILD INTERMITTENT ASTHMA WITH EXACERBATION: Primary | ICD-10-CM

## 2022-03-02 PROCEDURE — 99214 OFFICE O/P EST MOD 30 MIN: CPT | Mod: 95 | Performed by: PHYSICIAN ASSISTANT

## 2022-03-02 RX ORDER — FLUTICASONE PROPIONATE 110 UG/1
1 AEROSOL, METERED RESPIRATORY (INHALATION) 2 TIMES DAILY
Qty: 12 G | Refills: 0 | Status: SHIPPED | OUTPATIENT
Start: 2022-03-02 | End: 2023-09-25

## 2022-03-02 RX ORDER — OXCARBAZEPINE 300 MG/1
300 TABLET, FILM COATED ORAL 2 TIMES DAILY
Qty: 30 TABLET | Refills: 1 | Status: SHIPPED | OUTPATIENT
Start: 2022-03-02 | End: 2022-03-03

## 2022-03-02 RX ORDER — BENZONATATE 100 MG/1
100 CAPSULE ORAL 3 TIMES DAILY PRN
Qty: 30 CAPSULE | Refills: 0 | Status: SHIPPED | OUTPATIENT
Start: 2022-03-02 | End: 2022-08-24

## 2022-03-02 RX ORDER — PREDNISONE 20 MG/1
TABLET ORAL
Qty: 20 TABLET | Refills: 0 | Status: SHIPPED | OUTPATIENT
Start: 2022-03-02 | End: 2023-01-27

## 2022-03-02 NOTE — PROGRESS NOTES
Sima is a 37 year old who is being evaluated via a billable video visit.      How would you like to obtain your AVS? MyChart  If the video visit is dropped, the invitation should be resent by: Text to cell phone: [phone]  Will anyone else be joining your video visit? No    Video Start Time: 10:22 AM     Assessment & Plan     Mild intermittent asthma with exacerbation  Plan to treat with additional prednisone taper, albuterol inhaler, and neb. Will add flovent for now given persistence of symptoms. Advised to rinse mouth after using flovent. Close monitoring and follow-up if any worsening or no improvement.  - predniSONE (DELTASONE) 20 MG tablet; Take 3 tabs by mouth daily x 3 days, then 2 tabs daily x 3 days, then 1 tab daily x 3 days, then 1/2 tab daily x 3 days.  - fluticasone (FLOVENT HFA) 110 MCG/ACT inhaler; Inhale 1 puff into the lungs 2 times daily  - benzonatate (TESSALON) 100 MG capsule; Take 1 capsule (100 mg) by mouth 3 times daily as needed for cough    COVID-19  No signs of respiratory distress. Symptomatic treatment, rest, fluids. Close monitoring and watching her oxygen. Advised she call us or go to urgent care or ER if oxygen <90%. Referral to get well loop placed. Close monitoring and follow-up if any worsening or no improvement.  - COVID-19 GetWell Loop Referral  - benzonatate (TESSALON) 100 MG capsule; Take 1 capsule (100 mg) by mouth 3 times daily as needed for cough      Return in about 1 week (around 3/9/2022) for if no improvement, earlier if worsening.    NICKIE Spears [facility]   Sima is a 37 year old who presents for the following health issues     History of Present Illness     Asthma:  She presents for follow up of asthma.  She has some cough, some wheezing, and some shortness of breath. She is using a relief medication daily. She does not have a controller medication. Patient is aware of the following triggers: unaware of any  triggers. The patient has not had a visit to the Emergency Room, Urgent Care or Hospital due to asthma since the last clinic visit.     She eats 2-3 servings of fruits and vegetables daily.She consumes 1 sweetened beverage(s) daily.She exercises with enough effort to increase her heart rate 10 to 19 minutes per day.  She exercises with enough effort to increase her heart rate 3 or less days per week.   She is taking medications regularly.      COVID-19 Symptom Review  How many days ago did these symptoms start? X 6 days    Are any of the following symptoms significant for you?  New or worsening difficulty breathing? Yes    Please describe what kind of difficulty you are having breathing:Moderate dyspnea (short of breath with ADLs, shortness of breath that limits the ability to walk up one flight of stairs without needing to rest)    Worsening cough? Yes, it's a dry cough.     Fever or chills? Yes, the highest temperature was 102.1    Headache: YES    Sore throat: no    Chest pain: no    Diarrhea: no    Body aches? YES    What treatments has patient tried? Acetaminophen, Inhaler and Nebs   Does patient live in a nursing home, group home, or shelter? no  Does patient have a way to get food/medications during quarantined? Yes, I have a friend or family member who can help me.    Tested positive for COVID on 2/26/22  Cough is worsening  Trouble catching her breath  Feeling winded  Can't stop coughing  Coughing is worse with talking  No chest pain  Temp right now is 100 F, last night was 101.6 F, highest temp was 102.1 F  Eating and drinking normally  No nausea, vomiting, abdominal pain  Still doing nebs and albuterol inhaler  Oxygen at home has been above 95%    Was treated for asthma exacerbation on 2/18/22 with prednisone taper. Didn't help significantly but states she has needed higher doses of prednisone in the past. She feels like asthma is still exacerbated on top of the COVID.     and kids have COVID.   has mild symptoms. Kids have fevers and feel sick.    MASSBP Score 3/2/2022   Age Greater than or equal to 65 years 0   BMI greater than or equal to 35 kg/m2 0   Has Diabetes Mellitus 0   Has Chronic Kidney Disease 0   Has Cardiovascular Disease and 55 years or older 0   Has Chronic Respiratory Disease and 55 years or older 0   Has Hypertension and 55 years or older 0   Is Immunocompromised 0   Is Pregnant 0   Member of BIP community (Black/, /, ,  or , or  or [place] Native)  0   MASSBP Score 0   Has the patient had a positive COVID test outside our system?  Yes   What day did symptoms start?  2/25/2022         Review of Systems   Constitutional, HEENT, cardiovascular, pulmonary, gi and gu systems are negative, except as otherwise noted.      Objective           Vitals:  No vitals were obtained today due to virtual visit.    Physical Exam   GENERAL: Healthy, alert and no distress  EYES: Eyes grossly normal to inspection.  No discharge or erythema, or obvious scleral/conjunctival abnormalities.  RESP: Frequent harsh cough. No audible wheeze, or visible cyanosis.  No visible retractions or increased work of breathing.    SKIN: Visible skin clear. No significant rash, abnormal pigmentation or lesions.  NEURO: Cranial nerves grossly intact.  Mentation and speech appropriate for age.  PSYCH: Mentation appears normal, affect normal/bright, judgement and insight intact, normal speech and appearance well-groomed.      Video-Visit Details    Type of service:  Video Visit     Video End Time: 10:32 AM     Originating Location (pt. Location): Home    Distant Location (provider location):  [facility]     Platform used for Video Visit: [employer]

## 2022-03-02 NOTE — PATIENT INSTRUCTIONS
"  Patient Education   Discharge Instructions for COVID-19 Patients  You have--or may have--COVID-19. Please follow the instructions listed below.   If you have a weakened immune system, discuss with your doctor any other actions you need to take.  How can I protect others?  If you have symptoms (fever, cough, body aches or trouble breathing):    Stay home and away from others (self-isolate) until:  ? Your other symptoms have resolved (gotten better). And   ? You've had no fever--and no medicine that reduces fever--for 1 full day (24 hours). And   ? At least 10 days have passed since your symptoms started. (You may need to wait 20 days. Follow the advice of your care team.)  If you don't show symptoms, but testing showed that you have COVID-19:    Stay home and away from others (self-isolate) until at least 10 days have passed since the date of your first positive COVID-19 test.  During this time    Stay in your own room, even for meals. Use your own bathroom if you can.    Stay away from others in your home. No hugging, kissing or shaking hands. No visitors.    Don't go to work, school or anywhere else.    Clean \"high touch\" surfaces often (doorknobs, counters, handles). Use household cleaning spray or wipes.    You'll find a full list of  on the EPA website: www.epa.gov/pesticide-registration/list-n-disinfectants-use-against-sars-cov-2.    Cover your mouth and nose with a mask or other face covering to avoid spreading germs.    Wash your hands and face often. Use soap and water.    Caregivers in these groups are at risk for severe illness due to COVID-19:  ? People 65 years and older  ? People who live in a nursing home or long-term care facility  ? People with chronic disease (lung, heart, cancer, diabetes, kidney, liver, immunologic)  ? People who have a weakened immune system, including those who:    Are in cancer treatment    Take medicine that weakens the immune system, such as corticosteroids    Had a " bone marrow or organ transplant    Have an immune deficiency    Have poorly controlled HIV or AIDS    Are obese (body mass index of 40 or higher)    Smoke regularly    Caregivers should wear gloves while washing dishes, handling laundry and cleaning bedrooms and bathrooms.    Use caution when washing and drying laundry: Don't shake dirty laundry and use the warmest water setting that you can.    For more tips on managing your health at home, go to www.cdc.gov/coronavirus/2019-ncov/downloads/10Things.pdf.  How can I take care of myself at home?  1. Get lots of rest. Drink extra fluids (unless a doctor has told you not to).  2. Take Tylenol (acetaminophen) for fever or pain. If you have liver or kidney problems, ask your family doctor if it's okay to take Tylenol.   Adults can take either:   ? 650 mg (two 325 mg pills) every 4 to 6 hours, or   ? 1,000 mg (two 500 mg pills) every 8 hours as needed.  ? Note: Don't take more than 3,000 mg in one day. Acetaminophen is found in many medicines (both prescribed and over-the-counter medicines). Read all labels to be sure you don't take too much.   For children, check the Tylenol bottle for the right dose. The dose is based on the child's age or weight.  3. If you have other health problems (like cancer, heart failure, an organ transplant or severe kidney disease): Call your specialty clinic if you don't feel better in the next 2 days.  4. Know when to call 911. Emergency warning signs include:  ? Trouble breathing or shortness of breath  ? Pain or pressure in the chest that doesn't go away  ? Feeling confused like you haven't felt before, or not being able to wake up  ? Bluish-colored lips or face  5. Your doctor may have prescribed a blood thinner medicine. Follow their instructions.  Where can I get more information?     [employer] - About COVID-19:   [email or web address]    CDC - What to Do If You're Sick:  www.cdc.gov/coronavirus/2019-ncov/about/steps-when-sick.html    CDC - Ending Home Isolation: www.cdc.gov/coronavirus/2019-ncov/hcp/disposition-in-home-patients.html    CDC - Caring for Someone: www.cdc.gov/coronavirus/2019-ncov/if-you-are-sick/care-for-someone.html    [facility] - Interim Guidance for Hospital Discharge to Home: www.health.[facility].[place]./diseases/coronavirus/hcp/hospdischarge.pdf    Below are the COVID-19 hotlines at the [place] Department of Health ([facility]). Interpreters are available.  ? For health questions: Call 351-326-2009 or 1-645.278.2808 (7 a.m. to 7 p.m.)  ? For questions about schools and childcare: Call 622-218-4112 or 1-351.483.4086 (7 a.m. to 7 p.m.)    For informational purposes only. Not to replace the advice of your health care provider. Clinically reviewed by Dr. Franco Mejia.   Copyright   2020 [facility]. All rights reserved. [employer] 227064 - REV 01/05/21.

## 2022-03-02 NOTE — TELEPHONE ENCOUNTER
Covid + 2/26 (at home test)     Prednisone- did not feel very helpful. In the past has always had to be on a higher dose.  Coughing+   Fever 102 at first now 100-100.6  SOB w/ coughing  SOB w/ exertion, reports some wheezing after doing things  Sustained above > 95%    Moderna 4/18, 5/16 no booster.     /pt/3 kids - all sick     Huddled w/ MK- added on for VIDEO visit today.   Next 5 appointments (look out 90 days)    Mar 02, 2022 12:00 PM  (Arrive by 11:40 AM)  Provider Visit with Adeola Reardon PA-C  [facility] [address] [address] [phone]        Kizzy VANCE RN

## 2022-03-03 ENCOUNTER — TELEPHONE (OUTPATIENT)
Dept: PSYCHIATRY | Facility: CLINIC | Age: 37
End: 2022-03-03
Payer: COMMERCIAL

## 2022-03-03 DIAGNOSIS — F39 MOOD DISORDER (H): ICD-10-CM

## 2022-03-03 RX ORDER — OXCARBAZEPINE 300 MG/1
300 TABLET, FILM COATED ORAL DAILY
Qty: 30 TABLET | Refills: 1 | Status: SHIPPED | OUTPATIENT
Start: 2022-03-03 | End: 2023-01-27

## 2022-03-03 NOTE — TELEPHONE ENCOUNTER
"Ryan Maxwell -   We received a fax for \"illegible information on Rx\"            After reviewing your 2/8/22 note, I was unable to tell how you would like her to be using this medication. Are you able to rewrite the script and send to [facility] pharmacy on file?     Thank you  Marizol Nuñez RN on 3/3/2022 at 12:00 PM    "

## 2022-03-06 ENCOUNTER — NURSE TRIAGE (OUTPATIENT)
Dept: CARE COORDINATION | Facility: CLINIC | Age: 37
End: 2022-03-06
Payer: COMMERCIAL

## 2022-03-06 NOTE — TELEPHONE ENCOUNTER
"Received notification of patient's home oximetry readings of 93-97% this morning with answer of Pregnant women on her [facility] survey. Called patient who reports she is not pregnant and feels that she hit the wrong answer on her survey. Patient had virtual visit 3-2 for asthma exacerbation. She is using nebulizer treatment every 6 hours, albuterol inhaler every 4 hours, Flovent (new) twice daily, prednisone taper as prescribed and tessalon three times daily. Dry cough heard with speaking and notes that talking does trigger cough. Feels she is slowly improving and is much better than two days ago when her  was growing concerned about her breathing. Sleeping well. Typically wakes with cough twice per night but is able to return to sleep. Will send patient information via [facility] for home treatment measures for management of cough, proning, worrisome signs/symptoms that require urgent medical evaluation and 24/7 [facility] Nurse Advisors phone number should patient have questions or concerns after hours. Patient verbalizes agreement with plan.      Answer Assessment - Initial Assessment Questions  1. RESPIRATORY STATUS: \"Describe your breathing?\" (e.g., wheezing, shortness of breath, unable to speak, severe coughing)       Mild shortness of breath with talking and walking in the house. Unchanged since visitt 3/2 but feels that cough is improving slowly  2. ONSET: \"When did this breathing problem begin?\"       Had asthma exacerbation in February. Unusual for her since she previously had well controlled asthma and hadn't needed to use her albuterol inhaler \"in years\".  Cough started last Saturday and she was diagnosed with COVID-19  3. PATTERN \"Does the difficult breathing come and go, or has it been constant since it started?\"       Comes and goes; does note worsening after cough  4. SEVERITY: \"How bad is your breathing?\" (e.g., mild, moderate, severe)     - MILD: No SOB at rest, mild SOB with " "walking, speaks normally in sentences, can lay down, no retractions, pulse < 100.     - MODERATE: SOB at rest, SOB with minimal exertion and prefers to sit, cannot lie down flat, speaks in phrases, mild retractions, audible wheezing, pulse 100-120.     - SEVERE: Very SOB at rest, speaks in single words, struggling to breathe, sitting hunched forward, retractions, pulse > 120       mild  5. RECURRENT SYMPTOM: \"Have you had difficulty breathing before?\" If so, ask: \"When was the last time?\" and \"What happened that time?\"       History of asthma; see above  6. CARDIAC HISTORY: \"Do you have any history of heart disease?\" (e.g., heart attack, angina, bypass surgery, angioplasty)       Has outgrown heart murmur  7. LUNG HISTORY: \"Do you have any history of lung disease?\"  (e.g., pulmonary embolus, asthma, emphysema)      asthma  8. CAUSE: \"What do you think is causing the breathing problem?\"       COVID-19 is causing asthma exacerbation  9. OTHER SYMPTOMS: \"Do you have any other symptoms? (e.g., dizziness, runny nose, cough, chest pain, fever)      Cough-dry;   10. PREGNANCY: \"Is there any chance you are pregnant?\" \"When was your last menstrual period?\"        hysterectomy  11. TRAVEL: \"Have you traveled out of the country in the last month?\" (e.g., travel history, exposures)        no    Protocols used: BREATHING DIFFICULTY-A-OH    Nannette Quiñonez RN  [facility]    "

## 2022-03-18 DIAGNOSIS — F41.1 GENERALIZED ANXIETY DISORDER: Primary | ICD-10-CM

## 2022-03-30 DIAGNOSIS — F41.9 ANXIETY DISORDER: Primary | ICD-10-CM

## 2022-04-07 ENCOUNTER — LAB (OUTPATIENT)
Dept: LAB | Facility: CLINIC | Age: 37
End: 2022-04-07
Payer: COMMERCIAL

## 2022-04-07 DIAGNOSIS — F41.1 GENERALIZED ANXIETY DISORDER: ICD-10-CM

## 2022-04-07 DIAGNOSIS — F41.9 ANXIETY DISORDER: ICD-10-CM

## 2022-04-07 LAB
BASOPHILS # BLD AUTO: 0 10E3/UL (ref 0–0.2)
BASOPHILS NFR BLD AUTO: 0 %
EOSINOPHIL # BLD AUTO: 0.2 10E3/UL (ref 0–0.7)
EOSINOPHIL NFR BLD AUTO: 2 %
ERYTHROCYTE [DISTWIDTH] IN BLOOD BY AUTOMATED COUNT: 13.5 % (ref 10–15)
HCT VFR BLD AUTO: 40.1 % (ref 35–47)
HGB BLD-MCNC: 13.3 G/DL (ref 11.7–15.7)
LYMPHOCYTES # BLD AUTO: 1.3 10E3/UL (ref 0.8–5.3)
LYMPHOCYTES NFR BLD AUTO: 16 %
MCH RBC QN AUTO: 29.6 PG (ref 26.5–33)
MCHC RBC AUTO-ENTMCNC: 33.2 G/DL (ref 31.5–36.5)
MCV RBC AUTO: 89 FL (ref 78–100)
MONOCYTES # BLD AUTO: 0.7 10E3/UL (ref 0–1.3)
MONOCYTES NFR BLD AUTO: 8 %
NEUTROPHILS # BLD AUTO: 6.2 10E3/UL (ref 1.6–8.3)
NEUTROPHILS NFR BLD AUTO: 74 %
PLATELET # BLD AUTO: 239 10E3/UL (ref 150–450)
RBC # BLD AUTO: 4.49 10E6/UL (ref 3.8–5.2)
WBC # BLD AUTO: 8.3 10E3/UL (ref 4–11)

## 2022-04-07 PROCEDURE — 82306 VITAMIN D 25 HYDROXY: CPT

## 2022-04-07 PROCEDURE — 84439 ASSAY OF FREE THYROXINE: CPT

## 2022-04-07 PROCEDURE — 80061 LIPID PANEL: CPT

## 2022-04-07 PROCEDURE — 99000 SPECIMEN HANDLING OFFICE-LAB: CPT

## 2022-04-07 PROCEDURE — 80201 ASSAY OF TOPIRAMATE: CPT | Mod: 90

## 2022-04-07 PROCEDURE — 80050 GENERAL HEALTH PANEL: CPT

## 2022-04-07 PROCEDURE — 36415 COLL VENOUS BLD VENIPUNCTURE: CPT

## 2022-04-08 LAB
ALBUMIN SERPL-MCNC: 3.8 G/DL (ref 3.4–5)
ALP SERPL-CCNC: 51 U/L (ref 40–150)
ALT SERPL W P-5'-P-CCNC: 26 U/L (ref 0–50)
ANION GAP SERPL CALCULATED.3IONS-SCNC: 6 MMOL/L (ref 3–14)
AST SERPL W P-5'-P-CCNC: 18 U/L (ref 0–45)
BILIRUB SERPL-MCNC: 0.9 MG/DL (ref 0.2–1.3)
BUN SERPL-MCNC: 9 MG/DL (ref 7–30)
CALCIUM SERPL-MCNC: 8.8 MG/DL (ref 8.5–10.1)
CHLORIDE BLD-SCNC: 110 MMOL/L (ref 94–109)
CHOLEST SERPL-MCNC: 167 MG/DL
CO2 SERPL-SCNC: 24 MMOL/L (ref 20–32)
CREAT SERPL-MCNC: 0.69 MG/DL (ref 0.52–1.04)
DEPRECATED CALCIDIOL+CALCIFEROL SERPL-MC: 18 UG/L (ref 20–75)
FASTING STATUS PATIENT QL REPORTED: YES
GFR SERPL CREATININE-BSD FRML MDRD: >90 ML/MIN/1.73M2
GLUCOSE BLD-MCNC: 90 MG/DL (ref 70–99)
HDLC SERPL-MCNC: 40 MG/DL
LDLC SERPL CALC-MCNC: 91 MG/DL
NONHDLC SERPL-MCNC: 127 MG/DL
POTASSIUM BLD-SCNC: 3.4 MMOL/L (ref 3.4–5.3)
PROT SERPL-MCNC: 7.4 G/DL (ref 6.8–8.8)
SODIUM SERPL-SCNC: 140 MMOL/L (ref 133–144)
T4 FREE SERPL-MCNC: 0.97 NG/DL (ref 0.76–1.46)
TOPIRAMATE SERPL-MCNC: <1.5 UG/ML
TRIGL SERPL-MCNC: 178 MG/DL
TSH SERPL DL<=0.005 MIU/L-ACNC: 0.92 MU/L (ref 0.4–4)

## 2022-08-23 ASSESSMENT — ASTHMA QUESTIONNAIRES
ACT_TOTALSCORE: 25
QUESTION_3 LAST FOUR WEEKS HOW OFTEN DID YOUR ASTHMA SYMPTOMS (WHEEZING, COUGHING, SHORTNESS OF BREATH, CHEST TIGHTNESS OR PAIN) WAKE YOU UP AT NIGHT OR EARLIER THAN USUAL IN THE MORNING: NOT AT ALL
QUESTION_2 LAST FOUR WEEKS HOW OFTEN HAVE YOU HAD SHORTNESS OF BREATH: NOT AT ALL
QUESTION_5 LAST FOUR WEEKS HOW WOULD YOU RATE YOUR ASTHMA CONTROL: COMPLETELY CONTROLLED
ACT_TOTALSCORE: 25
QUESTION_4 LAST FOUR WEEKS HOW OFTEN HAVE YOU USED YOUR RESCUE INHALER OR NEBULIZER MEDICATION (SUCH AS ALBUTEROL): NOT AT ALL
QUESTION_1 LAST FOUR WEEKS HOW MUCH OF THE TIME DID YOUR ASTHMA KEEP YOU FROM GETTING AS MUCH DONE AT WORK, SCHOOL OR AT HOME: NONE OF THE TIME

## 2022-08-24 ENCOUNTER — VIRTUAL VISIT (OUTPATIENT)
Dept: FAMILY MEDICINE | Facility: CLINIC | Age: 37
End: 2022-08-24
Payer: COMMERCIAL

## 2022-08-24 VITALS — HEIGHT: 61 IN | WEIGHT: 165 LBS | BODY MASS INDEX: 31.15 KG/M2

## 2022-08-24 DIAGNOSIS — F06.4 ANXIETY DISORDER DUE TO KNOWN PHYSIOLOGICAL CONDITION: ICD-10-CM

## 2022-08-24 DIAGNOSIS — E66.811 CLASS 1 OBESITY WITH BODY MASS INDEX (BMI) OF 31.0 TO 31.9 IN ADULT, UNSPECIFIED OBESITY TYPE, UNSPECIFIED WHETHER SERIOUS COMORBIDITY PRESENT: Primary | ICD-10-CM

## 2022-08-24 DIAGNOSIS — E66.811 CLASS 1 OBESITY WITH BODY MASS INDEX (BMI) OF 31.0 TO 31.9 IN ADULT, UNSPECIFIED OBESITY TYPE, UNSPECIFIED WHETHER SERIOUS COMORBIDITY PRESENT: ICD-10-CM

## 2022-08-24 DIAGNOSIS — F39 MOOD DISORDER (H): ICD-10-CM

## 2022-08-24 PROCEDURE — 99214 OFFICE O/P EST MOD 30 MIN: CPT | Mod: 95 | Performed by: PHYSICIAN ASSISTANT

## 2022-08-24 RX ORDER — VILAZODONE HYDROCHLORIDE 20 MG/1
20 TABLET ORAL DAILY
COMMUNITY
Start: 2022-08-10

## 2022-08-24 RX ORDER — QUETIAPINE 150 MG/1
25 TABLET, FILM COATED, EXTENDED RELEASE ORAL PRN
COMMUNITY
Start: 2022-03-17

## 2022-08-24 ASSESSMENT — PATIENT HEALTH QUESTIONNAIRE - PHQ9
10. IF YOU CHECKED OFF ANY PROBLEMS, HOW DIFFICULT HAVE THESE PROBLEMS MADE IT FOR YOU TO DO YOUR WORK, TAKE CARE OF THINGS AT HOME, OR GET ALONG WITH OTHER PEOPLE: NOT DIFFICULT AT ALL
SUM OF ALL RESPONSES TO PHQ QUESTIONS 1-9: 3
SUM OF ALL RESPONSES TO PHQ QUESTIONS 1-9: 3

## 2022-08-24 NOTE — PROGRESS NOTES
"Sima is a 37 year old who is being evaluated via a billable video visit.      How would you like to obtain your AVS? MyChart  If the video visit is dropped, the invitation should be resent by: Text to cell phone: [phone]  Will anyone else be joining your video visit? No    Assessment & Plan     Class 1 obesity with body mass index (BMI) of 31.0 to 31.9 in adult, unspecified obesity type, unspecified whether serious comorbidity present  Discussed options. She has been gaining weight and current BMI is 31.18. Weight gain seems associated with olanzapine; she has tried other medications but they have not worked for her. Plan to trial saxenda. Discussed r/b/se. We did discuss that suicidal behavior and one case of attempted suicide has been reported in patients treated with saxenda for obesity. Advised she monitor for new or worsening depression, suicidal thoughts or behavior, or unusual changes in mood or behavior. Discontinue use if suicidal thoughts or behaviors occur. She does have history of suicidal ideation and says she feels like she will \"always have some thoughts\" but she recently started viibryd and is currently feeling better than she has in a long time. She has some passive thoughts that it would be easier if she wasn't around, but these thoughts are very minimal and she denies active suicidal thoughts. She is very open with her  and will discuss this risk with starting the saxenda with him so he is aware and they will both closely monitor her mood.  - liraglutide - Weight Management (SAXENDA) 18 MG/3ML pen; Inject 0.6 mg Subcutaneous daily for 7 days, THEN 1.2 mg daily for 7 days, THEN 1.8 mg daily for 7 days, THEN 2.4 mg daily for 7 days, THEN 3 mg daily    Anxiety disorder due  Insomnia and lack of sleep for extended period of time   Mood disorder (H)  Following with psych. She does have history of suicidal ideation and says she feels like she will \"always have some thoughts\" but she " "recently started viibryd and is currently feeling better than she has in a long time. She has some passive thoughts that it would be easier if she wasn't around, but these thoughts are very minimal and she denies active suicidal thoughts.     BMI:   Estimated body mass index is 31.18 kg/m  as calculated from the following:    Height as of this encounter: 1.549 m (5' 1\").    Weight as of this encounter: 74.8 kg (165 lb).   Weight management plan: Discussed healthy diet and exercise guidelines and plan to trial Saxenda    Depression Screening Follow Up    PHQ 8/24/2022   PHQ-9 Total Score 3   Q9: Thoughts of better off dead/self-harm past 2 weeks Several days   F/U: Thoughts of suicide or self-harm No   F/U: Self harm-plan -   F/U: Self-harm action -   F/U: Safety concerns No       Return in about 1 month (around 9/24/2022) for Med Check - virtual.    Adeola Reardon PA-C  [facility] KATHRYN Gao is a 37 year old, presenting for the following health issues:  Weight Problem ( OLANZapine (ZYPREXA) 5 MG tablet /)    OLANZapine (ZYPREXA) 5 MG tablet     History of Present Illness       Reason for visit:  Weight gain from sleeping pill    She eats 2-3 servings of fruits and vegetables daily.She consumes 1 sweetened beverage(s) daily.She exercises with enough effort to increase her heart rate 10 to 19 minutes per day.  She exercises with enough effort to increase her heart rate 3 or less days per week.   She is taking medications regularly.    Today's PHQ-9         PHQ-9 Total Score: 3    PHQ-9 Q9 Thoughts of better off dead/self-harm past 2 weeks :   Several days  Thoughts of suicide or self harm: (P) No  Self-harm Plan:     Self-harm Action:       Safety concerns for self or others: (P) No    How difficult have these problems made it for you to do your work, take care of things at home, or get along with other people: Not difficult at all     Follows with psychiatry for mood. Has been on " olanzapine 5 mg nightly for sleep which has been working well for her, but she has had issues with weight gain and difficulty losing weight. She worked with her psychiatrist and tried trazodone instead of olanzapine but it made her feel too groggy in the mornings. She also tried Lybalvi to see if that could combat some of the weight gain but she felt sick with it so switched back to olanzapine.     She overall eats a healthy diet. Has tried intermittent fasting, cut out fast food, minimal snacking. She currently weighs 165 pounds and says this is the most she has ever weighed. She is really worried she will continue to gain. She goes for walks/hikes at least 3 times per week and plans to increase exercise after her kids go back to school soon.    Wt Readings from Last 3 Encounters:   08/24/22 74.8 kg (165 lb)   01/28/22 73.9 kg (163 lb)   11/16/21 69.4 kg (153 lb)     She had normal thyroid testing, normal blood sugar in 4/2022. Lipids showed borderline high triglycerides and low HDL.    Review of Systems   Constitutional, HEENT, cardiovascular, pulmonary, gi and gu systems are negative, except as otherwise noted.      Objective           Vitals:  No vitals were obtained today due to virtual visit.    Physical Exam   GENERAL: Healthy, alert and no distress  EYES: Eyes grossly normal to inspection.  No discharge or erythema, or obvious scleral/conjunctival abnormalities.  RESP: No audible wheeze, cough, or visible cyanosis.  No visible retractions or increased work of breathing.    SKIN: Visible skin clear. No significant rash, abnormal pigmentation or lesions.  NEURO: Cranial nerves grossly intact.  Mentation and speech appropriate for age.  PSYCH: Mentation appears normal, affect normal/bright, judgement and insight intact, normal speech and appearance well-groomed.      Video-Visit Details    Video Start Time: 9:01 AM    Type of service:  Video Visit    Video End Time:9:12 AM    Originating Location (pt.  Location): Home    Distant Location (provider location):  [facility] [employer]     Platform used for Video Visit: Jannet Segundo

## 2022-08-24 NOTE — PATIENT INSTRUCTIONS
As we discussed, suicidal behavior, with one case of attempted suicide, has been reported in patients treated with Saxenda for obesity. Monitor closely for new or worsening depression, suicidal thoughts or behavior, or unusual changes in mood or behavior. Discontinue use if worsening mood or suicidal thoughts. Seek immediate care (call 911 or ER) if suicidal behaviors or concerns for safety.

## 2022-08-25 ENCOUNTER — TELEPHONE (OUTPATIENT)
Dept: FAMILY MEDICINE | Facility: CLINIC | Age: 37
End: 2022-08-25

## 2022-08-25 NOTE — TELEPHONE ENCOUNTER
Central Prior Authorization Team  Phone: [phone]    PA Initiation    Medication: liraglutide - Weight Management (SAXENDA) 18 MG/3ML pen  Insurance Company: [employer] - Phone [phone] Fax [phone]  Pharmacy Filling the Rx: [facility] [id number] [place] [facility] [place], [place] - [address]  Filling Pharmacy Phone: [phone]  Filling Pharmacy Fax:    Start Date: 8/25/2022

## 2022-08-25 NOTE — TELEPHONE ENCOUNTER
Routing refill request to provider for review/approval because:  Pharmacy sent message indicating that this prescription not covered by insurance.     Estefania Ma RN on 8/25/2022 at 9:24 AM

## 2022-08-25 NOTE — LETTER
Provider Name: Adeola Reardon PA-C  [facility]  [address] [address]  Phone: [phone]  Fax [phone]      9/7/2022    Patients Name: Ashlie Matthews  YOB: 1985  Payor: Payor: [facility] / Plan: [facility] PMAP / Product Type: HMO /    ID: [id number]      Name of person submitting request form: NICKIE Spears    Reason for Request: Prior Authorization Drug    Requested Medication: liraglutide - Weight Management (SAXENDA) 18 MG/3ML pen  Diagnosis: Class I obesity with BMI of 31.0 - 31.9 in adult  Formulary Medications tried and when: (be specific and include detail (contraindications, allergies etc): liraglutide was denied as it is only approved for patients who have previously tried phentermine unless use with phentermine is contraindicated. I would not recommend use of phentermine due to patient's history of anxiety.  Other Pertinant History: Anxiety is currently controlled but I would not recommend use of phentermine.    PRIOR AUTHORIZATION REPLY  ___ Approved   Begin date_____End date ______Approved by ______________    ___ Formulary Alternative Available  ___P&T Med Dir Crit Not Met  ___ Pt has expanded formulary   ___ No benefit  ___Pending    ___ More information needed  ___ Denied    ________________________________________________________________________________________________________________________________________________________________________________________________________________________

## 2022-08-25 NOTE — TELEPHONE ENCOUNTER
Can we find out what alternative is covered by insurance for weight loss?    Thanks!  Adeola Reardon PA-C

## 2022-08-25 NOTE — TELEPHONE ENCOUNTER
Central Prior Authorization Team   Phone: [phone]      Prior Authorization Request from Refill Request/Refill Auth:

## 2022-08-30 NOTE — TELEPHONE ENCOUNTER
Central Prior Authorization Team  Phone: [phone]    PRIOR AUTHORIZATION DENIED    Medication: liraglutide - Weight Management (SAXENDA) 18 MG/3ML pen    Denial Date: 8/26/2022    Denial Rational: this medication is only approved for patients who have previously tried phentermine for at least 12 weeks in combination with lifestyle modifications and have been unsuccessful in meeting weight loss goals, unless use with phentermine is contraindicated.     Appeal Information:

## 2022-09-07 NOTE — TELEPHONE ENCOUNTER
Letter written and sent to PA team. Please submit to insurance.      Liraglutide was denied as it is only approved for patients who have previously tried phentermine unless use with phentermine is contraindicated. I would not recommend use of phentermine due to patient's history of anxiety.  Other Pertinant History: Anxiety is currently controlled but I would not recommend use of phentermine.    Adeola Reardon PA-C

## 2022-09-08 NOTE — TELEPHONE ENCOUNTER
Central Prior Authorization Team  Phone: [phone]    Medication Appeal Initiation    We have initiated an appeal for the requested medication:  Medication: liraglutide - Weight Management (SAXENDA) 18 MG/3ML pen  Appeal Start Date:  9/8/2022  Insurance Company: [employer] - Phone [phone] Fax [phone]  Comments:  Faxed appeal to [facility]

## 2022-09-14 ENCOUNTER — MYC MEDICAL ADVICE (OUTPATIENT)
Dept: FAMILY MEDICINE | Facility: CLINIC | Age: 37
End: 2022-09-14

## 2022-09-14 NOTE — TELEPHONE ENCOUNTER
Please see [facility] and advise.  Do you still want to see pt?  Not sure if you wanted to discuss mood/suicide ideation as well at upcoming appt or if appt was just for new med.    Future Appointments 9/14/2022 - 3/13/2023      Date Visit Type Length Department Provider     9/21/2022  3:00 PM OFFICE VISIT 30 min RM Adeola Jolley, PA-C    Location Instructions:     [facility] is located at [address] [address], near [address]. This is a half mile west of [address]. If traveling west on [address], turn south onto [address], then west onto [address] to reach the parking lot. If traveling east on [address], turn south directly onto [address].                   Hawa BURDICK RN, BSN  [facility]

## 2022-09-14 NOTE — TELEPHONE ENCOUNTER
Central Prior Authorization Team  Phone: [phone]    Called noé to follow up on appeal, rep stated that the notes said that the appeal was sent to the wrong number (was sent to the number posted on the denial letter) refaxed to number that the rep gave [phone].

## 2022-09-20 NOTE — TELEPHONE ENCOUNTER
Central Prior Authorization Team  Phone: [phone]    MEDICATION APPEAL APPROVED    Medication: liraglutide - Weight Management (SAXENDA) 18 MG/3ML pen  Authorization Effective Date: 8/15/2022  Authorization Expiration Date: 3/15/2023  Approved Dose/Quantity:   Reference #:     Insurance Company: [employer] - Phone [phone] Fax [phone]  Expected CoPay:       CoPay Card Available:      Foundation Assistance Needed:    Which Pharmacy is filling the prescription (Not needed for infusion/clinic administered): [facility] [id number] [place] [facility] [id number]  [address]

## 2022-09-23 ENCOUNTER — TELEPHONE (OUTPATIENT)
Dept: FAMILY MEDICINE | Facility: CLINIC | Age: 37
End: 2022-09-23

## 2022-09-23 DIAGNOSIS — E66.811 CLASS 1 OBESITY WITH BODY MASS INDEX (BMI) OF 31.0 TO 31.9 IN ADULT, UNSPECIFIED OBESITY TYPE, UNSPECIFIED WHETHER SERIOUS COMORBIDITY PRESENT: Primary | ICD-10-CM

## 2022-09-26 NOTE — TELEPHONE ENCOUNTER
Routing refill request to provider for review/approval because:  Drug not active on patient's medication list    Kizzy VANCE RN

## 2022-09-26 NOTE — TELEPHONE ENCOUNTER
Called, spoke with Sima. She's aware of prescription fill.    Monique MORALES  [facility]   [facility] [address]

## 2022-09-26 NOTE — TELEPHONE ENCOUNTER
Patient calling needing needles for rx asap.    Monique MORALES  [facility]   [facility]

## 2022-09-27 RX ORDER — LIRAGLUTIDE 6 MG/ML
INJECTION, SOLUTION SUBCUTANEOUS
Refills: 0 | OUTPATIENT
Start: 2022-09-27

## 2022-10-09 ENCOUNTER — HEALTH MAINTENANCE LETTER (OUTPATIENT)
Age: 37
End: 2022-10-09

## 2022-10-26 ENCOUNTER — E-VISIT (OUTPATIENT)
Dept: FAMILY MEDICINE | Facility: CLINIC | Age: 37
End: 2022-10-26
Payer: COMMERCIAL

## 2022-10-26 DIAGNOSIS — J01.90 ACUTE NON-RECURRENT SINUSITIS, UNSPECIFIED LOCATION: Primary | ICD-10-CM

## 2022-10-26 PROCEDURE — 99421 OL DIG E/M SVC 5-10 MIN: CPT | Performed by: PHYSICIAN ASSISTANT

## 2022-10-26 NOTE — PATIENT INSTRUCTIONS
When to Use Antibiotics    Antibiotics are medicines used to treat infections caused by bacteria. They don t work for an illness caused by a virus. And they don't work for an allergic reaction. In fact, taking antibiotics for reasons other than an infection by bacteria can cause problems. You may have side effects from the medicine. And if you need an antibiotic in the future, it may not work well. This is because the bacteria can become immune to the medicine. You can also get a type of diarrhea that's hard to treat. This diarrhea is called C. diff.   When antibiotics likely won t help  Your healthcare provider won t usually give you antibiotics for the conditions listed below. You can help by not asking for them if you have:     A cold. This type of illness is caused by a virus. It can cause a runny nose, stuffed-up nose, sneezing, coughing, and headache. You may also have mild body aches and low fever. A cold gets better on its own in a few days to a week.    The flu (influenza). This is a respiratory illness caused by a virus. The flu usually goes away on its own in a week or so. It can cause fever, body aches, sore throat, and tiredness.    Bronchitis. This is an infection in the lungs. It is most often caused by a virus. You may have coughing, phlegm, body aches, and a low fever. A common type of bronchitis is known as a chest cold. This is called acute bronchitis. This often happens after you have a respiratory infection like a cold. Bronchitis can take weeks to go away. Antibiotics often don t help.    Most sore throats. Sore throats are most often caused by viruses. Your throat may feel scratchy or achy. It may hurt to swallow. You may also have a low fever and body aches. A sore throat usually gets better in a few days.    Most outer ear infections. An ear infection may be caused by a virus or bacteria. It causes pain in the ear. Antibiotics by mouth usually don t help. Low-dose antibiotic ear drops  work much better.    Some inner ear infections. An inner ear infection (otitis media) can be caused by a virus in the ear. It can also cause pain and a high fever. Most older children with low-grade fever don't need to be treated with antibiotics.    Most sinus infections. This is also known as sinusitis. This kind of infection causes sinus pain and swelling, and a runny nose. In most cases, it goes away on its own. Antibiotics don t make recovery quicker.    Allergic rhinitis. This is a set of symptoms caused by an allergic reaction. You may have sneezing, a runny nose, itchy or watery eyes, or a sore throat. Allergies are not treated with antibiotics.    Low fever. A mild fever that s less than 100.4 F (38 C) most likely doesn t need to be treated with antibiotics.   When antibiotics can help  Antibiotics can be used to treat:                                                       Strep throat. This is a throat infection caused by a certain type of bacteria. Symptoms of strep throat include a sore throat, white patches on the tonsils, red spots on the roof of the mouth, fever, body aches, and nausea and vomiting. Strep throat almost never causes a cough.    Urinary tract infection (UTI). This is an infection of the bladder and the tube that takes urine out of the body. It is caused by bacteria. It can cause burning pain and urine that s cloudy or tinted with blood. UTIs are very common. Antibiotics usually help treat them.    Some outer ear infections. In some cases, a healthcare provider may prescribe antibiotics by mouth for an ear infection. You may need a test to show the cause of the ear infection.    Some sinus infections. In some cases, your healthcare provider may give you antibiotics. He or she may first need to make sure your symptoms aren t caused by something else. This may be a virus, fungus, allergies, or air pollutants such as smoke.   Your healthcare provider may give you antibiotics if you have a  condition that can affect your immune system. This includes diabetes or cancer.  Self-care at home  If your infection can t be treated with antibiotics, you can take other steps to feel better. Try the remedies below. In general:     Rest and sleep as much as needed.    Drink water and other clear fluids.    Don t smoke. Stay away from smoke from other people.    Use over-the-counter medicine such as acetaminophen or ibuprofen to ease pain or fever, as directed by your healthcare provider.  To treat sinus pain or nasal stuffiness:    Put a warm, moist cloth on your face where you feel sinus pain or pressure.    Try a nasal spray with medicine or saline. Use as directed by your healthcare provider.    Breathe in steam from a hot shower.    Use a humidifier or cool mist vaporizer.   To quiet a cough:     Use a humidifier or cool mist vaporizer.    Breathe in steam from a hot shower.    Suck on cough lozenges.   To sooth a sore throat:     Suck on ice chips, frozen ice pops, or lozenges.    Use a sore throat spray.    Use a humidifier or cool mist vaporizer.    Gargle with saltwater.    Drink warm liquids.    Take ibuprofen to reduce swelling and pain.  To ease ear pain:     Hold a warm, moist washcloth on the ear for 10 minutes at a time.  [employer] last reviewed this educational content on 12/1/2019 2000-2021 [employer]. All rights reserved. This information is not intended as a substitute for professional medical care. Always follow your healthcare professional's instructions.          Sinusitis (Antibiotic Treatment)    The sinuses are air-filled spaces within the bones of the face. They connect to the inside of the nose. Sinusitis is an inflammation of the tissue that lines the sinuses. Sinusitis can occur during a cold. It can also happen due to allergies to pollens and other particles in the air. Sinusitis can cause symptoms of sinus congestion and a feeling of fullness. A sinus infection causes  fever, headache, and facial pain. There is often green or yellow fluid draining from the nose or into the back of the throat (post-nasal drip). You have been given antibiotics to treat this condition.   Home care    Take the full course of antibiotics as instructed. Don't stop taking them, even when you feel better.    Drink plenty of water, hot tea, and other liquids as directed by the healthcare provider. This may help thin nasal mucus. It also may help your sinuses drain fluids.    Heat may help soothe painful areas of your face. Use a towel soaked in hot water. Or,  the shower and direct the warm spray onto your face. Using a vaporizer along with a menthol rub at night may also help soothe symptoms.     An expectorant with guaifenesin may help thin nasal mucus and help your sinuses drain fluids. Talk with your provider or pharmacists before taking an over-the-counter (OTC) medicine if you have any questions about it or its side effects..    You can use an OTC decongestant, unless a similar medicine was prescribed to you. Nasal sprays work the fastest. Use one that contains phenylephrine or oxymetazoline. First blow your nose gently. Then use the spray. Don't use these medicines more often than directed on the label. If you do, your symptoms may get worse. You may also take pills that contain pseudoephedrine. Don t use products that combine multiple medicines. This is because side effects may be increased. Read labels. You can also ask the pharmacist for help. (People with high blood pressure should not use decongestants. They can raise blood pressure.) Talk with your provider or pharmacist if you have any questions about the medicine..    OTC antihistamines may help if allergies contributed to your sinusitis. Talk with your provider or pharmacist if you have any questions about the medicine..    Don't use nasal rinses or irrigation during an acute sinus infection, unless your healthcare provider tells  you to. Rinsing may spread the infection to other areas in your sinuses.    Use acetaminophen or ibuprofen to control pain, unless another pain medicine was prescribed to you. If you have chronic liver or kidney disease or ever had a stomach ulcer, talk with your healthcare provider before using these medicines. Never give aspirin to anyone under age 18 who is ill with a fever. It may cause severe liver damage.    Don't smoke. This can make symptoms worse.    Follow-up care  Follow up with your healthcare provider, or as advised.   When to seek medical advice  Call your healthcare provider if any of these occur:     Facial pain or headache that gets worse    Stiff neck    Unusual drowsiness or confusion    Swelling of your forehead or eyelids    Symptoms don't go away in 10 days    Vision problems, such as blurred or double vision    Fever of 100.4 F (38 C) or higher, or as directed by your healthcare provider  Call 911  Call 911 if any of these occur:     Seizure    Trouble breathing    Feeling dizzy or faint    Fingernails, skin or lips look blue, purple , or gray  Prevention  Here are steps you can take to help prevent an infection:     Keep good hand washing habits.    Don t have close contact with people who have sore throats, colds, or other upper respiratory infections.    Don t smoke, and stay away from secondhand smoke.    Stay up to date with of your vaccines.  [employer] last reviewed this educational content on 12/1/2019 2000-2021 [employer]. All rights reserved. This information is not intended as a substitute for professional medical care. Always follow your healthcare professional's instructions.        Dear Sima,    I'm sorry you're not feeling well! It sounds like you have a sinus infection, which is most often caused by a virus but sometimes can be bacterial. I would recommend giving it a few more days with symptomatic treatment - rest, fluids, over the counter medications like  tylenol or ibuprofen, mucinex, etc. If no improvement in the next few days, I sent in a prescription for an antibiotic to your pharmacy and you can start taking this.    If your symptoms worsen, you develop severe headache, vomiting, high fever (>102), or are not improving in 7 days, please contact your primary care provider for an appointment or visit any of our convenient Walk-in Care or Urgent Care Centers to be seen which can be found on our website?here.?     Thanks again for choosing?us?as your health care partner,?   ?  Adeola Reardon PA-C?

## 2022-11-10 DIAGNOSIS — E66.811 CLASS 1 OBESITY WITH BODY MASS INDEX (BMI) OF 31.0 TO 31.9 IN ADULT, UNSPECIFIED OBESITY TYPE, UNSPECIFIED WHETHER SERIOUS COMORBIDITY PRESENT: ICD-10-CM

## 2022-11-11 RX ORDER — LIRAGLUTIDE 6 MG/ML
3 INJECTION, SOLUTION SUBCUTANEOUS DAILY
Qty: 15 ML | Refills: 0 | Status: SHIPPED | OUTPATIENT
Start: 2022-11-11 | End: 2022-11-15

## 2022-11-11 NOTE — TELEPHONE ENCOUNTER
Routing refill request to provider for review/approval because:  Labs not current:  A1C  Order for Saxenda with diagnosis of diabetes    Lab Results   Component Value Date    A1C 5.0 01/28/2022    A1C 5.2 07/28/2020    A1C 5.4 03/01/2019     Catrachita MATHIAS RN, BSN

## 2022-11-11 NOTE — TELEPHONE ENCOUNTER
Due for med check. Sent [employer] message.    Adeola Reardon PA-C

## 2022-11-15 ENCOUNTER — VIRTUAL VISIT (OUTPATIENT)
Dept: FAMILY MEDICINE | Facility: CLINIC | Age: 37
End: 2022-11-15
Payer: COMMERCIAL

## 2022-11-15 DIAGNOSIS — E66.811 CLASS 1 OBESITY WITH BODY MASS INDEX (BMI) OF 31.0 TO 31.9 IN ADULT, UNSPECIFIED OBESITY TYPE, UNSPECIFIED WHETHER SERIOUS COMORBIDITY PRESENT: ICD-10-CM

## 2022-11-15 PROCEDURE — 99213 OFFICE O/P EST LOW 20 MIN: CPT | Mod: 95 | Performed by: PHYSICIAN ASSISTANT

## 2022-11-15 RX ORDER — LIRAGLUTIDE 6 MG/ML
3 INJECTION, SOLUTION SUBCUTANEOUS DAILY
Qty: 15 ML | Refills: 3 | Status: SHIPPED | OUTPATIENT
Start: 2022-11-15 | End: 2023-01-27

## 2022-11-15 NOTE — PROGRESS NOTES
Sima is a 37 year old who is being evaluated via a billable video visit.      How would you like to obtain your AVS? MyChart  If the video visit is dropped, the invitation should be resent by: Text to cell phone: [phone]  Will anyone else be joining your video visit? No        Assessment & Plan     Class 1 obesity with body mass index (BMI) of 31.0 to 31.9 in adult, unspecified obesity type, unspecified whether serious comorbidity present  Doing really well with liraglutide. She has been on medication for 1.5 months and has increased to 3 mg daily. No medication side effects or concerns. She is feeling full faster and staying full longer. She has lost 17 pounds since starting. She is really happy with the medication and would like to continue.  - liraglutide - Weight Management (SAXENDA) 18 MG/3ML pen; Inject 3 mg Subcutaneous daily      Return in about 2 months (around 1/15/2023) for Preventive Physical Exam, Med Check.    Adeola Reardon PA-C  [facility]    Subjective   Sima is a 37 year old, presenting for the following health issues:  Recheck Medication      History of Present Illness       Reason for visit:  Med check    She eats 2-3 servings of fruits and vegetables daily.She consumes 1 sweetened beverage(s) daily.She exercises with enough effort to increase her heart rate 30 to 60 minutes per day.  She exercises with enough effort to increase her heart rate 5 days per week.   She is taking medications regularly.       Doing really well with liraglutide - started on 9/30/22. She has been on medication for 1.5 months and has increased to 3 mg daily. No medication side effects or concerns. She is feeling full faster and staying full longer. She has lost 17 pounds since starting. She is really happy with the medication and would like to continue.  Weighed herself on 9/30/22 - 168 lbs  Weight today: 151 lbs    Review of Systems   Constitutional, HEENT, cardiovascular, pulmonary, gi  and gu systems are negative, except as otherwise noted.      Objective           Vitals:  No vitals were obtained today due to virtual visit.    Physical Exam   Video portion of visit   GENERAL: Healthy, alert and no distress  EYES: Eyes grossly normal to inspection.  No discharge or erythema, or obvious scleral/conjunctival abnormalities.  RESP: No audible wheeze, cough, or visible cyanosis.  No visible retractions or increased work of breathing.    SKIN: Visible skin clear. No significant rash, abnormal pigmentation or lesions.  NEURO: Cranial nerves grossly intact.  Mentation and speech appropriate for age.  PSYCH: Mentation appears normal, affect normal/bright, judgement and insight intact, normal speech and appearance well-groomed.      Video-Visit Details    Video Start Time: 3:12 PM    Type of service:  Video Visit    Video End Time:3:16 PM    Originating Location (pt. Location): Home        Distant Location (provider location):  On-site    Platform used for Video Visit: JETME

## 2023-01-20 ASSESSMENT — ENCOUNTER SYMPTOMS
HEMATURIA: 0
BREAST MASS: 0
FREQUENCY: 0
SORE THROAT: 0
CHILLS: 0
DIZZINESS: 0
PARESTHESIAS: 0
HEARTBURN: 0
HEADACHES: 0
DYSURIA: 0
FEVER: 0
NAUSEA: 0
NERVOUS/ANXIOUS: 0
JOINT SWELLING: 0
WEAKNESS: 0
MYALGIAS: 0
CONSTIPATION: 0
EYE PAIN: 0
ABDOMINAL PAIN: 0
PALPITATIONS: 0
ARTHRALGIAS: 0
SHORTNESS OF BREATH: 0
HEMATOCHEZIA: 0
DIARRHEA: 0
COUGH: 0

## 2023-01-27 ENCOUNTER — OFFICE VISIT (OUTPATIENT)
Dept: FAMILY MEDICINE | Facility: CLINIC | Age: 38
End: 2023-01-27
Payer: COMMERCIAL

## 2023-01-27 VITALS
BODY MASS INDEX: 26.98 KG/M2 | OXYGEN SATURATION: 98 % | DIASTOLIC BLOOD PRESSURE: 65 MMHG | SYSTOLIC BLOOD PRESSURE: 107 MMHG | HEIGHT: 62 IN | RESPIRATION RATE: 14 BRPM | TEMPERATURE: 98.6 F | HEART RATE: 99 BPM | WEIGHT: 146.6 LBS

## 2023-01-27 DIAGNOSIS — F39 MOOD DISORDER (H): ICD-10-CM

## 2023-01-27 DIAGNOSIS — J45.20 MILD INTERMITTENT ASTHMA WITHOUT COMPLICATION: ICD-10-CM

## 2023-01-27 DIAGNOSIS — E66.811 CLASS 1 OBESITY WITH BODY MASS INDEX (BMI) OF 31.0 TO 31.9 IN ADULT, UNSPECIFIED OBESITY TYPE, UNSPECIFIED WHETHER SERIOUS COMORBIDITY PRESENT: ICD-10-CM

## 2023-01-27 DIAGNOSIS — Z00.00 ROUTINE GENERAL MEDICAL EXAMINATION AT A HEALTH CARE FACILITY: Primary | ICD-10-CM

## 2023-01-27 PROCEDURE — 90677 PCV20 VACCINE IM: CPT | Performed by: PHYSICIAN ASSISTANT

## 2023-01-27 PROCEDURE — 99395 PREV VISIT EST AGE 18-39: CPT | Mod: 25 | Performed by: PHYSICIAN ASSISTANT

## 2023-01-27 PROCEDURE — 99214 OFFICE O/P EST MOD 30 MIN: CPT | Mod: 25 | Performed by: PHYSICIAN ASSISTANT

## 2023-01-27 PROCEDURE — 90471 IMMUNIZATION ADMIN: CPT | Performed by: PHYSICIAN ASSISTANT

## 2023-01-27 RX ORDER — LIRAGLUTIDE 6 MG/ML
3 INJECTION, SOLUTION SUBCUTANEOUS DAILY
Qty: 30 ML | Refills: 2 | Status: SHIPPED | OUTPATIENT
Start: 2023-01-27 | End: 2023-08-29

## 2023-01-27 RX ORDER — OLANZAPINE AND SAMIDORPHAN L-MALATE 5; 10 MG/1; MG/1
1 TABLET, FILM COATED ORAL
COMMUNITY
Start: 2022-08-15 | End: 2023-01-27

## 2023-01-27 RX ORDER — BUSPIRONE HYDROCHLORIDE 10 MG/1
1 TABLET ORAL
COMMUNITY
Start: 2022-03-31 | End: 2023-01-27

## 2023-01-27 ASSESSMENT — ENCOUNTER SYMPTOMS
DIARRHEA: 0
WEAKNESS: 0
HEMATOCHEZIA: 0
BREAST MASS: 0
CHILLS: 0
COUGH: 0
FEVER: 0
MYALGIAS: 0
SHORTNESS OF BREATH: 0
SORE THROAT: 0
CONSTIPATION: 0
PALPITATIONS: 0
ABDOMINAL PAIN: 0
PARESTHESIAS: 0
HEARTBURN: 0
DIZZINESS: 0
JOINT SWELLING: 0
FREQUENCY: 0
EYE PAIN: 0
DYSURIA: 0
ARTHRALGIAS: 0
NAUSEA: 0
HEADACHES: 0
HEMATURIA: 0
NERVOUS/ANXIOUS: 0

## 2023-01-27 ASSESSMENT — ANXIETY QUESTIONNAIRES
1. FEELING NERVOUS, ANXIOUS, OR ON EDGE: SEVERAL DAYS
3. WORRYING TOO MUCH ABOUT DIFFERENT THINGS: SEVERAL DAYS
6. BECOMING EASILY ANNOYED OR IRRITABLE: NOT AT ALL
GAD7 TOTAL SCORE: 5
7. FEELING AFRAID AS IF SOMETHING AWFUL MIGHT HAPPEN: NOT AT ALL
2. NOT BEING ABLE TO STOP OR CONTROL WORRYING: SEVERAL DAYS
5. BEING SO RESTLESS THAT IT IS HARD TO SIT STILL: SEVERAL DAYS
IF YOU CHECKED OFF ANY PROBLEMS ON THIS QUESTIONNAIRE, HOW DIFFICULT HAVE THESE PROBLEMS MADE IT FOR YOU TO DO YOUR WORK, TAKE CARE OF THINGS AT HOME, OR GET ALONG WITH OTHER PEOPLE: NOT DIFFICULT AT ALL
GAD7 TOTAL SCORE: 5

## 2023-01-27 ASSESSMENT — PAIN SCALES - GENERAL: PAINLEVEL: NO PAIN (0)

## 2023-01-27 ASSESSMENT — PATIENT HEALTH QUESTIONNAIRE - PHQ9
5. POOR APPETITE OR OVEREATING: SEVERAL DAYS
SUM OF ALL RESPONSES TO PHQ QUESTIONS 1-9: 3

## 2023-01-27 ASSESSMENT — ASTHMA QUESTIONNAIRES: ACT_TOTALSCORE: 25

## 2023-01-27 NOTE — PROGRESS NOTES
SUBJECTIVE:   CC: Sima is an 37 year old who presents for preventive health visit.     Patient has been advised of split billing requirements and indicates understanding: Yes     Healthy Habits:     Getting at least 3 servings of Calcium per day:  Yes    Bi-annual eye exam:  Yes    Dental care twice a year:  NO    Sleep apnea or symptoms of sleep apnea:  None    Diet:  Gluten-free/reduced    Frequency of exercise:  2-3 days/week    Duration of exercise:  15-30 minutes    Taking medications regularly:  Yes    Medication side effects:  None    PHQ-2 Total Score: 0    Additional concerns today:  No    Using liraglutide 3 mg daily and is doing really well with this. No side effects. Appetite is much less, full faster. She has lost a significant amount of weight and is feeling really good.    Wt Readings from Last 4 Encounters:   01/27/23 66.5 kg (146 lb 9.6 oz)   08/24/22 74.8 kg (165 lb)   01/28/22 73.9 kg (163 lb)   11/16/21 69.4 kg (153 lb)         Depression and Anxiety Follow-Up    How are you doing with your depression since your last visit? No change    How are you doing with your anxiety since your last visit?  No change    Are you having other symptoms that might be associated with depression or anxiety? No    Have you had a significant life event? No     Do you have any concerns with your use of alcohol or other drugs? No    Follows with psychiatry for mood and is doing really well. She says she feels back to herself again. No suicidal ideation. Current medications: olanzapine and quetiapine.    Social History     Tobacco Use     Smoking status: Never     Smokeless tobacco: Never   Vaping Use     Vaping Use: Never used   Substance Use Topics     Alcohol use: No     Drug use: No     PHQ 2/7/2022 8/24/2022 1/27/2023   PHQ-9 Total Score 2 3 3   Q9: Thoughts of better off dead/self-harm past 2 weeks Not at all Several days Not at all   F/U: Thoughts of suicide or self-harm - No -   F/U: Self harm-plan - - -    F/U: Self-harm action - - -   F/U: Safety concerns - No -     SLIM-7 SCORE 10/5/2021 12/6/2021 1/27/2023   Total Score 2 (minimal anxiety) 4 (minimal anxiety) -   Total Score 2 4 5       Asthma Follow-Up    Was ACT completed today?    Yes    ACT Total Scores 1/27/2023   ACT TOTAL SCORE -   ASTHMA ER VISITS -   ASTHMA HOSPITALIZATIONS -   ACT TOTAL SCORE (Goal Greater than or Equal to 20) 25   In the past 12 months, how many times did you visit the emergency room for your asthma without being admitted to the hospital? 0   In the past 12 months, how many times were you hospitalized overnight because of your asthma? 0          How many days per week do you miss taking your asthma controller medication?  She has rx for flovent but usually only uses if she has URI. However, she had URI recently and didn't need flovent.     Please describe any recent triggers for your asthma: upper respiratory infections    Have you had any Emergency Room Visits, Urgent Care Visits, or Hospital Admissions since your last office visit?  No  Mild intermittent asthma. Rarely needs albuterol. Does not need filled today.    Today's PHQ-2 Score:   PHQ-2 ( 1999 [employer]) 1/20/2023   Q1: Little interest or pleasure in doing things 0   Q2: Feeling down, depressed or hopeless 0   PHQ-2 Score 0   Q1: Little interest or pleasure in doing things Not at all   Q2: Feeling down, depressed or hopeless Not at all   PHQ-2 Score 0           Social History     Tobacco Use     Smoking status: Never     Smokeless tobacco: Never   Substance Use Topics     Alcohol use: No         Alcohol Use 1/20/2023   Prescreen: >3 drinks/day or >7 drinks/week? No   Prescreen: >3 drinks/day or >7 drinks/week? -       Reviewed orders with patient.  Reviewed health maintenance and updated orders accordingly - Yes  Lab work is in process  Labs reviewed in EPIC  BP Readings from Last 3 Encounters:   01/27/23 107/65   01/28/22 110/70   11/16/21 115/76    Wt Readings from Last 3  Encounters:   01/27/23 66.5 kg (146 lb 9.6 oz)   08/24/22 74.8 kg (165 lb)   01/28/22 73.9 kg (163 lb)                  Patient Active Problem List   Diagnosis     Allergic state     Mild intermittent asthma     CARDIOVASCULAR SCREENING; LDL GOAL LESS THAN 160     AD (atopic dermatitis)     Insomnia due to psychological stress     High risk medication use     Mood disorder (H)     Anxiety disorder due  Insomnia and lack of sleep for extended period of time      Class 1 obesity with body mass index (BMI) of 31.0 to 31.9 in adult, unspecified obesity type, unspecified whether serious comorbidity present     Past Surgical History:   Procedure Laterality Date     ARTHROSCOPY SHOULDER Bilateral 2008,2009     GENITOURINARY SURGERY  05/2019    Interstim placement     GYN SURGERY  7/16/18     LAPAROSCOPIC HYSTERECTOMY TOTAL, SALPINGECTOMY BILATERAL  07/2018     Laparoscopy to look for endometriosis  08/2004     LAPAROTOMY MINI, TUBAL LIGATION (POST PARTUM), COMBINED Bilateral 05/11/2016    Procedure: COMBINED LAPAROTOMY MINI, TUBAL LIGATION (POST PARTUM);  Surgeon: Nannette Shane DO;  Location: [facility] OR     MYRINGOTOMY, INSERT TUBE BILATERAL, COMBINED  as a child     ORTHOPEDIC SURGERY  2008/2009    Shoulder     REMOVE STIMULATOR SACRAL NERVE Left 07/06/2020    Procedure: EXCISE INTERSTEM SACRAL NERVE STIMULATOR BATTERY AND LEAD LINE;  Surgeon: Pooja Lin MD;  Location: [facility] OR     Septoplasty for deviated septum  07/2005     SINUS SURGERY  2007     TONSILLECTOMY  2006       Social History     Tobacco Use     Smoking status: Never     Smokeless tobacco: Never   Substance Use Topics     Alcohol use: No     Family History   Problem Relation Age of Onset     Hypertension Mother      Gastrointestinal Disease Mother         Pancreatitis-flare ups often     Lipids Mother         high     Blood Disease Mother      Diabetes Mother      Heart Disease Father      Coronary Artery Disease Father      Bladder Cancer Father       Obesity Maternal Grandmother      Coronary Artery Disease Maternal Grandmother      Diabetes Maternal Grandfather         insulin     Hypertension Maternal Grandfather      Obesity Maternal Grandfather      Respiratory Maternal Grandfather         asthma     Prostate Cancer Paternal Grandfather      Other Cancer Paternal Grandfather         Prostate cancer     Allergies Son         Allergic to peanuts, different foods, Amoxicillin     Other Cancer Cousin         Leukemia     Leukemia Paternal Cousin          Current Outpatient Medications   Medication Sig Dispense Refill     albuterol (PROAIR HFA/PROVENTIL HFA/VENTOLIN HFA) 108 (90 Base) MCG/ACT inhaler Inhale 2 puffs into the lungs every 6 hours as needed for shortness of breath / dyspnea or wheezing 18 g 4     albuterol (PROVENTIL) (2.5 MG/3ML) 0.083% neb solution Take 1 vial (2.5 mg) by nebulization every 6 hours as needed for shortness of breath / dyspnea or wheezing 90 mL 3     cetirizine (ZYRTEC) 10 MG tablet Take 10 mg by mouth 2 times daily        fluticasone (FLOVENT HFA) 110 MCG/ACT inhaler Inhale 1 puff into the lungs 2 times daily 12 g 0     insulin pen needle (32G X 4 MM) 32G X 4 MM miscellaneous Use 1 pen needles daily or as directed. 100 each 1     liraglutide - Weight Management (SAXENDA) 18 MG/3ML pen Inject 3 mg Subcutaneous daily 30 mL 2     OLANZapine (ZYPREXA) 5 MG tablet Take 1 tablet (5 mg) by mouth At Bedtime 90 tablet 0     ondansetron (ZOFRAN) 4 MG tablet Take 1 tablet (4 mg) by mouth every 6 hours as needed for nausea 30 tablet 0     QUEtiapine (SEROQUEL XR) 150 MG TB24 24 hr tablet 25 mg as needed       vilazodone (VIIBRYD) 20 MG TABS tablet Take 20 mg by mouth daily       Allergies   Allergen Reactions     Moxifloxacin Hydrochloride Nausea and Vomiting     Avelox-vomiting     Cats Itching     Codeine GI Disturbance     Darvocet [Propoxyphene N-Apap] Hives     PN: LW Reaction: Rash, Generalized     Demerol Hives     Dog Hair [Dogs]  "Unknown     Dust Mites Itching and Swelling     Meperidine      PN: LW Reaction: Rash, Generalized     Oxycodone-Acetaminophen      PN: LW Reaction: SHORTNESS OF BREATH     Pollen Extract Itching     Vicodin [Hydrocodone-Acetaminophen] GI Disturbance     PN: LW Reaction: Rash, Generalized     Latex Itching, Swelling and Rash     \"sensitivity\"     Recent Labs   Lab Test 04/07/22  1331 01/28/22  0903 07/28/20  0943 03/01/19  0924 12/17/18  1113 05/03/18  1815   A1C  --  5.0 5.2 5.4  --   --    LDL 91 95 106*  --    < >  --    HDL 40* 44* 36*  --    < >  --    TRIG 178* 205* 206*  --    < >  --    ALT 26 30 22  --   --   --    CR 0.69 0.91 0.67  --   --  0.58   GFRESTIMATED >90 82 >90  --   --  >90   GFRESTBLACK  --   --  >90  --   --  >90   POTASSIUM 3.4 3.6 4.0  --   --  3.6   TSH 0.92  --   --  1.02  --  2.24    < > = values in this interval not displayed.        Breast Cancer Screening:    Breast CA Risk Assessment (FHS-7) 1/28/2022   Do you have a family history of breast, colon, or ovarian cancer? No / Unknown       Patient under 40 years of age: Routine Mammogram Screening not recommended.   Pertinent mammograms are reviewed under the imaging tab.    History of abnormal Pap smear: Status post benign hysterectomy. Health Maintenance and Surgical History updated.  PAP / HPV Latest Ref Rng & Units 1/28/2022 11/4/2015 2/15/2013   PAP   Negative for Intraepithelial Lesion or Malignancy (NILM) - -   PAP (Historical) - - NIL NIL   HPV16 Negative Negative Negative -   HPV18 Negative Negative Negative -   HRHPV Negative Negative Negative -     Reviewed and updated as needed this visit by clinical staff   Tobacco  Allergies  Meds  Problems  Med Hx  Surg Hx  Fam Hx  Soc   Hx        Reviewed and updated as needed this visit by Provider   Tobacco  Allergies  Meds  Problems  Med Hx  Surg Hx  Fam Hx         Past Medical History:   Diagnosis Date     Allergic rhinitis      Anxiety      Asthma      Chronic " maxillary sinusitis 3/28/2005     Chronic tonsillitis 2006     Cyclic vomiting syndrome      Depressive disorder      Diet controlled gestational diabetes mellitus in third trimester 3/5/2016     Dysmenorrhea      Perforated tympanic membrane 2011    left      PIH (pregnancy induced hypertension)      Pneumothorax     from bad coughing     Pregnancy induced hypertension      Syncope       Past Surgical History:   Procedure Laterality Date     ARTHROSCOPY SHOULDER Bilateral ,     GENITOURINARY SURGERY  2019    Interstim placement     GYN SURGERY  17     LAPAROSCOPIC HYSTERECTOMY TOTAL, SALPINGECTOMY BILATERAL  2018     Laparoscopy to look for endometriosis  2004     LAPAROTOMY MINI, TUBAL LIGATION (POST PARTUM), COMBINED Bilateral 2016    Procedure: COMBINED LAPAROTOMY MINI, TUBAL LIGATION (POST PARTUM);  Surgeon: Nannette Shane DO;  Location: [facility]     MYRINGOTOMY, INSERT TUBE BILATERAL, COMBINED  as a child     ORTHOPEDIC SURGERY      Shoulder     REMOVE STIMULATOR SACRAL NERVE Left 2020    Procedure: EXCISE INTERSTEM SACRAL NERVE STIMULATOR BATTERY AND LEAD LINE;  Surgeon: Pooja Lin MD;  Location: [facility] OR     Septoplasty for deviated septum  2005     SINUS SURGERY  2007     TONSILLECTOMY       OB History    Para Term  AB Living   4 3 3 0 1 3   SAB IAB Ectopic Multiple Live Births   1 0 0 0 3      # Outcome Date GA Lbr Damian/2nd Weight Sex Delivery Anes PTL Lv   4 Term 05/10/16 38w4d 03:36 / 00:40 3.05 kg (6 lb 11.6 oz) F Vag-Spont EPI N FEDERICO      Apgar1: 8  Apgar5: 9   3 Term 12 37w4d 04:41 / 00:09 3.7 kg (8 lb 2.5 oz) M Vag-Spont EPI N FEDERICO      Name: GERI GONGORA      Apgar1: 8  Apgar5: 9   2 Term 04/03/10 38w0d  3.175 kg (7 lb) M  None N FEDERICO      Birth Comments: increased BPs, no preeclampsia      Name: Lul   1 SAB                Review of Systems   Constitutional: Negative for chills and fever.   HENT:  "Negative for congestion, ear pain, hearing loss and sore throat.    Eyes: Negative for pain and visual disturbance.   Respiratory: Negative for cough and shortness of breath.    Cardiovascular: Negative for chest pain, palpitations and peripheral edema.   Gastrointestinal: Negative for abdominal pain, constipation, diarrhea, heartburn, hematochezia and nausea.   Breasts:  Negative for tenderness, breast mass and discharge.   Genitourinary: Negative for dysuria, frequency, genital sores, hematuria, pelvic pain, urgency, vaginal bleeding and vaginal discharge.   Musculoskeletal: Negative for arthralgias, joint swelling and myalgias.   Skin: Negative for rash.   Neurological: Negative for dizziness, weakness, headaches and paresthesias.   Psychiatric/Behavioral: Negative for mood changes. The patient is not nervous/anxious.      OBJECTIVE:   /65 (BP Location: Right arm, Patient Position: Sitting, Cuff Size: Adult Large)   Pulse 99   Temp 98.6  F (37  C) (Oral)   Resp 14   Ht 1.562 m (5' 1.5\")   Wt 66.5 kg (146 lb 9.6 oz)   LMP 06/27/2018 (Exact Date)   SpO2 98%   BMI 27.25 kg/m    Physical Exam  GENERAL: healthy, alert and no distress  EYES: Eyes grossly normal to inspection, PERRL and conjunctivae and sclerae normal  HENT: ear canals and TM's normal, nose and mouth without ulcers or lesions  NECK: no adenopathy, no asymmetry, masses, or scars and thyroid normal to palpation  RESP: lungs clear to auscultation - no rales, rhonchi or wheezes  BREAST: patient declined  CV: regular rate and rhythm, normal S1 S2, no S3 or S4, no murmur, click or rub, no peripheral edema and peripheral pulses strong  ABDOMEN: soft, nontender, no hepatosplenomegaly, no masses and bowel sounds normal  MS: no gross musculoskeletal defects noted, no edema  SKIN: no suspicious lesions or rashes  NEURO: Normal strength and tone, mentation intact and speech normal  PSYCH: mentation appears normal, affect normal/bright    Diagnostic " Test Results:  Labs reviewed in Epic    ASSESSMENT/PLAN:   Routine general medical examination at a health care facility  Reviewed personal and family history. Reviewed age appropriate screenings. Reviewed healthy BP and BMI ranges. Counseled on lifestyle modifications for optimal mental and physical health.  Discussed age-appropriate health maintenance. Recommended any needed vaccinations. Continue to focus on well balanced diet and exercise.     Class 1 obesity with body mass index (BMI) of 31.0 to 31.9 in adult, unspecified obesity type, unspecified whether serious comorbidity present  Using liraglutide 3 mg daily and is doing really well with this. No side effects. Appetite is much less, full faster. She has lost a significant amount of weight and is feeling really good.  - liraglutide - Weight Management (SAXENDA) 18 MG/3ML pen; Inject 3 mg Subcutaneous daily  - insulin pen needle (32G X 4 MM) 32G X 4 MM miscellaneous; Use 1 pen needles daily or as directed.    Mood disorder (H)  Doing well, follows with psych.    Mild intermittent asthma without complication  Doing well. Albuterol PRN rarely. Flovent mostly with URI. Does not need refills today but can call for refills if needed.      COUNSELING:  Reviewed preventive health counseling, as reflected in patient instructions        She reports that she has never smoked. She has never used smokeless tobacco.          NICKIE Spears [facility]

## 2023-03-15 ENCOUNTER — MYC MEDICAL ADVICE (OUTPATIENT)
Dept: FAMILY MEDICINE | Facility: CLINIC | Age: 38
End: 2023-03-15
Payer: COMMERCIAL

## 2023-03-20 ENCOUNTER — VIRTUAL VISIT (OUTPATIENT)
Dept: FAMILY MEDICINE | Facility: CLINIC | Age: 38
End: 2023-03-20
Payer: COMMERCIAL

## 2023-03-20 DIAGNOSIS — E66.811 CLASS 1 OBESITY WITH BODY MASS INDEX (BMI) OF 31.0 TO 31.9 IN ADULT, UNSPECIFIED OBESITY TYPE, UNSPECIFIED WHETHER SERIOUS COMORBIDITY PRESENT: Primary | ICD-10-CM

## 2023-03-20 PROCEDURE — 99213 OFFICE O/P EST LOW 20 MIN: CPT | Mod: VID | Performed by: PHYSICIAN ASSISTANT

## 2023-03-20 NOTE — PROGRESS NOTES
"Sima is a 38 year old who is being evaluated via a billable video visit.      How would you like to obtain your AVS? MyChart  If the video visit is dropped, the invitation should be resent by: Text to cell phone: [phone]  Will anyone else be joining your video visit? No          Assessment & Plan     Class 1 obesity with body mass index (BMI) of 31.0 to 31.9 in adult, unspecified obesity type, unspecified whether serious comorbidity present  Discussed weight loss that she has had with Saxenda and she has lost about 12% of her body weight, BMI decreased from 31.18 to 27.25. She has been maintaining this weight but is frustrated that she can't seem to lose additional weight. It does appear that Wegovy has potential for more weight loss than Saxenda, but expected weight loss with Wegovy is about 9.6-16%, so she has already met that with Saxenda. If we switch to Wegovy, there could be issues with insurance coverage, and we would need to start at the lower dose and it would take a while to get up to effective dose. For now, we will plan to continue with Saxenda and try to incorporate more exercise, continue to monitor diet (can try My Fitness Pal). Follow up in about 3 months to see how she is doing, earlier if concerns. Risks and benefits of treatment plan discussed. Patient and/or parent acknowledges and agrees with plan of care, all questions answered.        BMI:   Estimated body mass index is 27.25 kg/m  as calculated from the following:    Height as of 1/27/23: 1.562 m (5' 1.5\").    Weight as of 1/27/23: 66.5 kg (146 lb 9.6 oz).   Weight management plan: Discussed healthy diet and exercise guidelines      Return in about 3 months (around 6/20/2023) for Med Check, weight check.    Adeola Reardon PA-C  [facility]    Wolf   Sima is a 38 year old, presenting for the following health issues:  Recheck Medication (Would like to discuss changing Saxenda)      History of Present " Illness       Reason for visit:  Discuss possible medication change    She eats 2-3 servings of fruits and vegetables daily.She consumes 1 sweetened beverage(s) daily.She exercises with enough effort to increase her heart rate 10 to 19 minutes per day.  She exercises with enough effort to increase her heart rate 3 or less days per week.   She is taking medications regularly.     Sima has been taking liraglutide since September 2022 and has been on 3 mg daily for the past 4 months. She had been doing really well with the medication, noted decreased appetite, feeling full faster, no side effects. She did have a significant weight loss (about 12% body weight). BMI in August was 31.18, BMI in January was 27.25. Lately, she feels like the medication is not working as well and she doesn't feel full as fast. She has not had continued weight loss - weight has remained around 144-146 and she can't seem to get past that. She has tried intermittent fasting, trying to cut portions back. Overall eats a pretty healthy diet. She likes to walk for exercise but hasn't been able to walk as much over the winter due to the weather. She does try to do at least 20 minutes of some sort of weight training at home daily.     Wt Readings from Last 4 Encounters:   01/27/23 66.5 kg (146 lb 9.6 oz)   08/24/22 74.8 kg (165 lb)   01/28/22 73.9 kg (163 lb)   11/16/21 69.4 kg (153 lb)     Review of Systems   Constitutional, HEENT, cardiovascular, pulmonary, gi and gu systems are negative, except as otherwise noted.      Objective           Vitals:  No vitals were obtained today due to virtual visit.    Physical Exam   GENERAL: Healthy, alert and no distress  EYES: Eyes grossly normal to inspection.  No discharge or erythema, or obvious scleral/conjunctival abnormalities.  RESP: No audible wheeze, cough, or visible cyanosis.  No visible retractions or increased work of breathing.    SKIN: Visible skin clear. No significant rash, abnormal  pigmentation or lesions.  NEURO: Cranial nerves grossly intact.  Mentation and speech appropriate for age.  PSYCH: Mentation appears normal, affect normal/bright, judgement and insight intact, normal speech and appearance well-groomed.      Video-Visit Details    Type of service:  Video Visit   Video Start Time: 3:36 PM  Video End Time:3:43 PM    Originating Location (pt. Location): Home    Distant Location (provider location):  On-site  Platform used for Video Visit: Jannet

## 2023-04-07 ENCOUNTER — TELEPHONE (OUTPATIENT)
Dept: FAMILY MEDICINE | Facility: CLINIC | Age: 38
End: 2023-04-07
Payer: COMMERCIAL

## 2023-04-07 NOTE — LETTER
Physician Name: Adeola Reardon PA-C  [facility]  [address] [facility] [address]  Phone: [phone]  Fax [phone]      4/14/2023    Patients Name: Ashlie Matthews  YOB: 1985  Payor: Payor: [facility] / Plan: [facility] PMAP / Product Type: HMO /    ID: [id number]      Name of person submitting request form: NICKIE Spears    Reason for Request: Prior Authorization Drug    Requested Medication: liraglutide - Weight Management (SAXENDA) 18 MG/3ML pen  Diagnosis: Class 1 obesity with body mass index (BMI) of 31.0 to 31.9 in adult  Formulary Medications tried and when: (be specific and include detail (contraindications, allergies etc): Metformin, diet, exercise. I would not recommend use of phentermine due to patient's history of anxiety.  Other Pertinent History: Patient has lost 12% of her body weight by using Saxenda in addition to exercise and diet. BMI decreased from 31.18 to 27.25. She would benefit from continuing therapy for continued weight management.     Wt Readings from Last 4 Encounters:   01/27/23 66.5 kg (146 lb 9.6 oz)   08/24/22 74.8 kg (165 lb)   01/28/22 73.9 kg (163 lb)   11/16/21 69.4 kg (153 lb)          PRIOR AUTHORIZATION REPLY  ___ Approved   Begin date_____End date ______Approved by ______________    ___ Formulary Alternative Available  ___P&T Med Dir Crit Not Met  ___ Pt has expanded formulary   ___ No benefit  ___Pending    ___ More information needed  ___ Denied    ________________________________________________________________________________________________________________________________________________________________________________________________________________________

## 2023-04-07 NOTE — TELEPHONE ENCOUNTER
Prior Authorization Retail Medication Request    Medication/Dose: liraglutide - Weight Management (SAXENDA) 18 MG/3ML pen  ICD code (if different than what is on RX):    Previously Tried and Failed:  Metformin, diet, exercise  Rationale:  Continuing therapy. Patient has lost 12% of her body weight by using Saxenda. Would like to continue therapy to continue weight loss in addition to exercise & diet.   BMI decreased from 31.18 to 27.25.    Insurance Name:  [facility]  Insurance ID:  [id number]     Pharmacy Information (if different than what is on RX)  Name:    Phone:      Wt Readings from Last 5 Encounters:   01/27/23 66.5 kg (146 lb 9.6 oz)   08/24/22 74.8 kg (165 lb)   01/28/22 73.9 kg (163 lb)   11/16/21 69.4 kg (153 lb)   11/20/20 69.5 kg (153 lb 4.8 oz)

## 2023-04-12 ENCOUNTER — MYC MEDICAL ADVICE (OUTPATIENT)
Dept: FAMILY MEDICINE | Facility: CLINIC | Age: 38
End: 2023-04-12
Payer: COMMERCIAL

## 2023-04-12 NOTE — TELEPHONE ENCOUNTER
Central Prior Authorization Team   Phone: [phone]    PA Initiation    Medication: liraglutide - Weight Management (SAXENDA) 18 MG/3ML pen  Insurance Company: [employer] - Phone [phone] Fax [phone]  Pharmacy Filling the Rx: [id number] - [place], [place] - [address]  Filling Pharmacy Phone: [phone]  Filling Pharmacy Fax:    Start Date: 4/12/2023

## 2023-04-13 NOTE — TELEPHONE ENCOUNTER
Routed to Adeola Reardon, please review  message and advise.    Hawa Warner RN, BSN  [facility]

## 2023-04-13 NOTE — TELEPHONE ENCOUNTER
PRIOR AUTHORIZATION DENIED    Medication: liraglutide - Weight Management (SAXENDA) 18 MG/3ML pen    Denial Date: 4/12/2023    Denial Rational:                Appeal Information:    If you would like to appeal, please supply P/A team with a letter of medical necessity with clinical reason.

## 2023-04-17 NOTE — TELEPHONE ENCOUNTER
Medication Appeal Initiation    We have initiated an appeal for the requested medication:  Medication: liraglutide - Weight Management (SAXENDA) 18 MG/3ML pen  Appeal Start Date:  4/17/2023  Insurance Company: [employer] - Phone [phone] Fax [phone]  Comments:

## 2023-04-18 NOTE — TELEPHONE ENCOUNTER
Prior Authorization Approval    Authorization Effective Date: 3/18/2023  Authorization Expiration Date: 4/18/2024  Medication: liraglutide - Weight Management (SAXENDA) 18 MG/3ML pen  Approved Dose/Quantity:    Reference #:     Insurance Company: [employer] - Phone [phone] Fax [phone]  Expected CoPay:       CoPay Card Available:      Foundation Assistance Needed:    Which Pharmacy is filling the prescription (Not needed for infusion/clinic administered): [facility] [id number] [facility] [id number] [place] [address]  Pharmacy Notified: Yes  Patient Notified: Yes   **Instructed pharmacy to notify patient when script is ready to /ship.**

## 2023-04-23 DIAGNOSIS — E66.811 CLASS 1 OBESITY WITH BODY MASS INDEX (BMI) OF 31.0 TO 31.9 IN ADULT, UNSPECIFIED OBESITY TYPE, UNSPECIFIED WHETHER SERIOUS COMORBIDITY PRESENT: ICD-10-CM

## 2023-04-25 RX ORDER — PEN NEEDLE, DIABETIC 32GX 5/32"
NEEDLE, DISPOSABLE MISCELLANEOUS
Qty: 100 EACH | Refills: 3 | Status: SHIPPED | OUTPATIENT
Start: 2023-04-25

## 2023-04-25 NOTE — TELEPHONE ENCOUNTER
LOV was on 1/27/23. Refilled as per protocol.    SHANNEN Gloria  Patient Advocate Liason (PAL)  [facility]  Ph. [phone] / Fax. [phone]

## 2023-08-04 ENCOUNTER — TELEPHONE (OUTPATIENT)
Dept: SURGERY | Facility: CLINIC | Age: 38
End: 2023-08-04

## 2023-08-04 ENCOUNTER — OFFICE VISIT (OUTPATIENT)
Dept: SURGERY | Facility: CLINIC | Age: 38
End: 2023-08-04
Payer: COMMERCIAL

## 2023-08-04 VITALS
HEIGHT: 62 IN | HEART RATE: 93 BPM | DIASTOLIC BLOOD PRESSURE: 72 MMHG | BODY MASS INDEX: 27.42 KG/M2 | WEIGHT: 149 LBS | RESPIRATION RATE: 16 BRPM | SYSTOLIC BLOOD PRESSURE: 110 MMHG | OXYGEN SATURATION: 97 %

## 2023-08-04 DIAGNOSIS — D17.30 LIPOMA OF SKIN AND SUBCUTANEOUS TISSUE: Primary | ICD-10-CM

## 2023-08-04 PROCEDURE — 99213 OFFICE O/P EST LOW 20 MIN: CPT | Performed by: SURGERY

## 2023-08-04 RX ORDER — AMOXICILLIN AND CLAVULANATE POTASSIUM 500; 125 MG/1; MG/1
1 TABLET, FILM COATED ORAL 3 TIMES DAILY
COMMUNITY
Start: 2023-07-26 | End: 2023-08-16

## 2023-08-04 RX ORDER — METRONIDAZOLE 500 MG/1
500 TABLET ORAL
COMMUNITY
Start: 2023-07-26 | End: 2023-08-16

## 2023-08-04 NOTE — TELEPHONE ENCOUNTER
Type of surgery: EXCISION MASS LEFT UPPER EXTREMITY   Location of surgery: [facility]  Date and time of surgery: 8/23/2023 @ 11:00 AM   Surgeon: Adeola Paige MD   Pre-Op Appt Date: NOT NEEDED   Post-Op Appt Date: PATIENT TO SCHEDULE     Packet sent out: Yes  Pre-cert/Authorization completed:  Not Applicable  Date: 8/4/2023         EXCISION MASS LEFT UPPER EXTREMITY LOCAL ONLY 30 MIN REQ NON MGB NMS

## 2023-08-04 NOTE — PROGRESS NOTES
Assessment:   Ashlie Matthews is a 38 year old female seen in consultation for a lipoma on her left forearm. It is ~2.5 cm and is somewhat painful.     Plan:  We have discussed the options of observation and excision.  She elects excision.  We discussed the indications, alternatives, and risks.  We discussed scarring, numbness, anesthesia, infection, bleeding, and recurrence.    We will schedule surgery at the patient's convenience.      HPI:  Ashlie Matthews is a 38 year old female presents today for a mildly painful mass.  Duration:  1-2 years   H/o trauma:  No  Drainage:  No  Previous infections:  No  Other such masses now or in the past:  No  Family h/o similar masses:  No  Pain:  Yes  Size:  slowly increasing    Past Medical History:   has a past medical history of Allergic rhinitis, Anxiety, Asthma, Chronic maxillary sinusitis (3/28/2005), Chronic tonsillitis (2/21/2006), Cyclic vomiting syndrome, Depressive disorder, Diet controlled gestational diabetes mellitus in third trimester (3/5/2016), Dysmenorrhea, Perforated tympanic membrane (1/26/2011), PIH (pregnancy induced hypertension), Pneumothorax (2004), Pregnancy induced hypertension, and Syncope.    Past Surgical History:  Past Surgical History:   Procedure Laterality Date     ARTHROSCOPY SHOULDER Bilateral 2008,2009     GENITOURINARY SURGERY  05/2019    Interstim placement     GYN SURGERY  7/16/18     LAPAROSCOPIC HYSTERECTOMY TOTAL, SALPINGECTOMY BILATERAL  07/2018     Laparoscopy to look for endometriosis  08/2004     LAPAROTOMY MINI, TUBAL LIGATION (POST PARTUM), COMBINED Bilateral 05/11/2016    Procedure: COMBINED LAPAROTOMY MINI, TUBAL LIGATION (POST PARTUM);  Surgeon: Nannette Shane DO;  Location: [facility] OR     MYRINGOTOMY, INSERT TUBE BILATERAL, COMBINED  as a child     ORTHOPEDIC SURGERY  2008/2009    Shoulder     REMOVE STIMULATOR SACRAL NERVE Left 07/06/2020    Procedure: EXCISE INTERSTEM SACRAL NERVE STIMULATOR BATTERY AND LEAD LINE;   Surgeon: Pooja Lin MD;  Location: [facility] OR     Septoplasty for deviated septum  07/2005     SINUS SURGERY  2007     TONSILLECTOMY  2006        Social History:  Social History     Socioeconomic History     Marital status:      Spouse name: Pa     Number of children: 1     Years of education: Not on file     Highest education level: Not on file   Occupational History     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 Yes   Social History Narrative     Not on file     Social Determinants of Health     Financial Resource Strain: Not on file   Food Insecurity: Not on file   Transportation Needs: Not on file   Physical Activity: Not on file   Stress: Not on file   Social Connections: Not on file   Intimate Partner Violence: Not on file   Housing Stability: Not on file        Family History:  Family History   Problem Relation Age of Onset     Hypertension Mother      Gastrointestinal Disease Mother         Pancreatitis-flare ups often     Lipids Mother         high     Blood Disease Mother      Diabetes Mother      Heart Disease Father      Coronary Artery Disease Father      Bladder Cancer Father      Obesity Maternal Grandmother      Coronary Artery Disease Maternal Grandmother      Diabetes Maternal Grandfather         insulin     Hypertension Maternal Grandfather      Obesity Maternal Grandfather      Respiratory Maternal Grandfather         asthma     Prostate Cancer Paternal Grandfather      Other Cancer Paternal Grandfather         Prostate cancer     Allergies Son         Allergic to peanuts, different  "foods, Amoxicillin     Other Cancer Cousin         Leukemia     Leukemia Paternal Cousin      Family history reviewed and not pertinent.    ROS:  The 10 point review of systems is negative other than noted in the HPI and above.    PE:  /72   Pulse 93   Resp 16   Ht 1.562 m (5' 1.5\")   Wt 67.6 kg (149 lb)   LMP 06/27/2018 (Exact Date)   SpO2 97%   BMI 27.70 kg/m    General appearance: well-developed, well-nourished, no apparent distress  HEENT:  Head normocephalic and atraumatic, pupils equal and round, conjunctivae clear, mucous membranes moist, external ears and nose normal  Lungs: respirations unlabored  Musculoskeletal:  Normal station and gait  Extremities: warm, without edema  Neurologic: alert, speech is clear, moves all extremities with good strength  Psychiatric: Mood and affect are appropriate  Skin: There is a 2.5 cm soft, round, mobile subcutaneous mass on/in the left forearm.  The overlying skin is normal, though there is a superficial vein traversing over the mass.  It is mildly tender.        This note was created using voice recognition software. Undetected word substitutions or other errors may have occurred.     Time spent with the patient with greater that 50% of the time in discussion was 25 minutes.     Adeola Paige MD    Please route or send letter to:  Primary Care Provider (PCP) and Referring Provider        "

## 2023-08-04 NOTE — LETTER
2023       Ashlie Matthews    RE: [id number]  : 1985    Ashlie Matthews has been scheduled for surgery on 2023 at 11:00 AM  at [facility] with Dr Adeola Paige.  The hospital is located at [address] in [place].    Please check in at the Surgery reception desk at 10:00 AM . This is located in the back of the hospital on the East side, just past the Emergency Room entrance.     You can eat and drink up until surgery    You should shower before your surgery with Hibiclens or Exidine soap.  This can be found at your local pharmacy or you can pick it up from our office for free.  Please call our office if you have any questions.     You will receive several calls from our staff 3-7 days prior to your scheduled procedure with further details and to answer any questions you may have.    It is sometimes necessary to adjust the surgery schedule due to emergencies and additions to the schedule.  If your surgery is affected by this, we greatly appreciate your flexibility and understanding in this matter    It is best if you call regarding post-operative questions between the hours of 8:00 am & 3:00 pm Monday-Friday, so you have access to the daytime care team that know you best.  Prescription refills are accepted during regular office hours only.    Please do not bring any Disability or FMLA papers to the hospital.  They need to be either faxed ([phone]), mailed or hand delivered to our office by you or a family member for completion.  Please allow 14 business days to complete paperwork.        If you have questions or concerns, please contact our office at [phone].

## 2023-08-23 ENCOUNTER — HOSPITAL ENCOUNTER (OUTPATIENT)
Facility: CLINIC | Age: 38
Discharge: HOME | End: 2023-08-23
Attending: SURGERY | Admitting: SURGERY
Payer: COMMERCIAL

## 2023-08-23 ENCOUNTER — APPOINTMENT (OUTPATIENT)
Dept: SURGERY | Facility: PHYSICIAN GROUP | Age: 38
End: 2023-08-23
Payer: COMMERCIAL

## 2023-08-23 VITALS
OXYGEN SATURATION: 97 % | SYSTOLIC BLOOD PRESSURE: 113 MMHG | WEIGHT: 151.7 LBS | TEMPERATURE: 96.8 F | RESPIRATION RATE: 18 BRPM | BODY MASS INDEX: 28.64 KG/M2 | DIASTOLIC BLOOD PRESSURE: 75 MMHG | HEART RATE: 83 BPM | HEIGHT: 61 IN

## 2023-08-23 DIAGNOSIS — D17.30 LIPOMA OF SKIN AND SUBCUTANEOUS TISSUE: ICD-10-CM

## 2023-08-23 PROCEDURE — 250N000011 HC RX IP 250 OP 636: Performed by: SURGERY

## 2023-08-23 PROCEDURE — 710N000012 HC RECOVERY PHASE 2, PER MINUTE: Performed by: SURGERY

## 2023-08-23 PROCEDURE — 250N000009 HC RX 250: Performed by: SURGERY

## 2023-08-23 PROCEDURE — 360N000075 HC SURGERY LEVEL 2, PER MIN: Performed by: SURGERY

## 2023-08-23 PROCEDURE — 88304 TISSUE EXAM BY PATHOLOGIST: CPT | Mod: 26

## 2023-08-23 PROCEDURE — 999N000141 HC STATISTIC PRE-PROCEDURE NURSING ASSESSMENT: Performed by: SURGERY

## 2023-08-23 PROCEDURE — 88304 TISSUE EXAM BY PATHOLOGIST: CPT | Mod: TC | Performed by: SURGERY

## 2023-08-23 PROCEDURE — 272N000001 HC OR GENERAL SUPPLY STERILE: Performed by: SURGERY

## 2023-08-23 PROCEDURE — 25075 EXC FOREARM LES SC < 3 CM: CPT | Performed by: SURGERY

## 2023-08-23 RX ORDER — ACETAMINOPHEN 500 MG
1000 TABLET ORAL EVERY 6 HOURS PRN
COMMUNITY
Start: 2023-08-23 | End: 2023-09-25

## 2023-08-23 RX ORDER — OXYCODONE HYDROCHLORIDE 5 MG/1
5-10 TABLET ORAL EVERY 4 HOURS PRN
Qty: 6 TABLET | Refills: 0 | Status: SHIPPED | OUTPATIENT
Start: 2023-08-23 | End: 2023-09-25

## 2023-08-23 RX ORDER — ACETAMINOPHEN 325 MG/1
650 TABLET ORAL
Status: DISCONTINUED | OUTPATIENT
Start: 2023-08-23 | End: 2023-08-23 | Stop reason: HOSPADM

## 2023-08-23 RX ORDER — OXYCODONE HYDROCHLORIDE 5 MG/1
5 TABLET ORAL
Status: DISCONTINUED | OUTPATIENT
Start: 2023-08-23 | End: 2023-08-23 | Stop reason: HOSPADM

## 2023-08-23 RX ORDER — IBUPROFEN 200 MG
600 TABLET ORAL EVERY 6 HOURS PRN
COMMUNITY
Start: 2023-08-23 | End: 2023-09-25

## 2023-08-23 ASSESSMENT — ACTIVITIES OF DAILY LIVING (ADL)
ADLS_ACUITY_SCORE: 37
ADLS_ACUITY_SCORE: 37

## 2023-08-23 NOTE — OP NOTE
General Surgery Operative Note      Pre-operative diagnosis: Left forearm subcutaneous mass   Post-operative diagnosis: Same    Procedure: Excision of left forearm subcutaneous mass    Surgeon: Adeola Paige MD   Assistant(s): None   Anesthesia: Local    Estimated blood loss: 3 cc     Specimens: ID Type Source Tests Collected by Time Destination   1 : LEFT ARM LIPOMA Tissue Arm, Left SURGICAL PATHOLOGY EXAM dAeola Paige MD 8/23/2023  7:55 AM           The left arm was prepped and draped in standard sterile fashion.  The skin overlying the mass was anesthetized with local anesthetic.  An incision was made with a length of 2 cm.  The incision was carried into the subcutaneous tissue and the mass was completely excised from surrounding tissues using a combination of Bovie electrocautery and blunt dissection.  A small vein overlying the capsule was ligated with 3-0 vicryl. The mass, which measured 2.4 cm in its longest dimension, was then passed off the field as specimen.  It had the appearance of a lipoma. Hemostasis was maintained throughout with electrocautery.  The wound was then irrigated with sterile saline and closed with 3-0 and 4-0 Vicryl subcuticular sutures and skin glue.  The patient tolerated the procedure well.  Sponge and needle counts were correct at the end of the case.     Adeola Paige MD

## 2023-08-24 LAB
PATH REPORT.COMMENTS IMP SPEC: NORMAL
PATH REPORT.COMMENTS IMP SPEC: NORMAL
PATH REPORT.FINAL DX SPEC: NORMAL
PATH REPORT.GROSS SPEC: NORMAL
PATH REPORT.MICROSCOPIC SPEC OTHER STN: NORMAL
PATH REPORT.RELEVANT HX SPEC: NORMAL
PHOTO IMAGE: NORMAL

## 2023-08-26 DIAGNOSIS — E66.811 CLASS 1 OBESITY WITH BODY MASS INDEX (BMI) OF 31.0 TO 31.9 IN ADULT, UNSPECIFIED OBESITY TYPE, UNSPECIFIED WHETHER SERIOUS COMORBIDITY PRESENT: ICD-10-CM

## 2023-08-29 RX ORDER — LIRAGLUTIDE 6 MG/ML
3 INJECTION, SOLUTION SUBCUTANEOUS DAILY
Qty: 30 ML | Refills: 0 | Status: SHIPPED | OUTPATIENT
Start: 2023-08-29 | End: 2023-11-01

## 2023-08-29 NOTE — TELEPHONE ENCOUNTER
Sent [employer] message requesting a call back for an appt. Two more attempts will be made.    Tanya Cormier  Lead

## 2023-08-29 NOTE — TELEPHONE ENCOUNTER
Routing refill request to provider for review/approval because:  Drug not on the [facility] refill protocol   GLP-1 [facility]onists Protocol [id number]/26/2023 06:34 AM   Protocol Details Order for Saxenda with diagnosis of diabetes    HgbA1C in past 3 or 6 months    Normal serum creatinine on file in past 12 months        Consent (Marginal Mandibular)/Introductory Paragraph: The rationale for Mohs was explained to the patient and consent was obtained. The risks, benefits and alternatives to therapy were discussed in detail. Specifically, the risks of damage to the marginal mandibular branch of the facial nerve, infection, scarring, bleeding, prolonged wound healing, incomplete removal, allergy to anesthesia, and recurrence were addressed. Prior to the procedure, the treatment site was clearly identified and confirmed by the patient. All components of Universal Protocol/PAUSE Rule completed.

## 2023-09-25 ENCOUNTER — MYC MEDICAL ADVICE (OUTPATIENT)
Dept: FAMILY MEDICINE | Facility: CLINIC | Age: 38
End: 2023-09-25

## 2023-09-25 ENCOUNTER — OFFICE VISIT (OUTPATIENT)
Dept: SURGERY | Facility: CLINIC | Age: 38
End: 2023-09-25
Payer: COMMERCIAL

## 2023-09-25 ENCOUNTER — TELEPHONE (OUTPATIENT)
Dept: SURGERY | Facility: CLINIC | Age: 38
End: 2023-09-25

## 2023-09-25 VITALS
OXYGEN SATURATION: 97 % | BODY MASS INDEX: 27.97 KG/M2 | DIASTOLIC BLOOD PRESSURE: 78 MMHG | HEART RATE: 85 BPM | SYSTOLIC BLOOD PRESSURE: 122 MMHG | RESPIRATION RATE: 16 BRPM | HEIGHT: 62 IN | WEIGHT: 152 LBS

## 2023-09-25 DIAGNOSIS — K42.0 UMBILICAL HERNIA WITH OBSTRUCTION: ICD-10-CM

## 2023-09-25 DIAGNOSIS — R10.31 GROIN PAIN, RIGHT: Primary | ICD-10-CM

## 2023-09-25 PROCEDURE — 99214 OFFICE O/P EST MOD 30 MIN: CPT | Mod: 24 | Performed by: SURGERY

## 2023-09-25 RX ORDER — ESTRADIOL 0.1 MG/G
CREAM VAGINAL
COMMUNITY
Start: 2023-08-08

## 2023-09-25 NOTE — TELEPHONE ENCOUNTER
Type of surgery: ROBOTIC ASSITED RIGHT POSSIBLE LEFT INGUINAL HERNIA REPAIR , OPEN UMBILICAL HERNIA REPAIR   Location of surgery: [facility]  Date and time of surgery: 11/30/2023 @ 7:45 AM   Surgeon: Fabiola Gore MD    Pre-Op Appt Date: PATIENT TO SCHEDULE    Post-Op Appt Date: PATIENT TO SCHEDULE     Packet sent out: Yes  Pre-cert/Authorization completed:  Not Applicable  Date: 9/25/2023         ROBOTIC ASSITED RIGHT POSSIBLE LEFT INGUINAL HERNIA REPAIR , OPEN UMBILICAL HERNIA REPAIR GENERAL PT INST TO HAVE H&P WITH PCP 90 MIN REQ (180) PA ASSIST JLS NMS

## 2023-09-25 NOTE — PROGRESS NOTES
[facility]  New Patient Office Visit      Assessment:    Ahslie Matthews is a 38 year old female with a Primary, incarcerated umbilical hernia and ongoing right groin pain without obvious inguinal hernia on exam but ongoing 4+ years.    Plan:    Robotic right inguinal hernia repair possible left, with open umbilical hernia repair.   We will schedule surgery at the patient's convenience.   Will need preop H&P by her PCP (Adeola Reardon PA-C)    We have discussed observation, reduction techniques and importance, incarceration and strangulation signs, symptoms and importance as well as need to seek emergency treatment.  We have discussed hernia repair with mesh in detail, including benefits, alternatives, complications, incision, scar, mesh, infection, anesthesia, bleeding, DVT, hernia recurrence, lifting and activity limits after surgery.  All questions have been answered to the best of my ability.  Sima has allergies to most narcotics and we discussed I think she will do just fine with ibuprofen and tylenol for pain control    She has been given literature to review.     Ashlie Matthews is seen in consultation for a hernia, at the request of Adeola Reardon PA-C.                    HPI:  Ashlie Matthews is a 38 year old female who presents for evaluation of a lump in the april-umbilical region.  She first noticed a lump many years ago. She feels her umbilical hernia is protruding more and her kids comment on the appearance. No real pain there. Additionally she continues to have right groin pain. I saw her in 2021 for right groin pain. At that time there was no obvious hernia on exam or CT was also negative and we elected to monitor. She continues to have right groin pain with sneezing, coughing and certain movements. There is no obvious bulge there.     Previous surgery in this location:  Yes - laparoscopy incision near the umbilicus in the past     Previous herniorrhaphy:  No   Had hysterectomy,  therefore no further pregnancies  Had a rectocele repair a few months ago, healed from that      Past Medical History:  Past Medical History:   Diagnosis Date    Allergic rhinitis     Anxiety     Asthma     Chronic maxillary sinusitis 3/28/2005    Chronic tonsillitis 2/21/2006    Cyclic vomiting syndrome     Depressive disorder     Diet controlled gestational diabetes mellitus in third trimester 3/5/2016    Dysmenorrhea     Perforated tympanic membrane 1/26/2011    left     PIH (pregnancy induced hypertension)     Pneumothorax 2004    from bad coughing    Pregnancy induced hypertension     Syncope        Past Surgical History:  Past Surgical History:   Procedure Laterality Date    ARTHROSCOPY SHOULDER Bilateral 2008,2009    EXCISE MASS UPPER EXTREMITY Left 08/23/2023    Procedure: EXCISION, MASS, UPPER EXTREMITY;  Surgeon: Adeola Paige MD;  Location: [facility] OR    GENITOURINARY SURGERY  05/2019    Interstim placement    GYN SURGERY  07/16/2018    LAPAROSCOPIC HYSTERECTOMY TOTAL, SALPINGECTOMY BILATERAL  07/2018    Laparoscopy to look for endometriosis  08/2004    LAPAROTOMY MINI, TUBAL LIGATION (POST PARTUM), COMBINED Bilateral 05/11/2016    Procedure: COMBINED LAPAROTOMY MINI, TUBAL LIGATION (POST PARTUM);  Surgeon: Nannette Shane DO;  Location: [facility] OR    MYRINGOTOMY, INSERT TUBE BILATERAL, COMBINED  as a child    ORTHOPEDIC SURGERY  2008/2009    Shoulder    REMOVE STIMULATOR SACRAL NERVE Left 07/06/2020    Procedure: EXCISE INTERSTEM SACRAL NERVE STIMULATOR BATTERY AND LEAD LINE;  Surgeon: Pooja Lin MD;  Location: [facility] OR    REPAIR, RECTOCELE, VAGINAL APPROACH  07/2023    Septoplasty for deviated septum  07/2005    SINUS SURGERY  2007    TONSILLECTOMY  2006        Social History:  Social History     Tobacco Use    Smoking status: Never     Passive exposure: Never    Smokeless tobacco: Never   Vaping Use    Vaping Use: Never used   Substance Use Topics    Alcohol use: No    Drug use: No  "  Occupation works in a middle school, 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     Family History:  Family History   Problem Relation Age of Onset    Hypertension Mother     Gastrointestinal Disease Mother         Pancreatitis-flare ups often    Lipids Mother         high    Blood Disease Mother     Diabetes Mother     Heart Disease Father     Coronary Artery Disease Father     Bladder Cancer Father     Obesity Maternal Grandmother     Coronary Artery Disease Maternal Grandmother     Diabetes Maternal Grandfather         insulin    Hypertension Maternal Grandfather     Obesity Maternal Grandfather     Respiratory Maternal Grandfather         asthma    Prostate Cancer Paternal Grandfather     Other Cancer Paternal Grandfather         Prostate cancer    Allergies Son         Allergic to peanuts, different foods, Amoxicillin    Other Cancer Cousin         Leukemia    Leukemia Paternal Cousin        ROS:  The 10 point review of systems is negative other than noted in the HPI and/or below.    PE:    Vitals: /78   Pulse 85   Resp 16   Ht 1.562 m (5' 1.5\")   Wt 68.9 kg (152 lb)   LMP 06/27/2018 (Exact Date)   SpO2 97%   BMI 28.26 kg/m    BMI= Body mass index is 28.26 kg/m .    General: Generally appears well.  Psych: Alert and Oriented.  Normal affect  Neurological: non-focal, moves extremities symmetrically, grossly normal strength and sensation  GI: Abdomen Soft flat Hernia:  umbilical- bulge just above the umbilicus about 2cm diameter sac, unable to reduce to feel fascia defect.  Groin: no obvious groin bulge with valsalva  MSK: extremities without edema  Integumentary:  No rashes      Data   CT scan 2021 reviewed. Shows a 1cm umbilical hernia. Hint of fatty tissue along the right indirect area but no obvious inguinal hernia    30 minutes spent on the date of the encounter doing chart review, history and exam, documentation and further activities as noted above      Fabiola Gore MD  09/25/23 12:07 PM "     Please route or send letter to:  *None*

## 2023-09-25 NOTE — LETTER
2023       Ashlie Matthews    RE: [id number]  : 1985    Ashlie Matthews has been scheduled for surgery on 2023 at 7:45 AM  at [facility] with Dr Fabiola Gore.  The hospital is located at [address] in [place].    Please check in at the Surgery reception desk at 5:45 AM . This is located in the back of the hospital on the East side, just past the Emergency Room entrance.     DO NOT EAT OR DRINK ANYTHING 8 HOURS BEFORE YOUR ARRIVAL TIME.   You may have sips of clear liquids up until 2 hours before your arrival time. If you have been advised to take your medication, please do this early in the morning with just sips of clear liquid.     Hospital regulations require an updated pre-operative examination to be completed within 30 days of the procedure. This can be done by your primary care provider. Please ask them to fax documentation to [phone]. We also recommend you bring a copy with you.     You should shower before your surgery with Hibiclens or Exidine soap.  This can be found at your local pharmacy or you can pick it up from our office for free.  Please call our office if you have any questions.     You will be required to have an Adult (friend or family member) drive you home after your surgery and arrange for an adult to stay with you until the next morning.     You will receive several calls from our staff 3-7 days prior to your scheduled procedure with further details and to answer any questions you may have.    It is sometimes necessary to adjust the surgery schedule due to emergencies and additions to the schedule.  If your surgery is affected by this, we greatly appreciate your flexibility and understanding in this matter    It is best if you call regarding post-operative questions between the hours of 8:00 am & 3:00 pm Monday-Friday, so you have access to the daytime care team that know you best.  Prescription refills are accepted during  regular office hours only.          Please do not bring any Disability or FMLA papers to the hospital.  They need to be either faxed ([phone]), mailed or hand delivered to our office by you or a family member for completion.  Please allow 14 business days to complete paperwork.        If you have questions or concerns, please contact our office at [phone].

## 2023-10-25 ASSESSMENT — ASTHMA QUESTIONNAIRES
QUESTION_3 LAST FOUR WEEKS HOW OFTEN DID YOUR ASTHMA SYMPTOMS (WHEEZING, COUGHING, SHORTNESS OF BREATH, CHEST TIGHTNESS OR PAIN) WAKE YOU UP AT NIGHT OR EARLIER THAN USUAL IN THE MORNING: NOT AT ALL
ACT_TOTALSCORE: 24
QUESTION_4 LAST FOUR WEEKS HOW OFTEN HAVE YOU USED YOUR RESCUE INHALER OR NEBULIZER MEDICATION (SUCH AS ALBUTEROL): NOT AT ALL
ACT_TOTALSCORE: 24
QUESTION_5 LAST FOUR WEEKS HOW WOULD YOU RATE YOUR ASTHMA CONTROL: WELL CONTROLLED
QUESTION_1 LAST FOUR WEEKS HOW MUCH OF THE TIME DID YOUR ASTHMA KEEP YOU FROM GETTING AS MUCH DONE AT WORK, SCHOOL OR AT HOME: NONE OF THE TIME
QUESTION_2 LAST FOUR WEEKS HOW OFTEN HAVE YOU HAD SHORTNESS OF BREATH: NOT AT ALL

## 2023-10-31 ASSESSMENT — PATIENT HEALTH QUESTIONNAIRE - PHQ9
10. IF YOU CHECKED OFF ANY PROBLEMS, HOW DIFFICULT HAVE THESE PROBLEMS MADE IT FOR YOU TO DO YOUR WORK, TAKE CARE OF THINGS AT HOME, OR GET ALONG WITH OTHER PEOPLE: NOT DIFFICULT AT ALL
SUM OF ALL RESPONSES TO PHQ QUESTIONS 1-9: 4
SUM OF ALL RESPONSES TO PHQ QUESTIONS 1-9: 4

## 2023-11-01 ENCOUNTER — TELEPHONE (OUTPATIENT)
Dept: FAMILY MEDICINE | Facility: CLINIC | Age: 38
End: 2023-11-01

## 2023-11-01 ENCOUNTER — OFFICE VISIT (OUTPATIENT)
Dept: FAMILY MEDICINE | Facility: CLINIC | Age: 38
End: 2023-11-01
Payer: COMMERCIAL

## 2023-11-01 VITALS
SYSTOLIC BLOOD PRESSURE: 108 MMHG | HEART RATE: 79 BPM | RESPIRATION RATE: 20 BRPM | HEIGHT: 62 IN | TEMPERATURE: 98.9 F | DIASTOLIC BLOOD PRESSURE: 64 MMHG | WEIGHT: 157 LBS | OXYGEN SATURATION: 97 % | BODY MASS INDEX: 28.89 KG/M2

## 2023-11-01 DIAGNOSIS — F39 MOOD DISORDER (H): ICD-10-CM

## 2023-11-01 DIAGNOSIS — E66.3 OVERWEIGHT (BMI 25.0-29.9): Primary | ICD-10-CM

## 2023-11-01 PROCEDURE — 99214 OFFICE O/P EST MOD 30 MIN: CPT | Performed by: PHYSICIAN ASSISTANT

## 2023-11-01 ASSESSMENT — PAIN SCALES - GENERAL: PAINLEVEL: NO PAIN (0)

## 2023-11-01 NOTE — PROGRESS NOTES
"  Assessment & Plan     Overweight (BMI 25.0-29.9)  BMI currently 29.18. BMI when starting liraglutide initially was 31. Will try switching to wegovy though still will likely have supply issues. If approved by insurance and if she is able to get medication, follow-up in 2-3 months for recheck. Discussed r/b/se. No contraindications.  - Semaglutide-Weight Management (WEGOVY) 0.25 MG/0.5ML pen; Inject 0.25 mg Subcutaneous once a week for 4 doses THEN increase to 0.5 mg subcutaneous once a week  - Semaglutide-Weight Management (WEGOVY) 0.5 MG/0.5ML pen; Inject 0.5 mg Subcutaneous once a week for 4 doses Start after completing 0.25 mg once weekly for 4 weeks    Mood disorder (H24)  Following with psychiatry     BMI:   Estimated body mass index is 29.18 kg/m  as calculated from the following:    Height as of this encounter: 1.562 m (5' 1.5\").    Weight as of this encounter: 71.2 kg (157 lb).       Adeola Reardon PA-C  [employer]   Sima is a 38 year old, presenting for the following health issues:  Recheck Medication        11/1/2023     8:53 AM   Additional Questions   Roomed by Monique MCCRAY       History of Present Illness       Reason for visit:  Change medication.    She eats 2-3 servings of fruits and vegetables daily.She consumes 1 sweetened beverage(s) daily.She exercises with enough effort to increase her heart rate 9 or less minutes per day.  She exercises with enough effort to increase her heart rate 3 or less days per week.   She is taking medications regularly.     Sima has been taking liraglutide since September 2022, was on 3 mg daily for many months but has had difficulty getting medication due to medication shortages recently. She had been doing really well with the medication, noted decreased appetite, feeling full faster, no side effects. She did have a significant weight loss (about 12% body weight). BMI in August 2022 was 31.18, BMI in January 2023 was 27.25. " "She had some diarrhea when starting the liraglutide but then this resolved and she did not have medication side effects.    However, started feeling like the medication was not working as well and she doesn't feel full as fast. Due to supply issues, she decreased dose over past few weeks and was doing 0.6 mg a few times a week instead of 3 mg daily. Even before decreasing dose, she didn't feel it was as effective. She has had some weight gain. She has tried intermittent fasting, trying to cut portions back. Overall eats a pretty healthy diet. She likes to walk for exercise and tries to do at least 20 minutes of some sort of weight training at home daily.      She is wondering if she could try switching to wegovy.  No history of pancreatitis  No personal or family history of medullary thyroid carcinoma or MEN-2.    Wt Readings from Last 4 Encounters:   11/01/23 71.2 kg (157 lb)   09/25/23 68.9 kg (152 lb)   08/23/23 68.8 kg (151 lb 11.2 oz)   08/04/23 67.6 kg (149 lb)       Review of Systems   Constitutional, HEENT, cardiovascular, pulmonary, gi and gu systems are negative, except as otherwise noted.      Objective    /64 (BP Location: Right arm, Patient Position: Sitting, Cuff Size: Adult Large)   Pulse 79   Temp 98.9  F (37.2  C) (Oral)   Resp 20   Ht 1.562 m (5' 1.5\")   Wt 71.2 kg (157 lb)   LMP 06/27/2018 (Exact Date)   SpO2 97%   BMI 29.18 kg/m    Body mass index is 29.18 kg/m .  Physical Exam   GENERAL: healthy, alert and no distress  NECK: no adenopathy, no asymmetry, masses, or scars and thyroid normal to palpation  RESP: lungs clear to auscultation - no rales, rhonchi or wheezes  CV: regular rate and rhythm, normal S1 S2, no S3 or S4, no murmur, click or rub, no peripheral edema and peripheral pulses strong  NEURO: Normal strength and tone, mentation intact and speech normal  PSYCH: mentation appears normal, affect normal/bright        "

## 2023-11-01 NOTE — LETTER
2023    INSURER: Payor: CAROL / Plan: [employer]OL PMAP / Product Type: HMO /   Re: Prior Authorization Request  Patient: Ashlie Matthews  Policy ID#:  [id number]  : 1985      To Whom it May Concern:    I am writing to formally request a prior authorization of coverage for my patient,  Ashlie Matthews, for treatment of obesity using Wegovy.    Ashlie started taking liraglutide in 2022 for treatment of obesity, starting BMI at that time was 31.18. She lost weight with liraglutide but then weight loss plateaued and she did not feel medication was helpful anymore and she has regained weight. She has had difficulty getting liraglutide due to supply issues recently. Recommendation was to try switching to Wegovy due to decreased liraglutide effectiveness and weight gain. Her BMI has increased from 27.25 in 2023 to 29.18 at recent visit 23. Although her current BMI is less than 30, she is gaining weight, and her BMI when she began weight loss medication met criteria of BMI >30.     Sincerely,    Adeola Reardon PA-C

## 2023-11-01 NOTE — TELEPHONE ENCOUNTER
CENTRAL PRIOR AUTHORIZATION  [phone]        Lists of hospitals in the [place] DENIED: LETTER IS ATTACHED

## 2023-11-01 NOTE — PATIENT INSTRUCTIONS
MEDICATION STARTED AT THIS APPOINTMENT  GLP-1 Agonist Medications    We are considering starting a GLP-1 (Glucagon-like Peptide-1) medication. Examples of this medication include Victoza, Ozempic, etc. The medication chosen will depend on insurance coverage, and can be changed to the medication that is covered under your plan. These medications work the same, in that they can help you lose weight and control your blood sugar. One of the ways it works is by slowing down the rate that food leaves your stomach. You feel meier and will eat less. It also helps regulate hormones that can help improve your blood sugars.    Types of GLP-1 medications include:    Wegovy (semaglutide): This contains the same active ingredient in Ozempic, except FDA approved for weight loss specifically. Doses increase up to 2.4 mg once weekly. Each pen contains one dose and needle is within pen.     Side effects of GLP-1 Medications include: The most common side effects are all GI related and consist of: nausea, constipation, diarrhea, burping, heartburn, or gassiness. Patients are advised to eat slowly and less, and nausea typically passes if people can stick it out. Other side effect can include headache.     You should not be prescribed this medication if you have a personal or family history of medullary thyroid carcinoma (MTC) or Multiple Endocrine Neoplasia syndrome type 2 (MEN, type 2). Please let us know if you have personal or family history of this specific kind of thyroid cancer.     The risk of pancreatitis (inflammation of the pancreas) has been associated with this type of medication, but is very rare.  If you have had pancreatitis in the past, this medication may not be for you. Please let us know about any past history of pancreas problems.      Symptoms of pancreatitis include: Pain in your upper stomach area which  may travel to your back and be worse after eating. Your stomach area may be tender to the touch.  You may have  vomiting or nausea and/or have a fever. If you should develop any of these symptoms, stop the medication and contact your primary care doctor. They will do a blood test to check for pancreatitis.       There is a small chance you may have some low blood sugar after taking the medication. Signs of low blood sugar include:  Weakness  Shaky   Hungry  Sweating  Confusion    See below for ways to treat low blood sugar without adding in lots of extra calories.    Treating Low Blood Sugar  If you have symptoms of low blood sugar (sweating, shaking, dizzy, confused) eat 15 grams of carbs and wait 15 minutes:    Glucose Tabs are best for sugars under 70 -  Dex4 or BD Glucose tablets are good, you will need to take 3-4 of these to equal 15 grams.   One small box of raisins  4 oz fruit juice box or   cup fruit juice  1 small apple  1 small banana    cup canned fruit in water    English muffin or a slice of bread with jelly   1 low fat frozen waffle with sugar-free syrup    cup cottage cheese with   cup frozen or fresh blueberries  1 cup skim or low-fat milk    cup whole grain cereal  4-6 crackers such as Triscuits    Coverage Process: This medication is usually covered by insurance for patients with a diagnosis of Type 2 Diabetes. Depending on insurance coverage, the medication may be changed to a different formulary, but they all work in the same way. Sometimes a prior authorization is required. A prior authorization is when the clinic needs to fill out paperwork that is sent to your insurance company to obtain coverage for that medication. The prescription will NOT automatically go to your pharmacy today if it needs a Prior Authorization. If the medication requires a prior authorization, that prescription will go to our Prior Authorization team where the prior authorization will be worked on.

## 2023-11-01 NOTE — TELEPHONE ENCOUNTER
Central Prior Authorization Team   Phone: [phone]    PRIOR AUTHORIZATION DENIED    Medication: WEGOVY 0.25 MG/0.5ML SC SOAJ  Insurance Company: [employer] - Phone [phone] Fax [phone]  Denial Date: 11/1/2023  Denial Rational: MUST HAVE BMI OF 30 KG/M2 OR 27 KG/M2 IN ADDITION TO WEIGHT RELATED COMORBIDITY      Appeal Information: IF PROVIDER WOULD LIKE TO APPEAL THIS DECISION PLEASE PROVIDE THE PA TEAM WITH A LETTER OF MEDICAL NECESSITY      Patient Notified: No

## 2023-11-02 NOTE — TELEPHONE ENCOUNTER
Please submit appeal.    Patient started taking liraglutide in 9/2022 for weight loss, starting BMI at that time was 31.18. She lost weight with liraglutide but then weight loss plateaued and she did not feel medication was helpful anymore and she has regained weight.  She has had difficulty getting liraglutide due to supply issues recently. Recommendation was to try switching to Wegovy due to decreased liraglutide effectiveness and weight gain. Her BMI has increased from 27.25 in January 2023 to 29.18 today. Although her current BMI is less than 30, she is gaining weight, and her BMI when she began weight loss medication met criteria of BMI >30.     Adeola Reardon PA-C

## 2023-11-02 NOTE — TELEPHONE ENCOUNTER
Ewa Cuevas Ea/Rm22 hours ago (11:08 AM)       PA DENIED - please see denial rationale.  If provider would like to appeal please provide LMN and route encounter back to me.  Otherwise please notify the patient and close encounter when finished.  Thank you,  Ewa

## 2023-11-03 ENCOUNTER — MYC MEDICAL ADVICE (OUTPATIENT)
Dept: FAMILY MEDICINE | Facility: CLINIC | Age: 38
End: 2023-11-03
Payer: COMMERCIAL

## 2023-11-03 ASSESSMENT — ASTHMA QUESTIONNAIRES: ACT_TOTALSCORE: 24

## 2023-11-03 NOTE — TELEPHONE ENCOUNTER
Per chart review, PA for WEgovy was denied and provider asked to appeal this decision. This is managed by the PA team in a separate encounter.  Informed patient that the appeal is the next step.    India Brooks RN

## 2023-11-09 ASSESSMENT — PATIENT HEALTH QUESTIONNAIRE - PHQ9
SUM OF ALL RESPONSES TO PHQ QUESTIONS 1-9: 4
10. IF YOU CHECKED OFF ANY PROBLEMS, HOW DIFFICULT HAVE THESE PROBLEMS MADE IT FOR YOU TO DO YOUR WORK, TAKE CARE OF THINGS AT HOME, OR GET ALONG WITH OTHER PEOPLE: NOT DIFFICULT AT ALL
SUM OF ALL RESPONSES TO PHQ QUESTIONS 1-9: 4

## 2023-11-09 NOTE — TELEPHONE ENCOUNTER
Medication Appeal Initiation    Medication: WEGOVY 0.25 MG/0.5ML SC SOAJ  Appeal Start Date:  11/9/2023  Insurance Company: [employer]  Insurance Phone: [phone]  Insurance Fax: [phone]  Comments:       Faxed letter of medical necessity and chart notes to insurance -

## 2023-11-09 NOTE — TELEPHONE ENCOUNTER
Ewa Cuevas  You6 days ago       Please provide a letter of medical necessity to process with the appeal request.  Once in patient's chart please route encounter back to me.  Thank you,  Ewa

## 2023-11-10 ENCOUNTER — OFFICE VISIT (OUTPATIENT)
Dept: FAMILY MEDICINE | Facility: CLINIC | Age: 38
End: 2023-11-10
Payer: COMMERCIAL

## 2023-11-10 VITALS
SYSTOLIC BLOOD PRESSURE: 110 MMHG | TEMPERATURE: 98.4 F | RESPIRATION RATE: 14 BRPM | OXYGEN SATURATION: 96 % | BODY MASS INDEX: 29.28 KG/M2 | DIASTOLIC BLOOD PRESSURE: 72 MMHG | HEART RATE: 71 BPM | HEIGHT: 62 IN | WEIGHT: 159.1 LBS

## 2023-11-10 DIAGNOSIS — Z01.818 PREOP GENERAL PHYSICAL EXAM: Primary | ICD-10-CM

## 2023-11-10 DIAGNOSIS — K42.0 UMBILICAL HERNIA WITH OBSTRUCTION: ICD-10-CM

## 2023-11-10 LAB
ANION GAP SERPL CALCULATED.3IONS-SCNC: 9 MMOL/L (ref 7–15)
BUN SERPL-MCNC: 10.8 MG/DL (ref 6–20)
CALCIUM SERPL-MCNC: 9.2 MG/DL (ref 8.6–10)
CHLORIDE SERPL-SCNC: 104 MMOL/L (ref 98–107)
CREAT SERPL-MCNC: 0.84 MG/DL (ref 0.51–0.95)
DEPRECATED HCO3 PLAS-SCNC: 26 MMOL/L (ref 22–29)
EGFRCR SERPLBLD CKD-EPI 2021: 90 ML/MIN/1.73M2
GLUCOSE SERPL-MCNC: 95 MG/DL (ref 70–99)
HGB BLD-MCNC: 13.6 G/DL (ref 11.7–15.7)
POTASSIUM SERPL-SCNC: 4.3 MMOL/L (ref 3.4–5.3)
SODIUM SERPL-SCNC: 139 MMOL/L (ref 135–145)

## 2023-11-10 PROCEDURE — 80048 BASIC METABOLIC PNL TOTAL CA: CPT | Performed by: PHYSICIAN ASSISTANT

## 2023-11-10 PROCEDURE — 85018 HEMOGLOBIN: CPT | Performed by: PHYSICIAN ASSISTANT

## 2023-11-10 PROCEDURE — 36415 COLL VENOUS BLD VENIPUNCTURE: CPT | Performed by: PHYSICIAN ASSISTANT

## 2023-11-10 PROCEDURE — 99214 OFFICE O/P EST MOD 30 MIN: CPT | Performed by: PHYSICIAN ASSISTANT

## 2023-11-10 ASSESSMENT — PAIN SCALES - GENERAL: PAINLEVEL: NO PAIN (0)

## 2023-11-10 NOTE — PATIENT INSTRUCTIONS
Take all scheduled medications on the day of surgery EXCEPT for modifications listed below:   - GLP-1 Injectable (exenitide, liraglutide, semaglutide, dulaglutide, etc.): HOLD 7 days before surgery   Preparing for Your Surgery  Getting started  A nurse will call you to review your health history and instructions. They will give you an arrival time based on your scheduled surgery time. Please be ready to share:  Your doctor's clinic name and phone number  Your medical, surgical, and anesthesia history  A list of allergies and sensitivities  A list of medicines, including herbal treatments and over-the-counter drugs  Whether the patient has a legal guardian (ask how to send us the papers in advance)  Please tell us if you're pregnant--or if there's any chance you might be pregnant. Some surgeries may injure a fetus (unborn baby), so they require a pregnancy test. Surgeries that are safe for a fetus don't always need a test, and you can choose whether to have one.   If you have a child who's having surgery, please ask for a copy of Preparing for Your Child's Surgery.    Preparing for surgery  Within 10 to 30 days of surgery: Have a pre-op exam (sometimes called an H&P, or History and Physical). This can be done at a clinic or pre-operative center.  If you're having a , you may not need this exam. Talk to your care team.  At your pre-op exam, talk to your care team about all medicines you take. If you need to stop any medicines before surgery, ask when to start taking them again.  We do this for your safety. Many medicines can make you bleed too much during surgery. Some change how well surgery (anesthesia) drugs work.  Call your insurance company to let them know you're having surgery. (If you don't have insurance, call [phone].)  Call your clinic if there's any change in your health. This includes signs of a cold or flu (sore throat, runny nose, cough, rash, fever). It also includes a scrape or scratch  near the surgery site.  If you have questions on the day of surgery, call your hospital or surgery center.  Eating and drinking guidelines  For your safety: Unless your surgeon tells you otherwise, follow the guidelines below.  Eat and drink as usual until 8 hours before you arrive for surgery. After that, no food or milk.  Drink clear liquids until 2 hours before you arrive. These are liquids you can see through, like water, Gatorade, and Propel Water. They also include plain black coffee and tea (no cream or milk), candy, and breath mints. You can spit out gum when you arrive.  If you drink alcohol: Stop drinking it the night before surgery.  If your care team tells you to take medicine on the morning of surgery, it's okay to take it with a sip of water.  Preventing infection  Shower or bathe the night before and morning of your surgery. Follow the instructions your clinic gave you. (If no instructions, use regular soap.)  Don't shave or clip hair near your surgery site. We'll remove the hair if needed.  Don't smoke or vape the morning of surgery. You may chew nicotine gum up to 2 hours before surgery. A nicotine patch is okay.  Note: Some surgeries require you to completely quit smoking and nicotine. Check with your surgeon.  Your care team will make every effort to keep you safe from infection. We will:  Clean our hands often with soap and water (or an alcohol-based hand rub).  Clean the skin at your surgery site with a special soap that kills germs.  Give you a special gown to keep you warm. (Cold raises the risk of infection.)  Wear special hair covers, masks, gowns and gloves during surgery.  Give antibiotic medicine, if prescribed. Not all surgeries need antibiotics.  What to bring on the day of surgery  Photo ID and insurance card  Copy of your health care directive, if you have one  Glasses and hearing aids (bring cases)  You can't wear contacts during surgery  Inhaler and eye drops, if you use them (tell  us about these when you arrive)  CPAP machine or breathing device, if you use them  A few personal items, if spending the night  If you have . . .  A pacemaker, ICD (cardiac defibrillator) or other implant: Bring the ID card.  An implanted stimulator: Bring the remote control.  A legal guardian: Bring a copy of the certified (court-stamped) guardianship papers.  Please remove any jewelry, including body piercings. Leave jewelry and other valuables at home.  If you're going home the day of surgery  You must have a responsible adult drive you home. They should stay with you overnight as well.  If you don't have someone to stay with you, and you aren't safe to go home alone, we may keep you overnight. Insurance often won't pay for this.  After surgery  If it's hard to control your pain or you need more pain medicine, please call your surgeon's office.  Questions?   If you have any questions for your care team, list them here: _________________________________________________________________________________________________________________________________________________________________________ ____________________________________ ____________________________________ ____________________________________  For informational purposes only. Not to replace the advice of your health care provider. Copyright   2003, 2019 [facility]. All rights reserved. Clinically reviewed by Nidia Matta MD. [employer] [id number] - REV 12/22.    Preparing for Your Surgery  Getting started  A nurse will call you to review your health history and instructions. They will give you an arrival time based on your scheduled surgery time. Please be ready to share:  Your doctor's clinic name and phone number  Your medical, surgical, and anesthesia history  A list of allergies and sensitivities  A list of medicines, including herbal treatments and over-the-counter drugs  Whether the patient has a legal guardian (ask how to send us the papers in  advance)  Please tell us if you're pregnant--or if there's any chance you might be pregnant. Some surgeries may injure a fetus (unborn baby), so they require a pregnancy test. Surgeries that are safe for a fetus don't always need a test, and you can choose whether to have one.   If you have a child who's having surgery, please ask for a copy of Preparing for Your Child's Surgery.    Preparing for surgery  Within 10 to 30 days of surgery: Have a pre-op exam (sometimes called an H&P, or History and Physical). This can be done at a clinic or pre-operative center.  If you're having a , you may not need this exam. Talk to your care team.  At your pre-op exam, talk to your care team about all medicines you take. If you need to stop any medicines before surgery, ask when to start taking them again.  We do this for your safety. Many medicines can make you bleed too much during surgery. Some change how well surgery (anesthesia) drugs work.  Call your insurance company to let them know you're having surgery. (If you don't have insurance, call [phone].)  Call your clinic if there's any change in your health. This includes signs of a cold or flu (sore throat, runny nose, cough, rash, fever). It also includes a scrape or scratch near the surgery site.  If you have questions on the day of surgery, call your hospital or surgery center.  Eating and drinking guidelines  For your safety: Unless your surgeon tells you otherwise, follow the guidelines below.  Eat and drink as usual until 8 hours before you arrive for surgery. After that, no food or milk.  Drink clear liquids until 2 hours before you arrive. These are liquids you can see through, like water, Gatorade, and Propel Water. They also include plain black coffee and tea (no cream or milk), candy, and breath mints. You can spit out gum when you arrive.  If you drink alcohol: Stop drinking it the night before surgery.  If your care team tells you to take medicine  on the morning of surgery, it's okay to take it with a sip of water.  Preventing infection  Shower or bathe the night before and morning of your surgery. Follow the instructions your clinic gave you. (If no instructions, use regular soap.)  Don't shave or clip hair near your surgery site. We'll remove the hair if needed.  Don't smoke or vape the morning of surgery. You may chew nicotine gum up to 2 hours before surgery. A nicotine patch is okay.  Note: Some surgeries require you to completely quit smoking and nicotine. Check with your surgeon.  Your care team will make every effort to keep you safe from infection. We will:  Clean our hands often with soap and water (or an alcohol-based hand rub).  Clean the skin at your surgery site with a special soap that kills germs.  Give you a special gown to keep you warm. (Cold raises the risk of infection.)  Wear special hair covers, masks, gowns and gloves during surgery.  Give antibiotic medicine, if prescribed. Not all surgeries need antibiotics.  What to bring on the day of surgery  Photo ID and insurance card  Copy of your health care directive, if you have one  Glasses and hearing aids (bring cases)  You can't wear contacts during surgery  Inhaler and eye drops, if you use them (tell us about these when you arrive)  CPAP machine or breathing device, if you use them  A few personal items, if spending the night  If you have . . .  A pacemaker, ICD (cardiac defibrillator) or other implant: Bring the ID card.  An implanted stimulator: Bring the remote control.  A legal guardian: Bring a copy of the certified (court-stamped) guardianship papers.  Please remove any jewelry, including body piercings. Leave jewelry and other valuables at home.  If you're going home the day of surgery  You must have a responsible adult drive you home. They should stay with you overnight as well.  If you don't have someone to stay with you, and you aren't safe to go home alone, we may keep you  overnight. Insurance often won't pay for this.  After surgery  If it's hard to control your pain or you need more pain medicine, please call your surgeon's office.  Questions?   If you have any questions for your care team, list them here: _________________________________________________________________________________________________________________________________________________________________________ ____________________________________ ____________________________________ ____________________________________  For informational purposes only. Not to replace the advice of your health care provider. Copyright   2003, 2019 [facility]. All rights reserved. Clinically reviewed by Nidia Matta MD. [employer] [id number] - REV 12/22.

## 2023-11-10 NOTE — PROGRESS NOTES
[facility]  [address] [facility] [address]  Phone: [phone]  Primary Provider: Adeola Reardon  Pre-op Performing Provider: ADEOLA REARDON      PREOPERATIVE EVALUATION:  Today's date: 11/10/2023    Sima is a 38 year old female who presents for a preoperative evaluation.      11/10/2023     9:56 AM   Additional Questions   Roomed by MR   Accompanied by NA         11/10/2023     9:56 AM   Patient Reported Additional Medications   Patient reports taking the following new medications NA       Surgical Information:  Surgery/Procedure: ROBOT-ASSISTED INGUINAL HERNIORRHAPHY, RIGHT POSSIBLE LEFT HERNIORRHAPHY, UMBILICAL, OPEN   Surgery Location: [facility] OR  Surgeon: Fabiola Gore MD   Surgery Date: 11/30/23  Time of Surgery: 7:50 AM  Where patient plans to recover: At home with family  Fax number for surgical facility: Note does not need to be faxed, will be available electronically in Epic.    Assessment & Plan     The proposed surgical procedure is considered INTERMEDIATE risk.    Preop general physical exam  --Advised to avoid NSAIDS (Motrin, Ibuprofen, Aleve, Naprosyn) for one week prior to surgery. If needed, Tylenol or Acetaminophen are okay to use.  --Advised to avoid supplements for one week prior to surgery.  --Knows where to be and when to be there for surgery. Knows when to be NPO.  --Pain medications, time off from work and FMLA following surgery deferred to surgeon.   - Basic metabolic panel  (Ca, Cl, CO2, Creat, Gluc, K, Na, BUN); Future  - Hemoglobin; Future  - Basic metabolic panel  (Ca, Cl, CO2, Creat, Gluc, K, Na, BUN)  - Hemoglobin    Umbilical hernia with obstruction  Anticipating surgery           - No identified additional risk factors other than previously addressed    Antiplatelet or Anticoagulation Medication Instructions:   - Patient is on no antiplatelet or anticoagulation medications.    Additional Medication Instructions:  Patient is to take all  scheduled medications on the day of surgery EXCEPT for modifications listed below:   - GLP-1 Injectable (exenitide, liraglutide, semaglutide, dulaglutide, etc.): HOLD 7 days before surgery (though she is not currently taking due to supply issues)    RECOMMENDATION:  APPROVAL GIVEN to proceed with proposed procedure, without further diagnostic evaluation.    Subjective       HPI related to upcoming procedure: Primary, incarcerated umbilical hernia and ongoing right groin pain without obvious inguinal hernia on exam but ongoing 4+ years.         11/3/2023     8:47 AM   Preop Questions   1. Have you ever had a heart attack or stroke? No   2. Have you ever had surgery on your heart or blood vessels, such as a stent placement, a coronary artery bypass, or surgery on an artery in your head, neck, heart, or legs? No   3. Do you have chest pain with activity? No   4. Do you have a history of  heart failure? No   5. Do you currently have a cold, bronchitis or symptoms of other infection? No   6. Do you have a cough, shortness of breath, or wheezing? No   7. Do you or anyone in your family have previous history of blood clots? No   8. Do you or does anyone in your family have a serious bleeding problem such as prolonged bleeding following surgeries or cuts? No   9. Have you ever had problems with anemia or been told to take iron pills? No   10. Have you had any abnormal blood loss such as black, tarry or bloody stools, or abnormal vaginal bleeding? No   11. Have you ever had a blood transfusion? No   12. Are you willing to have a blood transfusion if it is medically needed before, during, or after your surgery? Yes   13. Have you or any of your relatives ever had problems with anesthesia? No   14. Do you have sleep apnea, excessive snoring or daytime drowsiness? No   15. Do you have any artifical heart valves or other implanted medical devices like a pacemaker, defibrillator, or continuous glucose monitor? No   16. Do you  have artificial joints? No   17. Are you allergic to latex? No   18. Is there any chance that you may be pregnant? No       Health Care Directive:  Patient does not have a Health Care Directive or Living Will: Discussed advance care planning with patient; however, patient declined at this time.    Preoperative Review of :   reviewed - 6 tabs oxycodone rx 8/23/23, not currently taking      Status of Chronic Conditions:  See problem list for active medical problems.  Problems all longstanding and stable, except as noted/documented.  See ROS for pertinent symptoms related to these conditions.    Review of Systems  Constitutional, neuro, ENT, endocrine, pulmonary, cardiac, gastrointestinal, genitourinary, musculoskeletal, integument and psychiatric systems are negative, except as otherwise noted.    Patient Active Problem List    Diagnosis Date Noted    Class 1 obesity with body mass index (BMI) of 31.0 to 31.9 in adult, unspecified obesity type, unspecified whether serious comorbidity present 08/24/2022     Priority: Medium    Anxiety disorder due  Insomnia and lack of sleep for extended period of time  02/24/2021     Priority: Medium    High risk medication use 02/11/2021     Priority: Medium     SGA, olanzapine       Mood disorder (H24) 02/11/2021     Priority: Medium     She has had the best benefit in targeting insomnia and subsequently mood with the olanzapine       Insomnia due to psychological stress 02/04/2021     Priority: Medium    AD (atopic dermatitis) 10/16/2013     Priority: Medium    CARDIOVASCULAR SCREENING; LDL GOAL LESS THAN 160 10/31/2010     Priority: Medium    Mild intermittent asthma 04/11/2006     Priority: Medium    Allergic state 02/26/2004     Priority: Medium     Problem list name updated by automated process. Provider to review        Past Medical History:   Diagnosis Date    Allergic rhinitis     Anxiety     Asthma     Chronic maxillary sinusitis 3/28/2005    Chronic tonsillitis  2/21/2006    Cyclic vomiting syndrome     Depressive disorder     Diet controlled gestational diabetes mellitus in third trimester 3/5/2016    Dysmenorrhea     Perforated tympanic membrane 1/26/2011    left     PIH (pregnancy induced hypertension)     Pneumothorax 2004    from bad coughing    Pregnancy induced hypertension     Syncope      Past Surgical History:   Procedure Laterality Date    ARTHROSCOPY SHOULDER Bilateral 2008,2009    EXCISE MASS UPPER EXTREMITY Left 08/23/2023    Procedure: EXCISION, MASS, UPPER EXTREMITY;  Surgeon: Adeola Paige MD;  Location: [facility] OR    GENITOURINARY SURGERY  05/2019    Interstim placement    GYN SURGERY  07/16/2018    LAPAROSCOPIC HYSTERECTOMY TOTAL, SALPINGECTOMY BILATERAL  07/2018    Laparoscopy to look for endometriosis  08/2004    LAPAROTOMY MINI, TUBAL LIGATION (POST PARTUM), COMBINED Bilateral 05/11/2016    Procedure: COMBINED LAPAROTOMY MINI, TUBAL LIGATION (POST PARTUM);  Surgeon: Nannette Shane DO;  Location: [facility] OR    MYRINGOTOMY, INSERT TUBE BILATERAL, COMBINED  as a child    ORTHOPEDIC SURGERY  2008/2009    Shoulder    REMOVE STIMULATOR SACRAL NERVE Left 07/06/2020    Procedure: EXCISE INTERSTEM SACRAL NERVE STIMULATOR BATTERY AND LEAD LINE;  Surgeon: Pooja Lin MD;  Location: [facility] OR    REPAIR, RECTOCELE, VAGINAL APPROACH  07/2023    Septoplasty for deviated septum  07/2005    SINUS SURGERY  2007    TONSILLECTOMY  2006     Current Outpatient Medications   Medication Sig Dispense Refill    albuterol (PROAIR HFA/PROVENTIL HFA/VENTOLIN HFA) 108 (90 Base) MCG/ACT inhaler Inhale 2 puffs into the lungs every 6 hours as needed for shortness of breath / dyspnea or wheezing 18 g 4    albuterol (PROVENTIL) (2.5 MG/3ML) 0.083% neb solution Take 1 vial (2.5 mg) by nebulization every 6 hours as needed for shortness of breath / dyspnea or wheezing 90 mL 3    BD PEN NEEDLE ADELE 2ND GEN 32G X 4 MM miscellaneous USE 1 PEN NEEDLES DAILY OR AS DIRECTED. 100 each  "3    cetirizine (ZYRTEC) 10 MG tablet Take 10 mg by mouth 2 times daily       estradiol (ESTRACE) 0.1 MG/GM vaginal cream APPLY 1 GRAM INTO THE VAGINA WITH THE APPLICATOR MON-WED-FRI      OLANZapine (ZYPREXA) 5 MG tablet Take 1 tablet (5 mg) by mouth At Bedtime 90 tablet 0    ondansetron (ZOFRAN) 4 MG tablet Take 1 tablet (4 mg) by mouth every 6 hours as needed for nausea 30 tablet 0    QUEtiapine (SEROQUEL XR) 150 MG TB24 24 hr tablet 25 mg as needed      Semaglutide-Weight Management (WEGOVY) 0.25 MG/0.5ML pen Inject 0.25 mg Subcutaneous once a week for 4 doses THEN increase to 0.5 mg subcutaneous once a week 2 mL 0    Semaglutide-Weight Management (WEGOVY) 0.5 MG/0.5ML pen Inject 0.5 mg Subcutaneous once a week for 4 doses Start after completing 0.25 mg once weekly for 4 weeks 2 mL 0    vilazodone (VIIBRYD) 20 MG TABS tablet Take 20 mg by mouth daily         Allergies   Allergen Reactions    Moxifloxacin Hydrochloride Nausea and Vomiting     Avelox-vomiting    Cats Itching    Codeine GI Disturbance    Darvocet [Propoxyphene N-Apap] Hives     PN: LW Reaction: Rash, Generalized    Demerol Hives    Dog Hair [Dogs] Unknown    Dust Mites Itching and Swelling    Meperidine      PN: LW Reaction: Rash, Generalized    Oxycodone-Acetaminophen      PN: LW Reaction: SHORTNESS OF BREATH    Pollen Extract Itching    Vicodin [Hydrocodone-Acetaminophen] GI Disturbance     PN: LW Reaction: Rash, Generalized    Latex Itching, Swelling and Rash     \"sensitivity\"        Social History     Tobacco Use    Smoking status: Never     Passive exposure: Never    Smokeless tobacco: Never   Substance Use Topics    Alcohol use: No     Family History   Problem Relation Age of Onset    Hypertension Mother     Gastrointestinal Disease Mother         Pancreatitis-flare ups often    Lipids Mother         high    Blood Disease Mother     Diabetes Mother     Heart Disease Father     Coronary Artery Disease Father     Bladder Cancer Father     Other " "Cancer Father         Bladder cancer    Obesity Maternal Grandmother     Coronary Artery Disease Maternal Grandmother     Diabetes Maternal Grandfather         insulin    Hypertension Maternal Grandfather     Obesity Maternal Grandfather     Respiratory Maternal Grandfather         asthma    Prostate Cancer Paternal Grandfather     Other Cancer Paternal Grandfather         Prostate cancer    Allergies Son         Allergic to peanuts, different foods, Amoxicillin    Other Cancer Cousin         Leukemia    Leukemia Paternal Cousin      History   Drug Use No         Objective     /72 (BP Location: Right arm, Patient Position: Sitting, Cuff Size: Adult Regular)   Pulse 71   Temp 98.4  F (36.9  C) (Oral)   Resp 14   Ht 1.562 m (5' 1.5\")   Wt 72.2 kg (159 lb 1.6 oz)   LMP 06/27/2018 (Exact Date)   SpO2 96%   BMI 29.57 kg/m      Physical Exam    GENERAL APPEARANCE: healthy, alert and no distress     EYES: EOMI, PERRL     HENT: ear canals and TM's normal and nose and mouth without ulcers or lesions     NECK: no adenopathy, no asymmetry, masses, or scars and thyroid normal to palpation     RESP: lungs clear to auscultation - no rales, rhonchi or wheezes     CV: regular rates and rhythm, normal S1 S2, no S3 or S4 and no murmur, click or rub     ABDOMEN:  soft, nontender, bowel sounds normal     MS: extremities normal- no gross deformities noted, no evidence of inflammation in joints, FROM in all extremities.     SKIN: no suspicious lesions or rashes     NEURO: Normal strength and tone, sensory exam grossly normal, mentation intact and speech normal     PSYCH: mentation appears normal. and affect normal/bright     LYMPHATICS: No cervical adenopathy    Recent Labs   Lab Test 04/07/22  1331 01/28/22  0903   HGB 13.3  --      --     139   POTASSIUM 3.4 3.6   CR 0.69 0.91   A1C  --  5.0        Diagnostics:  Recent Results (from the past 48 hour(s))   Basic metabolic panel  (Ca, Cl, CO2, Creat, Gluc, K, " Na, BUN)    Collection Time: 11/10/23 10:35 AM   Result Value Ref Range    Sodium 139 135 - 145 mmol/L    Potassium 4.3 3.4 - 5.3 mmol/L    Chloride 104 98 - 107 mmol/L    Carbon Dioxide (CO2) 26 22 - 29 mmol/L    Anion Gap 9 7 - 15 mmol/L    Urea Nitrogen 10.8 6.0 - 20.0 mg/dL    Creatinine 0.84 0.51 - 0.95 mg/dL    GFR Estimate 90 >60 mL/min/1.73m2    Calcium 9.2 8.6 - 10.0 mg/dL    Glucose 95 70 - 99 mg/dL   Hemoglobin    Collection Time: 11/10/23 10:35 AM   Result Value Ref Range    Hemoglobin 13.6 11.7 - 15.7 g/dL      No EKG required, no history of coronary heart disease, significant arrhythmia, peripheral arterial disease or other structural heart disease.    Revised Cardiac Risk Index (RCRI):  The patient has the following serious cardiovascular risks for perioperative complications:   - No serious cardiac risks = 0 points     RCRI Interpretation: 0 points: Class I (very low risk - 0.4% complication rate)         Signed Electronically by: Adeola Reardon PA-C  Copy of this evaluation report is provided to requesting physician.

## 2023-11-16 NOTE — TELEPHONE ENCOUNTER
MEDICATION APPEAL APPROVED    Medication: WEGOVY 0.25 MG/0.5ML SC SOAJ  Authorization Effective Date: 10/13/2023  Authorization Expiration Date: 5/13/2024  [employer] Company: [employer]  Filling Pharmacy: [facility] [id number] [facility] [id number] [place] [address] [address]  Patient Notified: YES (pharmacy will notify the patient when ready)  Comments:

## 2023-11-30 ENCOUNTER — HOSPITAL ENCOUNTER (OUTPATIENT)
Facility: CLINIC | Age: 38
Discharge: HOME | End: 2023-11-30
Attending: SURGERY | Admitting: SURGERY
Payer: COMMERCIAL

## 2023-11-30 ENCOUNTER — APPOINTMENT (OUTPATIENT)
Dept: SURGERY | Facility: PHYSICIAN GROUP | Age: 38
End: 2023-11-30
Payer: COMMERCIAL

## 2023-11-30 ENCOUNTER — ANESTHESIA (OUTPATIENT)
Dept: SURGERY | Facility: CLINIC | Age: 38
End: 2023-11-30
Payer: COMMERCIAL

## 2023-11-30 ENCOUNTER — ANESTHESIA EVENT (OUTPATIENT)
Dept: SURGERY | Facility: CLINIC | Age: 38
End: 2023-11-30
Payer: COMMERCIAL

## 2023-11-30 VITALS
TEMPERATURE: 97.4 F | BODY MASS INDEX: 30.38 KG/M2 | HEART RATE: 79 BPM | WEIGHT: 160.9 LBS | RESPIRATION RATE: 16 BRPM | SYSTOLIC BLOOD PRESSURE: 119 MMHG | OXYGEN SATURATION: 96 % | HEIGHT: 61 IN | DIASTOLIC BLOOD PRESSURE: 72 MMHG

## 2023-11-30 LAB — GLUCOSE BLDC GLUCOMTR-MCNC: 92 MG/DL (ref 70–99)

## 2023-11-30 PROCEDURE — 250N000009 HC RX 250: Performed by: ANESTHESIOLOGY

## 2023-11-30 PROCEDURE — 250N000011 HC RX IP 250 OP 636: Performed by: SURGERY

## 2023-11-30 PROCEDURE — 360N000080 HC SURGERY LEVEL 7, PER MIN: Performed by: SURGERY

## 2023-11-30 PROCEDURE — 250N000011 HC RX IP 250 OP 636: Mod: JZ | Performed by: NURSE ANESTHETIST, CERTIFIED REGISTERED

## 2023-11-30 PROCEDURE — S2900 ROBOTIC SURGICAL SYSTEM: HCPCS | Performed by: SURGERY

## 2023-11-30 PROCEDURE — 49659 UNLSTD LAPS PX HRNAP HRNRPHY: CPT | Mod: AS | Performed by: PHYSICIAN ASSISTANT

## 2023-11-30 PROCEDURE — 370N000017 HC ANESTHESIA TECHNICAL FEE, PER MIN: Performed by: SURGERY

## 2023-11-30 PROCEDURE — 272N000001 HC OR GENERAL SUPPLY STERILE: Performed by: SURGERY

## 2023-11-30 PROCEDURE — 710N000009 HC RECOVERY PHASE 1, LEVEL 1, PER MIN: Performed by: SURGERY

## 2023-11-30 PROCEDURE — 999N000141 HC STATISTIC PRE-PROCEDURE NURSING ASSESSMENT: Performed by: SURGERY

## 2023-11-30 PROCEDURE — C1781 MESH (IMPLANTABLE): HCPCS | Performed by: SURGERY

## 2023-11-30 PROCEDURE — 82962 GLUCOSE BLOOD TEST: CPT

## 2023-11-30 PROCEDURE — 710N000012 HC RECOVERY PHASE 2, PER MINUTE: Performed by: SURGERY

## 2023-11-30 PROCEDURE — 49592 RPR AA HRN 1ST < 3 NCR/STRN: CPT | Mod: 51 | Performed by: SURGERY

## 2023-11-30 PROCEDURE — 250N000025 HC SEVOFLURANE, PER MIN: Performed by: SURGERY

## 2023-11-30 PROCEDURE — 250N000009 HC RX 250: Performed by: NURSE ANESTHETIST, CERTIFIED REGISTERED

## 2023-11-30 PROCEDURE — 49592 RPR AA HRN 1ST < 3 NCR/STRN: CPT | Mod: 51 | Performed by: PHYSICIAN ASSISTANT

## 2023-11-30 PROCEDURE — 250N000013 HC RX MED GY IP 250 OP 250 PS 637: Performed by: ANESTHESIOLOGY

## 2023-11-30 PROCEDURE — 49659 UNLSTD LAPS PX HRNAP HRNRPHY: CPT | Mod: RT | Performed by: SURGERY

## 2023-11-30 PROCEDURE — 258N000003 HC RX IP 258 OP 636: Performed by: NURSE ANESTHETIST, CERTIFIED REGISTERED

## 2023-11-30 DEVICE — MESH PROGRIP LAPAROSCOPIC 5.9X3.9" PARIETEX SELF-FIX LPG1510: Type: IMPLANTABLE DEVICE | Site: INGUINAL | Status: FUNCTIONAL

## 2023-11-30 DEVICE — MESH VENTRALEX HERNIA 1.7" CIRCLE SM W/STRAP 5950007: Type: IMPLANTABLE DEVICE | Site: UMBILICAL | Status: FUNCTIONAL

## 2023-11-30 RX ORDER — SODIUM CHLORIDE, SODIUM LACTATE, POTASSIUM CHLORIDE, CALCIUM CHLORIDE 600; 310; 30; 20 MG/100ML; MG/100ML; MG/100ML; MG/100ML
INJECTION, SOLUTION INTRAVENOUS CONTINUOUS PRN
Status: DISCONTINUED | OUTPATIENT
Start: 2023-11-30 | End: 2023-11-30

## 2023-11-30 RX ORDER — LIDOCAINE 40 MG/G
CREAM TOPICAL
Status: DISCONTINUED | OUTPATIENT
Start: 2023-11-30 | End: 2023-11-30 | Stop reason: HOSPADM

## 2023-11-30 RX ORDER — KETOROLAC TROMETHAMINE 30 MG/ML
INJECTION, SOLUTION INTRAMUSCULAR; INTRAVENOUS PRN
Status: DISCONTINUED | OUTPATIENT
Start: 2023-11-30 | End: 2023-11-30

## 2023-11-30 RX ORDER — FENTANYL CITRATE 50 UG/ML
25 INJECTION, SOLUTION INTRAMUSCULAR; INTRAVENOUS EVERY 5 MIN PRN
Status: DISCONTINUED | OUTPATIENT
Start: 2023-11-30 | End: 2023-11-30 | Stop reason: HOSPADM

## 2023-11-30 RX ORDER — ONDANSETRON 2 MG/ML
4 INJECTION INTRAMUSCULAR; INTRAVENOUS EVERY 30 MIN PRN
Status: DISCONTINUED | OUTPATIENT
Start: 2023-11-30 | End: 2023-11-30 | Stop reason: HOSPADM

## 2023-11-30 RX ORDER — HYDRALAZINE HYDROCHLORIDE 20 MG/ML
2.5-5 INJECTION INTRAMUSCULAR; INTRAVENOUS EVERY 10 MIN PRN
Status: DISCONTINUED | OUTPATIENT
Start: 2023-11-30 | End: 2023-11-30 | Stop reason: HOSPADM

## 2023-11-30 RX ORDER — ALBUTEROL SULFATE 0.83 MG/ML
2.5 SOLUTION RESPIRATORY (INHALATION) EVERY 4 HOURS PRN
Status: DISCONTINUED | OUTPATIENT
Start: 2023-11-30 | End: 2023-11-30 | Stop reason: HOSPADM

## 2023-11-30 RX ORDER — ACETAMINOPHEN 325 MG/1
975 TABLET ORAL ONCE
Status: COMPLETED | OUTPATIENT
Start: 2023-11-30 | End: 2023-11-30

## 2023-11-30 RX ORDER — BUPIVACAINE HYDROCHLORIDE 5 MG/ML
INJECTION, SOLUTION PERINEURAL PRN
Status: DISCONTINUED | OUTPATIENT
Start: 2023-11-30 | End: 2023-11-30 | Stop reason: HOSPADM

## 2023-11-30 RX ORDER — ONDANSETRON 4 MG/1
4 TABLET, ORALLY DISINTEGRATING ORAL EVERY 30 MIN PRN
Status: DISCONTINUED | OUTPATIENT
Start: 2023-11-30 | End: 2023-11-30 | Stop reason: HOSPADM

## 2023-11-30 RX ORDER — PROPOFOL 10 MG/ML
INJECTION, EMULSION INTRAVENOUS PRN
Status: DISCONTINUED | OUTPATIENT
Start: 2023-11-30 | End: 2023-11-30

## 2023-11-30 RX ORDER — SCOLOPAMINE TRANSDERMAL SYSTEM 1 MG/1
1 PATCH, EXTENDED RELEASE TRANSDERMAL ONCE
Status: DISCONTINUED | OUTPATIENT
Start: 2023-11-30 | End: 2023-11-30 | Stop reason: HOSPADM

## 2023-11-30 RX ORDER — LIDOCAINE HYDROCHLORIDE 20 MG/ML
INJECTION, SOLUTION INFILTRATION; PERINEURAL PRN
Status: DISCONTINUED | OUTPATIENT
Start: 2023-11-30 | End: 2023-11-30

## 2023-11-30 RX ORDER — LABETALOL HYDROCHLORIDE 5 MG/ML
10 INJECTION, SOLUTION INTRAVENOUS
Status: DISCONTINUED | OUTPATIENT
Start: 2023-11-30 | End: 2023-11-30 | Stop reason: HOSPADM

## 2023-11-30 RX ORDER — ONDANSETRON 2 MG/ML
INJECTION INTRAMUSCULAR; INTRAVENOUS PRN
Status: DISCONTINUED | OUTPATIENT
Start: 2023-11-30 | End: 2023-11-30

## 2023-11-30 RX ORDER — FENTANYL CITRATE 50 UG/ML
50 INJECTION, SOLUTION INTRAMUSCULAR; INTRAVENOUS EVERY 5 MIN PRN
Status: DISCONTINUED | OUTPATIENT
Start: 2023-11-30 | End: 2023-11-30 | Stop reason: HOSPADM

## 2023-11-30 RX ORDER — CEFAZOLIN SODIUM/WATER 2 G/20 ML
2 SYRINGE (ML) INTRAVENOUS
Status: COMPLETED | OUTPATIENT
Start: 2023-11-30 | End: 2023-11-30

## 2023-11-30 RX ORDER — PROPOFOL 10 MG/ML
INJECTION, EMULSION INTRAVENOUS CONTINUOUS PRN
Status: DISCONTINUED | OUTPATIENT
Start: 2023-11-30 | End: 2023-11-30

## 2023-11-30 RX ORDER — CEFAZOLIN SODIUM/WATER 2 G/20 ML
2 SYRINGE (ML) INTRAVENOUS SEE ADMIN INSTRUCTIONS
Status: DISCONTINUED | OUTPATIENT
Start: 2023-11-30 | End: 2023-11-30 | Stop reason: HOSPADM

## 2023-11-30 RX ORDER — HYDROMORPHONE HCL IN WATER/PF 6 MG/30 ML
0.4 PATIENT CONTROLLED ANALGESIA SYRINGE INTRAVENOUS EVERY 5 MIN PRN
Status: DISCONTINUED | OUTPATIENT
Start: 2023-11-30 | End: 2023-11-30 | Stop reason: HOSPADM

## 2023-11-30 RX ORDER — HYDROMORPHONE HCL IN WATER/PF 6 MG/30 ML
0.2 PATIENT CONTROLLED ANALGESIA SYRINGE INTRAVENOUS EVERY 5 MIN PRN
Status: DISCONTINUED | OUTPATIENT
Start: 2023-11-30 | End: 2023-11-30 | Stop reason: HOSPADM

## 2023-11-30 RX ORDER — SODIUM CHLORIDE, SODIUM LACTATE, POTASSIUM CHLORIDE, CALCIUM CHLORIDE 600; 310; 30; 20 MG/100ML; MG/100ML; MG/100ML; MG/100ML
INJECTION, SOLUTION INTRAVENOUS CONTINUOUS
Status: DISCONTINUED | OUTPATIENT
Start: 2023-11-30 | End: 2023-11-30 | Stop reason: HOSPADM

## 2023-11-30 RX ORDER — DEXAMETHASONE SODIUM PHOSPHATE 4 MG/ML
INJECTION, SOLUTION INTRA-ARTICULAR; INTRALESIONAL; INTRAMUSCULAR; INTRAVENOUS; SOFT TISSUE PRN
Status: DISCONTINUED | OUTPATIENT
Start: 2023-11-30 | End: 2023-11-30

## 2023-11-30 RX ORDER — FENTANYL CITRATE 50 UG/ML
INJECTION, SOLUTION INTRAMUSCULAR; INTRAVENOUS PRN
Status: DISCONTINUED | OUTPATIENT
Start: 2023-11-30 | End: 2023-11-30

## 2023-11-30 RX ORDER — ACETAMINOPHEN 325 MG/1
650 TABLET ORAL
Status: DISCONTINUED | OUTPATIENT
Start: 2023-11-30 | End: 2023-11-30 | Stop reason: HOSPADM

## 2023-11-30 RX ADMIN — ROCURONIUM BROMIDE 10 MG: 50 INJECTION, SOLUTION INTRAVENOUS at 08:26

## 2023-11-30 RX ADMIN — ROCURONIUM BROMIDE 40 MG: 50 INJECTION, SOLUTION INTRAVENOUS at 07:47

## 2023-11-30 RX ADMIN — FENTANYL CITRATE 100 MCG: 50 INJECTION INTRAMUSCULAR; INTRAVENOUS at 07:47

## 2023-11-30 RX ADMIN — PROPOFOL 175 MCG/KG/MIN: 10 INJECTION, EMULSION INTRAVENOUS at 07:47

## 2023-11-30 RX ADMIN — DEXAMETHASONE SODIUM PHOSPHATE 8 MG: 4 INJECTION, SOLUTION INTRA-ARTICULAR; INTRALESIONAL; INTRAMUSCULAR; INTRAVENOUS; SOFT TISSUE at 07:47

## 2023-11-30 RX ADMIN — LIDOCAINE HYDROCHLORIDE 50 MG: 20 INJECTION, SOLUTION INFILTRATION; PERINEURAL at 07:47

## 2023-11-30 RX ADMIN — FENTANYL CITRATE 50 MCG: 50 INJECTION INTRAMUSCULAR; INTRAVENOUS at 08:45

## 2023-11-30 RX ADMIN — SUGAMMADEX 200 MG: 100 INJECTION, SOLUTION INTRAVENOUS at 09:05

## 2023-11-30 RX ADMIN — MIDAZOLAM 2 MG: 1 INJECTION INTRAMUSCULAR; INTRAVENOUS at 07:42

## 2023-11-30 RX ADMIN — PROPOFOL 200 MG: 10 INJECTION, EMULSION INTRAVENOUS at 07:47

## 2023-11-30 RX ADMIN — ACETAMINOPHEN 975 MG: 325 TABLET, FILM COATED ORAL at 09:50

## 2023-11-30 RX ADMIN — SCOPALAMINE 1 PATCH: 1 PATCH, EXTENDED RELEASE TRANSDERMAL at 06:49

## 2023-11-30 RX ADMIN — Medication 2 G: at 07:39

## 2023-11-30 RX ADMIN — KETOROLAC TROMETHAMINE 30 MG: 30 INJECTION, SOLUTION INTRAMUSCULAR at 09:03

## 2023-11-30 RX ADMIN — ONDANSETRON 4 MG: 2 INJECTION INTRAMUSCULAR; INTRAVENOUS at 09:03

## 2023-11-30 RX ADMIN — SODIUM CHLORIDE, POTASSIUM CHLORIDE, SODIUM LACTATE AND CALCIUM CHLORIDE: 600; 310; 30; 20 INJECTION, SOLUTION INTRAVENOUS at 07:39

## 2023-11-30 RX ADMIN — SODIUM CHLORIDE, POTASSIUM CHLORIDE, SODIUM LACTATE AND CALCIUM CHLORIDE: 600; 310; 30; 20 INJECTION, SOLUTION INTRAVENOUS at 08:49

## 2023-11-30 ASSESSMENT — ACTIVITIES OF DAILY LIVING (ADL)
ADLS_ACUITY_SCORE: 38
ADLS_ACUITY_SCORE: 38

## 2023-11-30 NOTE — ANESTHESIA POSTPROCEDURE EVALUATION
Patient: Ashlie Matthews    Procedure: Procedure(s):  ROBOT-ASSISTED RIGHT INGUINAL HERNIORRHAPHY  OPEN UMBILICAL HERNIORRHAPHY       Anesthesia Type:  General    Note:  Disposition: Outpatient   Postop Pain Control: Uneventful            Sign Out: Well controlled pain   PONV: No   Neuro/Psych: Uneventful            Sign Out: Acceptable/Baseline neuro status   Airway/Respiratory: Uneventful            Sign Out: Acceptable/Baseline resp. status   CV/Hemodynamics: Uneventful            Sign Out: Acceptable CV status; No obvious hypovolemia; No obvious fluid overload   Other NRE:    DID A NON-ROUTINE EVENT OCCUR? No           Last vitals:  Vitals Value Taken Time   /81 11/30/23 1000   Temp 98.4  F (36.9  C) 11/30/23 0950   Pulse 85 11/30/23 1003   Resp 21 11/30/23 1003   SpO2 93 % 11/30/23 1003   Vitals shown include unfiled device data.    Electronically Signed By: Adeola Lea MD  November 30, 2023  11:45 AM

## 2023-11-30 NOTE — ANESTHESIA PREPROCEDURE EVALUATION
Anesthesia Pre-Procedure Evaluation    Patient: Ashlie Matthews   MRN: [id number] : 1985        Procedure : Procedure(s):  ROBOT-ASSISTED INGUINAL HERNIORRHAPHY, RIGHT POSSIBLE LEFT  HERNIORRHAPHY, UMBILICAL, OPEN          Past Medical History:   Diagnosis Date    Allergic rhinitis     Anxiety     Asthma     Chronic maxillary sinusitis 3/28/2005    Chronic tonsillitis 2006    Cyclic vomiting syndrome     Depressive disorder     Diet controlled gestational diabetes mellitus in third trimester 3/5/2016    Dysmenorrhea     Perforated tympanic membrane 2011    left     PIH (pregnancy induced hypertension)     Pneumothorax     from bad coughing    Pregnancy induced hypertension     Syncope       Past Surgical History:   Procedure Laterality Date    ARTHROSCOPY SHOULDER Bilateral ,    EXCISE MASS UPPER EXTREMITY Left 2023    Procedure: EXCISION, MASS, UPPER EXTREMITY;  Surgeon: Adeola Paige MD;  Location: [facility] OR    GENITOURINARY SURGERY  2019    Interstim placement    GYN SURGERY  2018    LAPAROSCOPIC HYSTERECTOMY TOTAL, SALPINGECTOMY BILATERAL  2018    Laparoscopy to look for endometriosis  2004    LAPAROTOMY MINI, TUBAL LIGATION (POST PARTUM), COMBINED Bilateral 2016    Procedure: COMBINED LAPAROTOMY MINI, TUBAL LIGATION (POST PARTUM);  Surgeon: Nannette Shane DO;  Location: [facility] OR    MYRINGOTOMY, INSERT TUBE BILATERAL, COMBINED  as a child    ORTHOPEDIC SURGERY      Shoulder    REMOVE STIMULATOR SACRAL NERVE Left 2020    Procedure: EXCISE INTERSTEM SACRAL NERVE STIMULATOR BATTERY AND LEAD LINE;  Surgeon: Pooja Lin MD;  Location: [facility] OR    REPAIR, RECTOCELE, VAGINAL APPROACH  2023    Septoplasty for deviated septum  2005    SINUS SURGERY  2007    TONSILLECTOMY  2006      Allergies   Allergen Reactions    Moxifloxacin Hydrochloride Nausea and Vomiting     Avelox-vomiting    Cats Itching    Codeine GI Disturbance     "Darvocet [Propoxyphene N-Apap] Hives     PN: LW Reaction: Rash, Generalized    Demerol Hives    Dog Hair [Dogs] Unknown    Dust Mites Itching and Swelling    Meperidine      PN: LW Reaction: Rash, Generalized    Oxycodone-Acetaminophen      PN: LW Reaction: SHORTNESS OF BREATH    Pollen Extract Itching    Vicodin [Hydrocodone-Acetaminophen] GI Disturbance     PN: LW Reaction: Rash, Generalized    Latex Itching, Swelling and Rash     \"sensitivity\"      Social History     Tobacco Use    Smoking status: Never     Passive exposure: Never    Smokeless tobacco: Never   Substance Use Topics    Alcohol use: No      Wt Readings from Last 1 Encounters:   11/30/23 73 kg (160 lb 14.4 oz)        Anesthesia Evaluation            ROS/MED HX  ENT/Pulmonary:     (+)                     asthma                  Neurologic:       Cardiovascular:     (+)  hypertension (PIH, resolved)- -   -  - -                                      METS/Exercise Tolerance:     Hematologic:       Musculoskeletal:       GI/Hepatic:       Renal/Genitourinary:       Endo:       Psychiatric/Substance Use: Comment: Cyclic vomiting syndrome      (+) psychiatric history anxiety       Infectious Disease:       Malignancy:       Other:            Physical Exam    Airway        Mallampati: II   TM distance: > 3 FB   Neck ROM: full   Mouth opening: > 3 cm    Respiratory Devices and Support         Dental           Cardiovascular   cardiovascular exam normal          Pulmonary   pulmonary exam normal                OUTSIDE LABS:  CBC:   Lab Results   Component Value Date    WBC 8.3 04/07/2022    WBC 6.4 07/28/2020    HGB 13.6 11/10/2023    HGB 13.3 04/07/2022    HCT 40.1 04/07/2022    HCT 41.5 07/28/2020     04/07/2022     07/28/2020     BMP:   Lab Results   Component Value Date     11/10/2023     04/07/2022    POTASSIUM 4.3 11/10/2023    POTASSIUM 3.4 04/07/2022    CHLORIDE 104 11/10/2023    CHLORIDE 110 (H) 04/07/2022    CO2 26 11/10/2023 " "   CO2 24 04/07/2022    BUN 10.8 11/10/2023    BUN 9 04/07/2022    CR 0.84 11/10/2023    CR 0.69 04/07/2022    GLC 92 11/30/2023    GLC 95 11/10/2023     COAGS: No results found for: \"PTT\", \"INR\", \"FIBR\"  POC:   Lab Results   Component Value Date     (H) 05/03/2018    HCG Negative 05/03/2018    HCGS Negative 01/20/2018     HEPATIC:   Lab Results   Component Value Date    ALBUMIN 3.8 04/07/2022    PROTTOTAL 7.4 04/07/2022    ALT 26 04/07/2022    AST 18 04/07/2022    ALKPHOS 51 04/07/2022    BILITOTAL 0.9 04/07/2022     OTHER:   Lab Results   Component Value Date    LACT 2.5 (H) 09/16/2015    A1C 5.0 01/28/2022    MURIEL 9.2 11/10/2023    LIPASE 121 09/16/2015    AMYLASE 52 08/18/2009    TSH 0.92 04/07/2022    T4 0.97 04/07/2022    SED 32 (H) 06/12/2013       Anesthesia Plan    ASA Status:  2    NPO Status:  NPO Appropriate    Anesthesia Type: General.     - Airway: ETT   Induction: Intravenous.   Maintenance: TIVA.        Consents    Anesthesia Plan(s) and associated risks, benefits, and realistic alternatives discussed. Questions answered and patient/representative(s) expressed understanding.     - Discussed:     - Discussed with:  Patient            Postoperative Care    Pain management: IV analgesics, Oral pain medications, Multi-modal analgesia.   PONV prophylaxis: Ondansetron (or other 5HT-3), Dexamethasone or Solumedrol, Scopolamine patch     Comments:    Other Comments: Elevated risk PONV           Adeola Lea MD    I have reviewed the pertinent notes and labs in the chart from the past 30 days and (re)examined the patient.  Any updates or changes from those notes are reflected in this note.              # Overweight: Estimated body mass index is 29.91 kg/m  as calculated from the following:    Height as of this encounter: 1.562 m (5' 1.5\").    Weight as of this encounter: 73 kg (160 lb 14.4 oz).      "

## 2023-11-30 NOTE — ANESTHESIA CARE TRANSFER NOTE
Patient: Ashlie Matthews    Procedure: Procedure(s):  ROBOT-ASSISTED RIGHT INGUINAL HERNIORRHAPHY  OPEN UMBILICAL HERNIORRHAPHY       Diagnosis: Groin pain, right [R10.31]  Umbilical hernia with obstruction [K42.0]  Diagnosis Additional Information: No value filed.    Anesthesia Type:   General     Note:    Oropharynx: oropharynx clear of all foreign objects and spontaneously breathing  Level of Consciousness: awake  Oxygen Supplementation: face mask  Level of Supplemental Oxygen (L/min / FiO2): 6  Independent Airway: airway patency satisfactory and stable  Dentition: dentition unchanged  Vital Signs Stable: post-procedure vital signs reviewed and stable  Report to RN Given: handoff report given  Patient transferred to: PACU    Handoff Report: Identifed the Patient, Identified the Reponsible Provider, Reviewed the pertinent medical history, Discussed the surgical course, Reviewed Intra-OP anesthesia mangement and issues during anesthesia, Set expectations for post-procedure period and Allowed opportunity for questions and acknowledgement of understanding      Vitals:  Vitals Value Taken Time   /71 11/30/23 0925   Temp     Pulse 90 11/30/23 0925   Resp 23 11/30/23 0925   SpO2 98 % 11/30/23 0925       Electronically Signed By: RYAN Alicea CRNA  November 30, 2023  9:26 AM

## 2023-11-30 NOTE — OP NOTE
[facility] General Surgery Operative Note    Pre-operative diagnosis: Right groin pain possible Inguinal hernia  Umbilical hernia incarcerated   Post-operative diagnosis: Right femoral hernia  Umbilical hernia incarcerated   Procedure: Robotic assisted laparoscopic right femoral hernia repair  Open umbilical hernia repair   Surgeon: Fabiola Gore MD   Assistant(s): Ashlie Olson PA-C  The Physician Assistant was medically necessary for their expertise in prepping, camera management, exchanging robotic instruments, passing suture and mesh through the robotic ports, and suturing   Anesthesia: general   Estimated blood loss:  Specimen:  FINDINGS:  10 cc  none  Small right femoral hernia. 1.5cm umbilical hernia repaired with preperitoneal 4.3cm ventralex ST       Indication for Procedure: This is a 38 year old female who presented to the office with many years of right groin pain. She never had palpable bulge or imaging evidence of hernia, however symptoms were consistent with this. She also has a symptomatic umbilical hernia with incarcerated preperitoneal fat. After discussing risks and benefits, they agreed to proceed with robotic approach for the inguinal hernia and open repair of the umbilical hernia.  Description of procedure:  Patient was brought to the operating room, placed on the operating table in supine position. Anesthesia was induced. Her pressure points were padded and she was secured with a safety strap. A timeout was called to verify the patient, site of procedure and procedure to be performed.  A supraumbilical curvilinear incision was created. The incarcerated preperitoneal fat was reduced through the umbilical hernia. The peritoneum was entered bluntly with a carmalt clamp. An 8mm port was placed and the abdomen insufflated with CO2.  Two 8mm ports were then placed on right and left lateral abdomen, 8cm from the midline port. No obvious hernia was noted in either groin.  The peritoneum  was opened transversely from the right medial umbilical ligament to the lateral abdominal wall 5cm above the internal ring and ASIS. The peritoneum was dissected away from the abdominal wall focusing first on the medial dissection to identify the pubic tubercle and coopers ligament, where the dissection stayed along the rectus muscle, then the lateral dissection toward the iliac crest. Laterally, care was taken to stay directly on the peritoneum and leave the transversalis fascia up. There was a small femoral hernia defect and fat was reduced from this space.  The round ligament was identified and was transected proximally near the entrance to the peritoneum to avoid injury to the genital branch of the genitofemoral nerve which typically joins the round ligament distally near the internal ring. The round ligament and internal ring was checked for a cord lipoma, with dissecting and tugging on the retroperitoneal fat in this area there was some oozing around a lymph node that was controlled with gauze pressure. A 10cm x 15cm piece of progrip mesh was placed in the pocket and unfurled to lay flat and completely cover the myopectineal orifice, crossing the pubic bone medially, 2cm below the chema's ligament and ensuring it would be above the inferior peritoneal edge. The peritoneum was closed in a running fashion with a 3-0 V-lock suture.   The cavity was inspected and there was adequate hemostasis.   the inferior edge of the mesh was seen to be laying flat with no curling.   The trocars were then removed and pneumoperitoneum evacuated.  The the umbilical hernia was repaired. The umbilical stalk transected with cautery. The fascia around the hernia was defined circumferentially. The hernia defect was 1.5cm in diameter. The preperitoneal space was dissected circumferentially. A 4.3cm diameter circular piece of polypropylene mesh (Ventralex ST) was then opened. The mesh was placed subfascial, in a preperitoneal location.  Interrupted 0 PDS was used to close the fascia over the mesh.  Local was injected along the fascia, subcutaneous tissues and skin.   The umbilicus was reinverted using a 3-0 vicryl stitch. The skin incisions were closed with 4-0 vicryl and the skin was covered with steri strips, a cotton ball in the umbilicus and tegaderm.    Fabiola Gore MD

## 2023-11-30 NOTE — DISCHARGE INSTRUCTIONS
Maximum ibuprofen (Advil) dose from all sources should not exceed 2.5 grams (2,400 mg) per day. You received Toradol, an IV form of Ibuprofen (Motrin) at 9 AM.  Do not take any Ibuprofen products until 3 PM or after.     Maximum acetaminophen (Tylenol) dose from all sources should not exceed 4 grams (4000 mg) per day.  You last had 975 mg at 9:50 AM; do not take Tylenol products again until after 3:50 PM if needed.        HOME CARE FOLLOWING INGUINAL/FEMORAL HERNIA REPAIR  JANKI Long, LAWSON Phillips, MILDRED Hand    DIET:  Start with liquids and gradually resume your regular diet as tolerated.  Drink plenty of fluids.  While taking pain medications, consider use of a stool softener, increase your fiber in your diet, or add a fiber supplement (like Metamucil, Citrucel) to help prevent constipation - a possible side effect of pain medications.    NAUSEA:  If nauseated from the anesthetic/pain meds; rest in bed, get up cautiously with assistance, and drink clear liquids (juice, tea, broth).    ACTIVITY:  Light Activity -- you may immediately be up and about as tolerated.  Walking is encouraged, increase as tolerated.  Driving/Light Work-- when comfortable and off narcotic pain medications.  Strenuous Work/Activity -- limit lifting to 20 pounds for 4 weeks.  Active Sports (running, biking, etc.) -- cautiously resume after 4 weeks.    INCISIONAL CARE:  If you have a dressing in place, keep clean and dry for 48 hours; you may replace the gauze if it becomes soiled.  After 48 hours you may remove the dressing and shower.  Do not submerse incision in water for 1 week.  If you have a Dermabond dressing (a type of skin glue), you may shower immediately.  Sutures will absorb and need not be removed.  If present, leave the steri-strips (white paper tapes) in place for 14 days after surgery.  If present, leave Dermabond glue in place until it wears/flakes off.  Do not apply lotions, creams, or  ointments to incisions.  Expect a variable amount of swelling/black and blue discoloration that may involve the penis/scrotum or labia.  Some numbness around the incision is common.  A lump/ridge under the incision is normal and will gradually resolve.    DISCOMFORT:  Local anesthetic placed at surgery should provide relief for 4-8 hours.  Begin taking pain pills before discomfort is severe.  Take the pain medication with some food, when possible, to minimize side effects.  Intermittent use of ice packs to the hernia repair site may help during the first 1-3 weeks after surgery.  Expect gradual improvement.    Recommend the following over the counter medications:  - Ibuprofen (motrin/advil) 600mg every 6 hours (max 2,400mg per day)   - Tylenol (acetaminophen) 500-1000mg every 6 hours (max 4,000mg per day)  - Take with food if GI upset occurs  - If additional pain medication is needed, take the narcotic that you were prescribed.    FOLLOW-UP AFTER SURGERY:  -Our office will contact you approximately 2-3 weeks after surgery to check on your progress and answer any questions you may have.  If you are doing well, you will not need to return for an office appointment.  If any concerns are identified over the phone, we will help you make an appointment to see a provider.    -If you have not received a phone call, have any questions or concerns, or would like to be seen, please call us at [phone].  We are located at: [address], [address]; [place] [address]    -CONTACT US IF THE FOLLOWING DEVELOPS:   1. A fever that is above 101     2. Increased redness, warmth, drainage, bleeding, or swelling.   3. Pain that is not relieved by rest/ice and your prescription.   4.  Increasing pain after 48 hours.   5. Drainage that is thick, cloudy, yellow, green or white.   6. Any other questions or concerns.      FREQUENTLY ASKED QUESTIONS:    Q:  How should my incision look?    A:  Normally your incision will appear  slightly swollen with light redness directly along the incision itself as it heals.  It may feel like a bump or ridge as the healing/scarring happens, and over time (3-4 months) this bump or ridge feeling should slowly go away.  In general, clear or pink watery drainage can be normal at first as your incision heals, but should decrease over time.    Q:  How do I know if my incision is infected?  A:  Look at your incision for signs of infection, like redness around the incision spreading to surrounding skin, or drainage of cloudy or foul-smelling drainage.  If you feel warm, check your temperature to see if you are running a fever.    **If any of these things occur, please notify the nurse at our office.  We may need you to come into the office for an incision check.      Q:  How do I take care of my incision?  A:  If you have a dressing in place - Starting the day after surgery, replace the dressing 1-2 times a day until there is no further drainage from the incision.  At that time, a dressing is no longer needed.  Try to minimize tape on the skin if irritation is occurring at the tape sites.  If you have significant irritation from tape on the skin, please call the office to discuss other method of dressing your incision.    Small pieces of tape called  steri-strips  may be present directly overlying your incision; these may be removed 10 days after surgery unless otherwise specified by your surgeon.  If these tapes start to loosen at the ends, you may trim them back until they fall off or are removed.    A:  If you had  Dermabond  tissue glue used as a dressing (this causes your incision to look shiny with a clear covering over it) - This type of dressing wears off with time and does not require more dressings over the top unless it is draining around the glue as it wears off.  Do not apply ointments or lotions over the incisions until the glue has completely worn off.    Q:  There is a piece of tape or a sticky   lead  still on my skin.  Can I remove this?  A:  Sometimes the sticky  leads  used for monitoring during surgery or for evaluation in the emergency department are not all removed while you are in the hospital.  These sometimes have a tab or metal dot on them.  You can easily remove these on your own, like taking off a band-aid.  If there is a gel substance under the  lead , simply wipe/clean it off with a washcloth or paper towel.      Q:  What can I do to minimize constipation (very hard stools, or lack of stools)?  A:  Stay well hydrated.  Increase your dietary fiber intake or take a fiber supplement -with plenty of water.  Walk around frequently.  You may consider an over-the-counter stool-softener.  Your Pharmacist can assist you with choosing one that is stocked at your pharmacy.  Constipation is also one of the most common side effects of pain medication.  If you are using pain medication, be pro-active and try to PREVENT problems with constipation by taking the steps above BEFORE constipation becomes a problem.    Q:  What do I do if I need more pain medications?  A:  Call the office to receive refills.  Be aware that certain pain meds cannot be called into a pharmacy and actually require a paper prescription.  A change may be made in your pain med as you progress thru your recovery period or if you have side effects to certain meds.    --Pain meds are NOT refilled after 5pm on weekdays, and NOT AT ALL on the weekends, so please look ahead to prevent problems.    Q:  Why am I having a hard time sleeping now that I am at home?  A:  Many medications you receive while you are in the hospital can impact your sleep for a number of days after your surgery/hospitalization.  Decreased level of activity and naps during the day may also make sleeping at night difficult.  Try to minimize day-time naps, and get up frequently during the day to walk around your home during your recovery time.  Sleep aides may be of some  help, but are not recommended for long-term use.      Q:  I am having some back discomfort.  What should I do?  A:  This may be related to certain positioning that was required for your surgery, extended periods of time in bed, or other changes in your overall activity level.  You may try ice, heat, acetaminophen, or ibuprofen to treat this temporarily.  Note that many pain medications have acetaminophen in them and would state this on the prescription bottle.  Be sure not to exceed the maximum of 4000mg per day of acetaminophen.     **If the pain you are having does not resolve, is severe, or is a flare of back pain you have had on other occasions prior to surgery, please contact your primary physician for further recommendations or for an appointment to be examined at their office.    Q:  Why am I having headaches?  A:  Headaches can be caused by many things:  caffeine withdrawal, use of pain meds, dehydration, high blood pressure, lack of sleep, over-activity/exhaustion, flare-up of usual migraine headaches.  If you feel this is related to muscle tension (a band-like feeling around the head, or a pressure at the low-back of the head) you may try ice or heat to this area.  You may need to drink more fluids (try electrolyte drink like Gatorade), rest, or take your usual migraine medications.   **If your headaches do not resolve, worsen, are accompanied by other symptoms, or if your blood pressure is high, please call your primary physician for recommendation and/or examination.    Q:  I am unable to urinate.  What do I do?  A:  A small percentage of people can have difficulty urinating initially after surgery.  This includes being able to urinate only a very small amount at a time and feeling discomfort or pressure in the very low abdomen.  This is called  urinary retention , and is actually an urgent situation.  Proceed to your nearest Emergency department for evaluation (not an Urgent Care Center).  Sometimes the  bladder does not work correctly after certain medications you receive during surgery, or related to certain procedures.  You may need to have a catheter placed until your bladder recovers.  When planning to go to an Emergency department, it may help to call the ER to let them know you are coming in for this problem after a surgery.  This may help you get in quicker to be evaluated.  **If you have symptoms of a urinary tract infection, please contact your primary physician for the proper evaluation and treatment.        If you have other questions, please call the office Monday thru Friday between 8am and 4:30pm to discuss with the nurse or physician assistant.  [phone]    There is a surgeon ON CALL on weekday evenings and over the weekend in case of urgent need only, and may be contacted at the same number.    If you are having an emergency, call 911 or proceed to your nearest emergency department.      Remove Scope Patch: Tomorrow, Friday, December 1st at 5 PM    How to Remove Scope Patch:  When the scopolamine patch is no longer needed, remove the patch and fold it in half with the sticky side together and dispose of it.  Wash your hands and the area behind your ear thoroughly with soap and water to remove any traces of scopolamine from the area.  Make sure not to touch your eyes after handling the scope patch and wash your hands thoroughly.

## 2023-11-30 NOTE — ANESTHESIA PROCEDURE NOTES
Airway       Patient location during procedure: OR       Procedure Start/Stop Times: 11/30/2023 7:49 AM  Staff -        CRNA: Fabiola Cisneros APRN CRNA       Performed By: CRNA  Consent for Airway        Urgency: elective  Indications and Patient Condition       Indications for airway management: april-procedural       Induction type:intravenous       Mask difficulty assessment: 1 - vent by mask    Final Airway Details       Final airway type: endotracheal airway       Successful airway: ETT - single and Oral  Endotracheal Airway Details        ETT size (mm): 7.0       Cuffed: yes       Successful intubation technique: direct laryngoscopy       DL Blade Type: Trevino 2       Grade View of Cords: 1       Adjucts: stylet       Position: Right       Measured from: lips       Secured at (cm): 22       Bite block used: None    Post intubation assessment        Placement verified by: capnometry, equal breath sounds and chest rise        Number of attempts at approach: 1       Number of other approaches attempted: 0       Secured with: tape       Ease of procedure: easy       Dentition: Unchanged and Intact    Medication(s) Administered   Medication Administration Time: 11/30/2023 7:49 AM

## 2023-12-12 ENCOUNTER — MYC MEDICAL ADVICE (OUTPATIENT)
Dept: SURGERY | Facility: CLINIC | Age: 38
End: 2023-12-12
Payer: COMMERCIAL

## 2023-12-12 NOTE — CONFIDENTIAL NOTE
[facility]      Surgery: Open umbilical hernia repair, robotic femoral hernia repair  Surgeon: Bao  Surgery date: 11/30/23    Patient concerned about black appearance of surgical site, sent pic via MyChart.    MyChart picture reviewed, noted necrotic skin/ eschar along her incision with surrounding bruise. Patient will apply Aquafor daily to the eschar and cover with bandage. Anticipate the necrotic skin will eventually slough, but keeping the area moist will protect the wound from breaks in the skin which could lead to infection. Patient will call if symptoms worsen or fail to improve.    Anastasiia Swanson PA-C

## 2024-01-04 ENCOUNTER — MYC MEDICAL ADVICE (OUTPATIENT)
Dept: FAMILY MEDICINE | Facility: CLINIC | Age: 39
End: 2024-01-04
Payer: COMMERCIAL

## 2024-01-22 ASSESSMENT — ENCOUNTER SYMPTOMS
PARESTHESIAS: 0
ARTHRALGIAS: 0
CHILLS: 0
HEARTBURN: 0
DYSURIA: 0
JOINT SWELLING: 0
DIZZINESS: 0
PALPITATIONS: 0
HEMATOCHEZIA: 0
HEADACHES: 0
HEMATURIA: 0
FREQUENCY: 0
DIARRHEA: 0
SHORTNESS OF BREATH: 0
MYALGIAS: 0
NERVOUS/ANXIOUS: 0
COUGH: 0
EYE PAIN: 0
NAUSEA: 0
WEAKNESS: 0
ABDOMINAL PAIN: 0
SORE THROAT: 0
BREAST MASS: 0
FEVER: 0
CONSTIPATION: 0

## 2024-01-29 ENCOUNTER — OFFICE VISIT (OUTPATIENT)
Dept: FAMILY MEDICINE | Facility: CLINIC | Age: 39
End: 2024-01-29
Payer: COMMERCIAL

## 2024-01-29 VITALS
HEIGHT: 62 IN | OXYGEN SATURATION: 98 % | SYSTOLIC BLOOD PRESSURE: 114 MMHG | HEART RATE: 79 BPM | DIASTOLIC BLOOD PRESSURE: 66 MMHG | TEMPERATURE: 97.4 F | BODY MASS INDEX: 29.44 KG/M2 | RESPIRATION RATE: 16 BRPM | WEIGHT: 160 LBS

## 2024-01-29 DIAGNOSIS — E55.9 VITAMIN D DEFICIENCY: ICD-10-CM

## 2024-01-29 DIAGNOSIS — E66.3 OVERWEIGHT (BMI 25.0-29.9): ICD-10-CM

## 2024-01-29 DIAGNOSIS — Z00.00 ROUTINE GENERAL MEDICAL EXAMINATION AT A HEALTH CARE FACILITY: Primary | ICD-10-CM

## 2024-01-29 DIAGNOSIS — F39 MOOD DISORDER (H): ICD-10-CM

## 2024-01-29 DIAGNOSIS — J45.20 MILD INTERMITTENT ASTHMA WITHOUT COMPLICATION: ICD-10-CM

## 2024-01-29 DIAGNOSIS — Z13.220 LIPID SCREENING: ICD-10-CM

## 2024-01-29 DIAGNOSIS — Z12.31 ENCOUNTER FOR SCREENING MAMMOGRAM FOR BREAST CANCER: ICD-10-CM

## 2024-01-29 PROCEDURE — 82306 VITAMIN D 25 HYDROXY: CPT | Performed by: PHYSICIAN ASSISTANT

## 2024-01-29 PROCEDURE — 90471 IMMUNIZATION ADMIN: CPT | Performed by: PHYSICIAN ASSISTANT

## 2024-01-29 PROCEDURE — 80061 LIPID PANEL: CPT | Performed by: PHYSICIAN ASSISTANT

## 2024-01-29 PROCEDURE — 36415 COLL VENOUS BLD VENIPUNCTURE: CPT | Performed by: PHYSICIAN ASSISTANT

## 2024-01-29 PROCEDURE — 90746 HEPB VACCINE 3 DOSE ADULT IM: CPT | Performed by: PHYSICIAN ASSISTANT

## 2024-01-29 PROCEDURE — 99213 OFFICE O/P EST LOW 20 MIN: CPT | Mod: 25 | Performed by: PHYSICIAN ASSISTANT

## 2024-01-29 PROCEDURE — 99396 PREV VISIT EST AGE 40-64: CPT | Mod: 25 | Performed by: PHYSICIAN ASSISTANT

## 2024-01-29 ASSESSMENT — PAIN SCALES - GENERAL: PAINLEVEL: NO PAIN (0)

## 2024-01-29 ASSESSMENT — ENCOUNTER SYMPTOMS
HEADACHES: 0
SORE THROAT: 0
WEAKNESS: 0
CONSTIPATION: 0
EYE PAIN: 0
FREQUENCY: 0
DIARRHEA: 0
FEVER: 0
MYALGIAS: 0
NAUSEA: 0
DIZZINESS: 0
SHORTNESS OF BREATH: 0
CHILLS: 0
NERVOUS/ANXIOUS: 0
PALPITATIONS: 0
ARTHRALGIAS: 0
DYSURIA: 0
ABDOMINAL PAIN: 0
COUGH: 0
JOINT SWELLING: 0
HEMATURIA: 0

## 2024-01-29 ASSESSMENT — ANXIETY QUESTIONNAIRES
1. FEELING NERVOUS, ANXIOUS, OR ON EDGE: MORE THAN HALF THE DAYS
5. BEING SO RESTLESS THAT IT IS HARD TO SIT STILL: SEVERAL DAYS
7. FEELING AFRAID AS IF SOMETHING AWFUL MIGHT HAPPEN: SEVERAL DAYS
4. TROUBLE RELAXING: SEVERAL DAYS
GAD7 TOTAL SCORE: 10
3. WORRYING TOO MUCH ABOUT DIFFERENT THINGS: MORE THAN HALF THE DAYS
8. IF YOU CHECKED OFF ANY PROBLEMS, HOW DIFFICULT HAVE THESE MADE IT FOR YOU TO DO YOUR WORK, TAKE CARE OF THINGS AT HOME, OR GET ALONG WITH OTHER PEOPLE?: SOMEWHAT DIFFICULT
6. BECOMING EASILY ANNOYED OR IRRITABLE: SEVERAL DAYS
IF YOU CHECKED OFF ANY PROBLEMS ON THIS QUESTIONNAIRE, HOW DIFFICULT HAVE THESE PROBLEMS MADE IT FOR YOU TO DO YOUR WORK, TAKE CARE OF THINGS AT HOME, OR GET ALONG WITH OTHER PEOPLE: SOMEWHAT DIFFICULT
2. NOT BEING ABLE TO STOP OR CONTROL WORRYING: MORE THAN HALF THE DAYS
7. FEELING AFRAID AS IF SOMETHING AWFUL MIGHT HAPPEN: SEVERAL DAYS
GAD7 TOTAL SCORE: 10

## 2024-01-29 NOTE — PROGRESS NOTES
Preventive Care Visit  [facility] EDWARDMOGRISELDA Reardon PA-C, Family Medicine  Jan 29, 2024       SUBJECTIVE:   Sima is a 39 year old, presenting for the following:  Physical        1/29/2024    10:28 AM   Additional Questions   Roomed by mouna butler cma   Accompanied by self         1/29/2024    10:28 AM   Patient Reported Additional Medications   Patient reports taking the following new medications na     Healthy Habits:     Getting at least 3 servings of Calcium per day:  Yes    Bi-annual eye exam:  Yes    Dental care twice a year:  Yes    Sleep apnea or symptoms of sleep apnea:  None    Diet:  Gluten-free/reduced    Frequency of exercise:  2-3 days/week    Duration of exercise:  Less than 15 minutes    Taking medications regularly:  Yes    Medication side effects:  None    Additional concerns today:  No    Vitamin D level low 4/2022  Not taking vitamin D    Weight loss had stalled with saxenda so plan was to try wegovy. However, she hasn't been able to get wegovy due to medication shortage. Would like to try going back to [facility] for now.    Follows with psychiatry for mood and has been doing well. She has noticed a little increase in anxiety lately. Has visit with her psychiatrist in 1 week. No suicidal ideation. Current medications: viibryd, olanzapine and quetiapine.     Asthma:  How many days per week do you miss taking your asthma controller medication?  She had rx for flovent in the past but usually only uses if she has URI.  Please describe any recent triggers for your asthma: upper respiratory infections  Have you had any Emergency Room Visits, Urgent Care Visits, or Hospital Admissions since your last office visit?  No  Mild intermittent asthma. Rarely needs albuterol. Does not need filled today.      1/27/2023     8:12 AM 10/25/2023     8:48 AM 11/3/2023     8:49 AM   ACT Total Scores   ACT TOTAL SCORE (Goal Greater than or Equal to 20) 25 24 24   In the past 12 months, how many  times did you visit the emergency room for your asthma without being admitted to the hospital? 0 0 0   In the past 12 months, how many times were you hospitalized overnight because of your asthma? 0 0 0         Social History     Tobacco Use     Smoking status: Never     Passive exposure: Never     Smokeless tobacco: Never   Substance Use Topics     Alcohol use: No             1/22/2024     8:46 AM   Alcohol Use   Prescreen: >3 drinks/day or >7 drinks/week? Not Applicable     Reviewed orders with patient.  Reviewed health maintenance and updated orders accordingly - Yes  Lab work is in process  Labs reviewed in EPIC  BP Readings from Last 3 Encounters:   01/29/24 114/66   11/30/23 119/72   11/10/23 110/72    Wt Readings from Last 3 Encounters:   01/29/24 72.6 kg (160 lb)   11/30/23 73 kg (160 lb 14.4 oz)   11/10/23 72.2 kg (159 lb 1.6 oz)                  Patient Active Problem List   Diagnosis     Allergic state     Mild intermittent asthma     AD (atopic dermatitis)     Insomnia due to psychological stress     High risk medication use     Mood disorder (H24)     Anxiety disorder due  Insomnia and lack of sleep for extended period of time      Class 1 obesity with body mass index (BMI) of 31.0 to 31.9 in adult, unspecified obesity type, unspecified whether serious comorbidity present     Past Surgical History:   Procedure Laterality Date     ARTHROSCOPY SHOULDER Bilateral 2008,2009     DAVINCI XI HERNIORRHAPHY INGUINAL Right 11/30/2023    Procedure: ROBOT-ASSISTED RIGHT INGUINAL HERNIORRHAPHY;  Surgeon: Fabiola Gore MD;  Location: [facility] OR     EXCISE MASS UPPER EXTREMITY Left 08/23/2023    Procedure: EXCISION, MASS, UPPER EXTREMITY;  Surgeon: Adeola Paige MD;  Location:  OR     GENITOURINARY SURGERY  05/2019    Interstim placement     GYN SURGERY  07/16/2018     HERNIORRHAPHY UMBILICAL N/A 11/30/2023    Procedure: OPEN UMBILICAL HERNIORRHAPHY;  Surgeon: Fabiola Gore MD;  Location:  OR      LAPAROSCOPIC HYSTERECTOMY TOTAL, SALPINGECTOMY BILATERAL  07/2018     Laparoscopy to look for endometriosis  08/2004     LAPAROTOMY MINI, TUBAL LIGATION (POST PARTUM), COMBINED Bilateral 05/11/2016    Procedure: COMBINED LAPAROTOMY MINI, TUBAL LIGATION (POST PARTUM);  Surgeon: Nannette Shane DO;  Location: [facility] OR     MYRINGOTOMY, INSERT TUBE BILATERAL, COMBINED  as a child     ORTHOPEDIC SURGERY  2008/2009    Shoulder     REMOVE STIMULATOR SACRAL NERVE Left 07/06/2020    Procedure: EXCISE INTERSTEM SACRAL NERVE STIMULATOR BATTERY AND LEAD LINE;  Surgeon: Pooja Lin MD;  Location: [facility] OR     REPAIR, RECTOCELE, VAGINAL APPROACH  07/2023     Septoplasty for deviated septum  07/2005     SINUS SURGERY  2007     TONSILLECTOMY  2006       Social History     Tobacco Use     Smoking status: Never     Passive exposure: Never     Smokeless tobacco: Never   Substance Use Topics     Alcohol use: No     Family History   Problem Relation Age of Onset     Hypertension Mother      Gastrointestinal Disease Mother         Pancreatitis-flare ups often     Lipids Mother         high     Blood Disease Mother      Diabetes Mother      Heart Disease Father      Coronary Artery Disease Father      Bladder Cancer Father      Other Cancer Father         Bladder cancer     Obesity Maternal Grandmother      Coronary Artery Disease Maternal Grandmother      Diabetes Maternal Grandfather         insulin     Hypertension Maternal Grandfather      Obesity Maternal Grandfather      Respiratory Maternal Grandfather         asthma     Prostate Cancer Paternal Grandfather      Other Cancer Paternal Grandfather         Prostate cancer     Allergies Son         Allergic to peanuts, different foods, Amoxicillin     Other Cancer Cousin         Leukemia     Leukemia Paternal Cousin          Current Outpatient Medications   Medication Sig Dispense Refill     albuterol (PROAIR HFA/PROVENTIL HFA/VENTOLIN HFA) 108 (90 Base) MCG/ACT inhaler  "Inhale 2 puffs into the lungs every 6 hours as needed for shortness of breath / dyspnea or wheezing 18 g 4     albuterol (PROVENTIL) (2.5 MG/3ML) 0.083% neb solution Take 1 vial (2.5 mg) by nebulization every 6 hours as needed for shortness of breath / dyspnea or wheezing 90 mL 3     BD PEN NEEDLE ADELE 2ND GEN 32G X 4 MM miscellaneous USE 1 PEN NEEDLES DAILY OR AS DIRECTED. 100 each 3     cetirizine (ZYRTEC) 10 MG tablet Take 10 mg by mouth 2 times daily        estradiol (ESTRACE) 0.1 MG/GM vaginal cream APPLY 1 GRAM INTO THE VAGINA WITH THE APPLICATOR MON-WED-FRI       liraglutide - Weight Management (SAXENDA) 18 MG/3ML pen Inject 0.6 mg Subcutaneous daily for 7 days, THEN 1.2 mg daily for 7 days, THEN 1.8 mg daily for 7 days, THEN 2.4 mg daily for 7 days, THEN 3 mg daily for 62 days. 38 mL 0     OLANZapine (ZYPREXA) 5 MG tablet Take 1 tablet (5 mg) by mouth At Bedtime 90 tablet 0     ondansetron (ZOFRAN) 4 MG tablet Take 1 tablet (4 mg) by mouth every 6 hours as needed for nausea 30 tablet 0     QUEtiapine (SEROQUEL XR) 150 MG TB24 24 hr tablet 25 mg as needed       vilazodone (VIIBRYD) 20 MG TABS tablet Take 20 mg by mouth daily       Allergies   Allergen Reactions     Moxifloxacin Hydrochloride Nausea and Vomiting     Avelox-vomiting     Cats Itching     Codeine GI Disturbance     Darvocet [Propoxyphene N-Apap] Hives     PN: LW Reaction: Rash, Generalized     Demerol Hives     Dog Hair [Dogs] Unknown     Dust Mites Itching and Swelling     Meperidine      PN: LW Reaction: Rash, Generalized     Oxycodone-Acetaminophen      PN: LW Reaction: SHORTNESS OF BREATH     Pollen Extract Itching     Vicodin [Hydrocodone-Acetaminophen] GI Disturbance     PN: LW Reaction: Rash, Generalized     Latex Itching, Swelling and Rash     \"sensitivity\"     Recent Labs   Lab Test 11/10/23  1035 04/07/22  1331 01/28/22  0903 01/28/22  0903 07/28/20  0943 03/01/19  0924 12/17/18  1113 05/03/18  1815   A1C  --   --   --  5.0 5.2 5.4  --  "  --    LDL  --  91  --  95 106*  --    < >  --    HDL  --  40*  --  44* 36*  --    < >  --    TRIG  --  178*  --  205* 206*  --    < >  --    ALT  --  26  --  30 22  --   --   --    CR 0.84 0.69   < > 0.91 0.67  --   --  0.58   GFRESTIMATED 90 >90   < > 82 >90  --   --  >90   GFRESTBLACK  --   --   --   --  >90  --   --  >90   POTASSIUM 4.3 3.4   < > 3.6 4.0  --   --  3.6   TSH  --  0.92  --   --   --  1.02  --  2.24    < > = values in this interval not displayed.        Breast Cancer Screenin/28/2022     7:53 AM   Breast CA Risk Assessment (FHS-7)   Do you have a family history of breast, colon, or ovarian cancer? No / Unknown     Mammogram Screening - Offered annual screening and updated Health Maintenance for [facility] plan based on risk factor consideration  Pertinent mammograms are reviewed under the imaging tab.    History of abnormal Pap smear:       Latest Ref Rng & Units 2022     8:48 AM 2015    12:15 PM 2015    12:00 AM   PAP / HPV   PAP  Negative for Intraepithelial Lesion or Malignancy (NILM)      PAP (Historical)    NIL    HPV 16 DNA Negative Negative  Negative     HPV 18 DNA Negative Negative  Negative     Other HR HPV Negative Negative  Negative       Reviewed and updated as needed this visit by clinical staff   Tobacco  Allergies  Meds              Reviewed and updated as needed this visit by Provider                  Past Medical History:   Diagnosis Date     Allergic rhinitis      Anxiety      Asthma      Chronic maxillary sinusitis 3/28/2005     Chronic tonsillitis 2006     Cyclic vomiting syndrome      Depressive disorder      Diet controlled gestational diabetes mellitus in third trimester 3/5/2016     Dysmenorrhea      Perforated tympanic membrane 2011    left      PIH (pregnancy induced hypertension)      Pneumothorax 2004    from bad coughing     Pregnancy induced hypertension      Syncope       Past Surgical History:   Procedure Laterality Date      ARTHROSCOPY SHOULDER Bilateral ,     DAVINCI XI HERNIORRHAPHY INGUINAL Right 2023    Procedure: ROBOT-ASSISTED RIGHT INGUINAL HERNIORRHAPHY;  Surgeon: Fabiola Gore MD;  Location: [facility] OR     EXCISE MASS UPPER EXTREMITY Left 2023    Procedure: EXCISION, MASS, UPPER EXTREMITY;  Surgeon: Adeola Paige MD;  Location: [facility] OR     GENITOURINARY SURGERY  2019    Interstim placement     GYN SURGERY  2018     HERNIORRHAPHY UMBILICAL N/A 2023    Procedure: OPEN UMBILICAL HERNIORRHAPHY;  Surgeon: Fabiola Gore MD;  Location: [facility] OR     LAPAROSCOPIC HYSTERECTOMY TOTAL, SALPINGECTOMY BILATERAL  2018     Laparoscopy to look for endometriosis  2004     LAPAROTOMY MINI, TUBAL LIGATION (POST PARTUM), COMBINED Bilateral 2016    Procedure: COMBINED LAPAROTOMY MINI, TUBAL LIGATION (POST PARTUM);  Surgeon: Nannette Shane DO;  Location: [facility] OR     MYRINGOTOMY, INSERT TUBE BILATERAL, COMBINED  as a child     ORTHOPEDIC SURGERY      Shoulder     REMOVE STIMULATOR SACRAL NERVE Left 2020    Procedure: EXCISE INTERSTEM SACRAL NERVE STIMULATOR BATTERY AND LEAD LINE;  Surgeon: Pooja Lin MD;  Location: [facility] OR     REPAIR, RECTOCELE, VAGINAL APPROACH  2023     Septoplasty for deviated septum  2005     SINUS SURGERY  2007     TONSILLECTOMY       OB History    Para Term  AB Living   4 3 3 0 1 3   SAB IAB Ectopic Multiple Live Births   1 0 0 0 3      # Outcome Date GA Lbr Damian/2nd Weight Sex Delivery Anes PTL Lv   4 Term 05/10/16 38w4d 03:36 / 00:40 3.05 kg (6 lb 11.6 oz) F Vag-Spont EPI N FEDERICO      Apgar1: 8  Apgar5: 9   3 Term / 37w4d 04:41 / 00:09 3.7 kg (8 lb 2.5 oz) M Vag-Spont EPI N FEDERICO      Name: ROLANGERI CHANDLER      Apgar1: 8  Apgar5: 9   2 Term 04/03/10 38w0d  3.175 kg (7 lb) M  None N FEDERICO      Birth Comments: increased BPs, no preeclampsia      Name: Lul   1 SAB              Review of Systems   Constitutional:   "Negative for chills and fever.   HENT:  Negative for congestion, ear pain, hearing loss and sore throat.    Eyes:  Negative for pain and visual disturbance.   Respiratory:  Negative for cough and shortness of breath.    Cardiovascular:  Negative for chest pain and palpitations.   Gastrointestinal:  Negative for abdominal pain, constipation, diarrhea and nausea.   Genitourinary:  Negative for dysuria, frequency, genital sores, hematuria, pelvic pain, urgency, vaginal bleeding and vaginal discharge.   Musculoskeletal:  Negative for arthralgias, joint swelling and myalgias.   Skin:  Negative for rash.   Neurological:  Negative for dizziness, weakness and headaches.   Psychiatric/Behavioral:  The patient is not nervous/anxious.        OBJECTIVE:   /66   Pulse 79   Temp 97.4  F (36.3  C) (Oral)   Resp 16   Ht 1.562 m (5' 1.5\")   Wt 72.6 kg (160 lb)   LMP 06/27/2018 (Exact Date)   SpO2 98%   BMI 29.74 kg/m     Estimated body mass index is 29.74 kg/m  as calculated from the following:    Height as of this encounter: 1.562 m (5' 1.5\").    Weight as of this encounter: 72.6 kg (160 lb).  Physical Exam  GENERAL: alert and no distress  EYES: Eyes grossly normal to inspection, PERRL and conjunctivae and sclerae normal  HENT: ear canals and TM's normal, nose and mouth without ulcers or lesions  NECK: no adenopathy, no asymmetry, masses, or scars  RESP: lungs clear to auscultation - no rales, rhonchi or wheezes  CV: regular rate and rhythm, normal S1 S2, no S3 or S4, no murmur, click or rub, no peripheral edema  ABDOMEN: soft, nontender, no hepatosplenomegaly, no masses and bowel sounds normal  MS: no gross musculoskeletal defects noted, no edema  SKIN: no suspicious lesions or rashes  NEURO: Normal strength and tone, mentation intact and speech normal  PSYCH: mentation appears normal, affect normal/bright  LYMPH: no cervical, supraclavicular, axillary, or inguinal adenopathy    Diagnostic Test Results:  Labs " "reviewed in Epic    ASSESSMENT/PLAN:   Routine general medical examination at a health care facility  Reviewed personal and family history. Reviewed age appropriate screenings. Reviewed healthy BP and BMI ranges. Counseled on lifestyle modifications for optimal mental and physical health.  Discussed age-appropriate health maintenance. Recommended any needed vaccinations. Continue to focus on well balanced diet and exercise. Declines flu and COVID vaccine.    Overweight (BMI 25.0-29.9)  Weight loss had stalled with saxenda so plan was to try wegovy. However, she hasn't been able to get wegovy due to medication shortage. Would like to try going back to saxenda for now. She tolerated saxenda well in the past without side effects. Follow-up in 3 months to recheck medication/weight.  - liraglutide - Weight Management (SAXENDA) 18 MG/3ML pen; Inject 0.6 mg Subcutaneous daily for 7 days, THEN 1.2 mg daily for 7 days, THEN 1.8 mg daily for 7 days, THEN 2.4 mg daily for 7 days, THEN 3 mg daily for 62 days.    Encounter for screening mammogram for breast cancer  - *MA Screening Digital Bilateral; Future    Lipid screening  She is fasting today  - Lipid panel reflex to direct LDL Fasting; Future  - Lipid panel reflex to direct LDL Fasting    Vitamin D deficiency  Noted in 4/2022. Not taking vitamin D supplement  - Vitamin D Deficiency; Future  - Vitamin D Deficiency    Mood disorder (H24)  Following with psychiatry    Mild intermittent asthma without complication  Doing well. Albuterol PRN rarely. Flovent mostly with URI. Does not need refills today but can call for refills if needed.      Counseling  Reviewed preventive health counseling, as reflected in patient instructions      BMI  Estimated body mass index is 29.74 kg/m  as calculated from the following:    Height as of this encounter: 1.562 m (5' 1.5\").    Weight as of this encounter: 72.6 kg (160 lb).   Weight management plan: Discussed healthy diet and exercise " guidelines      She reports that she has never smoked. She has never been exposed to tobacco smoke. She has never used smokeless tobacco.          Signed Electronically by: Adeola Reardon PA-C  Answers submitted by the patient for this visit:  SLIM-7 (Submitted on 1/29/2024)  SLIM 7 TOTAL SCORE: 10  Annual Preventive Visit (Submitted on 1/22/2024)  Chief Complaint: Annual Exam:  Blood in stool: No  heartburn: No  peripheral edema: No  mood changes: No  Skin sensation changes: No  tenderness: No  breast mass: No  breast discharge: No

## 2024-01-29 NOTE — LETTER
My Asthma Action Plan    Name: Ashlie Matthews   YOB: 1985  Date: 1/29/24  My doctor: Adeola Reardon PA-C   My clinic: [facility]        My Rescue Medicine:   Albuterol inhaler (Proair/Ventolin/Proventil HFA)  2-4 puffs EVERY 4 HOURS as needed. Use a spacer if recommended by your provider.   My Asthma Severity:   Intermittent / Exercise Induced  Know your asthma triggers: upper respiratory infections and exercise or sports  upper respiratory infections          GREEN ZONE   Good Control  I feel good  No cough or wheeze  Can work, sleep and play without asthma symptoms       Take your asthma control medicine every day.     If exercise triggers your asthma, take your rescue medication  15 minutes before exercise or sports, and  During exercise if you have asthma symptoms  Spacer to use with inhaler: If you have a spacer, make sure to use it with your inhaler             YELLOW ZONE Getting Worse  I have ANY of these:  I do not feel good  Cough or wheeze  Chest feels tight  Wake up at night   Keep taking your Green Zone medications  Start taking your rescue medicine:  every 20 minutes for up to 1 hour. Then every 4 hours for 24-48 hours.  If you stay in the Yellow Zone for more than 12-24 hours, contact your doctor.  If you do not return to the Green Zone in 12-24 hours or you get worse, start taking your oral steroid medicine if prescribed by your provider.           RED ZONE Medical Alert - Get Help  I have ANY of these:  I feel awful  Medicine is not helping  Breathing getting harder  Trouble walking or talking  Nose opens wide to breathe       Take your rescue medicine NOW  If your provider has prescribed an oral steroid medicine, start taking it NOW  Call your doctor NOW  If you are still in the Red Zone after 20 minutes and you have not reached your doctor:  Take your rescue medicine again and  Call 911 or go to the emergency room right away    See your regular doctor  within 2 weeks of an Emergency Room or Urgent Care visit for follow-up treatment.          Annual Reminders:  Meet with Asthma Educator,  Flu Shot in the Fall, consider Pneumonia Vaccination for patients with asthma (aged 19 and older).    Pharmacy: [facility] [id number] [place] [facility] [id number]  [address]    Electronically signed by Adeola Reardon PA-C   Date: 1/29/24                    Asthma Triggers  How To Control Things That Make Your Asthma Worse    Triggers are things that make your asthma worse.  Look at the list below to help you find your triggers and   what you can do about them. You can help prevent asthma flare-ups by staying away from your triggers.      Trigger                                                          What you can do   Cigarette Smoke  Tobacco smoke can make asthma worse. Do not allow smoking in your home, car or around you.  Be sure no one smokes at a child s day care or school.  If you smoke, ask your health care provider for ways to help you quit.  Ask family members to quit too.  Ask your health care provider for a referral to Quit Plan to help you quit smoking, or call 3-940-593PLAN.     Colds, Flu, Bronchitis  These are common triggers of asthma. Wash your hands often.  Don t touch your eyes, nose or mouth.  Get a flu shot every year.     Dust Mites  These are tiny bugs that live in cloth or carpet. They are too small to see. Wash sheets and blankets in hot water every week.   Encase pillows and mattress in dust mite proof covers.  Avoid having carpet if you can. If you have carpet, vacuum weekly.   Use a dust mask and HEPA vacuum.   Pollen and Outdoor Mold  Some people are allergic to trees, grass, or weed pollen, or molds. Try to keep your windows closed.  Limit time out doors when pollen count is high.   Ask you health care provider about taking medicine during allergy season.     Animal Dander  Some people are allergic to skin flakes, urine or saliva from pets with  fur or feathers. Keep pets with fur or feathers out of your home.    If you can t keep the pet outdoors, then keep the pet out of your bedroom.  Keep the bedroom door closed.  Keep pets off cloth furniture and away from stuffed toys.     Mice, Rats, and Cockroaches  Some people are allergic to the waste from these pests.   Cover food and garbage.  Clean up spills and food crumbs.  Store grease in the refrigerator.   Keep food out of the bedroom.   Indoor Mold  This can be a trigger if your home has high moisture. Fix leaking faucets, pipes, or other sources of water.   Clean moldy surfaces.  Dehumidify basement if it is damp and smelly.   Smoke, Strong Odors, and Sprays  These can reduce air quality. Stay away from strong odors and sprays, such as perfume, powder, hair spray, paints, smoke incense, paint, cleaning products, candles and new carpet.   Exercise or Sports  Some people with asthma have this trigger. Be active!  Ask your doctor about taking medicine before sports or exercise to prevent symptoms.    Warm up for 5-10 minutes before and after sports or exercise.     Other Triggers of Asthma  Cold air:  Cover your nose and mouth with a scarf.  Sometimes laughing or crying can be a trigger.  Some medicines and food can trigger asthma.

## 2024-01-30 LAB
CHOLEST SERPL-MCNC: 240 MG/DL
FASTING STATUS PATIENT QL REPORTED: YES
HDLC SERPL-MCNC: 52 MG/DL
LDLC SERPL CALC-MCNC: 163 MG/DL
NONHDLC SERPL-MCNC: 188 MG/DL
TRIGL SERPL-MCNC: 125 MG/DL
VIT D+METAB SERPL-MCNC: 21 NG/ML (ref 20–50)

## 2024-01-31 PROBLEM — E78.2 MODERATE MIXED HYPERLIPIDEMIA NOT REQUIRING STATIN THERAPY: Status: ACTIVE | Noted: 2024-01-31

## 2024-02-13 ENCOUNTER — MYC MEDICAL ADVICE (OUTPATIENT)
Dept: FAMILY MEDICINE | Facility: CLINIC | Age: 39
End: 2024-02-13
Payer: COMMERCIAL

## 2024-02-13 DIAGNOSIS — E66.3 OVERWEIGHT (BMI 25.0-29.9): Primary | ICD-10-CM

## 2024-02-21 ENCOUNTER — TELEPHONE (OUTPATIENT)
Dept: FAMILY MEDICINE | Facility: CLINIC | Age: 39
End: 2024-02-21
Payer: COMMERCIAL

## 2024-02-21 NOTE — TELEPHONE ENCOUNTER
Prior Authorization Retail Medication Request    Medication/Dose: Zepbound  Diagnosis and ICD code (if different than what is on RX):    New/renewal/insurance change PA/secondary ins. PA:  Previously Tried and Failed:    Rationale:      Insurance   Primary:   Insurance ID:      Secondary (if applicable):  Insurance ID:      Pharmacy Information (if different than what is on RX)  Name:    Phone:    Fax:    Clinic hasn't received anything that PA is needed, but patient is saying PA is needed.

## 2024-02-21 NOTE — TELEPHONE ENCOUNTER
Zepbound was sent on 2/14/24. I haven't received PA but patient says she was notified that PA is needed. Will forward to PA team. Is PA needed?    Thanks!  Adeola Reardon PA-C

## 2024-02-21 NOTE — TELEPHONE ENCOUNTER
Did you want to send in another option? If PA, Saxenda or Zepbound?    Thank you    Sheela Chavez RN

## 2024-03-03 NOTE — TELEPHONE ENCOUNTER
PA Initiation    Medication: ZEPBOUND 2.5 MG/0.5ML SC SOAJ  Insurance Company: [employer] - Phone [phone] Fax [phone]  Pharmacy Filling the Rx: [facility] [id number] [place], [place] - [address]  Filling Pharmacy Phone: [phone]  Filling Pharmacy Fax: [phone]  Start Date: 3/2/2024         Thank you,     Austin [facility]  Pharmacy Clinic [facility]  [email or web address]   Phone: [phone]  Fax: [phone]

## 2024-03-09 ENCOUNTER — OFFICE VISIT (OUTPATIENT)
Dept: URGENT CARE | Facility: URGENT CARE | Age: 39
End: 2024-03-09
Payer: COMMERCIAL

## 2024-03-09 VITALS
OXYGEN SATURATION: 97 % | TEMPERATURE: 99.4 F | SYSTOLIC BLOOD PRESSURE: 122 MMHG | HEART RATE: 120 BPM | RESPIRATION RATE: 20 BRPM | DIASTOLIC BLOOD PRESSURE: 79 MMHG

## 2024-03-09 DIAGNOSIS — J10.1 INFLUENZA B: Primary | ICD-10-CM

## 2024-03-09 DIAGNOSIS — J02.9 SORE THROAT: ICD-10-CM

## 2024-03-09 LAB
DEPRECATED S PYO AG THROAT QL EIA: NEGATIVE
FLUAV AG SPEC QL IA: NEGATIVE
FLUBV AG SPEC QL IA: POSITIVE
GROUP A STREP BY PCR: NOT DETECTED

## 2024-03-09 PROCEDURE — 99213 OFFICE O/P EST LOW 20 MIN: CPT | Performed by: FAMILY MEDICINE

## 2024-03-09 PROCEDURE — 87804 INFLUENZA ASSAY W/OPTIC: CPT | Performed by: FAMILY MEDICINE

## 2024-03-09 PROCEDURE — 87651 STREP A DNA AMP PROBE: CPT | Performed by: FAMILY MEDICINE

## 2024-03-09 RX ORDER — OSELTAMIVIR PHOSPHATE 75 MG/1
75 CAPSULE ORAL 2 TIMES DAILY
Qty: 10 CAPSULE | Refills: 0 | Status: SHIPPED | OUTPATIENT
Start: 2024-03-09 | End: 2024-03-14

## 2024-03-09 NOTE — PROGRESS NOTES
SUBJECTIVE:   Ashlie Matthews is a 39 year old female presenting with a chief complaint of sore throat.  Developed fever, chills, bodyaches.  No cough  Onset of symptoms was 1 day(s) ago.  Course of illness is worsening.    Severity moderate  Current and Associated symptoms: sore throat, fever, chills  Treatment measures tried include Tylenol/Ibuprofen, Fluids, and Rest.  Predisposing factors include exposure to strep - son.    Home rapid COVID test negative yesterday    Past Medical History:   Diagnosis Date    Allergic rhinitis     Anxiety     Asthma     Chronic maxillary sinusitis 3/28/2005    Chronic tonsillitis 2/21/2006    Cyclic vomiting syndrome     Depressive disorder     Diet controlled gestational diabetes mellitus in third trimester 3/5/2016    Dysmenorrhea     Perforated tympanic membrane 1/26/2011    left     PIH (pregnancy induced hypertension)     Pneumothorax 2004    from bad coughing    Pregnancy induced hypertension     Syncope      Current Outpatient Medications   Medication Sig Dispense Refill    albuterol (PROAIR HFA/PROVENTIL HFA/VENTOLIN HFA) 108 (90 Base) MCG/ACT inhaler Inhale 2 puffs into the lungs every 6 hours as needed for shortness of breath / dyspnea or wheezing 18 g 4    albuterol (PROVENTIL) (2.5 MG/3ML) 0.083% neb solution Take 1 vial (2.5 mg) by nebulization every 6 hours as needed for shortness of breath / dyspnea or wheezing 90 mL 3    cetirizine (ZYRTEC) 10 MG tablet Take 10 mg by mouth 2 times daily       Ibuprofen-Acetaminophen (ADVIL DUAL ACTION PO)       OLANZapine (ZYPREXA) 5 MG tablet Take 1 tablet (5 mg) by mouth At Bedtime 90 tablet 0    ondansetron (ZOFRAN) 4 MG tablet Take 1 tablet (4 mg) by mouth every 6 hours as needed for nausea 30 tablet 0    QUEtiapine (SEROQUEL XR) 150 MG TB24 24 hr tablet 25 mg as needed      vilazodone (VIIBRYD) 20 MG TABS tablet Take 20 mg by mouth daily      BD PEN NEEDLE ADELE 2ND GEN 32G X 4 MM miscellaneous USE 1 PEN NEEDLES DAILY OR AS  DIRECTED. (Patient not taking: Reported on 3/9/2024) 100 each 3    estradiol (ESTRACE) 0.1 MG/GM vaginal cream APPLY 1 GRAM INTO THE VAGINA WITH THE APPLICATOR MON-WED-FRI (Patient not taking: Reported on 3/9/2024)      tirzepatide-Weight Management (ZEPBOUND) 5 MG/0.5ML prefilled pen Inject 0.5 mLs (5 mg) Subcutaneous every 7 days Start AFTER completing 4 weeks of 2.5 mg once weekly (Patient not taking: Reported on 3/9/2024) 2 mL 0     Social History     Tobacco Use    Smoking status: Never     Passive exposure: Never    Smokeless tobacco: Never   Substance Use Topics    Alcohol use: No       ROS:  Review of systems negative except as stated above.    OBJECTIVE:  /79 (BP Location: Right arm, Patient Position: Sitting, Cuff Size: Adult Regular)   Pulse 120   Temp 99.4  F (37.4  C) (Oral)   Resp 20   LMP 06/27/2018 (Exact Date)   SpO2 97%   GENERAL APPEARANCE: healthy, alert and no distress  EYES: EOMI,  PERRL, conjunctiva clear  HENT: Mouth without ulcers, erythema or lesions  RESP: lungs with no audible wheezes or increase work of breathing  PSYCH: mentation appears normal and affect normal/bright    Results for orders placed or performed in visit on 03/09/24   Streptococcus A Rapid Screen w/Reflex to PCR - Clinic Collect     Status: Normal    Specimen: Throat; Swab   Result Value Ref Range    Group A Strep antigen Negative Negative   Influenza A & B Antigen - Clinic Collect     Status: Abnormal    Specimen: Nose; Swab   Result Value Ref Range    Influenza A antigen Negative Negative    Influenza B antigen Positive (A) Negative    Narrative    Test results must be correlated with clinical data. If necessary, results should be confirmed by a molecular assay or viral culture.         ASSESSMENT/PLAN:  (J10.1) Influenza B  (primary encounter diagnosis)  Plan: oseltamivir (TAMIFLU) 75 MG capsule            (J02.9) Sore throat  Plan: Streptococcus A Rapid Screen w/Reflex to PCR -         Clinic Collect, Group  A Streptococcus PCR         Throat Swab, Influenza A & B Antigen - Clinic         Collect            Reassurance given, reviewed symptomatic treatment with tylenol, ibuprofen, plenty of fluids and rest.  RX Tamiflu given for influenza B infection, patient is within the 48 hour window for effective treatment.  Will follow up on throat culture and treat if positive for strep.    Follow up with primary provider if no improvement of symptoms in 1 week    Octavio Abraham MD  March 9, 2024 10:25 AM

## 2024-03-09 NOTE — PATIENT INSTRUCTIONS
Okay to take ibuprofen 200 mg - 4 tablets (800 mg) every 8 hours as needed.  Okay to take tylenol 500 mg - 2 tablets (1000 mg) every 6-8 hours as needed, do not exceed 3000 mg in 24 hours.    Take full course of Tamiflu for influenza B treatment    We will contact you if the throat culture/PCR is positive for strep

## 2024-03-12 NOTE — TELEPHONE ENCOUNTER
Called Jason at [phone] to check status. They stated that they never received the PA. I stated I have confirmation from [facility] it was sent. They said that we would need to re-start it (cannot do on [facility]) so I requested they send a paper copy to me via fax for me to complete. They stated they sent it. Will update encounter once received.      Thank you,     Austin Bass, [facility]  Pharmacy Clinic [facility]  [email or web address]   Phone: [phone]  Fax: [phone]

## 2024-03-12 NOTE — TELEPHONE ENCOUNTER
Faxed completed PA form to Jason at [phone]. Fax confirmed sent.    Thank you,     Austin Bass [facility]  [facility] Clinic [email or web address] [email or web address]   Phone: [phone]  Fax: [phone]

## 2024-03-13 ENCOUNTER — MYC MEDICAL ADVICE (OUTPATIENT)
Dept: FAMILY MEDICINE | Facility: CLINIC | Age: 39
End: 2024-03-13
Payer: COMMERCIAL

## 2024-03-13 NOTE — TELEPHONE ENCOUNTER
Prior Authorization Approval    Medication: ZEPBOUND 2.5 MG/0.5ML SC SOAJ  Authorization Effective Date: 3/13/2024  Authorization Expiration Date: 9/13/2024  Approved Dose/Quantity: 2 ml per 28 days  Reference #: [id number]   Insurance Company: Jason - Phone [phone] Fax [phone]  Expected CoPay: $    CoPay Card Available:      Financial Assistance Needed: No  Which Pharmacy is filling the prescription: [facility] [id number] [address]  Pharmacy Notified: Yes  Patient Notified: Yes **pharmacy will notify pt when ready**            Thank you,     Austin Bass [facility]  Pharmacy Clinic [facility], Increbel [email or web address]   Phone: [phone]  Fax: [phone]

## 2024-03-16 ENCOUNTER — HEALTH MAINTENANCE LETTER (OUTPATIENT)
Age: 39
End: 2024-03-16

## 2024-03-29 ENCOUNTER — ANCILLARY PROCEDURE (OUTPATIENT)
Dept: MAMMOGRAPHY | Facility: CLINIC | Age: 39
End: 2024-03-29
Attending: PHYSICIAN ASSISTANT
Payer: COMMERCIAL

## 2024-03-29 ENCOUNTER — ALLIED HEALTH/NURSE VISIT (OUTPATIENT)
Dept: FAMILY MEDICINE | Facility: CLINIC | Age: 39
End: 2024-03-29
Attending: PHYSICIAN ASSISTANT
Payer: COMMERCIAL

## 2024-03-29 DIAGNOSIS — Z12.31 ENCOUNTER FOR SCREENING MAMMOGRAM FOR BREAST CANCER: ICD-10-CM

## 2024-03-29 DIAGNOSIS — Z23 ENCOUNTER FOR IMMUNIZATION: Primary | ICD-10-CM

## 2024-03-29 PROCEDURE — 99207 PR NO CHARGE NURSE ONLY: CPT

## 2024-03-29 PROCEDURE — 77067 SCR MAMMO BI INCL CAD: CPT | Mod: TC | Performed by: RADIOLOGY

## 2024-03-29 PROCEDURE — 90746 HEPB VACCINE 3 DOSE ADULT IM: CPT

## 2024-03-29 PROCEDURE — 77063 BREAST TOMOSYNTHESIS BI: CPT | Mod: TC | Performed by: RADIOLOGY

## 2024-03-29 PROCEDURE — 90471 IMMUNIZATION ADMIN: CPT

## 2024-03-29 NOTE — PROGRESS NOTES
Prior to immunization administration, verified patients identity using patient s name and date of birth. Please see Immunization Activity for additional information.     Screening Questionnaire for Adult Immunization    Are you sick today?   No   Do you have allergies to medications, food, a vaccine component or latex?   No   Have you ever had a serious reaction after receiving a vaccination?   No   Do you have a long-term health problem with heart, lung, kidney, or metabolic disease (e.g., diabetes), asthma, a blood disorder, no spleen, complement component deficiency, a cochlear implant, or a spinal fluid leak?  Are you on long-term aspirin therapy?   No   Do you have cancer, leukemia, HIV/AIDS, or any other immune system problem?   No   Do you have a parent, brother, or sister with an immune system problem?   No   In the past 3 months, have you taken medications that affect  your immune system, such as prednisone, other steroids, or anticancer drugs; drugs for the treatment of rheumatoid arthritis, Crohn s disease, or psoriasis; or have you had radiation treatments?   No   Have you had a seizure, or a brain or other nervous system problem?   No   During the past year, have you received a transfusion of blood or blood    products, or been given immune (gamma) globulin or antiviral drug?   No   For women: Are you pregnant or is there a chance you could become       pregnant during the next month?   No   Have you received any vaccinations in the past 4 weeks?   No     Immunization questionnaire answers were all negative.    I have reviewed the following standing orders:   This patient is due and qualifies for the Hepatitis B vaccine.    Click here for Hepatitis B Standing Order    I have reviewed the vaccines inclusion and exclusion criteria; No concerns regarding eligibility.     Patient instructed to remain in clinic for 15 minutes afterwards, and to report any adverse reactions.     Screening performed by Vianney  SIMON Ramos on 3/29/2024 at 7:52 AM.      Prior to immunization administration, verified patients identity using patient s name and date of birth. Please see Immunization Activity for additional information.     Screening Questionnaire for Adult Immunization    Are you sick today?   No   Do you have allergies to medications, food, a vaccine component or latex?   No   Have you ever had a serious reaction after receiving a vaccination?   No   Do you have a long-term health problem with heart, lung, kidney, or metabolic disease (e.g., diabetes), asthma, a blood disorder, no spleen, complement component deficiency, a cochlear implant, or a spinal fluid leak?  Are you on long-term aspirin therapy?   No   Do you have cancer, leukemia, HIV/AIDS, or any other immune system problem?   No   Do you have a parent, brother, or sister with an immune system problem?   No   In the past 3 months, have you taken medications that affect  your immune system, such as prednisone, other steroids, or anticancer drugs; drugs for the treatment of rheumatoid arthritis, Crohn s disease, or psoriasis; or have you had radiation treatments?   No   Have you had a seizure, or a brain or other nervous system problem?   No   During the past year, have you received a transfusion of blood or blood    products, or been given immune (gamma) globulin or antiviral drug?   No   For women: Are you pregnant or is there a chance you could become       pregnant during the next month?   No   Have you received any vaccinations in the past 4 weeks?   No     Immunization questionnaire answers were all negative.    I have reviewed the following standing orders:   This patient is due and qualifies for the Hepatitis B vaccine.    Click here for Hepatitis B Standing Order    I have reviewed the vaccines inclusion and exclusion criteria; No concerns regarding eligibility.     Patient instructed to remain in clinic for 15 minutes afterwards, and to report any adverse  reactions.     Screening performed by Vianney Ramos MA on 3/29/2024 at 7:52 AM.

## 2024-04-06 ENCOUNTER — MYC MEDICAL ADVICE (OUTPATIENT)
Dept: FAMILY MEDICINE | Facility: CLINIC | Age: 39
End: 2024-04-06
Payer: COMMERCIAL

## 2024-04-06 DIAGNOSIS — E66.3 OVERWEIGHT (BMI 25.0-29.9): ICD-10-CM

## 2024-04-08 RX ORDER — TIRZEPATIDE 2.5 MG/.5ML
INJECTION, SOLUTION SUBCUTANEOUS
Qty: 2 ML | Refills: 0 | Status: SHIPPED | OUTPATIENT
Start: 2024-04-08

## 2024-04-08 NOTE — TELEPHONE ENCOUNTER
5 mg dose on back order so will send 2.5 mg dose to continue until 5 mg dose is available.    Adeola Reardon PA-C

## 2024-12-14 ENCOUNTER — E-VISIT (OUTPATIENT)
Dept: URGENT CARE | Facility: CLINIC | Age: 39
End: 2024-12-14

## 2024-12-14 DIAGNOSIS — J06.9 ACUTE UPPER RESPIRATORY INFECTION, UNSPECIFIED: Primary | ICD-10-CM

## 2024-12-14 NOTE — PATIENT INSTRUCTIONS
Dear Sima,    After reviewing your responses, I would like you to come in for a swab to make sure we treat you correctly. This swab is to evaluate you for possible COVID, Flu, and Strep Throat, and should be scheduled for today or tomorrow. Please use the Schedule Now button in [employer] to schedule your swab. Otherwise, click this link to schedule a lab only appointment.    Lab appointments are not available at most locations on the weekends. If no Lab Only appointment is available, you should be seen in any of our convenient Urgent Care Centers for an in person visit, which can be found on our website here.    You will receive instructions with your results in [employer] once they are available.     If your symptoms worsen, you develop difficulty breathing, difficulty with drinking enough to stay hydrated, or fevers for more than 5 days, please contact your primary care provider for an appointment or visit an Urgent Care Center to be seen.      Thanks again for choosing us as your health care partner.   Uyen Santoyo, CNP

## 2024-12-30 ENCOUNTER — PATIENT OUTREACH (OUTPATIENT)
Dept: CARE COORDINATION | Facility: CLINIC | Age: 39
End: 2024-12-30
Payer: COMMERCIAL

## 2025-01-13 ENCOUNTER — PATIENT OUTREACH (OUTPATIENT)
Dept: CARE COORDINATION | Facility: CLINIC | Age: 40
End: 2025-01-13
Payer: COMMERCIAL

## 2025-03-22 ENCOUNTER — HEALTH MAINTENANCE LETTER (OUTPATIENT)
Age: 40
End: 2025-03-22

## (undated) DEVICE — Device

## (undated) DEVICE — LINEN HALF SHEET 5512

## (undated) DEVICE — GLOVE PROTEXIS W/NEU-THERA 7.5  2D73TE75

## (undated) DEVICE — DAVINCI XI DRAPE ARM 470015

## (undated) DEVICE — SOL WATER IRRIG 1000ML BOTTLE 2F7114

## (undated) DEVICE — LINEN ORTHO ACL PACK 5447

## (undated) DEVICE — GOWN XLG DISP 9545

## (undated) DEVICE — ESU GROUND PAD ADULT W/CORD E7507

## (undated) DEVICE — PACK MINOR CUSTOM RIDGES SBA32RMRMA

## (undated) DEVICE — GLOVE BIOGEL PI MICRO INDICATOR UNDERGLOVE SZ 7.0 48970

## (undated) DEVICE — PREP CHLORAPREP 26ML TINTED HI-LITE ORANGE 930815

## (undated) DEVICE — TUBING SMOKE EVAC ATTACHMENT E3590

## (undated) DEVICE — SU VICRYL 3-0 SH 27" UND J416H

## (undated) DEVICE — LINEN TOWEL PACK X10 5473

## (undated) DEVICE — BAG CLEAR TRASH 1.3M 39X33" P4040C

## (undated) DEVICE — SU WND CLOSURE VLOC 180 ABS 3-0 6" V-20 VLOCL0604

## (undated) DEVICE — GLOVE BIOGEL PI MICRO INDICATOR UNDERGLOVE SZ 6.5 48965

## (undated) DEVICE — GLOVE BIOGEL PI MICRO SZ 6.5 48565

## (undated) DEVICE — SU VICRYL 0 UR-6 27" J603H

## (undated) DEVICE — DAVINCI XI OBTURATOR BLADELESS 8MM 470359

## (undated) DEVICE — SPONGE RAY-TEC 4X8" 7318

## (undated) DEVICE — SYR 10ML LL W/O NDL 302995

## (undated) DEVICE — NDL 22GA 1.5"

## (undated) DEVICE — NDL 18GA 1.5" 305196

## (undated) DEVICE — DAVINCI XI SEAL UNIVERSAL 5-8MM 470361

## (undated) DEVICE — SOL ADH LIQUID BENZOIN SWAB 0.6ML C1544

## (undated) DEVICE — DRAPE MAYO STAND 23X54 8337

## (undated) DEVICE — DRSG GAUZE 4X4" 8044

## (undated) DEVICE — SYR BULB IRRIG 50ML LATEX FREE 0035280

## (undated) DEVICE — ESU PENCIL W/HOLSTER E2350H

## (undated) DEVICE — DAVINCI HOT SHEARS TIP COVER  400180

## (undated) DEVICE — SU PDS II 0 CT-2 27" Z334H

## (undated) DEVICE — SOL NACL 0.9% IRRIG 1000ML BOTTLE 2F7124

## (undated) DEVICE — SU DERMABOND ADVANCED .7ML DNX12

## (undated) DEVICE — KIT CULTURE ESWAB AEROBE/ANAEROBE WHITE TOP R723480

## (undated) DEVICE — SYR 03ML SLIP TIP W/O NDL

## (undated) DEVICE — LUBRICANT INST ELECTROLUBE EL101

## (undated) DEVICE — LINEN FULL SHEET 5511

## (undated) DEVICE — GLOVE PROTEXIS POWDER FREE 7.5 ORTHOPEDIC 2D73ET75

## (undated) DEVICE — SU VICRYL 4-0 PS-2 18" UND J496H

## (undated) DEVICE — DRAPE LAP PEDS DISP 29492

## (undated) DEVICE — DRSG TEGADERM 4X4 3/4" 1626W

## (undated) DEVICE — SPONGE RAY-TEC 3X3" 30-094

## (undated) DEVICE — ESU ELEC BLADE 2.75" COATED/INSULATED E1455

## (undated) DEVICE — SU ETHILON 2-0 PS 18" 585H

## (undated) DEVICE — DRSG COTTON BALL 6PK LCB62

## (undated) DEVICE — PREP DURAPREP 26ML APL 8630

## (undated) DEVICE — BLADE KNIFE SURG 11 371111

## (undated) RX ORDER — BUPIVACAINE HYDROCHLORIDE 5 MG/ML
INJECTION, SOLUTION EPIDURAL; INTRACAUDAL
Status: DISPENSED
Start: 2023-11-30

## (undated) RX ORDER — PROPOFOL 10 MG/ML
INJECTION, EMULSION INTRAVENOUS
Status: DISPENSED
Start: 2023-11-30

## (undated) RX ORDER — FENTANYL CITRATE 50 UG/ML
INJECTION, SOLUTION INTRAMUSCULAR; INTRAVENOUS
Status: DISPENSED
Start: 2023-11-30

## (undated) RX ORDER — FENTANYL CITRATE 50 UG/ML
INJECTION, SOLUTION INTRAMUSCULAR; INTRAVENOUS
Status: DISPENSED
Start: 2020-07-06

## (undated) RX ORDER — PROPOFOL 10 MG/ML
INJECTION, EMULSION INTRAVENOUS
Status: DISPENSED
Start: 2020-07-06

## (undated) RX ORDER — SCOLOPAMINE TRANSDERMAL SYSTEM 1 MG/1
PATCH, EXTENDED RELEASE TRANSDERMAL
Status: DISPENSED
Start: 2023-11-30

## (undated) RX ORDER — GLYCOPYRROLATE 0.2 MG/ML
INJECTION, SOLUTION INTRAMUSCULAR; INTRAVENOUS
Status: DISPENSED
Start: 2023-11-30

## (undated) RX ORDER — ACETAMINOPHEN 325 MG/1
TABLET ORAL
Status: DISPENSED
Start: 2023-11-30

## (undated) RX ORDER — ONDANSETRON 2 MG/ML
INJECTION INTRAMUSCULAR; INTRAVENOUS
Status: DISPENSED
Start: 2023-11-30

## (undated) RX ORDER — SCOLOPAMINE TRANSDERMAL SYSTEM 1 MG/1
PATCH, EXTENDED RELEASE TRANSDERMAL
Status: DISPENSED
Start: 2020-07-06

## (undated) RX ORDER — LIDOCAINE HYDROCHLORIDE AND EPINEPHRINE 10; 10 MG/ML; UG/ML
INJECTION, SOLUTION INFILTRATION; PERINEURAL
Status: DISPENSED
Start: 2020-07-06

## (undated) RX ORDER — CEFAZOLIN SODIUM 2 G/100ML
INJECTION, SOLUTION INTRAVENOUS
Status: DISPENSED
Start: 2020-07-06

## (undated) RX ORDER — BUPIVACAINE HYDROCHLORIDE 5 MG/ML
INJECTION, SOLUTION EPIDURAL; INTRACAUDAL
Status: DISPENSED
Start: 2020-07-06

## (undated) RX ORDER — LIDOCAINE HYDROCHLORIDE 10 MG/ML
INJECTION, SOLUTION EPIDURAL; INFILTRATION; INTRACAUDAL; PERINEURAL
Status: DISPENSED
Start: 2023-11-30

## (undated) RX ORDER — DEXAMETHASONE SODIUM PHOSPHATE 4 MG/ML
INJECTION, SOLUTION INTRA-ARTICULAR; INTRALESIONAL; INTRAMUSCULAR; INTRAVENOUS; SOFT TISSUE
Status: DISPENSED
Start: 2020-07-06

## (undated) RX ORDER — VANCOMYCIN HYDROCHLORIDE 1 G/20ML
INJECTION, POWDER, LYOPHILIZED, FOR SOLUTION INTRAVENOUS
Status: DISPENSED
Start: 2020-07-06

## (undated) RX ORDER — CEFAZOLIN SODIUM/WATER 2 G/20 ML
SYRINGE (ML) INTRAVENOUS
Status: DISPENSED
Start: 2023-11-30

## (undated) RX ORDER — DEXAMETHASONE SODIUM PHOSPHATE 4 MG/ML
INJECTION, SOLUTION INTRA-ARTICULAR; INTRALESIONAL; INTRAMUSCULAR; INTRAVENOUS; SOFT TISSUE
Status: DISPENSED
Start: 2023-11-30

## (undated) RX ORDER — LIDOCAINE HYDROCHLORIDE 10 MG/ML
INJECTION, SOLUTION EPIDURAL; INFILTRATION; INTRACAUDAL; PERINEURAL
Status: DISPENSED
Start: 2020-07-06

## (undated) RX ORDER — GLYCOPYRROLATE 0.2 MG/ML
INJECTION INTRAMUSCULAR; INTRAVENOUS
Status: DISPENSED
Start: 2020-07-06